# Patient Record
Sex: MALE | Race: WHITE | NOT HISPANIC OR LATINO | Employment: OTHER | URBAN - METROPOLITAN AREA
[De-identification: names, ages, dates, MRNs, and addresses within clinical notes are randomized per-mention and may not be internally consistent; named-entity substitution may affect disease eponyms.]

---

## 2017-03-30 RX ORDER — TRAMADOL HYDROCHLORIDE 50 MG/1
50 TABLET ORAL
Status: ON HOLD | COMMUNITY
End: 2017-04-05

## 2017-03-30 RX ORDER — LOSARTAN POTASSIUM 50 MG/1
50 TABLET ORAL EVERY MORNING
Status: ON HOLD | COMMUNITY
End: 2019-02-22 | Stop reason: CLARIF

## 2017-03-30 RX ORDER — ACARBOSE 25 MG/1
25 TABLET ORAL
Status: ON HOLD | COMMUNITY
End: 2019-02-22 | Stop reason: CLARIF

## 2017-03-30 RX ORDER — MELOXICAM 7.5 MG/1
7.5 TABLET ORAL 2 TIMES DAILY
Status: ON HOLD | COMMUNITY
End: 2017-04-05

## 2017-03-30 RX ORDER — ACETAMINOPHEN 325 MG/1
325-650 TABLET ORAL PRN
Status: ON HOLD | COMMUNITY
End: 2019-02-22 | Stop reason: CLARIF

## 2017-04-04 ENCOUNTER — APPOINTMENT (OUTPATIENT)
Dept: RADIOLOGY | Facility: MEDICAL CENTER | Age: 62
DRG: 497 | End: 2017-04-04
Attending: NEUROLOGICAL SURGERY
Payer: COMMERCIAL

## 2017-04-04 ENCOUNTER — HOSPITAL ENCOUNTER (INPATIENT)
Facility: MEDICAL CENTER | Age: 62
LOS: 1 days | DRG: 497 | End: 2017-04-05
Attending: NEUROLOGICAL SURGERY | Admitting: NEUROLOGICAL SURGERY
Payer: COMMERCIAL

## 2017-04-04 PROBLEM — M48.061 SPINAL STENOSIS, LUMBAR REGION, WITHOUT NEUROGENIC CLAUDICATION: Status: ACTIVE | Noted: 2017-04-04

## 2017-04-04 LAB — GLUCOSE BLD-MCNC: 107 MG/DL (ref 65–99)

## 2017-04-04 PROCEDURE — 160036 HCHG PACU - EA ADDL 30 MINS PHASE I: Performed by: NEUROLOGICAL SURGERY

## 2017-04-04 PROCEDURE — 160002 HCHG RECOVERY MINUTES (STAT): Performed by: NEUROLOGICAL SURGERY

## 2017-04-04 PROCEDURE — 770001 HCHG ROOM/CARE - MED/SURG/GYN PRIV*

## 2017-04-04 PROCEDURE — 160048 HCHG OR STATISTICAL LEVEL 1-5: Performed by: NEUROLOGICAL SURGERY

## 2017-04-04 PROCEDURE — 500122 HCHG BOVIE, BLADE: Performed by: NEUROLOGICAL SURGERY

## 2017-04-04 PROCEDURE — 72020 X-RAY EXAM OF SPINE 1 VIEW: CPT

## 2017-04-04 PROCEDURE — 01NB0ZZ RELEASE LUMBAR NERVE, OPEN APPROACH: ICD-10-PCS | Performed by: NEUROLOGICAL SURGERY

## 2017-04-04 PROCEDURE — 160009 HCHG ANES TIME/MIN: Performed by: NEUROLOGICAL SURGERY

## 2017-04-04 PROCEDURE — A9270 NON-COVERED ITEM OR SERVICE: HCPCS

## 2017-04-04 PROCEDURE — 700102 HCHG RX REV CODE 250 W/ 637 OVERRIDE(OP)

## 2017-04-04 PROCEDURE — 160031 HCHG SURGERY MINUTES - 1ST 30 MINS LEVEL 5: Performed by: NEUROLOGICAL SURGERY

## 2017-04-04 PROCEDURE — 500367 HCHG DRAIN KIT, HEMOVAC: Performed by: NEUROLOGICAL SURGERY

## 2017-04-04 PROCEDURE — 501423 HCHG SPONGE, SURGIFOAM 12X7: Performed by: NEUROLOGICAL SURGERY

## 2017-04-04 PROCEDURE — A4314 CATH W/DRAINAGE 2-WAY LATEX: HCPCS | Performed by: NEUROLOGICAL SURGERY

## 2017-04-04 PROCEDURE — 700111 HCHG RX REV CODE 636 W/ 250 OVERRIDE (IP)

## 2017-04-04 PROCEDURE — 502626 HCHG SURGIFLO HEMOSTATIC MATRIX 6ML: Performed by: NEUROLOGICAL SURGERY

## 2017-04-04 PROCEDURE — 502240 HCHG MISC OR SUPPLY RC 0272: Performed by: NEUROLOGICAL SURGERY

## 2017-04-04 PROCEDURE — A9270 NON-COVERED ITEM OR SERVICE: HCPCS | Performed by: NURSE PRACTITIONER

## 2017-04-04 PROCEDURE — 500881 HCHG PACK, EXTREMITY: Performed by: NEUROLOGICAL SURGERY

## 2017-04-04 PROCEDURE — A6404 STERILE GAUZE > 48 SQ IN: HCPCS | Performed by: NEUROLOGICAL SURGERY

## 2017-04-04 PROCEDURE — 500444 HCHG HEMOSTAT, SURGICEL 2X3: Performed by: NEUROLOGICAL SURGERY

## 2017-04-04 PROCEDURE — 160035 HCHG PACU - 1ST 60 MINS PHASE I: Performed by: NEUROLOGICAL SURGERY

## 2017-04-04 PROCEDURE — 700105 HCHG RX REV CODE 258: Performed by: NURSE PRACTITIONER

## 2017-04-04 PROCEDURE — 500331 HCHG COTTONOID, SURG PATTIE: Performed by: NEUROLOGICAL SURGERY

## 2017-04-04 PROCEDURE — 0QP004Z REMOVAL OF INTERNAL FIXATION DEVICE FROM LUMBAR VERTEBRA, OPEN APPROACH: ICD-10-PCS | Performed by: NEUROLOGICAL SURGERY

## 2017-04-04 PROCEDURE — A6222 GAUZE <=16 IN NO W/SAL W/O B: HCPCS | Performed by: NEUROLOGICAL SURGERY

## 2017-04-04 PROCEDURE — 500105 HCHG BONE MILL BM200: Performed by: NEUROLOGICAL SURGERY

## 2017-04-04 PROCEDURE — 700101 HCHG RX REV CODE 250

## 2017-04-04 PROCEDURE — 110371 HCHG SHELL REV 272: Performed by: NEUROLOGICAL SURGERY

## 2017-04-04 PROCEDURE — 501899 HCHG DURASEAL SEALANT SYSTEM 5ML: Performed by: NEUROLOGICAL SURGERY

## 2017-04-04 PROCEDURE — 700112 HCHG RX REV CODE 229: Performed by: NURSE PRACTITIONER

## 2017-04-04 PROCEDURE — A4606 OXYGEN PROBE USED W OXIMETER: HCPCS | Performed by: NEUROLOGICAL SURGERY

## 2017-04-04 PROCEDURE — 160042 HCHG SURGERY MINUTES - EA ADDL 1 MIN LEVEL 5: Performed by: NEUROLOGICAL SURGERY

## 2017-04-04 PROCEDURE — 500512 HCHG ENDO PEANUT: Performed by: NEUROLOGICAL SURGERY

## 2017-04-04 PROCEDURE — 500885 HCHG PACK, JACKSON TABLE: Performed by: NEUROLOGICAL SURGERY

## 2017-04-04 PROCEDURE — 82962 GLUCOSE BLOOD TEST: CPT

## 2017-04-04 PROCEDURE — 110382 HCHG SHELL REV 271: Performed by: NEUROLOGICAL SURGERY

## 2017-04-04 PROCEDURE — 503195 HCHG SEALER, BIPOLAR AQUAMANTYS: Performed by: NEUROLOGICAL SURGERY

## 2017-04-04 PROCEDURE — 501838 HCHG SUTURE GENERAL: Performed by: NEUROLOGICAL SURGERY

## 2017-04-04 PROCEDURE — 700102 HCHG RX REV CODE 250 W/ 637 OVERRIDE(OP): Performed by: NURSE PRACTITIONER

## 2017-04-04 DEVICE — DURASEAL SEALANT SYSTEM 5ML - (5/BX): Type: IMPLANTABLE DEVICE | Status: FUNCTIONAL

## 2017-04-04 RX ORDER — BACITRACIN ZINC 500 [USP'U]/G
OINTMENT TOPICAL
Status: DISCONTINUED | OUTPATIENT
Start: 2017-04-04 | End: 2017-04-04 | Stop reason: HOSPADM

## 2017-04-04 RX ORDER — POLYETHYLENE GLYCOL 3350 17 G/17G
1 POWDER, FOR SOLUTION ORAL 2 TIMES DAILY PRN
Status: DISCONTINUED | OUTPATIENT
Start: 2017-04-04 | End: 2017-04-05 | Stop reason: HOSPADM

## 2017-04-04 RX ORDER — ACARBOSE 50 MG/1
25 TABLET ORAL
Status: DISCONTINUED | OUTPATIENT
Start: 2017-04-05 | End: 2017-04-05 | Stop reason: HOSPADM

## 2017-04-04 RX ORDER — OXYMETAZOLINE HYDROCHLORIDE 0.05 G/100ML
2 SPRAY NASAL 2 TIMES DAILY PRN
Status: DISCONTINUED | OUTPATIENT
Start: 2017-04-04 | End: 2017-04-05 | Stop reason: HOSPADM

## 2017-04-04 RX ORDER — OXYCODONE HYDROCHLORIDE 5 MG/1
10-20 TABLET ORAL EVERY 4 HOURS PRN
Status: DISCONTINUED | OUTPATIENT
Start: 2017-04-04 | End: 2017-04-04

## 2017-04-04 RX ORDER — LIDOCAINE HYDROCHLORIDE 10 MG/ML
INJECTION, SOLUTION INFILTRATION; PERINEURAL
Status: COMPLETED
Start: 2017-04-04 | End: 2017-04-04

## 2017-04-04 RX ORDER — OXYCODONE HYDROCHLORIDE 10 MG/1
10 TABLET ORAL EVERY 4 HOURS PRN
Status: DISCONTINUED | OUTPATIENT
Start: 2017-04-04 | End: 2017-04-05 | Stop reason: HOSPADM

## 2017-04-04 RX ORDER — SODIUM CHLORIDE, SODIUM LACTATE, POTASSIUM CHLORIDE, CALCIUM CHLORIDE 600; 310; 30; 20 MG/100ML; MG/100ML; MG/100ML; MG/100ML
1000 INJECTION, SOLUTION INTRAVENOUS
Status: COMPLETED | OUTPATIENT
Start: 2017-04-04 | End: 2017-04-04

## 2017-04-04 RX ORDER — AMOXICILLIN 250 MG
1 CAPSULE ORAL
Status: DISCONTINUED | OUTPATIENT
Start: 2017-04-04 | End: 2017-04-05 | Stop reason: HOSPADM

## 2017-04-04 RX ORDER — METHOCARBAMOL 750 MG/1
750 TABLET, FILM COATED ORAL EVERY 8 HOURS PRN
Status: DISCONTINUED | OUTPATIENT
Start: 2017-04-04 | End: 2017-04-05 | Stop reason: HOSPADM

## 2017-04-04 RX ORDER — DOCUSATE SODIUM 100 MG/1
100 CAPSULE, LIQUID FILLED ORAL 2 TIMES DAILY
Status: DISCONTINUED | OUTPATIENT
Start: 2017-04-04 | End: 2017-04-05 | Stop reason: HOSPADM

## 2017-04-04 RX ORDER — DIPHENHYDRAMINE HYDROCHLORIDE 50 MG/ML
25 INJECTION INTRAMUSCULAR; INTRAVENOUS EVERY 6 HOURS PRN
Status: DISCONTINUED | OUTPATIENT
Start: 2017-04-04 | End: 2017-04-05 | Stop reason: HOSPADM

## 2017-04-04 RX ORDER — SODIUM CHLORIDE 9 MG/ML
INJECTION, SOLUTION INTRAVENOUS CONTINUOUS
Status: DISCONTINUED | OUTPATIENT
Start: 2017-04-04 | End: 2017-04-05 | Stop reason: HOSPADM

## 2017-04-04 RX ORDER — AMOXICILLIN 250 MG
1 CAPSULE ORAL NIGHTLY
Status: DISCONTINUED | OUTPATIENT
Start: 2017-04-04 | End: 2017-04-05 | Stop reason: HOSPADM

## 2017-04-04 RX ORDER — ACETAMINOPHEN 500 MG
1000 TABLET ORAL EVERY 6 HOURS
Status: DISCONTINUED | OUTPATIENT
Start: 2017-04-04 | End: 2017-04-05 | Stop reason: HOSPADM

## 2017-04-04 RX ORDER — CALCIUM CARBONATE 500 MG/1
500 TABLET, CHEWABLE ORAL 2 TIMES DAILY
Status: DISCONTINUED | OUTPATIENT
Start: 2017-04-04 | End: 2017-04-05 | Stop reason: HOSPADM

## 2017-04-04 RX ORDER — BACITRACIN 50000 [IU]/1
INJECTION, POWDER, FOR SOLUTION INTRAMUSCULAR
Status: DISCONTINUED | OUTPATIENT
Start: 2017-04-04 | End: 2017-04-04 | Stop reason: HOSPADM

## 2017-04-04 RX ORDER — ENEMA 19; 7 G/133ML; G/133ML
1 ENEMA RECTAL
Status: DISCONTINUED | OUTPATIENT
Start: 2017-04-04 | End: 2017-04-05 | Stop reason: HOSPADM

## 2017-04-04 RX ORDER — BUPIVACAINE HYDROCHLORIDE AND EPINEPHRINE 5; 5 MG/ML; UG/ML
INJECTION, SOLUTION EPIDURAL; INTRACAUDAL; PERINEURAL
Status: DISCONTINUED | OUTPATIENT
Start: 2017-04-04 | End: 2017-04-04 | Stop reason: HOSPADM

## 2017-04-04 RX ORDER — DEXTROSE MONOHYDRATE 25 G/50ML
25 INJECTION, SOLUTION INTRAVENOUS
Status: DISCONTINUED | OUTPATIENT
Start: 2017-04-04 | End: 2017-04-05 | Stop reason: HOSPADM

## 2017-04-04 RX ORDER — BISACODYL 10 MG
10 SUPPOSITORY, RECTAL RECTAL
Status: DISCONTINUED | OUTPATIENT
Start: 2017-04-04 | End: 2017-04-05 | Stop reason: HOSPADM

## 2017-04-04 RX ORDER — ONDANSETRON 4 MG/1
4 TABLET, ORALLY DISINTEGRATING ORAL EVERY 4 HOURS PRN
Status: DISCONTINUED | OUTPATIENT
Start: 2017-04-04 | End: 2017-04-05 | Stop reason: HOSPADM

## 2017-04-04 RX ORDER — ZOLPIDEM TARTRATE 5 MG/1
5 TABLET ORAL NIGHTLY PRN
Status: DISCONTINUED | OUTPATIENT
Start: 2017-04-04 | End: 2017-04-05 | Stop reason: HOSPADM

## 2017-04-04 RX ORDER — PROMETHAZINE HYDROCHLORIDE 25 MG/1
12.5-25 TABLET ORAL EVERY 8 HOURS PRN
Status: DISCONTINUED | OUTPATIENT
Start: 2017-04-04 | End: 2017-04-05 | Stop reason: HOSPADM

## 2017-04-04 RX ORDER — OXYCODONE HYDROCHLORIDE 10 MG/1
20 TABLET ORAL EVERY 4 HOURS PRN
Status: DISCONTINUED | OUTPATIENT
Start: 2017-04-04 | End: 2017-04-05 | Stop reason: HOSPADM

## 2017-04-04 RX ORDER — LOSARTAN POTASSIUM 50 MG/1
50 TABLET ORAL EVERY MORNING
Status: DISCONTINUED | OUTPATIENT
Start: 2017-04-06 | End: 2017-04-05 | Stop reason: HOSPADM

## 2017-04-04 RX ORDER — DIPHENHYDRAMINE HCL 25 MG
25 TABLET ORAL EVERY 6 HOURS PRN
Status: DISCONTINUED | OUTPATIENT
Start: 2017-04-04 | End: 2017-04-05 | Stop reason: HOSPADM

## 2017-04-04 RX ORDER — BUPIVACAINE HYDROCHLORIDE AND EPINEPHRINE 2.5; 5 MG/ML; UG/ML
INJECTION, SOLUTION INFILTRATION; PERINEURAL
Status: DISCONTINUED | OUTPATIENT
Start: 2017-04-04 | End: 2017-04-04 | Stop reason: HOSPADM

## 2017-04-04 RX ORDER — ONDANSETRON 2 MG/ML
4 INJECTION INTRAMUSCULAR; INTRAVENOUS EVERY 4 HOURS PRN
Status: DISCONTINUED | OUTPATIENT
Start: 2017-04-04 | End: 2017-04-05 | Stop reason: HOSPADM

## 2017-04-04 RX ORDER — OXYCODONE HCL 5 MG/5 ML
SOLUTION, ORAL ORAL
Status: COMPLETED
Start: 2017-04-04 | End: 2017-04-04

## 2017-04-04 RX ORDER — CEFAZOLIN SODIUM 2 G/100ML
2 INJECTION, SOLUTION INTRAVENOUS EVERY 8 HOURS
Status: COMPLETED | OUTPATIENT
Start: 2017-04-05 | End: 2017-04-05

## 2017-04-04 RX ADMIN — SODIUM CHLORIDE, SODIUM LACTATE, POTASSIUM CHLORIDE, CALCIUM CHLORIDE 1000 ML: 600; 310; 30; 20 INJECTION, SOLUTION INTRAVENOUS at 15:05

## 2017-04-04 RX ADMIN — OXYCODONE HYDROCHLORIDE 5 MG: 5 SOLUTION ORAL at 19:50

## 2017-04-04 RX ADMIN — LIDOCAINE HYDROCHLORIDE: 10 INJECTION, SOLUTION INFILTRATION; PERINEURAL at 15:05

## 2017-04-04 RX ADMIN — SODIUM CHLORIDE: 9 INJECTION, SOLUTION INTRAVENOUS at 22:02

## 2017-04-04 RX ADMIN — METHOCARBAMOL 750 MG: 750 TABLET ORAL at 23:11

## 2017-04-04 RX ADMIN — VITAMIN D, TAB 1000IU (100/BT) 5000 UNITS: 25 TAB at 22:02

## 2017-04-04 RX ADMIN — ANTACID TABLETS 500 MG: 500 TABLET, CHEWABLE ORAL at 22:03

## 2017-04-04 RX ADMIN — ACETAMINOPHEN 1000 MG: 500 TABLET, FILM COATED ORAL at 22:03

## 2017-04-04 RX ADMIN — DOCUSATE SODIUM 100 MG: 100 CAPSULE ORAL at 22:03

## 2017-04-04 RX ADMIN — STANDARDIZED SENNA CONCENTRATE AND DOCUSATE SODIUM 1 TABLET: 8.6; 5 TABLET, FILM COATED ORAL at 22:03

## 2017-04-04 ASSESSMENT — PAIN SCALES - GENERAL
PAINLEVEL_OUTOF10: 0
PAINLEVEL_OUTOF10: 1
PAINLEVEL_OUTOF10: 0
PAINLEVEL_OUTOF10: 3
PAINLEVEL_OUTOF10: 0
PAINLEVEL_OUTOF10: 4
PAINLEVEL_OUTOF10: 0

## 2017-04-04 ASSESSMENT — LIFESTYLE VARIABLES
HAVE PEOPLE ANNOYED YOU BY CRITICIZING YOUR DRINKING: NO
EVER HAD A DRINK FIRST THING IN THE MORNING TO STEADY YOUR NERVES TO GET RID OF A HANGOVER: NO
TOTAL SCORE: 0
HAVE YOU EVER FELT YOU SHOULD CUT DOWN ON YOUR DRINKING: NO
EVER_SMOKED: NEVER
EVER FELT BAD OR GUILTY ABOUT YOUR DRINKING: NO
TOTAL SCORE: 0
AVERAGE NUMBER OF DAYS PER WEEK YOU HAVE A DRINK CONTAINING ALCOHOL: 1
TOTAL SCORE: 0
CONSUMPTION TOTAL: NEGATIVE
ON A TYPICAL DAY WHEN YOU DRINK ALCOHOL HOW MANY DRINKS DO YOU HAVE: 1
ALCOHOL_USE: YES
HOW MANY TIMES IN THE PAST YEAR HAVE YOU HAD 5 OR MORE DRINKS IN A DAY: 0

## 2017-04-04 NOTE — IP AVS SNAPSHOT
" <p align=\"LEFT\"><IMG SRC=\"//[email or web address]$/[email or web address]\" alt=\"Image\" WIDTH=\"50%\" HEIGHT=\"200\" BORDER=\"\"></p>                   Name:Lucas Rojas  Medical Record Number:[id number]  CSN: [id number]    YOB: 1955   Age: 62 y.o.  Sex: male  HT:1.778 m (5' 10\") WT: 100.4 kg (221 lb 5.5 oz)          Admit Date: 4/4/2017     Discharge Date:   Today's Date: 4/5/2017  Attending Doctor:  Osmani Engel M.D.                  Allergies:  Review of patient's allergies indicates no known allergies.          Follow-up Information     1. Follow up with Osmani Engel M.D.. Schedule an appointment as soon as possible for a visit in 2 weeks.    Specialty:  Neurosurgery    Contact information    [address] [id number] [phone]           Medication List      Take these Medications        Instructions    acarbose 25 MG tablet   Commonly known as:  PRECOSE    Take 25 mg by mouth 3 times a day, with meals. Indications: Type 2 Diabetes   Dose:  25 mg       acetaminophen 325 MG Tabs   Commonly known as:  TYLENOL    Take 325-650 mg by mouth as needed. Indications: Pain   Dose:  325-650 mg       chlorpheniramine 4 MG Tabs   Commonly known as:  CHLOR-TRIMETRON    Take 4 mg by mouth at bedtime as needed for Allergies.   Dose:  4 mg       hydrocodone-acetaminophen 5-325 MG Tabs per tablet   Commonly known as:  NORCO    Take 1-2 Tabs by mouth every 6 hours as needed.   Dose:  1-2 Tab       losartan 50 MG Tabs   Commonly known as:  COZAAR    Take 50 mg by mouth every morning. Indications: High Blood Pressure   Dose:  50 mg       methocarbamol 750 MG Tabs   Commonly known as:  ROBAXIN    Take 1 Tab by mouth every 8 hours as needed.   Dose:  750 mg       oxymetazoline 0.05 % Soln   Commonly known as:  AFRIN    Spray 2 Sprays in nose 2 times a day as needed for Congestion.   Dose:  2 Spray       Testosterone 4 MG/24HR Pt24    Apply 1 Patch to skin as directed every bedtime.   Dose:  1 Patch      " tramadol 50 MG Tabs   Commonly known as:  ULTRAM    Take 1 Tab by mouth 1 time daily as needed for Severe Pain.   Dose:  50 mg       vitamin D3 5000 UNITS Caps    Take 1 Cap by mouth every day.   Dose:  1 Cap

## 2017-04-04 NOTE — IP AVS SNAPSHOT
[employer] Access Code: Activation code not generated  Current [employer] Status: Active    [employer]  A secure, online tool to manage your health information     [employer]’s [employer]® is a secure, online tool that connects you to your personalized health information from the privacy of your home -- day or night - making it very easy for you to manage your healthcare. Once the activation process is completed, you can even access your medical information using the [employer] naga, which is available for free in the Apple Naga store or Google Play store.     [employer] provides the following levels of access (as shown below):   My Chart Features   [facility] Primary Care Doctor [facility]  Specialists [facility]  Urgent  Care Non-[facility]  Primary Care  Doctor   Email your healthcare team securely and privately 24/7 X X X X   Manage appointments: schedule your next appointment; view details of past/upcoming appointments X      Request prescription refills. X      View recent personal medical records, including lab and immunizations X X X X   View health record, including health history, allergies, medications X X X X   Read reports about your outpatient visits, procedures, consult and ER notes X X X X   See your discharge summary, which is a recap of your hospital and/or ER visit that includes your diagnosis, lab results, and care plan. X X       How to register for [employer]:  1. Go to  [email or web address].  2. Click on the Sign Up Now box, which takes you to the New Member Sign Up page. You will need to provide the following information:  a. Enter your [employer] Access Code exactly as it appears at the top of this page. (You will not need to use this code after you’ve completed the sign-up process. If you do not sign up before the expiration date, you must request a new code.)   b. Enter your date of birth.   c. Enter your home email address.   d. Click Submit, and follow the next screen’s instructions.  3. Create a [employer] ID. This will  be your [employer] login ID and cannot be changed, so think of one that is secure and easy to remember.  4. Create a [employer] password. You can change your password at any time.  5. Enter your Password Reset Question and Answer. This can be used at a later time if you forget your password.   6. Enter your e-mail address. This allows you to receive e-mail notifications when new information is available in [employer].  7. Click Sign Up. You can now view your health information.    For assistance activating your [employer] account, call [phone]

## 2017-04-04 NOTE — IP AVS SNAPSHOT
" Home Care Instructions                                                                                                                  Name:Lucas Rojas  Medical Record Number:[id number]  CSN: [id number]    YOB: 1955   Age: 62 y.o.  Sex: male  HT:1.778 m (5' 10\") WT: 100.4 kg (221 lb 5.5 oz)          Admit Date: 4/4/2017     Discharge Date:   Today's Date: 4/5/2017  Attending Doctor:  Osmani Engel M.D.                  Allergies:  Review of patient's allergies indicates no known allergies.            Discharge Instructions       Discharge Instructions    Discharged to home by car with relative. Discharged via wheelchair, hospital escort: Yes.  Special equipment needed: Not Applicable    Be sure to schedule a follow-up appointment with your primary care doctor or any specialists as instructed.     Discharge Plan:   Diet Plan: Discussed  Activity Level: Discussed  Confirmed Follow up Appointment: Patient to Call and Schedule Appointment  Confirmed Symptoms Management: Discussed  Medication Reconciliation Updated: Yes  Influenza Vaccine Indication: Not indicated: Previously immunized this influenza season and > 8 years of age    I understand that a diet low in cholesterol, fat, and sodium is recommended for good health. Unless I have been given specific instructions below for another diet, I accept this instruction as my diet prescription.   Other diet: Diabetic    Special Instructions:   Continue to wear brace as directed, may remove for showering.   No bending, lifting or twisting.   Daily OTC laxative while on narcotics.   No aspirin, NSAID or blood thinners x 2 weeks.   Walk often to prevent DVT   Continue incentive spirometer hourly   F/u with [facility] in 2 weeks    · Is patient discharged on Warfarin / Coumadin?   No     · Is patient Post Blood Transfusion?  No    Depression / Suicide Risk    As you are discharged from this [facility] facility, it is important to learn how to " keep safe from harming yourself.    Recognize the warning signs:  · Abrupt changes in personality, positive or negative- including increase in energy   · Giving away possessions  · Change in eating patterns- significant weight changes-  positive or negative  · Change in sleeping patterns- unable to sleep or sleeping all the time   · Unwillingness or inability to communicate  · Depression  · Unusual sadness, discouragement and loneliness  · Talk of wanting to die  · Neglect of personal appearance   · Rebelliousness- reckless behavior  · Withdrawal from people/activities they love  · Confusion- inability to concentrate     If you or a loved one observes any of these behaviors or has concerns about self-harm, here's what you can do:  · Talk about it- your feelings and reasons for harming yourself  · Remove any means that you might use to hurt yourself (examples: pills, rope, extension cords, firearm)  · Get professional help from the community (Mental Health, Substance Abuse, psychological counseling)  · Do not be alone:Call your Safe Contact- someone whom you trust who will be there for you.  · Call your local CRISIS HOTLINE 447-5644 or 699-558-3322  · Call your local Children's Mobile Crisis Response Team [place] (942) 665-7153 or www.LawyerPaid  · Call the toll free National Suicide Prevention Hotlines   · National Suicide Prevention Lifeline 841-187-LIJK (9505)  · National Hope Line Network 800-SUICIDE (395-4805)        Follow-up Information     1. Follow up with Osmani Engel M.D.. Schedule an appointment as soon as possible for a visit in 2 weeks.    Specialty:  Neurosurgery    Contact information    [address] [address] [phone]           Discharge Medication Instructions:    Below are the medications your physician expects you to take upon discharge:    Review all your home medications and newly ordered medications with your doctor and/or pharmacist. Follow  medication instructions as directed by your doctor and/or pharmacist.    Please keep your medication list with you and share with your physician.               Medication List      START taking these medications        Instructions    hydrocodone-acetaminophen 5-325 MG Tabs per tablet   Commonly known as:  NORCO    Take 1-2 Tabs by mouth every 6 hours as needed.   Dose:  1-2 Tab       methocarbamol 750 MG Tabs   Last time this was given:  750 mg on 4/5/2017  7:36 AM   Commonly known as:  ROBAXIN    Take 1 Tab by mouth every 8 hours as needed.   Dose:  750 mg         CONTINUE taking these medications        Instructions    acarbose 25 MG tablet   Last time this was given:  25 mg on 4/5/2017  7:36 AM   Commonly known as:  PRECOSE    Take 25 mg by mouth 3 times a day, with meals. Indications: Type 2 Diabetes   Dose:  25 mg       acetaminophen 325 MG Tabs   Last time this was given:  1,000 mg on 4/5/2017  5:52 AM   Commonly known as:  TYLENOL    Take 325-650 mg by mouth as needed. Indications: Pain   Dose:  325-650 mg       chlorpheniramine 4 MG Tabs   Commonly known as:  CHLOR-TRIMETRON    Take 4 mg by mouth at bedtime as needed for Allergies.   Dose:  4 mg       losartan 50 MG Tabs   Commonly known as:  COZAAR    Take 50 mg by mouth every morning. Indications: High Blood Pressure   Dose:  50 mg       oxymetazoline 0.05 % Soln   Commonly known as:  AFRIN    Spray 2 Sprays in nose 2 times a day as needed for Congestion.   Dose:  2 Spray       Testosterone 4 MG/24HR Pt24    Apply 1 Patch to skin as directed every bedtime.   Dose:  1 Patch       tramadol 50 MG Tabs   Commonly known as:  ULTRAM    Take 1 Tab by mouth 1 time daily as needed for Severe Pain.   Dose:  50 mg       vitamin D3 5000 UNITS Caps    Take 1 Cap by mouth every day.   Dose:  1 Cap         STOP taking these medications     aspirin 81 MG tablet       meloxicam 7.5 MG Tabs   Commonly known as:  MOBIC               Instructions           Diet /  Nutrition:    Follow any diet instructions given to you by your doctor or the dietician, including how much salt (sodium) you are allowed each day.    If you are overweight, talk to your doctor about a weight reduction plan.    Activity:    Remain physically active following your doctor's instructions about exercise and activity.    Rest often.     Any time you become even a little tired or short of breath, SIT DOWN and rest.    Worsening Symptoms:    Report any of the following signs and symptoms to the doctor's office immediately:    *Pain of jaw, arm, or neck  *Chest pain not relieved by medication                               *Dizziness or loss of consciousness  *Difficulty breathing even when at rest   *More tired than usual                                       *Bleeding drainage or swelling of surgical site  *Swelling of feet, ankles, legs or stomach                 *Fever (>100ºF)  *Pink or blood tinged sputum  *Weight gain (3lbs/day or 5lbs /week)           *Shock from internal defibrillator (if applicable)  *Palpitations or irregular heartbeats                *Cool and/or numb extremities    Stroke Awareness    Common Risk Factors for Stroke include:    Age  Atrial Fibrillation  Carotid Artery Stenosis  Diabetes Mellitus  Excessive alcohol consumption  High blood pressure  Overweight   Physical inactivity  Smoking    Warning signs and symptoms of a stroke include:    *Sudden numbness or weakness of the face, arm or leg (especially on one side of the body).  *Sudden confusion, trouble speaking or understanding.  *Sudden trouble seeing in one or both eyes.  *Sudden trouble walking, dizziness, loss of balance or coordination.Sudden severe headache with no known cause.    It is very important to get treatment quickly when a stroke occurs. If you experience any of the above warning signs, call 911 immediately.                   Disclaimer         Quit Smoking / Tobacco Use:    I understand the use of any  tobacco products increases my chance of suffering from future heart disease or stroke and could cause other illnesses which may shorten my life. Quitting the use of tobacco products is the single most important thing I can do to improve my health. For further information on smoking / tobacco cessation call a Toll Free Quit Line at 1-409.466.6294 (*National Cancer [place]) or 1-242.826.7753 (American Lung Association) or you can access the web based program at www.lungusa.org.    [place] Tobacco Users Help Line:  (578) 825-4032       Toll Free: 1-517.394.3306  Quit Tobacco Program [facility] (268)[phone]    Crisis Hotline:    [place] Crisis Hotline:  5-346-MCRGHBT or 1-294.599.1524    [place] Crisis Hotline:    1-172.507.2977 or 151-030-8850    Discharge Survey:   Thank you for choosing [facility]. We hope we did everything we could to make your hospital stay a pleasant one. You may be receiving a phone survey and we would appreciate your time and participation in answering the questions. Your input is very valuable to us in our efforts to improve our service to our patients and their families.        My signature on this form indicates that:    1. I have reviewed and understand the above information.  2. My questions regarding this information have been answered to my satisfaction.  3. I have formulated a plan with my discharge nurse to obtain my prescribed medications for home.                  Disclaimer         __________________________________                     __________       ________                       Patient Signature                                                 Date                    Time

## 2017-04-05 VITALS
SYSTOLIC BLOOD PRESSURE: 147 MMHG | OXYGEN SATURATION: 97 % | RESPIRATION RATE: 16 BRPM | HEART RATE: 68 BPM | BODY MASS INDEX: 31.69 KG/M2 | DIASTOLIC BLOOD PRESSURE: 76 MMHG | WEIGHT: 221.34 LBS | TEMPERATURE: 97.2 F | HEIGHT: 70 IN

## 2017-04-05 LAB
ANION GAP SERPL CALC-SCNC: 8 MMOL/L (ref 0–11.9)
BUN SERPL-MCNC: 27 MG/DL (ref 8–22)
CALCIUM SERPL-MCNC: 9.1 MG/DL (ref 8.5–10.5)
CHLORIDE SERPL-SCNC: 107 MMOL/L (ref 96–112)
CO2 SERPL-SCNC: 22 MMOL/L (ref 20–33)
CREAT SERPL-MCNC: 1.14 MG/DL (ref 0.5–1.4)
ERYTHROCYTE [DISTWIDTH] IN BLOOD BY AUTOMATED COUNT: 41.8 FL (ref 35.9–50)
GFR SERPL CREATININE-BSD FRML MDRD: >60 ML/MIN/1.73 M 2
GLUCOSE BLD-MCNC: 104 MG/DL (ref 65–99)
GLUCOSE BLD-MCNC: 137 MG/DL (ref 65–99)
GLUCOSE BLD-MCNC: 193 MG/DL (ref 65–99)
GLUCOSE SERPL-MCNC: 163 MG/DL (ref 65–99)
HCT VFR BLD AUTO: 44.1 % (ref 42–52)
HGB BLD-MCNC: 14.8 G/DL (ref 14–18)
MCH RBC QN AUTO: 30.3 PG (ref 27–33)
MCHC RBC AUTO-ENTMCNC: 33.6 G/DL (ref 33.7–35.3)
MCV RBC AUTO: 90.4 FL (ref 81.4–97.8)
PLATELET # BLD AUTO: 200 K/UL (ref 164–446)
PMV BLD AUTO: 11.2 FL (ref 9–12.9)
POTASSIUM SERPL-SCNC: 4.6 MMOL/L (ref 3.6–5.5)
RBC # BLD AUTO: 4.88 M/UL (ref 4.7–6.1)
SODIUM SERPL-SCNC: 137 MMOL/L (ref 135–145)
WBC # BLD AUTO: 10.6 K/UL (ref 4.8–10.8)

## 2017-04-05 PROCEDURE — 36415 COLL VENOUS BLD VENIPUNCTURE: CPT

## 2017-04-05 PROCEDURE — G8979 MOBILITY GOAL STATUS: HCPCS | Mod: CI

## 2017-04-05 PROCEDURE — 97161 PT EVAL LOW COMPLEX 20 MIN: CPT

## 2017-04-05 PROCEDURE — A9270 NON-COVERED ITEM OR SERVICE: HCPCS | Performed by: NURSE PRACTITIONER

## 2017-04-05 PROCEDURE — G8987 SELF CARE CURRENT STATUS: HCPCS | Mod: CI

## 2017-04-05 PROCEDURE — G8978 MOBILITY CURRENT STATUS: HCPCS | Mod: CI

## 2017-04-05 PROCEDURE — 700102 HCHG RX REV CODE 250 W/ 637 OVERRIDE(OP): Performed by: NURSE PRACTITIONER

## 2017-04-05 PROCEDURE — G8989 SELF CARE D/C STATUS: HCPCS | Mod: CI

## 2017-04-05 PROCEDURE — G8988 SELF CARE GOAL STATUS: HCPCS | Mod: CI

## 2017-04-05 PROCEDURE — 700111 HCHG RX REV CODE 636 W/ 250 OVERRIDE (IP): Performed by: NURSE PRACTITIONER

## 2017-04-05 PROCEDURE — 700102 HCHG RX REV CODE 250 W/ 637 OVERRIDE(OP): Performed by: PHYSICIAN ASSISTANT

## 2017-04-05 PROCEDURE — G8980 MOBILITY D/C STATUS: HCPCS | Mod: CI

## 2017-04-05 PROCEDURE — 82962 GLUCOSE BLOOD TEST: CPT

## 2017-04-05 PROCEDURE — 80048 BASIC METABOLIC PNL TOTAL CA: CPT

## 2017-04-05 PROCEDURE — A9270 NON-COVERED ITEM OR SERVICE: HCPCS | Performed by: PHYSICIAN ASSISTANT

## 2017-04-05 PROCEDURE — 700112 HCHG RX REV CODE 229: Performed by: NURSE PRACTITIONER

## 2017-04-05 PROCEDURE — 85027 COMPLETE CBC AUTOMATED: CPT

## 2017-04-05 PROCEDURE — 97165 OT EVAL LOW COMPLEX 30 MIN: CPT

## 2017-04-05 RX ORDER — METHOCARBAMOL 750 MG/1
750 TABLET, FILM COATED ORAL EVERY 8 HOURS PRN
Qty: 90 TAB | Refills: 0 | Status: ON HOLD | OUTPATIENT
Start: 2017-04-05 | End: 2019-02-22 | Stop reason: CLARIF

## 2017-04-05 RX ORDER — TRAMADOL HYDROCHLORIDE 50 MG/1
50 TABLET ORAL
Qty: 90 TAB | Refills: 0 | Status: ON HOLD | OUTPATIENT
Start: 2017-04-05 | End: 2019-02-22 | Stop reason: CLARIF

## 2017-04-05 RX ORDER — HYDROCODONE BITARTRATE AND ACETAMINOPHEN 5; 325 MG/1; MG/1
1-2 TABLET ORAL EVERY 6 HOURS PRN
Status: DISCONTINUED | OUTPATIENT
Start: 2017-04-05 | End: 2017-04-05 | Stop reason: HOSPADM

## 2017-04-05 RX ORDER — HYDROCODONE BITARTRATE AND ACETAMINOPHEN 5; 325 MG/1; MG/1
1-2 TABLET ORAL EVERY 6 HOURS PRN
Qty: 90 TAB | Refills: 0 | Status: ON HOLD | OUTPATIENT
Start: 2017-04-05 | End: 2019-02-22 | Stop reason: CLARIF

## 2017-04-05 RX ADMIN — ACARBOSE 25 MG: 50 TABLET ORAL at 07:36

## 2017-04-05 RX ADMIN — HYDROCODONE BITARTRATE AND ACETAMINOPHEN 2 TABLET: 5; 325 TABLET ORAL at 11:30

## 2017-04-05 RX ADMIN — ANTACID TABLETS 500 MG: 500 TABLET, CHEWABLE ORAL at 07:36

## 2017-04-05 RX ADMIN — DOCUSATE SODIUM 100 MG: 100 CAPSULE ORAL at 07:36

## 2017-04-05 RX ADMIN — METHOCARBAMOL 750 MG: 750 TABLET ORAL at 07:36

## 2017-04-05 RX ADMIN — ACETAMINOPHEN 1000 MG: 500 TABLET, FILM COATED ORAL at 05:52

## 2017-04-05 RX ADMIN — CEFAZOLIN SODIUM 2 G: 2 INJECTION, SOLUTION INTRAVENOUS at 00:15

## 2017-04-05 RX ADMIN — INSULIN LISPRO 2 UNITS: 100 INJECTION, SOLUTION INTRAVENOUS; SUBCUTANEOUS at 00:16

## 2017-04-05 RX ADMIN — CEFAZOLIN SODIUM 2 G: 2 INJECTION, SOLUTION INTRAVENOUS at 07:35

## 2017-04-05 RX ADMIN — ACARBOSE 25 MG: 50 TABLET ORAL at 11:30

## 2017-04-05 RX ADMIN — VITAMIN D, TAB 1000IU (100/BT) 5000 UNITS: 25 TAB at 07:36

## 2017-04-05 ASSESSMENT — GAIT ASSESSMENTS
GAIT LEVEL OF ASSIST: SUPERVISED
DISTANCE (FEET): 200

## 2017-04-05 ASSESSMENT — ACTIVITIES OF DAILY LIVING (ADL): TOILETING: INDEPENDENT

## 2017-04-05 ASSESSMENT — PAIN SCALES - GENERAL
PAINLEVEL_OUTOF10: 3
PAINLEVEL_OUTOF10: 2
PAINLEVEL_OUTOF10: 3
PAINLEVEL_OUTOF10: 3

## 2017-04-05 NOTE — THERAPY
"Occupational Therapy Evaluation completed.   Functional Status:  Mod I w/bed mobility, CGA w/LB dressing spouse assisted, walking in room w/no AD mod I w/toilet txf and standing at sink for grooming aware of spinal prec able to apply   Plan of Care: Patient with no further skilled OT needs in the acute care setting at this time  Discharge Recommendations:  Equipment: No Equipment Needed. Post-acute therapy Currently anticipate no further skilled therapy needs once patient is discharged from the inpatient setting.    See \"Rehab Therapy-Acute\" Patient Summary Report for complete documentation.    "

## 2017-04-05 NOTE — PROGRESS NOTES
Pt refused bed alarm. Pt educated about safety and importance of bed alarm to prevent falls. Pt still refused bed alarm. Call light within reach. Pt educated to call before getting up. Hourly rounding in place.

## 2017-04-05 NOTE — PROGRESS NOTES
Late entry: Wife Lashae to PACU for short visit and drop off more patient belongings. Wife states to just leave her a message with update and assigned room number since she will be driving home long distance.   Current: Updated wife via her cell phone with room assignment, etc.

## 2017-04-05 NOTE — CARE PLAN
Problem: Safety  Goal: Will remain free from falls  Intervention: Implement fall precautions  Instructed pt to call for assistance as needed. Pt verbalized understanding. Call light w/in reach.      Problem: Pain Management  Goal: Pain level will decrease to patient’s comfort goal  Intervention: Follow pain managment plan developed in collaboration with patient and Interdisciplinary Team  Tylenol and robaxin given with +results.

## 2017-04-05 NOTE — PROGRESS NOTES
Back dressing changed. Norco given for pain. PIV d/c'd. Went over discharge instructions w/ pt, when to call the doctor, f/u appointments, medications, spinal precautions. Wife went to fill prescriptions at [facility]. Copy of discharge paperwork given to pt. Pt had no further questions, pt going to eat lunch and wait for prescriptions to be filled prior to d/c home.

## 2017-04-05 NOTE — PROGRESS NOTES
Pt seen and evaluated    Plan discussed with pt and nursing at bedside    Patient agrees with treatment plan.   OK to d/c home after cleared by PT    Full note to follow

## 2017-04-05 NOTE — PROGRESS NOTES
Pt refused bed alarm despite education. Instructed pt to call for assistance as needed, pt up self with steady gait. Pt verbalized understanding. Call light w/in reach.

## 2017-04-05 NOTE — CARE PLAN
Problem: Communication  Goal: The ability to communicate needs accurately and effectively will improve  Intervention: Landing patient and significant other/support system to call light to alert staff of needs  Pt. Educated to all of the following.

## 2017-04-05 NOTE — PROGRESS NOTES
Pt aaox4. RSIVASTAVA 5/5. Denies N/T. Up self with steady gait. Pt c/o minimal pain, tylenol and robaxin given with +results. +BS. Voiding w/o difficulty. Dressing CDI. Reviewed poc with pt-verbalized understanding. Call light in reach. Pt to d/c home today.

## 2017-04-05 NOTE — PROGRESS NOTES
Pt. Is AAOx4  Pt. Moves all extremeties,  Denies numbness and tingling  Complains of pain at lower back 1/10  Tylenol and robaxin given for pain  Pt. Up with standby assistance, pt. Ambulated approximately 200 ft. Tolerated well  18 ga in LFA Patent, site CDI  SCD's on  Treaded socks in place  Call light within reach    Pt. Dressing replaced due to the previous dressing being saturated. No evidence of a hematoma present.

## 2017-04-05 NOTE — PROGRESS NOTES
Pt. Declined for RN to order prn lumbar corset, pt. Given incentive spirometer, education provided on how to use it. Pt. Agreed to use 10 times per hour while awake.

## 2017-04-05 NOTE — DISCHARGE INSTRUCTIONS
Discharge Instructions    Discharged to home by car with relative. Discharged via wheelchair, hospital escort: Yes.  Special equipment needed: Not Applicable    Be sure to schedule a follow-up appointment with your primary care doctor or any specialists as instructed.     Discharge Plan:   Diet Plan: Discussed  Activity Level: Discussed  Confirmed Follow up Appointment: Patient to Call and Schedule Appointment  Confirmed Symptoms Management: Discussed  Medication Reconciliation Updated: Yes  Influenza Vaccine Indication: Not indicated: Previously immunized this influenza season and > 8 years of age    I understand that a diet low in cholesterol, fat, and sodium is recommended for good health. Unless I have been given specific instructions below for another diet, I accept this instruction as my diet prescription.   Other diet: Diabetic    Special Instructions:   Continue to wear brace as directed, may remove for showering.   No bending, lifting or twisting.   Daily OTC laxative while on narcotics.   No aspirin, NSAID or blood thinners x 2 weeks.   Walk often to prevent DVT   Continue incentive spirometer hourly   F/u with [facility] in 2 weeks    · Is patient discharged on Warfarin / Coumadin?   No     · Is patient Post Blood Transfusion?  No    Depression / Suicide Risk    As you are discharged from this [facility] facility, it is important to learn how to keep safe from harming yourself.    Recognize the warning signs:  · Abrupt changes in personality, positive or negative- including increase in energy   · Giving away possessions  · Change in eating patterns- significant weight changes-  positive or negative  · Change in sleeping patterns- unable to sleep or sleeping all the time   · Unwillingness or inability to communicate  · Depression  · Unusual sadness, discouragement and loneliness  · Talk of wanting to die  · Neglect of personal appearance   · Rebelliousness- reckless behavior  · Withdrawal  from people/activities they love  · Confusion- inability to concentrate     If you or a loved one observes any of these behaviors or has concerns about self-harm, here's what you can do:  · Talk about it- your feelings and reasons for harming yourself  · Remove any means that you might use to hurt yourself (examples: pills, rope, extension cords, firearm)  · Get professional help from the community (Mental Health, Substance Abuse, psychological counseling)  · Do not be alone:Call your Safe Contact- someone whom you trust who will be there for you.  · Call your local CRISIS HOTLINE 720-3684 or 135-715-8810  · Call your local Children's Mobile Crisis Response Team [place] (997) 012-2391 or www.Power Assure  · Call the toll free National Suicide Prevention Hotlines   · National Suicide Prevention Lifeline 187-395-PGSH (9133)  · National Hope Line Network 800-SUICIDE (504-7499)

## 2017-04-05 NOTE — THERAPY
"Physical Therapy Evaluation completed.   Bed Mobility:  Supine to Sit:  (up in chair)  Transfers: Sit to Stand: Supervised  Gait: Level Of Assist: Supervised with No Equipment Needed       Plan of Care: Patient with no further skilled PT needs in the acute care setting at this time  Discharge Recommendations: Equipment: No Equipment Needed. Post-acute therapy Discharge to home with outpatient or home health for additional skilled therapy services.    See \"Rehab Therapy-Acute\" Patient Summary Report for complete documentation.     "

## 2017-04-07 NOTE — OR SURGEON
Immediate Post-Operative Note      PreOp Diagnosis: Prior L4,5 TLIF, Right L3-S1 stenosis, right sided lumbar radiculopathy    PostOp Diagnosis: same    Procedure(s):  LUMBAR FUSION POSTERIOR - L4-5 FUSION/EXPLORATION  - Wound Class: Clean with Drain  LUMBAR LAMINECTOMY DISKECTOMY L3-S1 AND MEDIAL FACETECTOMY - Wound Class: Clean with Drain  FORAMINOTOMY - Wound Class: Clean with Drain  HARDWARE REMOVAL ORTHO - L4-5 - Wound Class: Clean with Drain    Surgeon(s):  Osmani Engel M.D.    Anesthesiologist/Type of Anesthesia:  Anesthesiologist: Jacobo Vela M.D./General    Surgical Staff:  Circulator: Elvia Elizabeth R.N.  Relief Scrub: Bob Palacios R.N.  Scrub Person: Billy Ventura [place]: Nikki Javed R.N.; Hazel Rosenberg  Radiology Technologist: Kaylyn Kelly    Specimen: none    Estimated Blood Loss: minimal    Findings: severe stenosis relieved    Complications: none        4/7/2017 7:33 AM Osmani Engel

## 2017-04-07 NOTE — OP REPORT
DATE OF SERVICE:  04/04/2017    PREOPERATIVE DIAGNOSES:  L4-L5 prior transforaminal lumbar interbody fusion   with placement of left-sided L4-L5 NuVasive pedicle screws and a right L4-L5   facet screw with significant right L4, L5 and S1 radiculopathies and L3-S1   stenosis.    POSTOPERATIVE DIAGNOSES:  L4-L5 prior transforaminal lumbar interbody fusion   with placement of left-sided L4-L5 NuVasive pedicle screws and a right L4-L5   facet screw with significant right L4, L5 and S1 radiculopathies and L3-S1   stenosis.    PRINCIPAL PROCEDURE PERFORMED:  L4-L5 posterior hardware removal, L4-L5   exploration of fusion performed as a distinct procedural service, and   unilateral L3, L4, L5 and S1 decompressive laminectomies, medial facetectomies   and foraminotomies with microdissection.    SURGEON:  Osmani Engel MD    ASSISTANT:  Denton Bird DNP    ANESTHESIA:  The procedure was performed under general anesthesia.    ANESTHESIOLOGIST:  Jacobo Vela MD    COMPLICATIONS:  There were no complications.    FINDINGS:  Include evidence of significant spinal canal compromise and no   evidence of pseudoarthrosis or screw hardware and evidence of a solid   arthrodesis at L4-L5.    For IV fluids, urine output, estimated blood loss, please see the anesthesia   record.  Please note, there were no changes to SSEPs and EMGs throughout the   case with remote monitoring was performed by neuro monitoring associates.    DISPOSITION:  Patient was extubated and moving all 4 extremities in the   recovery room.    CLINICAL HISTORY:  Patient presents with right-sided radiculopathy.  He   underwent a prior L4-L5 TLIF.  The patient exhausted conservative treatment, I   recommended return to surgery.  Risks, benefits, and options were discussed.    The patient understood.  He signed a written informed consent.  He desired to   proceed with surgery.    DESCRIPTION OF PROCEDURE:  He was brought to the operating room and placed   under  general anesthesia.  A Morgan catheter was placed.  He was turned prone   atop a radiolucent OSI table.  Perioperative IV antibiotics were administered.    The area was prepped and draped in the usual sterile fashion.  A time-out   was performed.  The old incision was opened.  The thoracolumbar fascia was   incised.  A sharp subperiosteal dissection was performed.  The laminectomy   defect was visualized.  The left L4 and L5 pedicle screws were removed.  There   was no evidence of loosening and no evidence of screw fracture.  The right   L4-L5 facet screw was removed.  There was no evidence of loosening or screw   fracture.  Next, using intraoperative live fluoroscopy, I used a 2 Kochers to   manipulate the inferior portion of the L4 spinous process and the S1 spinous   process.  I tried to show obvious motion at the L4-L5 interspace.  An   exploration of fusion was thus performed, there was no evidence of   pseudoarthrosis.  Furthermore, I was able to visualize the medial transverse   processes and there was clear evidence of bone growing between the L4-L5   transverse processes bilaterally.  Thus, effusion exploration was thus   performed.  Next, microscope was brought into view and I used an AMA drill bit   to perform a unilateral laminectomy on the right at L3, L4, L5 and S1.  There   was evidence of a disk recurrence at L4-L5 and significant amount of scar   tissue.  There were severe stenosis and a lateral recess at L3-L4, L4-L5 and   L5-S1.  After this area was decompressed, perfect hemostasis was achieved, the   wound was closed in anatomic layers and a sterile dressing was applied.    Prior to closure, instruments, sponge and needle counts were correct.  The   patient was extubated and found to be moving all 4 extremities in the recovery   room.       ____________________________________     MD LAWANDA NEELY / FRANSISCA    DD:  04/07/2017 07:33:20  DT:  04/07/2017 07:49:45    D#:  [id number]  Job#:  [id number]

## 2019-02-21 ENCOUNTER — HOSPITAL ENCOUNTER (OUTPATIENT)
Facility: MEDICAL CENTER | Age: 64
End: 2019-02-21
Payer: COMMERCIAL

## 2019-02-22 ENCOUNTER — HOSPITAL ENCOUNTER (INPATIENT)
Facility: MEDICAL CENTER | Age: 64
LOS: 2 days | DRG: 247 | End: 2019-02-24
Attending: EMERGENCY MEDICINE | Admitting: HOSPITALIST
Payer: MEDICARE

## 2019-02-22 ENCOUNTER — APPOINTMENT (OUTPATIENT)
Dept: RADIOLOGY | Facility: MEDICAL CENTER | Age: 64
DRG: 247 | End: 2019-02-22
Payer: MEDICARE

## 2019-02-22 DIAGNOSIS — I21.19 ACUTE MYOCARDIAL INFARCTION OF INFERIOR WALL (HCC): ICD-10-CM

## 2019-02-22 PROBLEM — M1A.9XX1 TOPHACEOUS GOUT: Status: ACTIVE | Noted: 2019-02-22

## 2019-02-22 PROBLEM — E66.09 CLASS 1 OBESITY DUE TO EXCESS CALORIES WITHOUT SERIOUS COMORBIDITY WITH BODY MASS INDEX (BMI) OF 31.0 TO 31.9 IN ADULT: Status: ACTIVE | Noted: 2019-02-22

## 2019-02-22 PROBLEM — I10 ESSENTIAL HYPERTENSION: Status: ACTIVE | Noted: 2019-02-22

## 2019-02-22 PROBLEM — I21.3 STEMI (ST ELEVATION MYOCARDIAL INFARCTION) (HCC): Status: ACTIVE | Noted: 2019-02-22

## 2019-02-22 PROBLEM — E78.5 DYSLIPIDEMIA: Status: ACTIVE | Noted: 2019-02-22

## 2019-02-22 PROBLEM — E66.811 CLASS 1 OBESITY DUE TO EXCESS CALORIES WITHOUT SERIOUS COMORBIDITY WITH BODY MASS INDEX (BMI) OF 31.0 TO 31.9 IN ADULT: Status: ACTIVE | Noted: 2019-02-22

## 2019-02-22 LAB
ALBUMIN SERPL BCP-MCNC: 4.6 G/DL (ref 3.2–4.9)
ALBUMIN/GLOB SERPL: 2 G/DL
ALP SERPL-CCNC: 131 U/L (ref 30–99)
ALT SERPL-CCNC: 39 U/L (ref 2–50)
ANION GAP SERPL CALC-SCNC: 7 MMOL/L (ref 0–11.9)
APTT PPP: 24.6 SEC (ref 24.7–36)
AST SERPL-CCNC: 94 U/L (ref 12–45)
BASOPHILS # BLD AUTO: 0.2 % (ref 0–1.8)
BASOPHILS # BLD: 0.03 K/UL (ref 0–0.12)
BILIRUB SERPL-MCNC: 0.3 MG/DL (ref 0.1–1.5)
BNP SERPL-MCNC: 35 PG/ML (ref 0–100)
BUN SERPL-MCNC: 26 MG/DL (ref 8–22)
CALCIUM SERPL-MCNC: 9.1 MG/DL (ref 8.5–10.5)
CHLORIDE SERPL-SCNC: 104 MMOL/L (ref 96–112)
CO2 SERPL-SCNC: 24 MMOL/L (ref 20–33)
CREAT SERPL-MCNC: 1.21 MG/DL (ref 0.5–1.4)
EKG IMPRESSION: NORMAL
EOSINOPHIL # BLD AUTO: 0.01 K/UL (ref 0–0.51)
EOSINOPHIL NFR BLD: 0.1 % (ref 0–6.9)
ERYTHROCYTE [DISTWIDTH] IN BLOOD BY AUTOMATED COUNT: 42.8 FL (ref 35.9–50)
GLOBULIN SER CALC-MCNC: 2.3 G/DL (ref 1.9–3.5)
GLUCOSE SERPL-MCNC: 147 MG/DL (ref 65–99)
HCT VFR BLD AUTO: 46.9 % (ref 42–52)
HGB BLD-MCNC: 14.6 G/DL (ref 14–18)
IMM GRANULOCYTES # BLD AUTO: 0.07 K/UL (ref 0–0.11)
IMM GRANULOCYTES NFR BLD AUTO: 0.5 % (ref 0–0.9)
INR PPP: 1.02 (ref 0.87–1.13)
LIPASE SERPL-CCNC: 29 U/L (ref 11–82)
LYMPHOCYTES # BLD AUTO: 0.69 K/UL (ref 1–4.8)
LYMPHOCYTES NFR BLD: 5.3 % (ref 22–41)
MAGNESIUM SERPL-MCNC: 2 MG/DL (ref 1.5–2.5)
MCH RBC QN AUTO: 28.2 PG (ref 27–33)
MCHC RBC AUTO-ENTMCNC: 31.1 G/DL (ref 33.7–35.3)
MCV RBC AUTO: 90.7 FL (ref 81.4–97.8)
MONOCYTES # BLD AUTO: 0.51 K/UL (ref 0–0.85)
MONOCYTES NFR BLD AUTO: 3.9 % (ref 0–13.4)
NEUTROPHILS # BLD AUTO: 11.75 K/UL (ref 1.82–7.42)
NEUTROPHILS NFR BLD: 90 % (ref 44–72)
NRBC # BLD AUTO: 0 K/UL
NRBC BLD-RTO: 0 /100 WBC
PLATELET # BLD AUTO: 251 K/UL (ref 164–446)
PMV BLD AUTO: 11.1 FL (ref 9–12.9)
POTASSIUM SERPL-SCNC: 5.1 MMOL/L (ref 3.6–5.5)
PROT SERPL-MCNC: 6.9 G/DL (ref 6–8.2)
PROTHROMBIN TIME: 13.5 SEC (ref 12–14.6)
RBC # BLD AUTO: 5.17 M/UL (ref 4.7–6.1)
SODIUM SERPL-SCNC: 135 MMOL/L (ref 135–145)
TROPONIN I SERPL-MCNC: 9.72 NG/ML (ref 0–0.04)
WBC # BLD AUTO: 13.1 K/UL (ref 4.8–10.8)

## 2019-02-22 PROCEDURE — 83880 ASSAY OF NATRIURETIC PEPTIDE: CPT

## 2019-02-22 PROCEDURE — 304952 HCHG R 2 PADS

## 2019-02-22 PROCEDURE — 99291 CRITICAL CARE FIRST HOUR: CPT | Performed by: INTERNAL MEDICINE

## 2019-02-22 PROCEDURE — 700102 HCHG RX REV CODE 250 W/ 637 OVERRIDE(OP): Performed by: INTERNAL MEDICINE

## 2019-02-22 PROCEDURE — 700105 HCHG RX REV CODE 258: Performed by: INTERNAL MEDICINE

## 2019-02-22 PROCEDURE — 307093 HCHG TR BAND RADIAL

## 2019-02-22 PROCEDURE — A9270 NON-COVERED ITEM OR SERVICE: HCPCS | Performed by: INTERNAL MEDICINE

## 2019-02-22 PROCEDURE — 02C03ZZ EXTIRPATION OF MATTER FROM CORONARY ARTERY, ONE ARTERY, PERCUTANEOUS APPROACH: ICD-10-PCS | Performed by: INTERNAL MEDICINE

## 2019-02-22 PROCEDURE — C1769 GUIDE WIRE: HCPCS

## 2019-02-22 PROCEDURE — 700102 HCHG RX REV CODE 250 W/ 637 OVERRIDE(OP): Performed by: HOSPITALIST

## 2019-02-22 PROCEDURE — C1894 INTRO/SHEATH, NON-LASER: HCPCS

## 2019-02-22 PROCEDURE — 700111 HCHG RX REV CODE 636 W/ 250 OVERRIDE (IP): Performed by: HOSPITALIST

## 2019-02-22 PROCEDURE — C1887 CATHETER, GUIDING: HCPCS

## 2019-02-22 PROCEDURE — 85025 COMPLETE CBC W/AUTO DIFF WBC: CPT

## 2019-02-22 PROCEDURE — 99223 1ST HOSP IP/OBS HIGH 75: CPT | Mod: 25 | Performed by: INTERNAL MEDICINE

## 2019-02-22 PROCEDURE — 99285 EMERGENCY DEPT VISIT HI MDM: CPT

## 2019-02-22 PROCEDURE — 700111 HCHG RX REV CODE 636 W/ 250 OVERRIDE (IP)

## 2019-02-22 PROCEDURE — 85610 PROTHROMBIN TIME: CPT

## 2019-02-22 PROCEDURE — C1874 STENT, COATED/COV W/DEL SYS: HCPCS

## 2019-02-22 PROCEDURE — 700111 HCHG RX REV CODE 636 W/ 250 OVERRIDE (IP): Performed by: INTERNAL MEDICINE

## 2019-02-22 PROCEDURE — 93010 ELECTROCARDIOGRAM REPORT: CPT | Performed by: INTERNAL MEDICINE

## 2019-02-22 PROCEDURE — 84484 ASSAY OF TROPONIN QUANT: CPT

## 2019-02-22 PROCEDURE — 700101 HCHG RX REV CODE 250

## 2019-02-22 PROCEDURE — 99223 1ST HOSP IP/OBS HIGH 75: CPT | Mod: AI | Performed by: HOSPITALIST

## 2019-02-22 PROCEDURE — 93005 ELECTROCARDIOGRAM TRACING: CPT | Performed by: EMERGENCY MEDICINE

## 2019-02-22 PROCEDURE — 93458 L HRT ARTERY/VENTRICLE ANGIO: CPT

## 2019-02-22 PROCEDURE — 85730 THROMBOPLASTIN TIME PARTIAL: CPT

## 2019-02-22 PROCEDURE — 770022 HCHG ROOM/CARE - ICU (200)

## 2019-02-22 PROCEDURE — 83690 ASSAY OF LIPASE: CPT

## 2019-02-22 PROCEDURE — 93010 ELECTROCARDIOGRAM REPORT: CPT | Mod: 76 | Performed by: INTERNAL MEDICINE

## 2019-02-22 PROCEDURE — B2111ZZ FLUOROSCOPY OF MULTIPLE CORONARY ARTERIES USING LOW OSMOLAR CONTRAST: ICD-10-PCS | Performed by: INTERNAL MEDICINE

## 2019-02-22 PROCEDURE — 99152 MOD SED SAME PHYS/QHP 5/>YRS: CPT

## 2019-02-22 PROCEDURE — 360979 HCHG DIAGNOSTIC CATH

## 2019-02-22 PROCEDURE — 92941 PRQ TRLML REVSC TOT OCCL AMI: CPT | Mod: RC | Performed by: INTERNAL MEDICINE

## 2019-02-22 PROCEDURE — A9270 NON-COVERED ITEM OR SERVICE: HCPCS | Performed by: HOSPITALIST

## 2019-02-22 PROCEDURE — 700111 HCHG RX REV CODE 636 W/ 250 OVERRIDE (IP): Performed by: EMERGENCY MEDICINE

## 2019-02-22 PROCEDURE — 99153 MOD SED SAME PHYS/QHP EA: CPT

## 2019-02-22 PROCEDURE — 99233 SBSQ HOSP IP/OBS HIGH 50: CPT | Performed by: INTERNAL MEDICINE

## 2019-02-22 PROCEDURE — 93005 ELECTROCARDIOGRAM TRACING: CPT | Performed by: HOSPITALIST

## 2019-02-22 PROCEDURE — A9270 NON-COVERED ITEM OR SERVICE: HCPCS

## 2019-02-22 PROCEDURE — C1757 CATH, THROMBECTOMY/EMBOLECT: HCPCS

## 2019-02-22 PROCEDURE — 93005 ELECTROCARDIOGRAM TRACING: CPT | Performed by: INTERNAL MEDICINE

## 2019-02-22 PROCEDURE — 4A023N7 MEASUREMENT OF CARDIAC SAMPLING AND PRESSURE, LEFT HEART, PERCUTANEOUS APPROACH: ICD-10-PCS | Performed by: INTERNAL MEDICINE

## 2019-02-22 PROCEDURE — 83735 ASSAY OF MAGNESIUM: CPT

## 2019-02-22 PROCEDURE — C9606 PERC D-E COR REVASC W AMI S: HCPCS | Mod: RC

## 2019-02-22 PROCEDURE — 700117 HCHG RX CONTRAST REV CODE 255: Performed by: INTERNAL MEDICINE

## 2019-02-22 PROCEDURE — 80053 COMPREHEN METABOLIC PANEL: CPT

## 2019-02-22 PROCEDURE — 027034Z DILATION OF CORONARY ARTERY, ONE ARTERY WITH DRUG-ELUTING INTRALUMINAL DEVICE, PERCUTANEOUS APPROACH: ICD-10-PCS | Performed by: INTERNAL MEDICINE

## 2019-02-22 PROCEDURE — 93458 L HRT ARTERY/VENTRICLE ANGIO: CPT | Mod: 26,59 | Performed by: INTERNAL MEDICINE

## 2019-02-22 PROCEDURE — 700102 HCHG RX REV CODE 250 W/ 637 OVERRIDE(OP)

## 2019-02-22 PROCEDURE — C1725 CATH, TRANSLUMIN NON-LASER: HCPCS

## 2019-02-22 PROCEDURE — 99152 MOD SED SAME PHYS/QHP 5/>YRS: CPT | Performed by: INTERNAL MEDICINE

## 2019-02-22 RX ORDER — POLYETHYLENE GLYCOL 3350 17 G/17G
1 POWDER, FOR SOLUTION ORAL
Status: DISCONTINUED | OUTPATIENT
Start: 2019-02-22 | End: 2019-02-24 | Stop reason: HOSPADM

## 2019-02-22 RX ORDER — MORPHINE SULFATE 4 MG/ML
2 INJECTION, SOLUTION INTRAMUSCULAR; INTRAVENOUS
Status: DISCONTINUED | OUTPATIENT
Start: 2019-02-22 | End: 2019-02-24 | Stop reason: HOSPADM

## 2019-02-22 RX ORDER — OXYCODONE HYDROCHLORIDE 5 MG/1
5 TABLET ORAL
Status: DISCONTINUED | OUTPATIENT
Start: 2019-02-22 | End: 2019-02-24 | Stop reason: HOSPADM

## 2019-02-22 RX ORDER — PRASUGREL 10 MG/1
TABLET, FILM COATED ORAL
Status: COMPLETED
Start: 2019-02-22 | End: 2019-02-22

## 2019-02-22 RX ORDER — LOSARTAN POTASSIUM 25 MG/1
50 TABLET ORAL ONCE
Status: COMPLETED | OUTPATIENT
Start: 2019-02-22 | End: 2019-02-22

## 2019-02-22 RX ORDER — ONDANSETRON 2 MG/ML
4 INJECTION INTRAMUSCULAR; INTRAVENOUS EVERY 4 HOURS PRN
Status: DISCONTINUED | OUTPATIENT
Start: 2019-02-22 | End: 2019-02-24 | Stop reason: HOSPADM

## 2019-02-22 RX ORDER — LOSARTAN POTASSIUM 25 MG/1
50 TABLET ORAL
Status: DISCONTINUED | OUTPATIENT
Start: 2019-02-22 | End: 2019-02-22

## 2019-02-22 RX ORDER — PRASUGREL 10 MG/1
60 TABLET, FILM COATED ORAL ONCE
Status: DISCONTINUED | OUTPATIENT
Start: 2019-02-22 | End: 2019-02-22

## 2019-02-22 RX ORDER — SODIUM CHLORIDE 9 MG/ML
INJECTION, SOLUTION INTRAVENOUS CONTINUOUS
Status: DISCONTINUED | OUTPATIENT
Start: 2019-02-22 | End: 2019-02-22

## 2019-02-22 RX ORDER — MIDAZOLAM HYDROCHLORIDE 1 MG/ML
INJECTION INTRAMUSCULAR; INTRAVENOUS
Status: COMPLETED
Start: 2019-02-22 | End: 2019-02-22

## 2019-02-22 RX ORDER — ONDANSETRON 4 MG/1
4 TABLET, ORALLY DISINTEGRATING ORAL EVERY 4 HOURS PRN
Status: DISCONTINUED | OUTPATIENT
Start: 2019-02-22 | End: 2019-02-24 | Stop reason: HOSPADM

## 2019-02-22 RX ORDER — RANITIDINE 150 MG/1
150 TABLET ORAL DAILY
COMMUNITY
End: 2023-09-12

## 2019-02-22 RX ORDER — BIVALIRUDIN 250 MG/5ML
INJECTION, POWDER, LYOPHILIZED, FOR SOLUTION INTRAVENOUS
Status: COMPLETED
Start: 2019-02-22 | End: 2019-02-22

## 2019-02-22 RX ORDER — LOSARTAN POTASSIUM 25 MG/1
50 TABLET ORAL 2 TIMES DAILY
Status: DISCONTINUED | OUTPATIENT
Start: 2019-02-22 | End: 2019-02-22

## 2019-02-22 RX ORDER — SODIUM CHLORIDE 9 MG/ML
INJECTION, SOLUTION INTRAVENOUS CONTINUOUS
Status: DISPENSED | OUTPATIENT
Start: 2019-02-22 | End: 2019-02-22

## 2019-02-22 RX ORDER — AMOXICILLIN 250 MG
2 CAPSULE ORAL 2 TIMES DAILY
Status: DISCONTINUED | OUTPATIENT
Start: 2019-02-22 | End: 2019-02-24 | Stop reason: HOSPADM

## 2019-02-22 RX ORDER — LIDOCAINE HYDROCHLORIDE 20 MG/ML
INJECTION, SOLUTION INFILTRATION; PERINEURAL
Status: COMPLETED
Start: 2019-02-22 | End: 2019-02-22

## 2019-02-22 RX ORDER — ALLOPURINOL 300 MG/1
300 TABLET ORAL DAILY
Status: DISCONTINUED | OUTPATIENT
Start: 2019-02-22 | End: 2019-02-24 | Stop reason: HOSPADM

## 2019-02-22 RX ORDER — BISACODYL 10 MG
10 SUPPOSITORY, RECTAL RECTAL
Status: DISCONTINUED | OUTPATIENT
Start: 2019-02-22 | End: 2019-02-24 | Stop reason: HOSPADM

## 2019-02-22 RX ORDER — HYDRALAZINE HYDROCHLORIDE 20 MG/ML
INJECTION INTRAMUSCULAR; INTRAVENOUS
Status: COMPLETED
Start: 2019-02-22 | End: 2019-02-22

## 2019-02-22 RX ORDER — ROSUVASTATIN CALCIUM 10 MG/1
10 TABLET, COATED ORAL DAILY
Status: ON HOLD | COMMUNITY
End: 2019-02-24

## 2019-02-22 RX ORDER — OXYCODONE HYDROCHLORIDE 5 MG/1
2.5 TABLET ORAL
Status: DISCONTINUED | OUTPATIENT
Start: 2019-02-22 | End: 2019-02-24 | Stop reason: HOSPADM

## 2019-02-22 RX ORDER — NITROGLYCERIN 20 MG/100ML
0-200 INJECTION INTRAVENOUS CONTINUOUS
Status: DISCONTINUED | OUTPATIENT
Start: 2019-02-22 | End: 2019-02-22

## 2019-02-22 RX ORDER — VERAPAMIL HYDROCHLORIDE 2.5 MG/ML
INJECTION, SOLUTION INTRAVENOUS
Status: COMPLETED
Start: 2019-02-22 | End: 2019-02-22

## 2019-02-22 RX ORDER — ALLOPURINOL 100 MG/1
100 TABLET ORAL DAILY
COMMUNITY

## 2019-02-22 RX ORDER — ACETAMINOPHEN 325 MG/1
650 TABLET ORAL EVERY 6 HOURS PRN
Status: DISCONTINUED | OUTPATIENT
Start: 2019-02-22 | End: 2019-02-24 | Stop reason: HOSPADM

## 2019-02-22 RX ORDER — ENALAPRILAT 1.25 MG/ML
1.25 INJECTION INTRAVENOUS EVERY 6 HOURS PRN
Status: DISCONTINUED | OUTPATIENT
Start: 2019-02-22 | End: 2019-02-24 | Stop reason: HOSPADM

## 2019-02-22 RX ORDER — ATORVASTATIN CALCIUM 80 MG/1
80 TABLET, FILM COATED ORAL
Status: DISCONTINUED | OUTPATIENT
Start: 2019-02-22 | End: 2019-02-24 | Stop reason: HOSPADM

## 2019-02-22 RX ORDER — ACETAMINOPHEN 325 MG/1
650 TABLET ORAL EVERY 6 HOURS PRN
Status: DISCONTINUED | OUTPATIENT
Start: 2019-02-22 | End: 2019-02-22

## 2019-02-22 RX ORDER — HEPARIN SODIUM,PORCINE 1000/ML
VIAL (ML) INJECTION
Status: COMPLETED
Start: 2019-02-22 | End: 2019-02-22

## 2019-02-22 RX ORDER — METOPROLOL TARTRATE 50 MG/1
50 TABLET, FILM COATED ORAL EVERY 8 HOURS
Status: DISCONTINUED | OUTPATIENT
Start: 2019-02-22 | End: 2019-02-24 | Stop reason: HOSPADM

## 2019-02-22 RX ORDER — PRASUGREL 10 MG/1
10 TABLET, FILM COATED ORAL DAILY
Status: DISCONTINUED | OUTPATIENT
Start: 2019-02-23 | End: 2019-02-24 | Stop reason: HOSPADM

## 2019-02-22 RX ADMIN — HYDRALAZINE HYDROCHLORIDE 10 MG: 20 INJECTION INTRAMUSCULAR; INTRAVENOUS at 02:14

## 2019-02-22 RX ADMIN — IOHEXOL 127 ML: 350 INJECTION, SOLUTION INTRAVENOUS at 02:14

## 2019-02-22 RX ADMIN — HEPARIN SODIUM: 1000 INJECTION, SOLUTION INTRAVENOUS; SUBCUTANEOUS at 01:34

## 2019-02-22 RX ADMIN — NITROGLYCERIN 30 MCG/MIN: 20 INJECTION INTRAVENOUS at 01:06

## 2019-02-22 RX ADMIN — HEPARIN SODIUM 2000 UNITS: 1000 INJECTION, SOLUTION INTRAVENOUS; SUBCUTANEOUS at 01:45

## 2019-02-22 RX ADMIN — MIDAZOLAM HYDROCHLORIDE 2 MG: 1 INJECTION, SOLUTION INTRAMUSCULAR; INTRAVENOUS at 01:52

## 2019-02-22 RX ADMIN — BIVALIRUDIN 250 MG: 250 INJECTION, POWDER, LYOPHILIZED, FOR SOLUTION INTRAVENOUS at 02:13

## 2019-02-22 RX ADMIN — METOPROLOL TARTRATE 25 MG: 25 TABLET, FILM COATED ORAL at 13:57

## 2019-02-22 RX ADMIN — LIDOCAINE HYDROCHLORIDE: 20 INJECTION, SOLUTION INFILTRATION; PERINEURAL at 01:34

## 2019-02-22 RX ADMIN — MIDAZOLAM 2 MG: 1 INJECTION INTRAMUSCULAR; INTRAVENOUS at 01:30

## 2019-02-22 RX ADMIN — LOSARTAN POTASSIUM 50 MG: 25 TABLET ORAL at 09:47

## 2019-02-22 RX ADMIN — FENTANYL CITRATE 25 MCG: 50 INJECTION, SOLUTION INTRAMUSCULAR; INTRAVENOUS at 02:13

## 2019-02-22 RX ADMIN — NITROGLYCERIN 1 INCH: 20 OINTMENT TOPICAL at 08:15

## 2019-02-22 RX ADMIN — LOSARTAN POTASSIUM 50 MG: 25 TABLET ORAL at 05:53

## 2019-02-22 RX ADMIN — NITROGLYCERIN 10 ML: 20 INJECTION INTRAVENOUS at 01:34

## 2019-02-22 RX ADMIN — SODIUM CHLORIDE: 9 INJECTION, SOLUTION INTRAVENOUS at 03:15

## 2019-02-22 RX ADMIN — PRASUGREL 60 MG: 10 TABLET, FILM COATED ORAL at 02:14

## 2019-02-22 RX ADMIN — METOPROLOL TARTRATE 50 MG: 50 TABLET ORAL at 15:45

## 2019-02-22 RX ADMIN — ENALAPRILAT 1.25 MG: 2.5 INJECTION INTRAVENOUS at 05:40

## 2019-02-22 RX ADMIN — METOPROLOL TARTRATE 25 MG: 25 TABLET, FILM COATED ORAL at 03:14

## 2019-02-22 RX ADMIN — ALLOPURINOL 300 MG: 300 TABLET ORAL at 05:43

## 2019-02-22 RX ADMIN — ATORVASTATIN CALCIUM 80 MG: 80 TABLET, FILM COATED ORAL at 03:14

## 2019-02-22 RX ADMIN — VERAPAMIL HYDROCHLORIDE 2.5 MG: 2.5 INJECTION, SOLUTION INTRAVENOUS at 01:35

## 2019-02-22 RX ADMIN — MORPHINE SULFATE 2 MG: 4 INJECTION INTRAVENOUS at 08:14

## 2019-02-22 RX ADMIN — ASPIRIN 81 MG: 81 TABLET, COATED ORAL at 05:43

## 2019-02-22 ASSESSMENT — LIFESTYLE VARIABLES
TOTAL SCORE: 0
CONSUMPTION TOTAL: NEGATIVE
EVER_SMOKED: NEVER
HOW MANY TIMES IN THE PAST YEAR HAVE YOU HAD 5 OR MORE DRINKS IN A DAY: 0
EVER HAD A DRINK FIRST THING IN THE MORNING TO STEADY YOUR NERVES TO GET RID OF A HANGOVER: NO
HAVE PEOPLE ANNOYED YOU BY CRITICIZING YOUR DRINKING: NO
HAVE YOU EVER FELT YOU SHOULD CUT DOWN ON YOUR DRINKING: NO
ON A TYPICAL DAY WHEN YOU DRINK ALCOHOL HOW MANY DRINKS DO YOU HAVE: 1
AVERAGE NUMBER OF DAYS PER WEEK YOU HAVE A DRINK CONTAINING ALCOHOL: .5
ALCOHOL_USE: YES
EVER FELT BAD OR GUILTY ABOUT YOUR DRINKING: NO
TOTAL SCORE: 0
TOTAL SCORE: 0

## 2019-02-22 ASSESSMENT — ENCOUNTER SYMPTOMS
WHEEZING: 0
MUSCULOSKELETAL NEGATIVE: 1
HEMOPTYSIS: 0
NAUSEA: 1
POLYDIPSIA: 0
VOMITING: 0
CHILLS: 0
HEADACHES: 0
PHOTOPHOBIA: 0
PALPITATIONS: 0
WEIGHT LOSS: 0
BRUISES/BLEEDS EASILY: 0
BLURRED VISION: 0
HEARTBURN: 0
DEPRESSION: 0
DOUBLE VISION: 0
TINGLING: 0
NEUROLOGICAL NEGATIVE: 1
COUGH: 0
SINUS PAIN: 0
EYES NEGATIVE: 1
ABDOMINAL PAIN: 0
MYALGIAS: 0
FOCAL WEAKNESS: 0
NAUSEA: 0
RESPIRATORY NEGATIVE: 1
DIZZINESS: 0
SPEECH CHANGE: 0
BLOOD IN STOOL: 0
NERVOUS/ANXIOUS: 0
DIAPHORESIS: 0
SENSORY CHANGE: 0
NERVOUS/ANXIOUS: 1
BACK PAIN: 0
NECK PAIN: 0
FEVER: 0
SHORTNESS OF BREATH: 0
SPUTUM PRODUCTION: 0
STRIDOR: 0

## 2019-02-22 ASSESSMENT — COGNITIVE AND FUNCTIONAL STATUS - GENERAL
DAILY ACTIVITIY SCORE: 24
SUGGESTED CMS G CODE MODIFIER MOBILITY: CH
SUGGESTED CMS G CODE MODIFIER DAILY ACTIVITY: CH
MOBILITY SCORE: 24

## 2019-02-22 ASSESSMENT — COPD QUESTIONNAIRES
COPD SCREENING SCORE: 2
IN THE PAST 12 MONTHS DO YOU DO LESS THAN YOU USED TO BECAUSE OF YOUR BREATHING PROBLEMS: DISAGREE/UNSURE
DURING THE PAST 4 WEEKS HOW MUCH DID YOU FEEL SHORT OF BREATH: NONE/LITTLE OF THE TIME
DO YOU EVER COUGH UP ANY MUCUS OR PHLEGM?: NO/ONLY WITH OCCASIONAL COLDS OR INFECTIONS
HAVE YOU SMOKED AT LEAST 100 CIGARETTES IN YOUR ENTIRE LIFE: NO/DON'T KNOW

## 2019-02-22 ASSESSMENT — PATIENT HEALTH QUESTIONNAIRE - PHQ9
1. LITTLE INTEREST OR PLEASURE IN DOING THINGS: NOT AT ALL
SUM OF ALL RESPONSES TO PHQ9 QUESTIONS 1 AND 2: 0
2. FEELING DOWN, DEPRESSED, IRRITABLE, OR HOPELESS: NOT AT ALL

## 2019-02-22 NOTE — ASSESSMENT & PLAN NOTE
Status post PCI RCA with total occlusion,   TPA administration at outside hospital.    Status post RCA stenting  Now on medical regimen as per Cardiology   The additional abnormal findings on angiography will be followed as an outpatient

## 2019-02-22 NOTE — CONSULTS
Cardiology Consultation Note      Date of service: 2/22/2019    Requesting Physician: Dr. Rudolph Heaton    Reason for consultation: Acute inferolateral ST elevation myocardial infarction    History of present illness    Lucas Rojas is a 63 y.o. male with history of hypertension who presented to [facility] with acute chest pain that started about 5 hours ago. It occured at rest, localized on the left side of his chest radiated down his left arm associated with diaphoresis and shortness of breath. The pain was particularly severe but he was found to have an ST segment elevation in the inferolateral leads with reciprocal ST depression in anterior leads.  He was given TNK a few hours ago and was transferred here for higher level of care.  The patient did receive aspirin as well but did not receive IV heparin.  At this time the patient states he is pain-free.   His electrocardiogram on arrival here by my review however continue to show up to 5 mm ST elevation in the inferior lead and several millimeter ST elevation in the head with reciprocal ST depression in V1 to V2 and lead I and aVL.    No Known Allergies     Home medication include;  Losartan 50 mg daily, tramadol 50 mg daily, Norco 5-3 25 1-2 as needed every 6, Robaxin 750 every 8-hour as needed, testosterone 4 mg patch at bedtime,   vitamin D, CPM 4 mg as needed and acetaminophen as needed      Current Facility-Administered Medications:   •  VERAPAMIL HCL 2.5 MG/ML IV SOLN, , , ,   •  nitroglycerin 50 mg in D5W 250 ml infusion, 0-200 mcg/min, Intravenous, Continuous, Rudolph Heaton M.D.  •  HEPARIN 1000 UNITS/ML OR USE ONLY, , , ,   •  LIDOCAINE HCL 2 % INJ SOLN, , , ,   •  HEPARIN (PORCINE) IN NACL 2-0.9 UNIT/ML-% INJ SOLN, , , ,   •  NITROGLYCERIN 2 MG IV SOLN, , , ,     Current Outpatient Prescriptions:   •  tramadol (ULTRAM) 50 MG Tab, Take 1 Tab by mouth 1 time daily as needed for Severe Pain., Disp: 90 Tab, Rfl: 0  •   hydrocodone-acetaminophen (NORCO) 5-325 MG Tab per tablet, Take 1-2 Tabs by mouth every 6 hours as needed., Disp: 90 Tab, Rfl: 0  •  methocarbamol (ROBAXIN) 750 MG Tab, Take 1 Tab by mouth every 8 hours as needed., Disp: 90 Tab, Rfl: 0  •  losartan (COZAAR) 50 MG Tab, Take 50 mg by mouth every morning. Indications: High Blood Pressure, Disp: , Rfl:   •  Cholecalciferol (VITAMIN D3) 5000 UNITS Cap, Take 1 Cap by mouth every day., Disp: , Rfl:   •  acarbose (PRECOSE) 25 MG tablet, Take 25 mg by mouth 3 times a day, with meals. Indications: Type 2 Diabetes, Disp: , Rfl:   •  acetaminophen (TYLENOL) 325 MG Tab, Take 325-650 mg by mouth as needed. Indications: Pain, Disp: , Rfl:   •  chlorpheniramine (CHLOR-TRIMETRON) 4 MG Tab, Take 4 mg by mouth at bedtime as needed for Allergies., Disp: , Rfl:   •  oxymetazoline (AFRIN) 0.05 % Solution, Spray 2 Sprays in nose 2 times a day as needed for Congestion., Disp: , Rfl:   •  Testosterone 4 MG/24HR PATCH 24 HR, Apply 1 Patch to skin as directed every bedtime., Disp: , Rfl:     Past Medical History:   Diagnosis Date   • Anesthesia     post op  nausea, last neurosurgery no problems   • Arthritis     back/right ankle and right wrist   • Diabetes (CMS-HCC)     oral meds   • Emphysema of lung (CMS-HCC)     per xray result. pt states no active disease   • High cholesterol    • Hypertension    • Pain 03-30-17     back, 7-8/10   • Snoring     sometimes, very mild, no sleep study       Past Surgical History:   Procedure Laterality Date   • LUMBAR FUSION POSTERIOR  4/4/2017    Procedure: LUMBAR FUSION POSTERIOR - L4-5 FUSION/EXPLORATION ;  Surgeon: Osmani Engel M.D.;  Location: SURGERY [facility];  Service:    • LUMBAR LAMINECTOMY DISKECTOMY Right 4/4/2017    Procedure: LUMBAR LAMINECTOMY DISKECTOMY L3-S1 AND MEDIAL FACETECTOMY;  Surgeon: Osmani Engel M.D.;  Location: SURGERY [facility];  Service:    • FORAMINOTOMY  4/4/2017    Procedure: FORAMINOTOMY;  Surgeon: Osmani WEBER  "LICHA Engel;  Location: SURGERY [facility];  Service:    • HARDWARE REMOVAL ORTHO  4/4/2017    Procedure: HARDWARE REMOVAL ORTHO - L4-5;  Surgeon: Osmani Engel M.D.;  Location: SURGERY [facility];  Service:    • LUMBAR FUSION POSTERIOR N/A 12/15/2015    Procedure: LUMBAR FUSION POSTERIOR INSTRUMENTED TLIF L4-5;  Surgeon: Osmani Engel M.D.;  Location: SURGERY [facility];  Service:    • LUMBAR LAMINECTOMY DISKECTOMY Left 12/15/2015    Procedure: LUMBAR LAMINECTOMY DISKECTOMY L1-2;  Surgeon: Osmani Engel M.D.;  Location: SURGERY [facility];  Service:    • SHOULDER SURGERY  2013    arthroscopic    • SHOULDER SURGERY  2012    right shoulder   • APPENDECTOMY  1976       Family history: Brother with MI in his 30s  Father with HTN.    Social History     Social History   • Marital status:      Spouse name: N/A   • Number of children: N/A   • Years of education: N/A     Occupational History   • Not on file.     Social History Main Topics   • Smoking status: Passive Smoke Exposure - Never Smoker     Packs/day: 2.00     Years: 18.00   • Smokeless tobacco: Never Used   • Alcohol use 0.0 oz/week      Comment: \"maybe 1 per week\"       ,    parents smoked growing up   • Drug use: No   • Sexual activity: Not on file     Other Topics Concern   • Not on file     Social History Narrative   • No narrative on file         Review of systems;    General: No fever, chills, no recent weight change, no weakness for her fatigue  HENT: No recent head trauma, no earache or discharge, no hearing loss or ringing in the ears, no toothache or sore throat, no neck pain  Eyes: No redness, no blurred vision or double vision  Heart: + chest pain and left arm pain No palpitation, no PND or orthopnea, no claudication, no leg swelling  Lung: No productive cough, no hemoptysis, + SOB  Abdomen: No abdominal pain, no nausea vomiting diarrhea or constipation, no blood in stool  : No dysuria, no frequency or hesitancy, no " "hematuria  Musculoskeletal: No myalgia, no back pain, some joint pain  Hematology: No easy bruising  Skin: No rash or itching  Neurological: No headache, no new focal weakness or numbness, no new memory loss  Psychological: Denies depression, anxiety or insomnia  All other review of systems are negative    Vitals: /108 mmHg HR 80    02/22/19 0000   Weight: 98.4 kg (217 lb)   Height: 1.778 m (5' 10\")     GENERAL not in acute distress, not dyspnic at rest, obese  Head atraumatic, normocephalic  Eyes EOMI  ENT neck supple, no JVD, no carotid bruits or thyromegaly  Lung good expansion, distant sound, no rales or wheezing  Heart RRR, normal rate, no murmur,gallop or rub  Abd soft, no tenderness, mass or bruits  Ext no edema  Skin no ecchymosis or petechiae  Musculoskeletal no deformity  Neuro grossly intact  Psych normal mood, normal affect    EKG as mentioned above,   CBC hemoglobin 14.6 platelet 251      Assessment and plans    1.  Acute inferolateral ST elevation myocardial infarction with failed thrombolytic therapy based on EKG  I advised him to undergo emergent cardiac catheterization and rescue percutaneous coronary intervention.  Risks and benefits of the procedure were discussed with him at length.  He understood except and was then wished to proceed.  He will be admitted to cardiac intensive care unit following procedure.  He will be placed on metoprolol, statin and antiplatelet therapy.  We will check his lipid and echocardiography.  Certainly, further recommendations will be based upon the finding of his catheterization.    2.  Hypertension, probably not well controlled  His blood pressure is quite elevated.  This could be in part from chest pain.  We will continue his losartan and also add metoprolol.  We will closely monitor his blood pressure and adjust medication accordingly.    Will follow the patient along with you.  Thank you consultation.  "

## 2019-02-22 NOTE — PROGRESS NOTES
12 hour chart check   SR-ST: 's with occasional PVC's and rare PAC's  PVC run   0.16 / 0.08 / 0.36

## 2019-02-22 NOTE — DISCHARGE PLANNING
Medical Social Work    Referral: STEMI    Intervention: LSW responded to code STEMI. Pt was BIB [facility] as a transfer from [facility]. Pt's name is Lucas Rojas ( [birth year]). Pt stated that he has already spoken to his wife, Lashae Rojas ([phone]), and Lashae will be driving to [employer] in the morning pending road conditions. SW offered to call pt's wife with update and pt declined stating he already spoke w/ spouse.     Plan: LSW will remain available as needed.

## 2019-02-22 NOTE — H&P
Hospital Medicine History & Physical Note    Date of Service  2/22/2019    Primary Care Physician  No primary care provider on file.    Consultants  Interventional Cardiology [facility]    Code Status  Full code     Chief Complaint  Chest pain    History of Presenting Illness  63 y.o. 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  He reports that approximately 8 PM, he began to feel unwell although nonspecifically so.  Within 15 minutes, he began to have severe chest pressure on the left side of his chest with radiated to his left arm.  This was constant in nature, and he immediately notified his wife taken to the emergency department for fear of a heart attack.  He reports associated anxiety, diaphoresis, nausea, with no other symptoms.  He denies any fever or chills, no shortness of breath, no headache, no abdominal pain, nausea vomiting, diarrhea or constipation.  He was brought to an emergency department at outside hospital, where he was found to have an ST elevated myocardial infarction, and he was started on tissue plasminogen activator as transferred to this facility was in process.  He was subsequently taken to the angiography suite and underwent percutaneous coronary intervention of the right coronary artery.  He currently feels much better.  He has no chest pain, he does have a slight headache, with no shortness of breath, no other complaints.    Review of Systems  Review of Systems   Constitutional: Positive for malaise/fatigue.   HENT: Negative.    Eyes: Negative.    Respiratory: Negative.    Cardiovascular: Positive for chest pain.   Gastrointestinal: Positive for nausea. Negative for abdominal pain and vomiting.   Genitourinary: Negative.    Musculoskeletal: Negative.    Skin: Negative.    Neurological: Negative.     Endo/Heme/Allergies: Negative.    Psychiatric/Behavioral: The patient is nervous/anxious.        Past Medical History   has a past medical history of Anesthesia; Arthritis; Diabetes (HCC); Emphysema of lung (HCC); High cholesterol; Hypertension; Pain (03-30-17); and Snoring.    Surgical History   has a past surgical history that includes shoulder surgery (2012); shoulder surgery (2013); appendectomy (1976); lumbar fusion posterior (N/A, 12/15/2015); lumbar laminectomy diskectomy (Left, 12/15/2015); lumbar fusion posterior (4/4/2017); lumbar laminectomy diskectomy (Right, 4/4/2017); foraminotomy (4/4/2017); and hardware removal ortho (4/4/2017).     Family History  family history includes Heart Attack in his brother; Hypertension in his father.     Social History   reports that he is a non-smoker but has been exposed to tobacco smoke. He has been exposed to 2.00 packs per day for the past 18.00 years. He has never used smokeless tobacco. He reports that he drinks alcohol. He reports that he does not use drugs.    Allergies  No Known Allergies    Medications  Prior to Admission Medications   Prescriptions Last Dose Informant Patient Reported? Taking?   Cholecalciferol (VITAMIN D3) 5000 UNITS Cap  Patient Yes No   Sig: Take 1 Cap by mouth every day.   Testosterone 4 MG/24HR PATCH 24 HR  Patient Yes No   Sig: Apply 1 Patch to skin as directed every bedtime.   acarbose (PRECOSE) 25 MG tablet  Patient Yes No   Sig: Take 25 mg by mouth 3 times a day, with meals. Indications: Type 2 Diabetes   acetaminophen (TYLENOL) 325 MG Tab  Patient Yes No   Sig: Take 325-650 mg by mouth as needed. Indications: Pain   chlorpheniramine (CHLOR-TRIMETRON) 4 MG Tab  Patient Yes No   Sig: Take 4 mg by mouth at bedtime as needed for Allergies.   hydrocodone-acetaminophen (NORCO) 5-325 MG Tab per tablet   No No   Sig: Take 1-2 Tabs by mouth every 6 hours as needed.   losartan (COZAAR) 50 MG Tab  Patient Yes No   Sig: Take 50 mg by mouth  every morning. Indications: High Blood Pressure   methocarbamol (ROBAXIN) 750 MG Tab   No No   Sig: Take 1 Tab by mouth every 8 hours as needed.   oxymetazoline (AFRIN) 0.05 % Solution  Patient Yes No   Sig: Spray 2 Sprays in nose 2 times a day as needed for Congestion.   tramadol (ULTRAM) 50 MG Tab   No No   Sig: Take 1 Tab by mouth 1 time daily as needed for Severe Pain.      Facility-Administered Medications: None       Physical Exam  Temp:  [36.8 °C (98.2 °F)-37.1 °C (98.7 °F)] 37.1 °C (98.7 °F)  Pulse:  [] 91  Resp:  [13-23] 14  BP: (147-157)/(88-91) 147/88  SpO2:  [94 %-97 %] 96 %    Physical Exam   Constitutional: He is oriented to person, place, and time. He appears well-developed and well-nourished. No distress.   HENT:   Head: Normocephalic and atraumatic.   Eyes: Pupils are equal, round, and reactive to light. Conjunctivae are normal.   Neck: Normal range of motion. Neck supple. No tracheal deviation present. No thyromegaly present.   Cardiovascular: Normal rate, regular rhythm and normal heart sounds.  Exam reveals no gallop and no friction rub.    No murmur heard.  Pulmonary/Chest: Effort normal and breath sounds normal. No respiratory distress. He has no wheezes.   Abdominal: Soft. Bowel sounds are normal. He exhibits no distension and no mass. There is no tenderness. There is no rebound and no guarding.   Musculoskeletal: Normal range of motion. He exhibits no edema.   Lymphadenopathy:     He has no cervical adenopathy.   Neurological: He is alert and oriented to person, place, and time. No cranial nerve deficit.   Skin: Skin is warm and dry. He is not diaphoretic.   Psychiatric: He has a normal mood and affect.   Nursing note and vitals reviewed.      Laboratory:  Recent Labs      02/22/19 0054   WBC  13.1*   RBC  5.17   HEMOGLOBIN  14.6   HEMATOCRIT  46.9   MCV  90.7   MCH  28.2   MCHC  31.1*   RDW  42.8   PLATELETCT  251   MPV  11.1     Recent Labs      02/22/19 0054   SODIUM  135    POTASSIUM  5.1   CHLORIDE  104   CO2  24   GLUCOSE  147*   BUN  26*   CREATININE  1.21   CALCIUM  9.1     Recent Labs      02/22/19 0054   ALTSGPT  39   ASTSGOT  94*   ALKPHOSPHAT  131*   TBILIRUBIN  0.3   LIPASE  29   GLUCOSE  147*     Recent Labs      02/22/19 0054   APTT  24.6*   INR  1.02     Recent Labs      02/22/19 0054   BNPBTYPENAT  35         Recent Labs      02/22/19 0054   TROPONINI  9.72*       Urinalysis:    No results found     Imaging:  EC-ECHOCARDIOGRAM COMPLETE W/O CONT    (Results Pending)   NO new imaging for review      Assessment/Plan:  I anticipate this patient will require at least two midnights for appropriate medical management, necessitating inpatient admission.    * STEMI (ST elevation myocardial infarction) (HCC)   Assessment & Plan    Status post PCI RCA with total occlusion, TPA administration at outside hospital.  Now on medical regimen as per Cardiology Dr. Lanier.  Will need further intervention of LAD in future (80-90% occlusion noted)     Class 1 obesity due to excess calories without serious comorbidity with body mass index (BMI) of 31.0 to 31.9 in adult   Assessment & Plan    Body mass index is 31.98 kg/m².       Essential hypertension   Assessment & Plan    Variable control with current medication regimen.  Will need follow-up given newly started BB therapy and ARB therapy.  Monitor.  PRN Medication for SBP >180mmhg     Dyslipidemia   Assessment & Plan    ON statin therapy, controlled.  Monitor.      Tophaceous gout   Assessment & Plan    Stable on allopurinol which will be continued.  Has had multiple removal surgeries for the same.          VTE prophylaxis: SCD

## 2019-02-22 NOTE — PROGRESS NOTES
Med rec complete per pt at bedside with list  Ok per Pt to discuss medications with visitor/s present  Allergies have been verified   No oral ABX within the last 30 days  Pt Home Pharmacy:[facility]

## 2019-02-22 NOTE — PROGRESS NOTES
0545 asked patient if he was experiencing any pain. Patient did admit he had been feeling some mild 2+ sharp pain over L side of chest. Declined mentioning it because he thought it didn't feel like anything when he came in. STAT EKG received 0549 shows new slight elevation in V5 and V6 with continued elevation in inferior leads.   Patient told to notify of any worsening/ changes in symptoms or decrease in pain.   Cardiology paged [phone]. Waiting for call back to give updates

## 2019-02-22 NOTE — CONSULTS
Critical Care Consultation    Date of consult: 2/22/2019    Referring Physician  No att. providers found    Reason for Consultation  Consulted for STEMI s/p cath    History of Presenting Illness  63 y.o. Male presented with chest pain.  He says it started at home, and he told his wife the need to go to the hospital.  He had diaphoresis, anxiety, nausea and chest discomfort.  He felt that he had heavy chest discomfort.  He was in his usual state of health the prior day.  He has a past medical history of hypertension, dyslipidemia, gout.  His pain did radiate to his left arm.  In the emergency room he was found to have an ST elevation MI, he was started on TPA, and transferred to this facility.  He was sent to cardiac catheterization lab and had a stent placed.  Currently he has some chest discomfort but says different than MI.  Given morphine and nitro paste and improved.    Interval updates on rounds this am:  On bivalirudin, stopping this am  S/p stent  No hx mi  Some chest pain  Nitro paste  Morphine prn  2 L nc  sbp adequate, keep < 160  Give vasotec  Received losartan and metoprolol  Hr adequate  Appropriate dual antiplatelets  Repeat mg  k adequate    Code Status  Full Code    Review of Systems  Review of Systems   Constitutional: Positive for malaise/fatigue. Negative for chills, diaphoresis, fever and weight loss.   HENT: Negative for congestion and sinus pain.    Eyes: Negative for blurred vision, double vision and photophobia.   Respiratory: Negative for cough, hemoptysis, sputum production, shortness of breath, wheezing and stridor.    Cardiovascular: Positive for chest pain. Negative for palpitations and leg swelling.   Gastrointestinal: Negative for blood in stool, heartburn, melena and vomiting.   Genitourinary: Negative for dysuria and urgency.   Musculoskeletal: Negative for back pain, myalgias and neck pain.   Skin: Negative for itching and rash.   Neurological: Negative for dizziness, tingling,  sensory change, speech change, focal weakness and headaches.   Endo/Heme/Allergies: Negative for polydipsia. Does not bruise/bleed easily.   Psychiatric/Behavioral: Negative for depression. The patient is not nervous/anxious.        Past Medical History   has a past medical history of Anesthesia; Arthritis; Diabetes (HCC); Emphysema of lung (HCC); High cholesterol; Hypertension; Pain (03-30-17); and Snoring.    Surgical History   has a past surgical history that includes shoulder surgery (2012); shoulder surgery (2013); appendectomy (1976); lumbar fusion posterior (N/A, 12/15/2015); lumbar laminectomy diskectomy (Left, 12/15/2015); lumbar fusion posterior (4/4/2017); lumbar laminectomy diskectomy (Right, 4/4/2017); foraminotomy (4/4/2017); and hardware removal ortho (4/4/2017).    Family History  family history includes Heart Attack in his brother; Hypertension in his father.    Social History   reports that he is a non-smoker but has been exposed to tobacco smoke. He has been exposed to 2.00 packs per day for the past 18.00 years. He has never used smokeless tobacco. He reports that he drinks alcohol. He reports that he does not use drugs.    Medications  Home Medications    **Home medications have not yet been reviewed for this encounter**       Current Facility-Administered Medications   Medication Dose Route Frequency Provider Last Rate Last Dose   • atorvastatin (LIPITOR) tablet 80 mg  80 mg Oral QHS Homero Olvera M.D.   80 mg at 02/22/19 0314   • aspirin EC (ECOTRIN) tablet 81 mg  81 mg Oral DAILY Homero Olvera M.D.   81 mg at 02/22/19 0543   • [START ON 2/23/2019] prasugrel (EFFIENT) tablet 10 mg  10 mg Oral DAILY Homero Olvera M.D.       • NS infusion   Intravenous Continuous Homero Olvera M.D. 100 mL/hr at 02/22/19 0315     • metoprolol (LOPRESSOR) tablet 25 mg  25 mg Oral Q8HRS Homero Olvera M.D.   25 mg at 02/22/19 0314   • enalaprilat (VASOTEC) injection 1.25 mg   1.25 mg Intravenous Q6HRS PRN Homero Olvera M.D.   1.25 mg at 02/22/19 0540   • losartan (COZAAR) tablet 50 mg  50 mg Oral Q DAY Homero Olvera M.D.   50 mg at 02/22/19 0553   • bivalirudin in saline infusion  0.2 mg/kg/hr Intravenous Continuous Homero Olvera M.D. 3.9 mL/hr at 02/22/19 0315 0.2 mg/kg/hr at 02/22/19 0315   • allopurinol (ZYLOPRIM) tablet 300 mg  300 mg Oral DAILY Armin Escalona M.D.   300 mg at 02/22/19 0543   • NS infusion   Intravenous Continuous Armin Escalona M.D.       • ondansetron (ZOFRAN) syringe/vial injection 4 mg  4 mg Intravenous Q4HRS PRN Armin Escalona M.D.       • ondansetron (ZOFRAN ODT) dispertab 4 mg  4 mg Oral Q4HRS PRN Armin Escalona M.D.       • senna-docusate (PERICOLACE or SENOKOT S) 8.6-50 MG per tablet 2 Tab  2 Tab Oral BID Armin Escalona M.D.        And   • polyethylene glycol/lytes (MIRALAX) PACKET 1 Packet  1 Packet Oral QDAY PRN Armin Escalona M.D.        And   • magnesium hydroxide (MILK OF MAGNESIA) suspension 30 mL  30 mL Oral QDAY PRN Armin Escalona M.D.        And   • bisacodyl (DULCOLAX) suppository 10 mg  10 mg Rectal QDAY PRN Armin Escalona M.D.       • acetaminophen (TYLENOL) tablet 650 mg  650 mg Oral Q6HRS PRN Armin Escalona M.D.       • Pharmacy Consult Request ...Pain Management Review 1 Each  1 Each Other PHARMACY TO DOSE Armin Escalona M.D.        And   • oxyCODONE immediate-release (ROXICODONE) tablet 2.5 mg  2.5 mg Oral Q3HRS PRN Armin Escalona M.D.        And   • oxyCODONE immediate-release (ROXICODONE) tablet 5 mg  5 mg Oral Q3HRS PRN Armin Escalona M.D.        And   • morphine (pf) 4 mg/ml injection 2 mg  2 mg Intravenous Q3HRS PRN Armin Escalona M.D.           Allergies  No Known Allergies    Vital Signs last 24 hours  Temp:  [36.8 °C (98.2 °F)-37.1 °C (98.7 °F)] 37.1 °C (98.7 °F)  Pulse:  [] 86  Resp:  [13-23] 16  BP: (147-157)/(88-91) 147/88  SpO2:  [94 %-97 %] 95 %    Physical Exam  Physical Exam    Constitutional: He is oriented to person, place, and time. He appears well-developed and well-nourished. He appears distressed.   Chest discomfort   HENT:   Head: Normocephalic and atraumatic.   Right Ear: External ear normal.   Left Ear: External ear normal.   Mouth/Throat: No oropharyngeal exudate.   Eyes: Pupils are equal, round, and reactive to light. Conjunctivae and EOM are normal. Right eye exhibits no discharge. Left eye exhibits no discharge.   Neck: No JVD present. No tracheal deviation present.   Cardiovascular: Normal rate, regular rhythm and normal heart sounds.    No murmur heard.  Pulmonary/Chest: Effort normal and breath sounds normal. No stridor. No respiratory distress. He has no wheezes. He has no rales. He exhibits no tenderness.   Abdominal: He exhibits no mass. There is no tenderness. There is no rebound and no guarding.   Musculoskeletal: He exhibits no edema, tenderness or deformity.   Neurological: He is alert and oriented to person, place, and time. He displays normal reflexes. No cranial nerve deficit. Coordination normal.   Skin: Skin is warm and dry. No rash noted. He is not diaphoretic. No erythema.   Psychiatric: He has a normal mood and affect. His behavior is normal.   Nursing note and vitals reviewed.      Fluids    Intake/Output Summary (Last 24 hours) at 19 0705  Last data filed at 19 0600   Gross per 24 hour   Intake            897.1 ml   Output             1300 ml   Net           -402.9 ml       Laboratory  Recent Results (from the past 48 hour(s))   EKG    Collection Time: 19 12:51 AM   Result Value Ref Range    Report       [facility]    Test Date:  2019  Pt Name:    JANEEN PRO                 Department: ER  MRN:        [id number]                      Room:       TRAUMA - EXAM 1  Gender:     Male                         Technician: [id number]  :        1955                   Requested By:ER TRIAGE  PROTOCOL  Order #:    [id number]                    Reading MD: RONNIE SAINI MD    Measurements  Intervals                                Axis  Rate:       76                           P:          40  AL:         188                          QRS:        56  QRSD:       90                           T:          105  QT:         336  QTc:        378    Interpretive Statements  Normal sinus rhythm with a ventricular rate of 76, normal QRS, normal  intervals,  ST segment elevation inferiorly and laterally as well as ST segment  depression  anteriorly consistent with an acute myocardial infarction    Electronically Signed On 2- 1:02:17 PST by RONNIE SAINI MD     TROPONIN    Collection Time: 02/22/19 12:54 AM   Result Value Ref Range    Troponin I 9.72 (H) 0.00 - 0.04 ng/mL   BTYPE NATRIURETIC PEPTIDE    Collection Time: 02/22/19 12:54 AM   Result Value Ref Range    B Natriuretic Peptide 35 0 - 100 pg/mL   CBC WITH DIFFERENTIAL    Collection Time: 02/22/19 12:54 AM   Result Value Ref Range    WBC 13.1 (H) 4.8 - 10.8 K/uL    RBC 5.17 4.70 - 6.10 M/uL    Hemoglobin 14.6 14.0 - 18.0 g/dL    Hematocrit 46.9 42.0 - 52.0 %    MCV 90.7 81.4 - 97.8 fL    MCH 28.2 27.0 - 33.0 pg    MCHC 31.1 (L) 33.7 - 35.3 g/dL    RDW 42.8 35.9 - 50.0 fL    Platelet Count 251 164 - 446 K/uL    MPV 11.1 9.0 - 12.9 fL    Neutrophils-Polys 90.00 (H) 44.00 - 72.00 %    Lymphocytes 5.30 (L) 22.00 - 41.00 %    Monocytes 3.90 0.00 - 13.40 %    Eosinophils 0.10 0.00 - 6.90 %    Basophils 0.20 0.00 - 1.80 %    Immature Granulocytes 0.50 0.00 - 0.90 %    Nucleated RBC 0.00 /100 WBC    Neutrophils (Absolute) 11.75 (H) 1.82 - 7.42 K/uL    Lymphs (Absolute) 0.69 (L) 1.00 - 4.80 K/uL    Monos (Absolute) 0.51 0.00 - 0.85 K/uL    Eos (Absolute) 0.01 0.00 - 0.51 K/uL    Baso (Absolute) 0.03 0.00 - 0.12 K/uL    Immature Granulocytes (abs) 0.07 0.00 - 0.11 K/uL    NRBC (Absolute) 0.00 K/uL   COMP METABOLIC PANEL    Collection Time: 02/22/19 12:54  AM   Result Value Ref Range    Sodium 135 135 - 145 mmol/L    Potassium 5.1 3.6 - 5.5 mmol/L    Chloride 104 96 - 112 mmol/L    Co2 24 20 - 33 mmol/L    Anion Gap 7.0 0.0 - 11.9    Glucose 147 (H) 65 - 99 mg/dL    Bun 26 (H) 8 - 22 mg/dL    Creatinine 1.21 0.50 - 1.40 mg/dL    Calcium 9.1 8.5 - 10.5 mg/dL    AST(SGOT) 94 (H) 12 - 45 U/L    ALT(SGPT) 39 2 - 50 U/L    Alkaline Phosphatase 131 (H) 30 - 99 U/L    Total Bilirubin 0.3 0.1 - 1.5 mg/dL    Albumin 4.6 3.2 - 4.9 g/dL    Total Protein 6.9 6.0 - 8.2 g/dL    Globulin 2.3 1.9 - 3.5 g/dL    A-G Ratio 2.0 g/dL   LIPASE    Collection Time: 19 12:54 AM   Result Value Ref Range    Lipase 29 11 - 82 U/L   PROTHROMBIN TIME    Collection Time: 19 12:54 AM   Result Value Ref Range    PT 13.5 12.0 - 14.6 sec    INR 1.02 0.87 - 1.13   APTT    Collection Time: 19 12:54 AM   Result Value Ref Range    APTT 24.6 (L) 24.7 - 36.0 sec   ESTIMATED GFR    Collection Time: 19 12:54 AM   Result Value Ref Range    GFR If African American >60 >60 mL/min/1.73 m 2    GFR If Non African American >60 >60 mL/min/1.73 m 2   EKG STAT    Collection Time: 19  3:45 AM   Result Value Ref Range    Report       [facility]    Test Date:  2019  Pt Name:    JANEEN PRO                 Department: 161  MRN:        [id number]                      Room:       T605  Gender:     Male                         Technician: ABEL  :        1955                   Requested By:YASMINE WHITTAKER  Order #:    [id number]                    Reading MD:    Measurements  Intervals                                Axis  Rate:       85                           P:          57  NH:         166                          QRS:        8  QRSD:       96                           T:          -35  QT:         353  QTc:        420    Interpretive Statements  SINUS RHYTHM  INFERIOR INFARCT, RECENT  Compared to ECG 2019 00:51:50  ST (T wave) deviation no longer  present  Myocardial infarct finding still present     EKG    Collection Time: 19  5:49 AM   Result Value Ref Range    Report       [facility]    Test Date:  2019  Pt Name:    JANEEN PRO                 Department: 161  MRN:        [id number]                      Room:       T605  Gender:     Male                         Technician: ABEL  :        1955                   Requested By:MELI SILVEIRA  Order #:    [id number]                    Reading MD:    Measurements  Intervals                                Axis  Rate:       90                           P:          43  HI:         165                          QRS:        -11  QRSD:       98                           T:          -34  QT:         363  QTc:        444    Interpretive Statements  SINUS RHYTHM  INFERIOR INFARCT, RECENT  LATERAL LEADS ARE ALSO INVOLVED  Compared to ECG 2019 03:45:49  No significant changes         Imaging  EC-ECHOCARDIOGRAM COMPLETE W/O CONT    (Results Pending)   Chest xray reviewed, bilateral edema, no consolidations    Assessment/Plan  * STEMI (ST elevation myocardial infarction) (HCC)   Assessment & Plan    S/p stent, presented with stemi  Chest discomfort  Weaning bilivirudin  Statin  Metoprolol  Nitro prn  Morphine prn  vasotec for sbp > 160  Inferior mi  Dual anti platelet therapy     Class 1 obesity due to excess calories without serious comorbidity with body mass index (BMI) of 31.0 to 31.9 in adult   Assessment & Plan    Contributory to heart disease   on diet prior to discharge     Essential hypertension   Assessment & Plan    Keep sbp < 160  vasotec prn  On metoprolol  On verapamil outpt     Dyslipidemia   Assessment & Plan    Continue statin     Tophaceous gout   Assessment & Plan    On allopurinol outpt  No acute episode         Discussed patient condition and risk of morbidity and/or mortality with Hospitalist, RN, RT, Therapies, Pharmacy, , Charge nurse / hot rounds,  Patient and cardiology.      The patient remains critically ill.  He is on bilavirudin drip, weaning off.  Post STEMI with stent placed.  Ongoing chest discomfort, he is at high risk for recurrently coronary occlusion and resulting arrythmia and/or cardiopulmonary arrest, including death, without active critical care monitoring and management.  Critical care time = 32 minutes in directly providing and coordinating critical care and extensive data review.  No time overlap and excludes procedures.

## 2019-02-22 NOTE — PROGRESS NOTES
Cardiology Follow Up Progress Note    Date of Service  2/22/2019    Attending Physician  CATHERINE Agarwal.*    Chief Complaint   Inferior ST elevation MI    HPI  Lucas Rojas is a 63 y.o. male admitted 2/22/2019 with with chest pain, found to have inferior ST elevation MI, taken to the Cath Lab emergently.  He had a stent placed to the culprit lesion which was the RCA.  He has residual 80-90% stenosis in the mid and 40-50% in the circumflex.    Strong family history of premature coronary artery disease in his brother.    Has hypertension and hyperlipidemia.  Has gout.    Interim Events  Telemetry-sinus, no sustained VT, some PVCs, frequent PACs  Had chest pain this morning, now resolved.  Blood pressure a little elevated  Echo pending  Has Nitropaste  Placed on Effient  Platelets 251, hemoglobin 14.6, creatinine 1.21, potassium 5.1  Troponin 9.72, BNP 35    Review of Systems  Review of Systems   Constitutional: Negative for chills and fever.   Gastrointestinal: Negative for nausea and vomiting.       Vital signs in last 24 hours  Temp:  [36.8 °C (98.2 °F)-37.1 °C (98.7 °F)] 37.1 °C (98.7 °F)  Pulse:  [] 86  Resp:  [13-23] 16  BP: (147-157)/(88-91) 147/88  SpO2:  [94 %-97 %] 95 %    Physical Exam  Physical Exam   General: No acute distress. Well nourished.  HEENT: EOM grossly intact, no scleral icterus, no pharyngeal erythema.   Neck:  No JVD, no bruits, trachea midline  CVS: RRR. Normal S1, S2. No M/R/G. No LE edema.  2+ radial pulses, 2+ PT pulses  Resp: CTAB. No wheezing or crackles/rhonchi. Normal respiratory effort.  Abdomen: Soft, NT, no kortney hepatomegaly, obese.  MSK/Ext: No clubbing or cyanosis.  Skin: Warm and dry, no rashes.  Neurological: CN III-XII grossly intact. No focal deficits.   Psych: A&O x 3, appropriate affect, good judgement      Lab Review  Lab Results   Component Value Date/Time    WBC 13.1 (H) 02/22/2019 12:54 AM    RBC 5.17 02/22/2019 12:54 AM    HEMOGLOBIN 14.6  02/22/2019 12:54 AM    HEMATOCRIT 46.9 02/22/2019 12:54 AM    MCV 90.7 02/22/2019 12:54 AM    MCH 28.2 02/22/2019 12:54 AM    MCHC 31.1 (L) 02/22/2019 12:54 AM    MPV 11.1 02/22/2019 12:54 AM      Lab Results   Component Value Date/Time    SODIUM 135 02/22/2019 12:54 AM    POTASSIUM 5.1 02/22/2019 12:54 AM    CHLORIDE 104 02/22/2019 12:54 AM    CO2 24 02/22/2019 12:54 AM    GLUCOSE 147 (H) 02/22/2019 12:54 AM    BUN 26 (H) 02/22/2019 12:54 AM    CREATININE 1.21 02/22/2019 12:54 AM      Lab Results   Component Value Date/Time    ASTSGOT 94 (H) 02/22/2019 12:54 AM    ALTSGPT 39 02/22/2019 12:54 AM     Lab Results   Component Value Date/Time    TROPONINI 9.72 (H) 02/22/2019 12:54 AM       Recent Labs      02/22/19   0054   BNPBTYPENAT  35       Cardiac Imaging and Procedures Review  EKG:  My personal interpretation of the EKG dated,  2/22/2019 is sinus, rate 76, lateral ST elevation with reciprocal changes    EKG 2/22/2019  Sinus, rate 90, inferior Q waves with persistent inferior and lateral ST elevation though improved    Echocardiogram: Pending    Cardiac Catheterization: 12/21/2019  1.  Acute inferolateral ST elevation myocardial infarction with failed thrombolytic therapy secondary to total occlusion of distal right coronary artery  2.  Status post stenting of the distal right coronary artery with 4x 20 mm Synergy drug-eluting stent with no significant residual stenosis DAPHNE-3 flow  3.  80-90% stenosis in the mid left anterior descending artery and 40-50% proximal left circumflex artery stenosis      Assessment/Plan  -Inferior ST elevation MI, status post emergent angioplasty  -Ongoing chest pain  -Hypertension  -Hyperlipidemia  -Gout  -Obesity  -Hx epistaxis  -DM, diet controlled  -Hyperkalemia due to diclofenac and losartan/ARB    Plan:  -Dual antiplatelet therapy for 1 year  -May need intervention on LAD  -Continue statin  -Continue metoprolol and losartan  -Blood pressure control  -Echocardiogram  -Continue  nitroglycerin  -Meds to beds  -Follow-up lipid panel  -Chronic NSAID use, he reports needed for severe gout  -Stop losartan, add to allergy list  -Home in 2 days, ICU tonight, floor tomorrow  -remove nitro patch later today to see if he remains CP free    Discussed with Dr. Alcantara    Thank you for allowing me to participate in the care of this patient.  I will continue to follow this patient    Please contact me with any questions.    Diana Heaton M.D.   Cardiologist, [facility]  [phone]

## 2019-02-22 NOTE — ED PROVIDER NOTES
ED Provider Note    CHIEF COMPLAINT  No chief complaint on file.  Chest pain    HPI  Lucas Rojas is a 63 y.o. male who presents with chest pain.  The patient states the pain started approximately 8 PM.  Patient states it started while at rest.  He describes the pain as pressure with radiation down the left arm.  He did have some associated dyspnea as well as nausea and also had profound diaphoresis.  The patient presented to [facility] where he was found to have an ST segment elevation myocardial infarction and transferred here for higher level of care.  The patient did receive aspirin as well as thrombolytics.  At this time the patient states he is pain-free.  He does have significant cardiac risk factors including hypertension, dyslipidemia, and diabetes mellitus.  He does not currently smoke.  At this time as mentioned above he is pain-free.  He has not any pain or swelling to his lower extremities nor does he have any risk factors from a DVT standpoint.  When the patient was having the discomfort he was unaware of any exacerbating or relieving factors.    REVIEW OF SYSTEMS  See HPI for further details. All other systems are negative.     PAST MEDICAL HISTORY  Past Medical History:   Diagnosis Date   • Pain 03-30-17     back, 7-8/10   • Anesthesia     post op  nausea, last neurosurgery no problems   • Arthritis     back/right ankle and right wrist   • Diabetes (CMS-[facility])     oral meds   • Emphysema of lung (CMS-[facility])     per xray result. pt states no active disease   • High cholesterol    • Hypertension    • Snoring     sometimes, very mild, no sleep study       FAMILY HISTORY  [unfilled]    SOCIAL HISTORY  Social History     Social History   • Marital status:      Spouse name: N/A   • Number of children: N/A   • Years of education: N/A     Social History Main Topics   • Smoking status: Passive Smoke Exposure - Never Smoker     Packs/day: 2.00     Years: 18.00   • Smokeless tobacco: Never  "Used   • Alcohol use 0.0 oz/week      Comment: \"maybe 1 per week\"       ,    parents smoked growing up   • Drug use: No   • Sexual activity: Not on file     Other Topics Concern   • Not on file     Social History Narrative   • No narrative on file       SURGICAL HISTORY  Past Surgical History:   Procedure Laterality Date   • LUMBAR FUSION POSTERIOR  4/4/2017    Procedure: LUMBAR FUSION POSTERIOR - L4-5 FUSION/EXPLORATION ;  Surgeon: Osmani Engel M.D.;  Location: SURGERY [facility];  Service:    • LUMBAR LAMINECTOMY DISKECTOMY Right 4/4/2017    Procedure: LUMBAR LAMINECTOMY DISKECTOMY L3-S1 AND MEDIAL FACETECTOMY;  Surgeon: Osmani Engel M.D.;  Location: SURGERY [facility];  Service:    • FORAMINOTOMY  4/4/2017    Procedure: FORAMINOTOMY;  Surgeon: Osmani Engel M.D.;  Location: SURGERY [facility];  Service:    • HARDWARE REMOVAL ORTHO  4/4/2017    Procedure: HARDWARE REMOVAL ORTHO - L4-5;  Surgeon: Osmani Engel M.D.;  Location: SURGERY [facility];  Service:    • LUMBAR FUSION POSTERIOR N/A 12/15/2015    Procedure: LUMBAR FUSION POSTERIOR INSTRUMENTED TLIF L4-5;  Surgeon: Osmani Engel M.D.;  Location: SURGERY [facility];  Service:    • LUMBAR LAMINECTOMY DISKECTOMY Left 12/15/2015    Procedure: LUMBAR LAMINECTOMY DISKECTOMY L1-2;  Surgeon: Osmani Engel M.D.;  Location: SURGERY [facility];  Service:    • SHOULDER SURGERY  2013    arthroscopic    • SHOULDER SURGERY  2012    right shoulder   • APPENDECTOMY  1976       CURRENT MEDICATIONS  Home Medications    **Home medications have not yet been reviewed for this encounter**         ALLERGIES  No Known Allergies    PHYSICAL EXAM  VITAL SIGNS: Ht 1.778 m (5' 10\")   Wt 98.4 kg (217 lb)   BMI 31.14 kg/m²       Constitutional: Well developed, Well nourished  HENT: Normocephalic, Atraumatic, Bilateral external ears normal, Oropharynx moist, No oral exudates, Nose normal.   Eyes: PERRLA, EOMI, Conjunctiva normal, No discharge. "   Neck: Normal range of motion, No tenderness, Supple, No stridor.   Lymphatic: No lymphadenopathy noted.   Cardiovascular: Normal heart rate, Normal rhythm, No murmurs, No rubs, No gallops.   Thorax & Lungs: Normal breath sounds, No respiratory distress, No wheezing, No chest tenderness.   Abdomen: Bowel sounds normal, Soft, No tenderness, No masses, No pulsatile masses.   Skin: Warm, Dry, No erythema, No rash.   Back: No tenderness, No CVA tenderness.   Extremities: Intact distal pulses, No edema, No tenderness, No cyanosis, No clubbing.   Musculoskeletal: Good range of motion in all major joints. No tenderness to palpation or major deformities noted.   Neurologic: Alert & oriented x 3, Normal motor function, Normal sensory function, No focal deficits noted.   Psychiatric: Affect normal, Judgment normal, Mood normal.     EKG  Twelve-lead EKG interpreted by myself shows a normal sinus rhythm with ventricular to 76, normal QRS, normal intervals, ST segment elevation inferiorly and laterally with depression anteriorly consistent with an acute myocardial infarction      COURSE & MEDICAL DECISION MAKING  Pertinent Labs & Imaging studies reviewed. (See chart for details)  This a 63-year-old gentleman who presents with signs and symptoms consistent with an acute myocardial infarction.  He received aspirin as well as thrombolytics prior to transfer.  On arrival the patient is pain-free however his EKG continues to show significant ST segment elevation and therefore cardiology was notified and the patient will be taken to the Cath Lab for intervention.  The patient will continue to receive a nitro drip for blood pressure management.  I did review his workup prior to transfer that showed a normal chest x-ray.  The patient does not have any evidence of widened mediastinum and has a normal aortic notch therefore dissection be unlikely.  The differential would include pulmonary embolus, chest wall pain, or referred pain from  intra-abdominal standpoint.  However based on the EKG as well as his benign abdominal examination I suspect the patient is undergoing acute myocardial infarction and therefore he was taken emergently to the Cath Lab for further treatment.  The hospitalist was notified for admission and cardiology was present for the resuscitation.    FINAL IMPRESSION  1.  Acute myocardial infarction  2.  Critical care time 30 minutes      Disposition  The patient will be admitted in critical condition         Electronically signed by: Rudolph Heaton, 2/22/2019 12:59 AM

## 2019-02-22 NOTE — OP REPORT
Cardiac catheterization report    Procedure date: 2/22/2019      Pre-operative Diagnosis:  Acute inferolateral ST elevation myocardial infarction with failed thrombolytic therapy    Post-operative Diagnosis:   1.  Acute inferolateral ST elevation myocardial infarction with failed thrombolytic therapy secondary to total occlusion of distal right coronary artery  2.  Status post stenting of the distal right coronary artery with 4x 20 mm Synergy drug-eluting stent with no significant residual stenosis DAPHNE-3 flow  3.  80-90% stenosis in the mid left anterior descending artery and 40-50% proximal left circumflex artery stenosis    Procedure:  1.  Left heart catheterization   2.  Selective coronary angiography   3.  Aspiration thrombectomy   4.  Balloon angioplasty and stenting of the distal right coronary artery  5.  Supervision of moderate conscious sedation    Complications: None    Description of Procedure:  After informed consent was obtained, the patient was brought to cardiac catheterization laboratory emergently due to persistent ST elevation after thrombolytic therapy.   Juan Francisco test was carried out on the right hand and was found to be negative.  Right wrist and right groin were then prepped and drapped in sterile fashion.  Versed and fentanyl were used for conscious sedation.  Lidocaine 2% was used to anesthetize the area.  A 6 Macanese Terumo sheath was then placed in the right radial artery using Seldinger technique.  A 2.5 mg of verapamil, 100 microgram of nitroglycerine and 3000 units of heparin were administered into the radial sheath.  A 5 Macanese TIG catheter was then advanced into the left ventricle.    Left ventricular pressure was then recorded.    Left ventriculography was not performed.  Left heart pullback was then performed.  Selective angiography of the right coronary artery was performed in 2 orthogonal views catheter. The TIG catheter did not engage the left main well.  This was then exchanged to a  4 Icelandic JL 3.5 catheter. Selective coronary angiography of the left coronary artery was then performed in multiple views.     After reviewing of the diagnostic angiography, it was felt that intervention of the right coronary artery was indicated.  Bivalrudin was then started for anticoagulation.  A 6 Icelandic JR4 guide catheter was used.  A 0.014 BMW wire was then advanced pass the occlusion.  Significant amount of thrombus was suggested.  Aspiration thrombectomy was performed using priority 1 catheter.  Relatively large amount of thrombus was retrieved.  Some embolization into the distal portion of the posterolateral branch was noted.  The lesion in the distal RCA was dilated with a 3 x 15 mm balloon.  A 4 x 20 mm Synergy drug eluting stent was deployed and post dilated with 4 mm non-compliance balloon upto 12 ATMs.  Subsequent angiography showed no significant residual stenosis with DAPHNE III flow except the very distal portion of the posterolateral branch which remained occluded.   The guide wire was subsequently removed and final angiography was performed.  It confirmed good result. The guide catheter was then removed.   The radial sheath was subsequently removed.  Hemostasis was obtained using a Terumo TR wrist band.  The patient was then given a loading dose of Effient.  Bivalirudin was subsequently reduced to low dose infusion.  The patient tolerated procedure well and left cardiac catheterization laboratory in stable condition.    I supervised monitoring the patient under moderate conscious sedation beginning at 1:18 AM until the end of the case at 2:15 AM.    Findings:  There is no gradient across aortic valve.  Left ventricular end-diastolic pressure was elevated at 28 mmHg.    Two-vessel coronary artery disease    This is right dominant system.    Left main is large and without flow limiting disease.  It bifurcated into left anterior descending and left circumflex artery.     Left anterior descending artery  is large caliber vessel and extends to the apex.  It gives rises to several small to medium size diagonal branches.  There is 80-90% stenosis in the mid portion of the left anterior descending artery (LAD) at the takeoff of the relatively large third diagonal branch.  The antegrade flow is normal.    Left circumflex artery is medium to large in caliber.   It gives rise to large first and medium second obtuse marginal branches.  There was 40-50% eccentric stenosis in the proximal left circumflex artery (LCX).  The antegrade flow is normal.    Right coronary artery (RCA) is large caliber. It was occluded in the distal segment after giving rise to a couple small acute marginal branches at the beginning of the case.  Midportion of right coronary has a couple area of 20-30% stenosis as well.    After intervention, there was 0% residual stenosis in the stented segment in the distal right coronary artery with DAPHNE III flow into large posterior descending and posterolateral branch.      Plan:   Dual antiplatelet therapy for one year.   Continue bivalirudin for about 4 hours.    Monitor closely in the ICU.  Blood pressure control  Risk factor modification.  Limit right wrist movement for 24 hours.  Consider intervention the left anterior descending artery in the near future.          Homero Olvera M.D.

## 2019-02-23 ENCOUNTER — APPOINTMENT (OUTPATIENT)
Dept: CARDIOLOGY | Facility: MEDICAL CENTER | Age: 64
DRG: 247 | End: 2019-02-23
Attending: INTERNAL MEDICINE
Payer: MEDICARE

## 2019-02-23 PROBLEM — R09.02 HYPOXIA: Status: ACTIVE | Noted: 2019-02-23

## 2019-02-23 LAB
ANION GAP SERPL CALC-SCNC: 6 MMOL/L (ref 0–11.9)
BUN SERPL-MCNC: 18 MG/DL (ref 8–22)
CALCIUM SERPL-MCNC: 8.3 MG/DL (ref 8.5–10.5)
CHLORIDE SERPL-SCNC: 108 MMOL/L (ref 96–112)
CHOLEST SERPL-MCNC: 72 MG/DL (ref 100–199)
CO2 SERPL-SCNC: 21 MMOL/L (ref 20–33)
CREAT SERPL-MCNC: 1.05 MG/DL (ref 0.5–1.4)
ERYTHROCYTE [DISTWIDTH] IN BLOOD BY AUTOMATED COUNT: 43.4 FL (ref 35.9–50)
GLUCOSE SERPL-MCNC: 104 MG/DL (ref 65–99)
HCT VFR BLD AUTO: 43.3 % (ref 42–52)
HDLC SERPL-MCNC: 31 MG/DL
HGB BLD-MCNC: 13.6 G/DL (ref 14–18)
LDLC SERPL CALC-MCNC: 28 MG/DL
LV EJECT FRACT  99904: 55
MCH RBC QN AUTO: 28.5 PG (ref 27–33)
MCHC RBC AUTO-ENTMCNC: 31.4 G/DL (ref 33.7–35.3)
MCV RBC AUTO: 90.8 FL (ref 81.4–97.8)
PLATELET # BLD AUTO: 247 K/UL (ref 164–446)
PMV BLD AUTO: 10.7 FL (ref 9–12.9)
POTASSIUM SERPL-SCNC: 4.4 MMOL/L (ref 3.6–5.5)
RBC # BLD AUTO: 4.77 M/UL (ref 4.7–6.1)
SODIUM SERPL-SCNC: 135 MMOL/L (ref 135–145)
TRIGL SERPL-MCNC: 66 MG/DL (ref 0–149)
WBC # BLD AUTO: 13.4 K/UL (ref 4.8–10.8)

## 2019-02-23 PROCEDURE — 80048 BASIC METABOLIC PNL TOTAL CA: CPT

## 2019-02-23 PROCEDURE — A9270 NON-COVERED ITEM OR SERVICE: HCPCS | Performed by: INTERNAL MEDICINE

## 2019-02-23 PROCEDURE — 99233 SBSQ HOSP IP/OBS HIGH 50: CPT | Performed by: INTERNAL MEDICINE

## 2019-02-23 PROCEDURE — 700102 HCHG RX REV CODE 250 W/ 637 OVERRIDE(OP): Performed by: INTERNAL MEDICINE

## 2019-02-23 PROCEDURE — 99232 SBSQ HOSP IP/OBS MODERATE 35: CPT | Performed by: INTERNAL MEDICINE

## 2019-02-23 PROCEDURE — 93306 TTE W/DOPPLER COMPLETE: CPT

## 2019-02-23 PROCEDURE — 80061 LIPID PANEL: CPT

## 2019-02-23 PROCEDURE — A9270 NON-COVERED ITEM OR SERVICE: HCPCS | Performed by: HOSPITALIST

## 2019-02-23 PROCEDURE — 85027 COMPLETE CBC AUTOMATED: CPT

## 2019-02-23 PROCEDURE — 700102 HCHG RX REV CODE 250 W/ 637 OVERRIDE(OP): Performed by: HOSPITALIST

## 2019-02-23 PROCEDURE — 700117 HCHG RX CONTRAST REV CODE 255: Performed by: INTERNAL MEDICINE

## 2019-02-23 PROCEDURE — 99233 SBSQ HOSP IP/OBS HIGH 50: CPT | Performed by: HOSPITALIST

## 2019-02-23 PROCEDURE — 93306 TTE W/DOPPLER COMPLETE: CPT | Mod: 26 | Performed by: INTERNAL MEDICINE

## 2019-02-23 PROCEDURE — 770020 HCHG ROOM/CARE - TELE (206)

## 2019-02-23 RX ORDER — PRASUGREL 10 MG/1
10 TABLET, FILM COATED ORAL DAILY
Qty: 30 TAB | Refills: 11 | Status: SHIPPED | OUTPATIENT
Start: 2019-02-24 | End: 2019-02-24

## 2019-02-23 RX ADMIN — METOPROLOL TARTRATE 50 MG: 50 TABLET ORAL at 00:19

## 2019-02-23 RX ADMIN — ATORVASTATIN CALCIUM 80 MG: 80 TABLET, FILM COATED ORAL at 00:18

## 2019-02-23 RX ADMIN — ALLOPURINOL 300 MG: 300 TABLET ORAL at 05:41

## 2019-02-23 RX ADMIN — METOPROLOL TARTRATE 50 MG: 50 TABLET ORAL at 21:21

## 2019-02-23 RX ADMIN — HUMAN ALBUMIN MICROSPHERES AND PERFLUTREN 3 ML: 10; .22 INJECTION, SOLUTION INTRAVENOUS at 10:36

## 2019-02-23 RX ADMIN — PRASUGREL 10 MG: 10 TABLET, FILM COATED ORAL at 05:42

## 2019-02-23 RX ADMIN — ATORVASTATIN CALCIUM 80 MG: 80 TABLET, FILM COATED ORAL at 21:21

## 2019-02-23 RX ADMIN — METOPROLOL TARTRATE 50 MG: 50 TABLET ORAL at 05:42

## 2019-02-23 RX ADMIN — ASPIRIN 81 MG: 81 TABLET, COATED ORAL at 05:42

## 2019-02-23 RX ADMIN — METOPROLOL TARTRATE 50 MG: 50 TABLET ORAL at 15:24

## 2019-02-23 ASSESSMENT — ENCOUNTER SYMPTOMS
BRUISES/BLEEDS EASILY: 0
MUSCULOSKELETAL NEGATIVE: 1
SENSORY CHANGE: 0
SPUTUM PRODUCTION: 0
CONSTITUTIONAL NEGATIVE: 1
CARDIOVASCULAR NEGATIVE: 1
HEADACHES: 0
GASTROINTESTINAL NEGATIVE: 1
POLYDIPSIA: 0
BLOOD IN STOOL: 0
BLURRED VISION: 0
STRIDOR: 0
HEMOPTYSIS: 0
WHEEZING: 0
SHORTNESS OF BREATH: 0
DOUBLE VISION: 0
WEIGHT LOSS: 0
PALPITATIONS: 0
PHOTOPHOBIA: 0
DIAPHORESIS: 0
RESPIRATORY NEGATIVE: 1
DIZZINESS: 0
NEUROLOGICAL NEGATIVE: 1
FOCAL WEAKNESS: 0
SINUS PAIN: 0
COUGH: 0
PSYCHIATRIC NEGATIVE: 1
NAUSEA: 0
DEPRESSION: 0
MYALGIAS: 0
BACK PAIN: 0
FEVER: 0
TINGLING: 0
NECK PAIN: 0
SPEECH CHANGE: 0
VOMITING: 0
EYES NEGATIVE: 1
NERVOUS/ANXIOUS: 0
HEARTBURN: 0
CHILLS: 0

## 2019-02-23 NOTE — PROGRESS NOTES
Cardiology Follow Up Progress Note    Date of Service  2/23/2019    Attending Physician  Milad Recio M.D.    Reason for consultation     ST elevation inferior MI    History of     Hypertension, obesity, dyslipidemia, gout    Admitted for     Transferred from [facility] with acute chest pain, EKG revealed ST segment elevation in the inferolateral leads with reciprocal ST depression in anterior leads, was given TNK, transferred to [facility] for further care, EKG in ER at [facility] showed persistent ST elevation in the inferior leads, underwent emergent cath    Interim Events    2/24/19, no overnight issues, sinus rhythm, no arrhythmia, denies angina, ambulated this morning, stable for discharge    Review of Systems  Review of Systems   Respiratory: Negative for apnea, cough, choking, chest tightness, shortness of breath, wheezing and stridor.    Cardiovascular: Negative for chest pain and leg swelling.       Vital signs in last 24 hours  Temp:  [36.7 °C (98.1 °F)-37.7 °C (99.9 °F)] 37.3 °C (99.2 °F)  Pulse:  [66-96] 90  Resp:  [16-24] 16  BP: (126-147)/(84-95) 126/84  SpO2:  [89 %-97 %] 95 %    Physical Exam  Physical Exam   Constitutional: He is oriented to person, place, and time. He appears well-developed.   HENT:   Head: Normocephalic.   Eyes: Conjunctivae are normal.   Neck: No JVD present. No thyromegaly present.   Cardiovascular: Normal rate and regular rhythm.    Pulses:       Carotid pulses are 2+ on the right side, and 2+ on the left side.       Radial pulses are 2+ on the right side, and 2+ on the left side.        Posterior tibial pulses are 2+ on the right side, and 2+ on the left side.   Pulmonary/Chest: He has no wheezes.   Abdominal: Soft.   Musculoskeletal: He exhibits no edema.   Neurological: He is alert and oriented to person, place, and time.   Skin: Skin is warm and dry.   Right radial cath site without evidence of hematoma       Lab Review  Lab Results   Component Value Date/Time    WBC  13.4 (H) 02/23/2019 05:45 AM    RBC 4.77 02/23/2019 05:45 AM    HEMOGLOBIN 13.6 (L) 02/23/2019 05:45 AM    HEMATOCRIT 43.3 02/23/2019 05:45 AM    MCV 90.8 02/23/2019 05:45 AM    MCH 28.5 02/23/2019 05:45 AM    MCHC 31.4 (L) 02/23/2019 05:45 AM    MPV 10.7 02/23/2019 05:45 AM      Lab Results   Component Value Date/Time    SODIUM 135 02/23/2019 05:45 AM    POTASSIUM 4.4 02/23/2019 05:45 AM    CHLORIDE 108 02/23/2019 05:45 AM    CO2 21 02/23/2019 05:45 AM    GLUCOSE 104 (H) 02/23/2019 05:45 AM    BUN 18 02/23/2019 05:45 AM    CREATININE 1.05 02/23/2019 05:45 AM      Lab Results   Component Value Date/Time    ASTSGOT 94 (H) 02/22/2019 12:54 AM    ALTSGPT 39 02/22/2019 12:54 AM     Lab Results   Component Value Date/Time    CHOLSTRLTOT 72 (L) 02/23/2019 05:45 AM    LDL 28 02/23/2019 05:45 AM    HDL 31 (A) 02/23/2019 05:45 AM    TRIGLYCERIDE 66 02/23/2019 05:45 AM    TROPONINI 9.72 (H) 02/22/2019 12:54 AM       Recent Labs      02/22/19   0054   BNPBTYPENAT  35         Assessment/Plan    ST elevation inferior MI  -s/p JEANNETTE/PTCA/aspiration thrombectomy of dRCA with Synergy JEANNETTE with no significant residual stenosis, 80-90% stenosis in mLAD (close follow-up with cardiology office, may need intervention on LAD), 40-50% pleft circumflex stenosis, 2/22/19    continue with DAPT ( ASA 81 Effient 10 mg ), along with Lipitor 80 mg , Metoprolol 50 mg BID      -LVEF 55% by echo on 2/23/19, RVSP 37 mmHg, no significant valvular abnormality      Hypertension  -Well-controlled  -105.69 this am      Hyperlipidemia  continue with Lipitor  -Lipid panel in good order 2/23/19, LDL 28    Obesity , class 1    -We discussed therapeutic lifestyle changes and close follow-up with primary care physician      We will schedule follow-up appointment with our cardiology office in 2 weeks.    Stable for DC

## 2019-02-23 NOTE — CARE PLAN
Problem: Infection  Goal: Will remain free from infection  Outcome: PROGRESSING AS EXPECTED  Monitoring temp, VS for s/s of infection. Hand hygiene performed in and out of room.     Problem: Knowledge Deficit  Goal: Knowledge of the prescribed therapeutic regimen will improve  Outcome: PROGRESSING AS EXPECTED  Pt verbalized understanding of cath puncture site care education.

## 2019-02-23 NOTE — PROGRESS NOTES
Critical Care Progress Note    Date of admission  2/22/2019    Chief Complaint  63 y.o. Male presented with chest pain.  He says it started at home, and he told his wife the need to go to the hospital.  He had diaphoresis, anxiety, nausea and chest discomfort.  He felt that he had heavy chest discomfort.  He was in his usual state of health the prior day.  He has a past medical history of hypertension, dyslipidemia, gout.  His pain did radiate to his left arm.  In the emergency room he was found to have an ST elevation MI, he was started on TPA, and transferred to this facility.  He was sent to cardiac catheterization lab and had a stent placed.  Currently he has some chest discomfort but says different than MI.  Given morphine and nitro paste and improved.     Hospital Course          Interval Problem Update  Reviewed last 24 hour events:  Interval updates on rounds this am:  sbp 115 to 130  Transfer to tele  Home tomorrow per cards  No xray  Dual antiplt therapy  Resting comfortably in chair  Ok for telemetry    Review of Systems  Review of Systems   Constitutional: Positive for malaise/fatigue. Negative for chills, diaphoresis, fever and weight loss.   HENT: Negative for congestion and sinus pain.    Eyes: Negative for blurred vision, double vision and photophobia.   Respiratory: Negative for cough, hemoptysis, sputum production, shortness of breath, wheezing and stridor.    Cardiovascular: Negative for chest pain, palpitations and leg swelling.   Gastrointestinal: Negative for blood in stool, heartburn, melena and vomiting.   Genitourinary: Negative for dysuria and urgency.   Musculoskeletal: Negative for back pain, myalgias and neck pain.   Skin: Negative for itching and rash.   Neurological: Negative for dizziness, tingling, sensory change, speech change, focal weakness and headaches.   Endo/Heme/Allergies: Negative for polydipsia. Does not bruise/bleed easily.   Psychiatric/Behavioral: Negative for  depression. The patient is not nervous/anxious.         Vital Signs for last 24 hours   Temp:  [36.7 °C (98.1 °F)-37.7 °C (99.9 °F)] 37 °C (98.6 °F)  Pulse:  [] 96  Resp:  [11-24] 24  BP: (147)/(95) 147/95  SpO2:  [88 %-97 %] 93 %    Hemodynamic parameters for last 24 hours       Respiratory Information for the last 24 hours       Physical Exam   Physical Exam   Constitutional: He is oriented to person, place, and time. He appears well-developed and well-nourished. No distress.   Fatigued but improved   HENT:   Head: Normocephalic and atraumatic.   Right Ear: External ear normal.   Left Ear: External ear normal.   Mouth/Throat: No oropharyngeal exudate.   Eyes: Pupils are equal, round, and reactive to light. Conjunctivae and EOM are normal. Right eye exhibits no discharge. Left eye exhibits no discharge.   Neck: No JVD present. No tracheal deviation present.   Cardiovascular: Normal rate, regular rhythm and normal heart sounds.    No murmur heard.  Pulmonary/Chest: Effort normal and breath sounds normal. No stridor. No respiratory distress. He has no wheezes. He has no rales. He exhibits no tenderness.   Moderate air movement   Abdominal: He exhibits no mass. There is no tenderness. There is no rebound and no guarding.   Musculoskeletal: He exhibits no edema, tenderness or deformity.   Neurological: He is alert and oriented to person, place, and time. He displays normal reflexes. No cranial nerve deficit. Coordination normal.   Skin: Skin is warm and dry. No rash noted. He is not diaphoretic. No erythema.   Psychiatric: He has a normal mood and affect. His behavior is normal.   Nursing note and vitals reviewed.      Medications  Current Facility-Administered Medications   Medication Dose Route Frequency Provider Last Rate Last Dose   • atorvastatin (LIPITOR) tablet 80 mg  80 mg Oral QHS Homero Olvera M.D.   80 mg at 02/23/19 0018   • aspirin EC (ECOTRIN) tablet 81 mg  81 mg Oral DAILY Homero  LICHA Olvera   81 mg at 02/23/19 0542   • prasugrel (EFFIENT) tablet 10 mg  10 mg Oral DAILY Homero Olvera M.D.   10 mg at 02/23/19 0542   • enalaprilat (VASOTEC) injection 1.25 mg  1.25 mg Intravenous Q6HRS PRN Homero Olvera M.D.   1.25 mg at 02/22/19 0540   • allopurinol (ZYLOPRIM) tablet 300 mg  300 mg Oral DAILY Armin Escalona M.D.   300 mg at 02/23/19 0541   • ondansetron (ZOFRAN) syringe/vial injection 4 mg  4 mg Intravenous Q4HRS PRN Armin Escalona M.D.       • ondansetron (ZOFRAN ODT) dispertab 4 mg  4 mg Oral Q4HRS PRN Armin Escalona M.D.       • senna-docusate (PERICOLACE or SENOKOT S) 8.6-50 MG per tablet 2 Tab  2 Tab Oral BID Armin Escalona M.D.        And   • polyethylene glycol/lytes (MIRALAX) PACKET 1 Packet  1 Packet Oral QDAY PRN Armin Escalona M.D.        And   • magnesium hydroxide (MILK OF MAGNESIA) suspension 30 mL  30 mL Oral QDAY PRN Armin Escalona M.D.        And   • bisacodyl (DULCOLAX) suppository 10 mg  10 mg Rectal QDAY PRN Armin Escalona M.D.       • acetaminophen (TYLENOL) tablet 650 mg  650 mg Oral Q6HRS PRN Armin Escalona M.D.       • Pharmacy Consult Request ...Pain Management Review 1 Each  1 Each Other PHARMACY TO DOSE Armin Escalona M.D.        And   • oxyCODONE immediate-release (ROXICODONE) tablet 2.5 mg  2.5 mg Oral Q3HRS PRN Armin Escalona M.D.        And   • oxyCODONE immediate-release (ROXICODONE) tablet 5 mg  5 mg Oral Q3HRS PRN Armin Escalona M.D.        And   • morphine (pf) 4 mg/ml injection 2 mg  2 mg Intravenous Q3HRS PRN Armin Escalona M.D.   2 mg at 02/22/19 0814   • metoprolol (LOPRESSOR) tablet 50 mg  50 mg Oral Q8HRS Diana Heaton M.D.   50 mg at 02/23/19 0542       Fluids    Intake/Output Summary (Last 24 hours) at 02/23/19 0712  Last data filed at 02/23/19 0400   Gross per 24 hour   Intake          1076.67 ml   Output             2030 ml   Net          -953.33 ml       Laboratory      Recent Labs      02/22/19   2794    TROPONINI  9.72*   BNPBTYPENAT  35     Recent Labs      02/22/19 0054 02/22/19   0425  02/23/19   0545   SODIUM  135   --   135   POTASSIUM  5.1   --   4.4   CHLORIDE  104   --   108   CO2  24   --   21   BUN  26*   --   18   CREATININE  1.21   --   1.05   MAGNESIUM   --   2.0   --    CALCIUM  9.1   --   8.3*     Recent Labs      02/22/19 0054 02/23/19   0545   ALTSGPT  39   --    ASTSGOT  94*   --    ALKPHOSPHAT  131*   --    TBILIRUBIN  0.3   --    LIPASE  29   --    GLUCOSE  147*  104*     Recent Labs      02/22/19 0054 02/23/19   0545   WBC  13.1*  13.4*   NEUTSPOLYS  90.00*   --    LYMPHOCYTES  5.30*   --    MONOCYTES  3.90   --    EOSINOPHILS  0.10   --    BASOPHILS  0.20   --    ASTSGOT  94*   --    ALTSGPT  39   --    ALKPHOSPHAT  131*   --    TBILIRUBIN  0.3   --      Recent Labs      02/22/19 0054 02/23/19   0545   RBC  5.17  4.77   HEMOGLOBIN  14.6  13.6*   HEMATOCRIT  46.9  43.3   PLATELETCT  251  247   PROTHROMBTM  13.5   --    APTT  24.6*   --    INR  1.02   --        Imaging  X-Ray:  I have personally reviewed the images and compared with prior images. and No film today  EKG:  I have personally reviewed the images and compared with prior images. and No film today  Echo:   Reviewed    Assessment/Plan  * STEMI (ST elevation myocardial infarction) (HCC)   Assessment & Plan    S/p stent, presented with stemi  Chest discomfort  Weaning bilivirudin  Statin  Metoprolol  Nitro prn  Morphine prn  vasotec for sbp > 160  Inferior mi  Dual anti platelet therapy  Patient likely to go home tomorrow  Ok for telemetry  Hypoxia study overnight for continued o2 needs     Hypoxia   Assessment & Plan    Hypoxia overnight  Denies snoring or sleep apnea  Should have sleep study outpt  May contribute to heart failure     Class 1 obesity due to excess calories without serious comorbidity with body mass index (BMI) of 31.0 to 31.9 in adult   Assessment & Plan    Contributory to heart disease   on diet  prior to discharge     Essential hypertension   Assessment & Plan    Keep sbp < 160  vasotec prn  On metoprolol  On verapamil outpt, currently held     Dyslipidemia   Assessment & Plan    Continue statin  Contributory to heart disease     Tophaceous gout   Assessment & Plan    On allopurinol outpt  No acute episode  No current pain          VTE:  Not Indicated  Ulcer: Not Indicated  Lines: None    I have performed a physical exam and reviewed and updated ROS and Plan today (2/23/2019). In review of yesterday's note (2/22/2019), there are no changes except as documented above.     Discussed patient condition and risk of morbidity and/or mortality with Hospitalist, Family, RN, RT, Therapies, Pharmacy, Dietary, Charge nurse / hot rounds, Patient and cardiology

## 2019-02-23 NOTE — PROGRESS NOTES
Hospital Medicine Daily Progress Note    Date of Service  2/23/2019    Chief Complaint  63 y.o. male admitted 2/22/2019 with history of diabetes type 2, dyslipidemia, hypertension, dietary obesity, presents to the hospital with chest pain at an outlying facility with the patient is started on TPA, transferred to [facility] for definitive care.  The patient was taken today angiography suite and ended up with a RCA stent.    Hospital Course    Admission for inferior ST elevation MI      Interval Problem Update  Patient seen and examined today. ICU Care  Care and plan discussed in IDT/Hot rounds.  Lines and assistive devices reviewed.    Patient tolerating treatment and therapies.  All Data, Medication data reviewed.  Case discussed with nursing as available.  Plan of Care reviewed with patient and notified of changes.  2/23 patient improved, telemetry with sinus rhythm, no ventricular tachycardia, some PVCs, no further chest pain, feels back to his previous status, blood pressure is officially controlled, echocardiogram is being done today, on DA PT with Effient.  Discussed with cardiology and the additional LAD lesion is felt to be a bridge phenomenon.  Patient stable for telemetry transfer today  Consultants/Specialty  Cardiology  Intensivist    Code Status  Full code    Disposition  ICU to telemetry    Review of Systems  Review of Systems   Constitutional: Negative.    HENT: Negative.    Eyes: Negative.    Respiratory: Negative.    Cardiovascular: Negative.    Gastrointestinal: Negative.    Genitourinary: Negative.    Musculoskeletal: Negative.    Skin: Negative.    Neurological: Negative.    Endo/Heme/Allergies: Negative.    Psychiatric/Behavioral: Negative.    All other systems reviewed and are negative.       Physical Exam  Temp:  [36.7 °C (98.1 °F)-37.7 °C (99.9 °F)] 37 °C (98.6 °F)  Pulse:  [] 96  Resp:  [11-24] 24  BP: (147)/(95) 147/95  SpO2:  [88 %-97 %] 93 %    Physical Exam    Fluids    Intake/Output  Summary (Last 24 hours) at 02/23/19 0726  Last data filed at 02/23/19 0400   Gross per 24 hour   Intake          1076.67 ml   Output             2030 ml   Net          -953.33 ml       Laboratory  Recent Labs      02/22/19 0054 02/23/19   0545   WBC  13.1*  13.4*   RBC  5.17  4.77   HEMOGLOBIN  14.6  13.6*   HEMATOCRIT  46.9  43.3   MCV  90.7  90.8   MCH  28.2  28.5   MCHC  31.1*  31.4*   RDW  42.8  43.4   PLATELETCT  251  247   MPV  11.1  10.7     Recent Labs      02/22/19 0054 02/23/19   0545   SODIUM  135  135   POTASSIUM  5.1  4.4   CHLORIDE  104  108   CO2  24  21   GLUCOSE  147*  104*   BUN  26*  18   CREATININE  1.21  1.05   CALCIUM  9.1  8.3*     Recent Labs      02/22/19 0054   APTT  24.6*   INR  1.02     Recent Labs      02/22/19 0054   BNPBTYPENAT  35     Recent Labs      02/23/19 0545   TRIGLYCERIDE  66   HDL  31*   LDL  28       Imaging  EC-ECHOCARDIOGRAM COMPLETE W/ CONT              Assessment/Plan  * STEMI (ST elevation myocardial infarction) (HCC)   Assessment & Plan    Status post PCI RCA with total occlusion,   TPA administration at outside hospital.    Status post RCA stenting  Now on medical regimen as per Cardiology   The additional abnormal findings on angiography will be followed as an outpatient     Class 1 obesity due to excess calories without serious comorbidity with body mass index (BMI) of 31.0 to 31.9 in adult   Assessment & Plan    Body mass index is 31.98 kg/m².       Essential hypertension   Assessment & Plan    Beta-blocker  Ace inhibition  As tolerated     Dyslipidemia   Assessment & Plan    ON statin therapy, controlled.  Monitor.      Tophaceous gout   Assessment & Plan    Stable on allopurinol which will be continued.  Has had multiple removal surgeries for the same.      Plan  Continue with current medical therapy and DA PT  Monitor hemodynamics and telemetry  Likely discharge on 2/24  Transfer to telemetry  Discussed with cardiology    VTE prophylaxis: SCD,  Lovenox    I have performed a physical exam and reviewed and updated ROS and Plan today . In review of yesterday's note , there are no changes except as documented above.

## 2019-02-23 NOTE — PROGRESS NOTES
Cardiology Follow Up Progress Note    Date of Service  2/23/2019    Attending Physician  CATHERINE Agarwal.*    Chief Complaint   Inferior ST elevation MI    HPI  Lucas Rojas is a 63 y.o. male admitted 2/22/2019 with with chest pain, found to have inferior ST elevation MI, taken to the Cath Lab emergently.  He had a stent placed to the culprit lesion which was the RCA.  He has residual 80-90% stenosis in the mid and 40-50% in the circumflex.    Strong family history of premature coronary artery disease in his brother.    Has hypertension and hyperlipidemia.  Has gout.    Interim Events  Telemetry-sinus, no VT, some PVCs, frequent PACs, brief atrial runs  No more CP, sitting up in chair, nitro paste off yesterda  Blood pressure controlled  Echo pending today  DAPT with Effient  Platelets 247, hemoglobin 13.6, creatinine 1.05, potassium 4.4  Troponin 9.72, BNP 35  LDL 28, HDL 31    Review of Systems  Review of Systems   Constitutional: Negative for chills and fever.   Gastrointestinal: Negative for nausea and vomiting.       Vital signs in last 24 hours  Temp:  [36.7 °C (98.1 °F)-37.7 °C (99.9 °F)] 37 °C (98.6 °F)  Pulse:  [] 96  Resp:  [11-24] 24  BP: (147)/(95) 147/95  SpO2:  [88 %-97 %] 93 %    Physical Exam  Physical Exam    General: No acute distress. Well nourished.  HEENT: EOM grossly intact, no scleral icterus, no pharyngeal erythema.   Neck:  No JVD, no bruits, trachea midline  CVS: RRR. Normal S1, S2. No M/R/G. No LE edema.  2+ radial pulses, 2+ PT pulses  Resp: CTAB. No wheezing or crackles/rhonchi. Normal respiratory effort.  Abdomen: Soft, NT, no kortney hepatomegaly.  MSK/Ext: No clubbing or cyanosis.  Skin: Warm and dry, no rashes.  Neurological: CN III-XII grossly intact. No focal deficits.   Psych: A&O x 3, appropriate affect, good judgement      Lab Review  Lab Results   Component Value Date/Time    WBC 13.4 (H) 02/23/2019 05:45 AM    RBC 4.77 02/23/2019 05:45 AM    HEMOGLOBIN 13.6  (L) 02/23/2019 05:45 AM    HEMATOCRIT 43.3 02/23/2019 05:45 AM    MCV 90.8 02/23/2019 05:45 AM    MCH 28.5 02/23/2019 05:45 AM    MCHC 31.4 (L) 02/23/2019 05:45 AM    MPV 10.7 02/23/2019 05:45 AM      Lab Results   Component Value Date/Time    SODIUM 135 02/23/2019 05:45 AM    POTASSIUM 4.4 02/23/2019 05:45 AM    CHLORIDE 108 02/23/2019 05:45 AM    CO2 21 02/23/2019 05:45 AM    GLUCOSE 104 (H) 02/23/2019 05:45 AM    BUN 18 02/23/2019 05:45 AM    CREATININE 1.05 02/23/2019 05:45 AM      Lab Results   Component Value Date/Time    ASTSGOT 94 (H) 02/22/2019 12:54 AM    ALTSGPT 39 02/22/2019 12:54 AM     Lab Results   Component Value Date/Time    CHOLSTRLTOT 72 (L) 02/23/2019 05:45 AM    LDL 28 02/23/2019 05:45 AM    HDL 31 (A) 02/23/2019 05:45 AM    TRIGLYCERIDE 66 02/23/2019 05:45 AM    TROPONINI 9.72 (H) 02/22/2019 12:54 AM       Recent Labs      02/22/19   0054   BNPBTYPENAT  35       Cardiac Imaging and Procedures Review  EKG:  My personal interpretation of the EKG dated,  2/22/2019 is sinus, rate 76, lateral ST elevation with reciprocal changes    EKG 2/22/2019  Sinus, rate 90, inferior Q waves with persistent inferior and lateral ST elevation though improved    Echocardiogram: Pending    Cardiac Catheterization: 12/21/2019  1.  Acute inferolateral ST elevation myocardial infarction with failed thrombolytic therapy secondary to total occlusion of distal right coronary artery  2.  Status post stenting of the distal right coronary artery with 4x 20 mm Synergy drug-eluting stent with no significant residual stenosis DAPHNE-3 flow  3.  80-90% stenosis in the mid left anterior descending artery and 40-50% proximal left circumflex artery stenosis    Assessment/Plan  -Inferior ST elevation MI, status post emergent angioplasty  -residual LAD disease  -Hypertension  -Hyperlipidemia  -Gout  -Obesity  -Hx epistaxis  -DM, diet controlled  -Hyperkalemia due to diclofenac and losartan/ARB    Plan:  -Monitor for sign afib bursts,  burden low at this point  -Dual antiplatelet therapy for 1 year  -May need intervention on LAD, monitor as outpt  -Continue statin, LDL at goal  -Continue metoprolol and losartan  -Blood pressure control  -Echocardiogram today  -Stop nitroglycerin, monitor for recurrent BP  -Meds to beds  -Chronic NSAID use, he reports needed for severe gout  -Stop losartan, add to allergy list  -To floor today, home tomorrow  -Lives too far for cardiac rehab    Discussed with Dr. Recio    Thank you for allowing me to participate in the care of this patient.  I will continue to follow this patient    Please contact me with any questions.    Diana Heaton M.D.   Cardiologist, [facility]  [phone]

## 2019-02-23 NOTE — PROGRESS NOTES
Paged Dr. Otoole to update her of pt's frequent PAC's and runs of afib RVR. Also updated on pt's bloody noses. Bleeding has stopped and they occur frequently for the pt

## 2019-02-23 NOTE — CARE PLAN
Problem: Knowledge Deficit  Goal: Knowledge of disease process/condition, treatment plan, diagnostic tests, and medications will improve  Outcome: PROGRESSING AS EXPECTED  Educate on current treatment regimen    Problem: Fluid Volume:  Goal: Will maintain balanced intake and output  Outcome: PROGRESSING AS EXPECTED  Strict I&O

## 2019-02-23 NOTE — PROGRESS NOTES
Monitor Summary:    -/.08/-    SR with frequent trigeminal PAC's  70-80's    2 runs of rapid afib.

## 2019-02-23 NOTE — CARE PLAN
Problem: Pain Management  Goal: Pain level will decrease to patient's comfort goal    Intervention: Follow pain managment plan developed in collaboration with patient and Interdisciplinary Team  Spoke with Dr. Langston about pt chest pain this am. Now chest pain free. See MAR  Intervention: Educate and implement non-pharmacologic comfort measures. Examples: relaxation, distration, play therapy, activity therapy, massage, etc.  Pt up moving to help with joint pain      Problem: Fluid Volume:  Goal: Will maintain balanced intake and output    Intervention: Monitor, educate, and encourage compliance with therapeutic intake of liquids  Pt drinking adequate fluids and having great output

## 2019-02-23 NOTE — ASSESSMENT & PLAN NOTE
S/p stent, presented with stemi  Chest discomfort  Weaning bilivirudin  Statin  Metoprolol  Nitro prn  Morphine prn  vasotec for sbp > 160  Inferior mi  Dual anti platelet therapy  Patient likely to go home tomorrow  Ok for telemetry  Hypoxia study overnight for continued o2 needs

## 2019-02-24 ENCOUNTER — PATIENT OUTREACH (OUTPATIENT)
Dept: HEALTH INFORMATION MANAGEMENT | Facility: OTHER | Age: 64
End: 2019-02-24

## 2019-02-24 VITALS
OXYGEN SATURATION: 97 % | TEMPERATURE: 97.4 F | BODY MASS INDEX: 31.78 KG/M2 | DIASTOLIC BLOOD PRESSURE: 88 MMHG | SYSTOLIC BLOOD PRESSURE: 133 MMHG | WEIGHT: 222 LBS | HEIGHT: 70 IN | HEART RATE: 110 BPM | RESPIRATION RATE: 17 BRPM

## 2019-02-24 LAB
ANION GAP SERPL CALC-SCNC: 9 MMOL/L (ref 0–11.9)
BNP SERPL-MCNC: 137 PG/ML (ref 0–100)
BUN SERPL-MCNC: 23 MG/DL (ref 8–22)
CALCIUM SERPL-MCNC: 8.3 MG/DL (ref 8.5–10.5)
CHLORIDE SERPL-SCNC: 107 MMOL/L (ref 96–112)
CO2 SERPL-SCNC: 22 MMOL/L (ref 20–33)
CREAT SERPL-MCNC: 1.15 MG/DL (ref 0.5–1.4)
ERYTHROCYTE [DISTWIDTH] IN BLOOD BY AUTOMATED COUNT: 43.7 FL (ref 35.9–50)
GLUCOSE SERPL-MCNC: 117 MG/DL (ref 65–99)
HCT VFR BLD AUTO: 41.1 % (ref 42–52)
HGB BLD-MCNC: 13.2 G/DL (ref 14–18)
MAGNESIUM SERPL-MCNC: 1.8 MG/DL (ref 1.5–2.5)
MCH RBC QN AUTO: 28.8 PG (ref 27–33)
MCHC RBC AUTO-ENTMCNC: 32.1 G/DL (ref 33.7–35.3)
MCV RBC AUTO: 89.7 FL (ref 81.4–97.8)
PHOSPHATE SERPL-MCNC: 3.6 MG/DL (ref 2.5–4.5)
PLATELET # BLD AUTO: 235 K/UL (ref 164–446)
PMV BLD AUTO: 10.9 FL (ref 9–12.9)
POTASSIUM SERPL-SCNC: 4.4 MMOL/L (ref 3.6–5.5)
RBC # BLD AUTO: 4.58 M/UL (ref 4.7–6.1)
SODIUM SERPL-SCNC: 138 MMOL/L (ref 135–145)
WBC # BLD AUTO: 13.2 K/UL (ref 4.8–10.8)

## 2019-02-24 PROCEDURE — A9270 NON-COVERED ITEM OR SERVICE: HCPCS | Performed by: INTERNAL MEDICINE

## 2019-02-24 PROCEDURE — 700102 HCHG RX REV CODE 250 W/ 637 OVERRIDE(OP): Performed by: HOSPITALIST

## 2019-02-24 PROCEDURE — 85027 COMPLETE CBC AUTOMATED: CPT

## 2019-02-24 PROCEDURE — 83735 ASSAY OF MAGNESIUM: CPT

## 2019-02-24 PROCEDURE — 83880 ASSAY OF NATRIURETIC PEPTIDE: CPT

## 2019-02-24 PROCEDURE — 80048 BASIC METABOLIC PNL TOTAL CA: CPT

## 2019-02-24 PROCEDURE — 97161 PT EVAL LOW COMPLEX 20 MIN: CPT

## 2019-02-24 PROCEDURE — 700102 HCHG RX REV CODE 250 W/ 637 OVERRIDE(OP): Performed by: INTERNAL MEDICINE

## 2019-02-24 PROCEDURE — 99239 HOSP IP/OBS DSCHRG MGMT >30: CPT | Performed by: INTERNAL MEDICINE

## 2019-02-24 PROCEDURE — 36415 COLL VENOUS BLD VENIPUNCTURE: CPT

## 2019-02-24 PROCEDURE — 84100 ASSAY OF PHOSPHORUS: CPT

## 2019-02-24 PROCEDURE — A9270 NON-COVERED ITEM OR SERVICE: HCPCS | Performed by: HOSPITALIST

## 2019-02-24 RX ORDER — ATORVASTATIN CALCIUM 80 MG/1
80 TABLET, FILM COATED ORAL
Qty: 30 TAB | Refills: 0 | Status: SHIPPED | OUTPATIENT
Start: 2019-02-24 | End: 2019-03-06 | Stop reason: SDUPTHER

## 2019-02-24 RX ORDER — METOPROLOL TARTRATE 50 MG/1
50 TABLET, FILM COATED ORAL 2 TIMES DAILY
Qty: 60 TAB | Refills: 0 | Status: SHIPPED | OUTPATIENT
Start: 2019-02-24 | End: 2019-03-06 | Stop reason: SDUPTHER

## 2019-02-24 RX ORDER — PRASUGREL 10 MG/1
10 TABLET, FILM COATED ORAL DAILY
Qty: 30 TAB | Refills: 11 | Status: SHIPPED | OUTPATIENT
Start: 2019-02-24 | End: 2019-03-06 | Stop reason: SDUPTHER

## 2019-02-24 RX ORDER — METOPROLOL TARTRATE 50 MG/1
25 TABLET, FILM COATED ORAL 2 TIMES DAILY
Qty: 60 TAB | Refills: 0 | Status: SHIPPED | OUTPATIENT
Start: 2019-02-24 | End: 2019-02-24

## 2019-02-24 RX ADMIN — ALLOPURINOL 300 MG: 300 TABLET ORAL at 05:55

## 2019-02-24 RX ADMIN — METOPROLOL TARTRATE 50 MG: 50 TABLET ORAL at 05:56

## 2019-02-24 RX ADMIN — ASPIRIN 81 MG: 81 TABLET, COATED ORAL at 05:59

## 2019-02-24 RX ADMIN — PRASUGREL 10 MG: 10 TABLET, FILM COATED ORAL at 05:59

## 2019-02-24 ASSESSMENT — COGNITIVE AND FUNCTIONAL STATUS - GENERAL
MOBILITY SCORE: 24
SUGGESTED CMS G CODE MODIFIER MOBILITY: CH

## 2019-02-24 ASSESSMENT — ENCOUNTER SYMPTOMS
CONSTITUTIONAL NEGATIVE: 1
WHEEZING: 0
GASTROINTESTINAL NEGATIVE: 1
EYES NEGATIVE: 1
NEUROLOGICAL NEGATIVE: 1
CHOKING: 0
CARDIOVASCULAR NEGATIVE: 1
MUSCULOSKELETAL NEGATIVE: 1
RESPIRATORY NEGATIVE: 1
COUGH: 0
PSYCHIATRIC NEGATIVE: 1
STRIDOR: 0
CHEST TIGHTNESS: 0
SHORTNESS OF BREATH: 0
APNEA: 0

## 2019-02-24 ASSESSMENT — GAIT ASSESSMENTS
GAIT LEVEL OF ASSIST: SUPERVISED
DISTANCE (FEET): 250

## 2019-02-24 ASSESSMENT — PATIENT HEALTH QUESTIONNAIRE - PHQ9
2. FEELING DOWN, DEPRESSED, IRRITABLE, OR HOPELESS: NOT AT ALL
SUM OF ALL RESPONSES TO PHQ9 QUESTIONS 1 AND 2: 0
1. LITTLE INTEREST OR PLEASURE IN DOING THINGS: NOT AT ALL

## 2019-02-24 NOTE — THERAPY
"Physical Therapy Cardiac Rehab Consult completed.   Bed Mobility:  Supine to Sit: (NT, sitting on couch upon entry, appears capable)  Transfers: Sit to Stand: Supervised  Gait: Level Of Assist: Supervised with No Equipment Needed       Plan of Care: Patient with no further skilled PT needs in the acute care setting at this time  Discharge Recommendations: Equipment: No Equipment Needed. Post-acute therapy Currently anticipate no further skilled therapy needs once patient is discharged from the inpatient setting.    See \"Rehab Therapy-Acute\" Patient Summary Report for complete documentation.    Patient is a 62 YO male that presented with complaints of chest pain and is s/p STEMI with RCA stent. EF per cath 55%. Patient with PMHx significant for HTN, HLD, sedentary lifestyle, obesity, DM. PT evaluation performed following 5 doses of metoprolol. Patient able to tolerate 250ft of ambulation with appropriate hemodynamic response, HR maintained 78-79bpm. Patient demonstrated elevated HR to 110s and increased WOB with exertion on stairs. Patient demonstrated knowledge of talk test/RPE scale, home walking program, and appropriate return to activity. Patient with no further acute PT needs.  "

## 2019-02-24 NOTE — PROGRESS NOTES
Bedside report received from Henry GUILLEN, assumed care of pt. Pt sitting on couch in room with no signs of distress, pt denies pain, SOB, N/V, dizziness. Right radial cath site assessed, clean/dry/soft with Band-Aid in place. Tele box on, call light and belongings in reach. Pt verbalized no additional needs at this time.

## 2019-02-24 NOTE — ASSESSMENT & PLAN NOTE
Hypoxia overnight  Denies snoring or sleep apnea  Should have sleep study outpt  May contribute to heart failure

## 2019-02-24 NOTE — DISCHARGE INSTRUCTIONS
Discharge Instructions    Discharged to home by car with relative. Discharged via wheelchair, hospital escort: Yes.  Special equipment needed: Not Applicable    Be sure to schedule a follow-up appointment with your primary care doctor or any specialists as instructed.     Discharge Plan:   Influenza Vaccine Indication: Not indicated: Previously immunized this influenza season and > 8 years of age    I understand that a diet low in cholesterol, fat, and sodium is recommended for good health. Unless I have been given specific instructions below for another diet, I accept this instruction as my diet prescription.   Other diet:     Special Instructions: Diagnosis:  Acute Coronary Syndrome (ACS) is a diagnosis that encompasses cardiac-related chest pain and heart attack. ACS occurs when the blood flow to the heart muscle is severely reduced or cut off completely due to a slow process called atherosclerosis.  Atherosclerosis is a disease in which the coronary arteries become narrow from a buildup of fat, cholesterol, and other substances that combine to form plaque. If the plaque breaks, a blood clot will form and block the blood flow to the heart muscle. This lack of blood flow can cause damage or death to the heart muscle which is called a heart attack or Myocardial Infarction (MI). There are two different types of MIs:  ST Elevation Myocardial Infarction or STEMI (the most severe type of heart attack) and Non-ST Elevation Myocardial Infarction or NSTEMI.    Treatment Plan:  · Cardiac Diet  - Low fat, low salt, low cholesterol   · Cardiac Rehab  - Your doctor has ordered you a referral to [facility].  Call [phone] to schedule an appointment.  · Attend my follow-up appointment with my Cardiologist.  · Take my medications as prescribed by my doctor  · Exercise daily  · Lower my bad cholesterol and raise my good cholesterol    Medications:  Certain medications are used to treat ACS.  Remember to always take medications  as prescribed and never stop talking medications unless told by your doctor.    You have been prescribed the following medicatons:    Aspirin - Aspirin is used as a blood thinning medication and you will require this medication indefinitely.    Lipitor    Lopressor    Effient (blood thinner)    · Is patient discharged on Warfarin / Coumadin?   No     Depression / Suicide Risk    As you are discharged from this [facility] facility, it is important to learn how to keep safe from harming yourself.    Recognize the warning signs:  · Abrupt changes in personality, positive or negative- including increase in energy   · Giving away possessions  · Change in eating patterns- significant weight changes-  positive or negative  · Change in sleeping patterns- unable to sleep or sleeping all the time   · Unwillingness or inability to communicate  · Depression  · Unusual sadness, discouragement and loneliness  · Talk of wanting to die  · Neglect of personal appearance   · Rebelliousness- reckless behavior  · Withdrawal from people/activities they love  · Confusion- inability to concentrate     If you or a loved one observes any of these behaviors or has concerns about self-harm, here's what you can do:  · Talk about it- your feelings and reasons for harming yourself  · Remove any means that you might use to hurt yourself (examples: pills, rope, extension cords, firearm)  · Get professional help from the community (Mental Health, Substance Abuse, psychological counseling)  · Do not be alone:Call your Safe Contact- someone whom you trust who will be there for you.  · Call your local CRISIS HOTLINE 552-1687 or 707-875-1339  · Call your local Children's Mobile Crisis Response Team [place] (233) 790-5610 or www.[employer]  · Call the toll free National Suicide Prevention Hotlines   · National Suicide Prevention Lifeline 312-091-FEDA (3501)  · National Hope Line Network 800-SUICIDE (854-6169)

## 2019-02-24 NOTE — DISCHARGE SUMMARY
Discharge Summary    CHIEF COMPLAINT ON ADMISSION  STEMI    Reason for Admission  Stemi     Admission Date  2/22/2019    CODE STATUS  Full Code  Echo  Normal left ventricular systolic function. Left ventricular ejection   fraction is visually estimated to be 55%.   Indeterminate diastolic function.  Normal inferior vena cava size and inspiratory collapse.  Right ventricular systolic pressure is estimated to be 37 mmHg.  HPI & HOSPITAL COURSE  This is a 63 y.o. male here with STEMI.  Please refer to H&P for detail.  admitted 2/22/2019 with history of diabetes type 2, dyslipidemia, hypertension, dietary obesity, presents to the hospital with chest pain at an outlying facility with the patient is started on TPA, transferred to [facility] for definitive care.  The patient was taken to angiography suite emergently and ended up with a RCA stent.  The patient was then admitted to cardiac intensive care unit.  Cardiology has been consulted and started on dual antiplatelet with aspirin and effient.  He was then transferred to telemetry floor overnight on 2/23.  He has been chest pain-free since the procedure.  Echocardiogram was done with no acute finding.  Per cardiology he can be discharged home and follow-up with them as an outpatient.  I saw and examined the patient today.  He was instructed to come back to ER if his symptoms get worse.       Therefore, he is discharged in fair and stable condition to home with close outpatient follow-up.    The patient met 2-midnight criteria for an inpatient stay at the time of discharge.    Discharge Date  2/24/19    FOLLOW UP ITEMS POST DISCHARGE      DISCHARGE DIAGNOSES  Principal Problem:    STEMI (ST elevation myocardial infarction) (HCC) POA: Unknown  Active Problems:    Tophaceous gout POA: Unknown    Dyslipidemia POA: Unknown    Essential hypertension POA: Unknown    Class 1 obesity due to excess calories without serious comorbidity with body mass index (BMI) of 31.0 to 31.9 in  adult POA: Unknown    Hypoxia POA: Unknown  Resolved Problems:    * No resolved hospital problems. *      FOLLOW UP  Future Appointments  Date Time Provider Department Center   3/6/2019 10:20 AM MANUEL Patrick [facility] None   3/28/2019 3:20 PM Diana Heaton M.D. [facility] None     Primary Care Physican      Please call to schedule your appointment with your Primary Care Phsyician. Thank you     Diana Heaton M.D.  [address] [facility] [address]  [facility] [address]  [phone]    In 2 weeks        MEDICATIONS ON DISCHARGE     Medication List      START taking these medications      Instructions   atorvastatin 80 MG tablet  Commonly known as:  LIPITOR   Take 1 Tab by mouth every bedtime.  Dose:  80 mg     metoprolol 50 MG Tabs  Commonly known as:  LOPRESSOR   Take 1 Tab by mouth 2 times a day.  Dose:  50 mg     prasugrel 10 MG Tabs  Commonly known as:  EFFIENT   Take 1 Tab by mouth every day.  Dose:  10 mg        CONTINUE taking these medications      Instructions   allopurinol 100 MG Tabs  Commonly known as:  ZYLOPRIM   Take 100 mg by mouth every day.  Dose:  100 mg     aspirin EC 81 MG Tbec  Commonly known as:  ECOTRIN   Take 81 mg by mouth every bedtime.  Dose:  81 mg     DICLOFENAC PO   Take 1 Tab by mouth 2 Times a Day.  Dose:  1 Tab     oxymetazoline 0.05 % Soln  Commonly known as:  AFRIN   Spray 2 Sprays in nose 2 times a day as needed for Congestion.  Dose:  2 Spray     raNITidine 150 MG Tabs  Commonly known as:  ZANTAC   Take 150 mg by mouth every day.  Dose:  150 mg     Testosterone 4 MG/24HR Pt24   Apply 1 Patch to skin as directed every bedtime.  Dose:  1 Patch     VITAMIN B-12 PO   Take 1 Tab by mouth every day.  Dose:  1 Tab        STOP taking these medications    rosuvastatin 10 MG Tabs  Commonly known as:  CRESTOR     verapamil  MG Tbcr  Commonly known as:  CALAN-SR            Allergies  Allergies   Allergen Reactions   • Losartan      Hyperkalemia       DIET  Orders Placed This Encounter    Procedures   • Diet Order Cardiac     Standing Status:   Standing     Number of Occurrences:   1     Order Specific Question:   Diet:     Answer:   Cardiac [6]       ACTIVITY  As tolerated.  Weight bearing as tolerated    CONSULTATIONS  Cardiology      PROCEDURES  Cath:  1.  Acute inferolateral ST elevation myocardial infarction with failed thrombolytic therapy secondary to total occlusion of distal right coronary artery  2.  Status post stenting of the distal right coronary artery with 4x 20 mm Synergy drug-eluting stent with no significant residual stenosis DAPHNE-3 flow  3.  80-90% stenosis in the mid left anterior descending artery and 40-50% proximal left circumflex artery stenosis  LABORATORY  Lab Results   Component Value Date    SODIUM 138 02/24/2019    POTASSIUM 4.4 02/24/2019    CHLORIDE 107 02/24/2019    CO2 22 02/24/2019    GLUCOSE 117 (H) 02/24/2019    BUN 23 (H) 02/24/2019    CREATININE 1.15 02/24/2019        Lab Results   Component Value Date    WBC 13.2 (H) 02/24/2019    HEMOGLOBIN 13.2 (L) 02/24/2019    HEMATOCRIT 41.1 (L) 02/24/2019    PLATELETCT 235 02/24/2019        Total time of the discharge process exceeds 40 minutes.

## 2019-02-24 NOTE — PROGRESS NOTES
Hospital Medicine Daily Progress Note    Date of Service  2/24/2019    Chief Complaint  63 y.o. male admitted 2/22/2019 with history of diabetes type 2, dyslipidemia, hypertension, dietary obesity, presents to the hospital with chest pain at an outlying facility with the patient is started on TPA, transferred to [facility] for definitive care.  The patient was taken today angiography suite and ended up with a RCA stent.    Hospital Course    Admission for inferior ST elevation MI      Interval Problem Update  Patient seen and examined today. ICU Care  Care and plan discussed in IDT/Hot rounds.  Lines and assistive devices reviewed.    Patient tolerating treatment and therapies.  All Data, Medication data reviewed.  Case discussed with nursing as available.  Plan of Care reviewed with patient and notified of changes.  2/23 patient improved, telemetry with sinus rhythm, no ventricular tachycardia, some PVCs, no further chest pain, feels back to his previous status, blood pressure is officially controlled, echocardiogram is being done today, on DA PT with Effient.  Discussed with cardiology and the additional LAD lesion is felt to be a bridge phenomenon.  Patient stable for telemetry transfer today  Consultants/Specialty  Cardiology  Intensivist    Code Status  Full code    Disposition  ICU to telemetry    Review of Systems  Review of Systems   Constitutional: Negative.    HENT: Negative.    Eyes: Negative.    Respiratory: Negative.    Cardiovascular: Negative.    Gastrointestinal: Negative.    Genitourinary: Negative.    Musculoskeletal: Negative.    Skin: Negative.    Neurological: Negative.    Endo/Heme/Allergies: Negative.    Psychiatric/Behavioral: Negative.    All other systems reviewed and are negative.       Physical Exam  Temp:  [36.8 °C (98.2 °F)-37.5 °C (99.5 °F)] 36.8 °C (98.2 °F)  Pulse:  [82-90] 82  Resp:  [16-18] 18  BP: (104-126)/(76-84) 104/76  SpO2:  [92 %-95 %] 92 %    Physical  Exam    Fluids    Intake/Output Summary (Last 24 hours) at 02/24/19 0807  Last data filed at 02/24/19 0600   Gross per 24 hour   Intake              440 ml   Output                0 ml   Net              440 ml       Laboratory  Recent Labs      02/22/19 0054 02/23/19 0545  02/24/19 0224   WBC  13.1*  13.4*  13.2*   RBC  5.17  4.77  4.58*   HEMOGLOBIN  14.6  13.6*  13.2*   HEMATOCRIT  46.9  43.3  41.1*   MCV  90.7  90.8  89.7   MCH  28.2  28.5  28.8   MCHC  31.1*  31.4*  32.1*   RDW  42.8  43.4  43.7   PLATELETCT  251  247  235   MPV  11.1  10.7  10.9     Recent Labs      02/22/19 0054 02/23/19 0545  02/24/19 0224   SODIUM  135  135  138   POTASSIUM  5.1  4.4  4.4   CHLORIDE  104  108  107   CO2  24  21  22   GLUCOSE  147*  104*  117*   BUN  26*  18  23*   CREATININE  1.21  1.05  1.15   CALCIUM  9.1  8.3*  8.3*     Recent Labs      02/22/19 0054   APTT  24.6*   INR  1.02     Recent Labs      02/22/19 0054 02/24/19 0224   BNPBTYPENAT  35  137*     Recent Labs      02/23/19   0545   TRIGLYCERIDE  66   HDL  31*   LDL  28       Imaging  EC-ECHOCARDIOGRAM COMPLETE W/ CONT   Final Result           Assessment/Plan  * STEMI (ST elevation myocardial infarction) (HCC)   Assessment & Plan    Status post PCI RCA with total occlusion,   TPA administration at outside hospital.    Status post RCA stenting  Now on medical regimen as per Cardiology   The additional abnormal findings on angiography will be followed as an outpatient     Class 1 obesity due to excess calories without serious comorbidity with body mass index (BMI) of 31.0 to 31.9 in adult   Assessment & Plan    Body mass index is 31.98 kg/m².       Essential hypertension   Assessment & Plan    Beta-blocker  Ace inhibition  As tolerated     Dyslipidemia   Assessment & Plan    ON statin therapy, controlled.  Monitor.      Tophaceous gout   Assessment & Plan    Stable on allopurinol which will be continued.  Has had multiple removal surgeries for the  same.      Plan  Continue with current medical therapy and DA PT  Monitor hemodynamics and telemetry  Likely discharge on 2/24  Transfer to telemetry  Discussed with cardiology    VTE prophylaxis: SCD, Lovenox    I have performed a physical exam and reviewed and updated ROS and Plan today . In review of yesterday's note , there are no changes except as documented above.

## 2019-02-24 NOTE — CARE PLAN
Problem: Communication  Goal: The ability to communicate needs accurately and effectively will improve  Outcome: PROGRESSING AS EXPECTED    Intervention: [place] patient and significant other/support system to call light to alert staff of needs   02/23/19 0415   OTHER   Oriented to: All of the Following : Location of Bathroom, Visiting Policy, Unit Routine, Call Light and Bedside Controls, Bedside Rail Policy, Smoking Policy, Rights and Responsibilities, Bedside Report, and Patient Education Notebook     Intervention: Reorient patient to environment as needed   02/23/19 5852   OTHER   Oriented to: All of the Following : Location of Bathroom, Visiting Policy, Unit Routine, Call Light and Bedside Controls, Bedside Rail Policy, Smoking Policy, Rights and Responsibilities, Bedside Report, and Patient Education Notebook         Problem: Safety  Goal: Will remain free from falls  Outcome: PROGRESSING AS EXPECTED  Pt fall risk assessed and appropriate interventions in place. Pt ambulates with steady gait, with no assistive devices. Bed in low/locked position, treaded slippers on, call light and belongings in reach. Pt educated to call for assistance.

## 2019-02-24 NOTE — PROGRESS NOTES
Pt monitored on pulse ox for nocturnal oximetry study on room air. Pt would occasionally drop to 87-88% while sleeping, not  sustained for greater than 30 seconds at a time, when O2 levels would return to 92% or above.

## 2019-02-25 ENCOUNTER — TELEPHONE (OUTPATIENT)
Dept: CARDIOLOGY | Facility: MEDICAL CENTER | Age: 64
End: 2019-02-25

## 2019-02-25 NOTE — TELEPHONE ENCOUNTER
Spoke to pt to advise of echo results and reinforce DAPT usage. Answered questions and encouraged pt to call with further questions or concerns. Pt appreciative of call and verbalized understanding.

## 2019-02-25 NOTE — TELEPHONE ENCOUNTER
Diana Heaton M.D.  Thuy Guzman, R.N.             This pt was d/c'd on Sunday by a new RN who didn't know to wait until I saw him, I missed him by 30 min.   Please give him a quick call on my behalf, let him know EF was normal on echo which is a miracle for the type of STEMI he had and reinforce DAPT and calling the office with questions. Not urgent.  He lives in [place].   I really appreciate it!      Called pt to inform. No answer; left vm to call back.

## 2019-03-06 ENCOUNTER — OFFICE VISIT (OUTPATIENT)
Dept: CARDIOLOGY | Facility: MEDICAL CENTER | Age: 64
End: 2019-03-06
Payer: MEDICARE

## 2019-03-06 ENCOUNTER — TELEPHONE (OUTPATIENT)
Dept: CARDIOLOGY | Facility: MEDICAL CENTER | Age: 64
End: 2019-03-06

## 2019-03-06 VITALS
OXYGEN SATURATION: 96 % | WEIGHT: 220.35 LBS | DIASTOLIC BLOOD PRESSURE: 82 MMHG | SYSTOLIC BLOOD PRESSURE: 120 MMHG | HEART RATE: 65 BPM | HEIGHT: 70 IN | BODY MASS INDEX: 31.55 KG/M2

## 2019-03-06 DIAGNOSIS — I25.10 CORONARY ARTERY DISEASE INVOLVING NATIVE CORONARY ARTERY OF NATIVE HEART WITHOUT ANGINA PECTORIS: ICD-10-CM

## 2019-03-06 DIAGNOSIS — I10 ESSENTIAL HYPERTENSION: ICD-10-CM

## 2019-03-06 PROBLEM — I21.3 STEMI (ST ELEVATION MYOCARDIAL INFARCTION) (HCC): Status: RESOLVED | Noted: 2019-02-22 | Resolved: 2019-03-06

## 2019-03-06 PROCEDURE — 99214 OFFICE O/P EST MOD 30 MIN: CPT | Performed by: NURSE PRACTITIONER

## 2019-03-06 RX ORDER — SYRINGE WITH NEEDLE, 1 ML 25GX5/8"
SYRINGE, EMPTY DISPOSABLE MISCELLANEOUS
Refills: 1 | COMMUNITY
Start: 2019-01-10 | End: 2019-03-28

## 2019-03-06 RX ORDER — ATORVASTATIN CALCIUM 80 MG/1
80 TABLET, FILM COATED ORAL
Qty: 90 TAB | Refills: 3 | Status: SHIPPED | OUTPATIENT
Start: 2019-03-06 | End: 2020-02-19

## 2019-03-06 RX ORDER — METOPROLOL TARTRATE 50 MG/1
50 TABLET, FILM COATED ORAL 2 TIMES DAILY
Qty: 180 TAB | Refills: 3 | Status: SHIPPED | OUTPATIENT
Start: 2019-03-06 | End: 2019-03-28 | Stop reason: SDUPTHER

## 2019-03-06 RX ORDER — TESTOSTERONE CYPIONATE 200 MG/ML
INJECTION, SOLUTION INTRAMUSCULAR
Refills: 4 | COMMUNITY
Start: 2019-02-01 | End: 2019-03-06

## 2019-03-06 RX ORDER — PRASUGREL 10 MG/1
10 TABLET, FILM COATED ORAL DAILY
Qty: 90 TAB | Refills: 3 | Status: SHIPPED | OUTPATIENT
Start: 2019-03-06 | End: 2023-09-12

## 2019-03-06 ASSESSMENT — ENCOUNTER SYMPTOMS
PND: 0
DIZZINESS: 0
SPUTUM PRODUCTION: 0
ORTHOPNEA: 0
CHILLS: 0
SHORTNESS OF BREATH: 0
CLAUDICATION: 0
PALPITATIONS: 0
VOMITING: 0
HEADACHES: 0
FEVER: 0
COUGH: 0
NAUSEA: 0
HEMOPTYSIS: 0
WHEEZING: 0

## 2019-03-06 NOTE — PROGRESS NOTES
"Chief Complaint   Patient presents with   • Hypertension     Hospital Follow Up       Subjective:   Lucas Rojas is a 63 y.o. male who presents today after being hospitalized February 22-24, 2019 for inferior ST elevated myocardial infarction.  The patient was given TNK while in [place] and was eventually transferred to [facility].  He was taken emergently to the Cath Lab in which a 4 x 20 mm Synergy drug-eluting stent was placed to the culprit RCA.  The patient had residual 80-90% stenosis in the LAD and 40-50% in the left circumflex.    He is not having chest pain currently but feels as though he is in \"slow motion.\"  He is very concerned about the 80-90% blockage and would like to have it fixed as soon as possible.  He has been relatively healthy his whole entire life and thus his recent hospitalization was quite concerning to him.    Past Medical History:   Diagnosis Date   • Anesthesia     post op  nausea, last neurosurgery no problems   • Arthritis     back/right ankle and right wrist   • Coronary artery disease involving native coronary artery without angina pectoris 3/6/2019   • Diabetes (HCC)     oral meds   • Emphysema of lung (HCC)     per xray result. pt states no active disease   • High cholesterol    • Hypertension    • Pain 03-30-17     back, 7-8/10   • Snoring     sometimes, very mild, no sleep study     Past Surgical History:   Procedure Laterality Date   • LUMBAR FUSION POSTERIOR  4/4/2017    Procedure: LUMBAR FUSION POSTERIOR - L4-5 FUSION/EXPLORATION ;  Surgeon: Osmani Engel M.D.;  Location: [facility];  Service:    • LUMBAR LAMINECTOMY DISKECTOMY Right 4/4/2017    Procedure: LUMBAR LAMINECTOMY DISKECTOMY L3-S1 AND MEDIAL FACETECTOMY;  Surgeon: Osmani Engel M.D.;  Location: [facility];  Service:    • FORAMINOTOMY  4/4/2017    Procedure: FORAMINOTOMY;  Surgeon: Osmani Engel M.D.;  Location: SUR[facility];  Service:    • HARDWARE REMOVAL ORTHO  " "4/4/2017    Procedure: HARDWARE REMOVAL ORTHO - L4-5;  Surgeon: Osmani Engel M.D.;  Location: SUR[facility]ERY [facility];  Service:    • LUMBAR FUSION POSTERIOR N/A 12/15/2015    Procedure: LUMBAR FUSION POSTERIOR INSTRUMENTED TLIF L4-5;  Surgeon: Osmani Engel M.D.;  Location: SUR[facility]ERY [facility];  Service:    • LUMBAR LAMINECTOMY DISKECTOMY Left 12/15/2015    Procedure: LUMBAR LAMINECTOMY DISKECTOMY L1-2;  Surgeon: Osmani Engel M.D.;  Location: SUR[facility]ERY [facility];  Service:    • SHOULDER SURGERY  2013    arthroscopic    • SHOULDER SURGERY  2012    right shoulder   • APPENDECTOMY  1976     Family History   Problem Relation Age of Onset   • Hypertension Father    • Heart Attack Brother      Social History     Social History   • Marital status:      Spouse name: N/A   • Number of children: N/A   • Years of education: N/A     Occupational History   • Not on file.     Social History Main Topics   • Smoking status: Passive Smoke Exposure - Never Smoker     Packs/day: 2.00     Years: 18.00   • Smokeless tobacco: Never Used   • Alcohol use 0.0 oz/week      Comment: \"maybe 1 per week\"       ,    parents smoked growing up   • Drug use: No   • Sexual activity: Not on file     Other Topics Concern   • Not on file     Social History Narrative   • No narrative on file     Allergies   Allergen Reactions   • Losartan      Hyperkalemia     Outpatient Encounter Prescriptions as of 3/6/2019   Medication Sig Dispense Refill   • atorvastatin (LIPITOR) 80 MG tablet Take 1 Tab by mouth every bedtime. 90 Tab 3   • metoprolol (LOPRESSOR) 50 MG Tab Take 1 Tab by mouth 2 times a day. 180 Tab 3   • prasugrel (EFFIENT) 10 MG Tab Take 1 Tab by mouth every day. 90 Tab 3   • aspirin EC (ECOTRIN) 81 MG Tablet Delayed Response Take 81 mg by mouth every bedtime.     • allopurinol (ZYLOPRIM) 100 MG Tab Take 100 mg by mouth every day.     • DICLOFENAC PO Take 1 Tab by mouth 2 Times a Day.     • raNITidine (ZANTAC) 150 MG Tab Take " "150 mg by mouth every day.     • Cyanocobalamin (VITAMIN B-12 PO) Take 1 Tab by mouth every day.     • oxymetazoline (AFRIN) 0.05 % Solution Spray 2 Sprays in nose 2 times a day as needed for Congestion.     • Testosterone 4 MG/24HR PATCH 24 HR Apply 1 Patch to skin as directed every bedtime.     • B-D 3CC LUER-YOLIS SYR 52QC6-8/2 21G X 1-1/2\" 3 ML Misc USE 1 Q 2 WEEKS  1   • [DISCONTINUED] ROSUVASTATIN CALCIUM PO TK 1 T PO QHS  3   • [DISCONTINUED] testosterone cypionate (DEPO-TESTOSTERONE) 200 MG/ML Solution injection INJECT 1 ML IM Q 2 WEEKS  4   • [DISCONTINUED] atorvastatin (LIPITOR) 80 MG tablet Take 1 Tab by mouth every bedtime. 30 Tab 0   • [DISCONTINUED] metoprolol (LOPRESSOR) 50 MG Tab Take 1 Tab by mouth 2 times a day. 60 Tab 0   • [DISCONTINUED] prasugrel (EFFIENT) 10 MG Tab Take 1 Tab by mouth every day. 30 Tab 11     No facility-administered encounter medications on file as of 3/6/2019.      Review of Systems   Constitutional: Negative for chills and fever.   Respiratory: Negative for cough, hemoptysis, sputum production, shortness of breath and wheezing.    Cardiovascular: Negative for chest pain, palpitations, orthopnea, claudication, leg swelling and PND.   Gastrointestinal: Negative for nausea and vomiting.   Neurological: Negative for dizziness and headaches.   All other systems reviewed and are negative.       Objective:   /82 (BP Location: Left arm, Patient Position: Sitting, BP Cuff Size: Adult)   Pulse 65   Ht 1.778 m (5' 10\")   Wt 100 kg (220 lb 5.6 oz)   SpO2 96%   BMI 31.62 kg/m²     Physical Exam   Constitutional: He is oriented to person, place, and time. He appears well-developed and well-nourished.   HENT:   Head: Normocephalic and atraumatic.   Neck: Neck supple.   Cardiovascular: Normal rate, regular rhythm and normal heart sounds.    No murmur heard.  Pulmonary/Chest: Effort normal and breath sounds normal.   Abdominal: Soft.   Musculoskeletal: He exhibits no edema. "   Neurological: He is alert and oriented to person, place, and time.   Skin: Skin is warm and dry.   Psychiatric: He has a normal mood and affect. His behavior is normal. Judgment and thought content normal.   Nursing note and vitals reviewed.    CARDIAC CATHETERIZATION 2/22/19  Post-operative Diagnosis:   1.  Acute inferolateral ST elevation myocardial infarction with failed thrombolytic therapy secondary to total occlusion of distal right coronary artery  2.  Status post stenting of the distal right coronary artery with 4x 20 mm Synergy drug-eluting stent with no significant residual stenosis DAPHNE-3 flow  3.  80-90% stenosis in the mid left anterior descending artery and 40-50% proximal left circumflex artery stenosis    ECHOCARDIOGRAM 2/22/19  CONCLUSIONS  Normal left ventricular systolic function. Left ventricular ejection   fraction is visually estimated to be 55%.   Indeterminate diastolic function.  Normal inferior vena cava size and inspiratory collapse.  Right ventricular systolic pressure is estimated to be 37 mmHg.    Assessment:     1. Coronary artery disease involving native coronary artery of native heart without angina pectoris     2. Essential hypertension         Medical Decision Making:  Today's Assessment / Status / Plan:   1.  CAD  -A 4x 20 mm Synergy drug-eluting stent placed to distal RCA  -80-90% stenosis of pLAD  -Scheduled for cardiac catheterization  -Continue ASA 81 mg daily, atorvastatin 80 mg every evening, metoprolol 50 mg twice daily, and prasugrel 10 mg daily  -Patient knows should he start having chest pain he needs to call 911 and seek medical attention immediately    2.  HTN  -Monitor blood pressure and heart rate  -Continue metoprolol 50 mg twice daily    Collaborating MD is Dr. Rosales.  Follow-up visit already scheduled with .    Please note that this dictation was created using voice recognition software.  I have made every reasonable attempt to correct obvious errors, but it  is possible there are errors of grammar or possibly content that I did not discover before finalizing the note.

## 2019-03-06 NOTE — TELEPHONE ENCOUNTER
Patient is scheduled on 4-4-19 for a C w/poss with . No meds to stop and patient to check in at 7:00 for a 9:00 procedure. H&P was done on 3-6-19 by Lexie Velazquez. Pre admit to call patient due to him living out of area.

## 2019-03-07 ENCOUNTER — HOSPITAL ENCOUNTER (OUTPATIENT)
Facility: MEDICAL CENTER | Age: 64
End: 2019-03-07
Attending: INTERNAL MEDICINE | Admitting: INTERNAL MEDICINE
Payer: MEDICARE

## 2019-03-28 ENCOUNTER — OFFICE VISIT (OUTPATIENT)
Dept: CARDIOLOGY | Facility: MEDICAL CENTER | Age: 64
End: 2019-03-28
Payer: MEDICARE

## 2019-03-28 VITALS
DIASTOLIC BLOOD PRESSURE: 82 MMHG | BODY MASS INDEX: 31.56 KG/M2 | OXYGEN SATURATION: 95 % | SYSTOLIC BLOOD PRESSURE: 128 MMHG | WEIGHT: 220.46 LBS | HEART RATE: 62 BPM | HEIGHT: 70 IN

## 2019-03-28 DIAGNOSIS — I21.11 ST ELEVATION MYOCARDIAL INFARCTION INVOLVING RIGHT CORONARY ARTERY (HCC): ICD-10-CM

## 2019-03-28 DIAGNOSIS — R53.83 OTHER FATIGUE: ICD-10-CM

## 2019-03-28 DIAGNOSIS — Z79.1 NSAID LONG-TERM USE: ICD-10-CM

## 2019-03-28 DIAGNOSIS — I10 ESSENTIAL HYPERTENSION: ICD-10-CM

## 2019-03-28 DIAGNOSIS — E78.5 DYSLIPIDEMIA: ICD-10-CM

## 2019-03-28 DIAGNOSIS — E66.09 CLASS 1 OBESITY DUE TO EXCESS CALORIES WITHOUT SERIOUS COMORBIDITY WITH BODY MASS INDEX (BMI) OF 31.0 TO 31.9 IN ADULT: ICD-10-CM

## 2019-03-28 DIAGNOSIS — R06.09 DOE (DYSPNEA ON EXERTION): ICD-10-CM

## 2019-03-28 DIAGNOSIS — I25.10 CORONARY ARTERY DISEASE INVOLVING NATIVE CORONARY ARTERY OF NATIVE HEART WITHOUT ANGINA PECTORIS: ICD-10-CM

## 2019-03-28 PROCEDURE — 99214 OFFICE O/P EST MOD 30 MIN: CPT | Performed by: INTERNAL MEDICINE

## 2019-03-28 RX ORDER — METOPROLOL TARTRATE 50 MG/1
25 TABLET, FILM COATED ORAL 2 TIMES DAILY
Qty: 180 TAB | Refills: 3 | COMMUNITY
Start: 2019-03-28 | End: 2019-03-29

## 2019-03-28 NOTE — PROGRESS NOTES
Subjective:   Chief Complaint:   Chief Complaint   Patient presents with   • Coronary Artery Disease       Lucas Rojas is a 63 y.o. male who returns for coronary artery disease.  In February 22, 2019 he was having significant chest pain was found to have an inferior ST elevation MI.  He was in [place] and had to wait for ambulance support, was given thrombolytics.  He was taken emergently for heart catheterization and had PCI to the RCA which was a culprit lesion.  He had residual 80-90% stenosis in the mid LAD and 40-50% in the circumflex.    Has been maintained on Effient and aspirin in addition to his beta-blocker and statin.  He is limiting by ADAM, NHYC 3, also SOB laying down.  No orthopnea, no LE edema.  EF was normal.  HR is slow, BP controlled, might be BB.    Strong family history of coronary artery disease including his brother.  Has hypertension, blood pressure controlled.  Has dyslipidemia with low HDL, last LDL 28.  He is on Lipitor 80 mg.  Has gout.      DATA REVIEWED by me:  ECG 2/22/2019  Sinus, rate 90, inferior Q waves and residual inferior lateral ST elevation    Echo 2-23-19  EF 55%, mild LHV, RVSP 37 mmHg    Left heart catheterization 2/22/2019  1.  Acute inferolateral ST elevation myocardial infarction with failed thrombolytic therapy secondary to total occlusion of distal right coronary artery  2.  Status post stenting of the distal right coronary artery with 4x 20 mm Synergy drug-eluting stent with no significant residual stenosis DAPHNE-3 flow  3.  80-90% stenosis in the mid left anterior descending artery and 40-50% proximal left circumflex artery stenosis      Most recent labs:     2/24/2019 hemoglobin 13.2, pulse 235, sodium 138, potassium 4.4, creatinine 1.15    2/23/2019 LDL cholesterol 28, HDL 31, to glycerides 66, total cholesterol 72    Past Medical History:   Diagnosis Date   • Anesthesia     post op  nausea, last neurosurgery no problems   • Arthritis     back/right ankle  and right wrist   • Coronary artery disease involving native coronary artery without angina pectoris 3/6/2019   • Diabetes (HCC)     oral meds   • Emphysema of lung (HCC)     per xray result. pt states no active disease   • High cholesterol    • Hypertension    • Pain 03-30-17     back, 7-8/10   • Snoring     sometimes, very mild, no sleep study     Past Surgical History:   Procedure Laterality Date   • LUMBAR FUSION POSTERIOR  4/4/2017    Procedure: LUMBAR FUSION POSTERIOR - L4-5 FUSION/EXPLORATION ;  Surgeon: Osmani Engel M.D.;  Location: SURGERY [facility];  Service:    • LUMBAR LAMINECTOMY DISKECTOMY Right 4/4/2017    Procedure: LUMBAR LAMINECTOMY DISKECTOMY L3-S1 AND MEDIAL FACETECTOMY;  Surgeon: Osmani Engel M.D.;  Location: SURGERY [facility];  Service:    • FORAMINOTOMY  4/4/2017    Procedure: FORAMINOTOMY;  Surgeon: Osmani Engel M.D.;  Location: SURGERY [facility];  Service:    • HARDWARE REMOVAL ORTHO  4/4/2017    Procedure: HARDWARE REMOVAL ORTHO - L4-5;  Surgeon: Osmani Engel M.D.;  Location: SURGERY [facility];  Service:    • LUMBAR FUSION POSTERIOR N/A 12/15/2015    Procedure: LUMBAR FUSION POSTERIOR INSTRUMENTED TLIF L4-5;  Surgeon: Osmani Engel M.D.;  Location: SURGERY [facility];  Service:    • LUMBAR LAMINECTOMY DISKECTOMY Left 12/15/2015    Procedure: LUMBAR LAMINECTOMY DISKECTOMY L1-2;  Surgeon: Osmani Engel M.D.;  Location: SURGERY [facility];  Service:    • SHOULDER SURGERY  2013    arthroscopic    • SHOULDER SURGERY  2012    right shoulder   • APPENDECTOMY  1976     Family History   Problem Relation Age of Onset   • Hypertension Father    • Heart Attack Brother      Social History     Social History   • Marital status:      Spouse name: N/A   • Number of children: N/A   • Years of education: N/A     Occupational History   • Not on file.     Social History Main Topics   • Smoking status: Passive Smoke Exposure - Never Smoker     Packs/day:  "2.00     Years: 18.00   • Smokeless tobacco: Never Used   • Alcohol use 0.0 oz/week      Comment: \"maybe 1 per week\"       ,    parents smoked growing up   • Drug use: No   • Sexual activity: Not on file     Other Topics Concern   • Not on file     Social History Narrative   • No narrative on file     Allergies   Allergen Reactions   • Losartan      Hyperkalemia       Current Outpatient Prescriptions   Medication Sig Dispense Refill   • metoprolol (LOPRESSOR) 50 MG Tab Take 0.5 Tabs by mouth 2 times a day. 180 Tab 3   • atorvastatin (LIPITOR) 80 MG tablet Take 1 Tab by mouth every bedtime. 90 Tab 3   • prasugrel (EFFIENT) 10 MG Tab Take 1 Tab by mouth every day. 90 Tab 3   • aspirin EC (ECOTRIN) 81 MG Tablet Delayed Response Take 81 mg by mouth every bedtime.     • allopurinol (ZYLOPRIM) 100 MG Tab Take 100 mg by mouth every day.     • DICLOFENAC PO Take 1 Tab by mouth 2 Times a Day.     • raNITidine (ZANTAC) 150 MG Tab Take 150 mg by mouth every day.     • Cyanocobalamin (VITAMIN B-12 PO) Take 1 Tab by mouth every day.     • oxymetazoline (AFRIN) 0.05 % Solution Spray 2 Sprays in nose 2 times a day as needed for Congestion.     • Testosterone 4 MG/24HR PATCH 24 HR Apply 1 Patch to skin as directed every bedtime.       No current facility-administered medications for this visit.        ROS  All others systems reviewed and negative.     Objective:     Blood pressure 128/82, pulse 62, height 1.778 m (5' 10\"), weight 100 kg (220 lb 7.4 oz), SpO2 95 %. Body mass index is 31.63 kg/m².    Physical Exam   General: No acute distress. Well nourished.  HEENT: EOM grossly intact, no scleral icterus, no pharyngeal erythema.   Neck:  No JVD, no bruits, trachea midline  CVS: RRR. Normal S1, S2. No M/R/G. No LE edema.  2+ radial pulses, 2+ DP pulses  Resp: CTAB. No wheezing or crackles/rhonchi. Normal respiratory effort.  Abdomen: Soft, NT, no kortney hepatomegaly.  MSK/Ext: No clubbing or cyanosis.  Skin: Warm and dry, no " ankur.  Neurological: CN III-XII grossly intact. No focal deficits.   Psych: A&O x 3, appropriate affect, good judgement      Assessment:     1. Coronary artery disease involving native coronary artery of native heart without angina pectoris  EKG    NM-CARDIAC STRESS TEST   2. ADAM (dyspnea on exertion)  NM-CARDIAC STRESS TEST   3. Essential hypertension     4. Other fatigue     5. NSAID long-term use     6. Class 1 obesity due to excess calories without serious comorbidity with body mass index (BMI) of 31.0 to 31.9 in adult     7. ST elevation myocardial infarction involving right coronary artery (HCC)     8. Dyslipidemia         Medical Decision Making:  Today's Assessment / Status / Plan:     -DAPT for 1 year  -Nuc to see if LAD lesion is relevant, I reviewed films personally with pt  -Reduce BB to see if he feels better  -He is on diclofenac and needs to stay on this per pt, he is aware it is an issue with CAD and accepts the risks.  -postpone cath for now    Written instructions given today:    -Metoprolol tartrate to 25 mg AM and PM, cut your 50 mg in half  -Chemical nuclear stress test to see if the blockage is physiologically relevant, it is a bifurcation lesion, more difficult to fix  -You should always hear results of testing within 5 days with my interpretation, if you do not, send a [employer] message or call the office: [phone].    Return in about 6 months (around 9/28/2019), or Lexie Velazquez 2 months, MD 6 months.    It is my pleasure to participate in the care of Mr. Rojas.  Please do not hesitate to contact me with questions or concerns.    Diana Heaton MD, [facility]  Cardiologist [facility]    Please note that this dictation was created using voice recognition software. I have made every reasonable attempt to correct obvious errors, but it is possible there are errors of grammar and possibly content that I did not discover before finalizing the note.

## 2019-03-28 NOTE — PATIENT INSTRUCTIONS
-Metoprolol tartrate to 25 mg AM and PM, cut your 50 mg in half  -Chemical nuclear stress test to see if the blockage is physiologically relevant, it is a bifurcation lesion, more difficult to fix  -You should always hear results of testing within 5 days with my interpretation, if you do not, send a [employer] message or call the office: [phone].

## 2019-03-28 NOTE — LETTER
[facility]   [address], [address]  [place] [address]  Phone: [phone]  Fax: [phone]              Lucas Rojas  1955    Encounter Date: 3/28/2019    Diana Heaton          PROGRESS NOTE:  Subjective:   Chief Complaint:   Chief Complaint   Patient presents with   • Coronary Artery Disease       Lucas Rojas is a 63 y.o. male who returns for coronary artery disease.  In February 22, 2019 he was having significant chest pain was found to have an inferior ST elevation MI.  He was in [place] and had to wait for ambulance support, was given thrombolytics.  He was taken emergently for heart catheterization and had PCI to the RCA which was a culprit lesion.  He had residual 80-90% stenosis in the mid LAD and 40-50% in the circumflex.    Has been maintained on Effient and aspirin in addition to his beta-blocker and statin.  He is limiting by ADAM, NHYC 3, also SOB laying down.  No orthopnea, no LE edema.  EF was normal.  HR is slow, BP controlled, might be BB.    Strong family history of coronary artery disease including his brother.  Has hypertension, blood pressure controlled.  Has dyslipidemia with low HDL, last LDL 28.  He is on Lipitor 80 mg.  Has gout.      DATA REVIEWED by me:  ECG 2/22/2019  Sinus, rate 90, inferior Q waves and residual inferior lateral ST elevation    Echo 2-23-19  EF 55%, mild LHV, RVSP 37 mmHg    Left heart catheterization 2/22/2019  1.  Acute inferolateral ST elevation myocardial infarction with failed thrombolytic therapy secondary to total occlusion of distal right coronary artery  2.  Status post stenting of the distal right coronary artery with 4x 20 mm Synergy drug-eluting stent with no significant residual stenosis DAPHNE-3 flow  3.  80-90% stenosis in the mid left anterior descending artery and 40-50% proximal left circumflex artery stenosis      Most recent labs:     2/24/2019 hemoglobin 13.2, pulse 235,  sodium 138, potassium 4.4, creatinine 1.15    2/23/2019 LDL cholesterol 28, HDL 31, to glycerides 66, total cholesterol 72    Past Medical History:   Diagnosis Date   • Anesthesia     post op  nausea, last neurosurgery no problems   • Arthritis     back/right ankle and right wrist   • Coronary artery disease involving native coronary artery without angina pectoris 3/6/2019   • Diabetes (HCC)     oral meds   • Emphysema of lung (HCC)     per xray result. pt states no active disease   • High cholesterol    • Hypertension    • Pain 03-30-17     back, 7-8/10   • Snoring     sometimes, very mild, no sleep study     Past Surgical History:   Procedure Laterality Date   • LUMBAR FUSION POSTERIOR  4/4/2017    Procedure: LUMBAR FUSION POSTERIOR - L4-5 FUSION/EXPLORATION ;  Surgeon: Osmani Engel M.D.;  Location: [facility];  Service:    • LUMBAR LAMINECTOMY DISKECTOMY Right 4/4/2017    Procedure: LUMBAR LAMINECTOMY DISKECTOMY L3-S1 AND MEDIAL FACETECTOMY;  Surgeon: Osmani Engel M.D.;  Location: [facility];  Service:    • FORAMINOTOMY  4/4/2017    Procedure: FORAMINOTOMY;  Surgeon: Osmani Engel M.D.;  Location: SURGERY [facility];  Service:    • HARDWARE REMOVAL ORTHO  4/4/2017    Procedure: HARDWARE REMOVAL ORTHO - L4-5;  Surgeon: Osmani Engel M.D.;  Location: SURGERY [facility];  Service:    • LUMBAR FUSION POSTERIOR N/A 12/15/2015    Procedure: LUMBAR FUSION POSTERIOR INSTRUMENTED TLIF L4-5;  Surgeon: Osmani Engel M.D.;  Location: [facility];  Service:    • LUMBAR LAMINECTOMY DISKECTOMY Left 12/15/2015    Procedure: LUMBAR LAMINECTOMY DISKECTOMY L1-2;  Surgeon: Osmani Engel M.D.;  Location: SURGERY [facility];  Service:    • SHOULDER SURGERY  2013    arthroscopic    • SHOULDER SURGERY  2012    right shoulder   • APPENDECTOMY  1976     Family History   Problem Relation Age of Onset   • Hypertension Father    • Heart Attack Brother      Social History      "    Social History   • Marital status:      Spouse name: N/A   • Number of children: N/A   • Years of education: N/A     Occupational History   • Not on file.     Social History Main Topics   • Smoking status: Passive Smoke Exposure - Never Smoker     Packs/day: 2.00     Years: 18.00   • Smokeless tobacco: Never Used   • Alcohol use 0.0 oz/week      Comment: \"maybe 1 per week\"       ,    parents smoked growing up   • Drug use: No   • Sexual activity: Not on file     Other Topics Concern   • Not on file     Social History Narrative   • No narrative on file     Allergies   Allergen Reactions   • Losartan      Hyperkalemia       Current Outpatient Prescriptions   Medication Sig Dispense Refill   • metoprolol (LOPRESSOR) 50 MG Tab Take 0.5 Tabs by mouth 2 times a day. 180 Tab 3   • atorvastatin (LIPITOR) 80 MG tablet Take 1 Tab by mouth every bedtime. 90 Tab 3   • prasugrel (EFFIENT) 10 MG Tab Take 1 Tab by mouth every day. 90 Tab 3   • aspirin EC (ECOTRIN) 81 MG Tablet Delayed Response Take 81 mg by mouth every bedtime.     • allopurinol (ZYLOPRIM) 100 MG Tab Take 100 mg by mouth every day.     • DICLOFENAC PO Take 1 Tab by mouth 2 Times a Day.     • raNITidine (ZANTAC) 150 MG Tab Take 150 mg by mouth every day.     • Cyanocobalamin (VITAMIN B-12 PO) Take 1 Tab by mouth every day.     • oxymetazoline (AFRIN) 0.05 % Solution Spray 2 Sprays in nose 2 times a day as needed for Congestion.     • Testosterone 4 MG/24HR PATCH 24 HR Apply 1 Patch to skin as directed every bedtime.       No current facility-administered medications for this visit.        ROS  All others systems reviewed and negative.     Objective:     Blood pressure 128/82, pulse 62, height 1.778 m (5' 10\"), weight 100 kg (220 lb 7.4 oz), SpO2 95 %. Body mass index is 31.63 kg/m².    Physical Exam   General: No acute distress. Well nourished.  HEENT: EOM grossly intact, no scleral icterus, no pharyngeal erythema.   Neck:  No JVD, no bruits, trachea " midline  CVS: RRR. Normal S1, S2. No M/R/G. No LE edema.  2+ radial pulses, 2+ DP pulses  Resp: CTAB. No wheezing or crackles/rhonchi. Normal respiratory effort.  Abdomen: Soft, NT, no kortney hepatomegaly.  MSK/Ext: No clubbing or cyanosis.  Skin: Warm and dry, no rashes.  Neurological: CN III-XII grossly intact. No focal deficits.   Psych: A&O x 3, appropriate affect, good judgement      Assessment:     1. Coronary artery disease involving native coronary artery of native heart without angina pectoris  EKG    NM-CARDIAC STRESS TEST   2. ADAM (dyspnea on exertion)  NM-CARDIAC STRESS TEST   3. Essential hypertension     4. Other fatigue     5. NSAID long-term use     6. Class 1 obesity due to excess calories without serious comorbidity with body mass index (BMI) of 31.0 to 31.9 in adult     7. ST elevation myocardial infarction involving right coronary artery (HCC)     8. Dyslipidemia         Medical Decision Making:  Today's Assessment / Status / Plan:     -DAPT for 1 year  -Nuc to see if LAD lesion is relevant, I reviewed films personally with pt  -Reduce BB to see if he feels better  -He is on diclofenac and needs to stay on this per pt, he is aware it is an issue with CAD and accepts the risks.  -postpone cath for now    Written instructions given today:    -Metoprolol tartrate to 25 mg AM and PM, cut your 50 mg in half  -Chemical nuclear stress test to see if the blockage is physiologically relevant, it is a bifurcation lesion, more difficult to fix  -You should always hear results of testing within 5 days with my interpretation, if you do not, send a "[employer] message or call the office: [phone].    Return in about 6 months (around 9/28/2019), or Lexie Velazquez 2 months, MD 6 months.    It is my pleasure to participate in the care of Mr. Rojas.  Please do not hesitate to contact me with questions or concerns.    Diana Heaton MD, [facility]  Cardiologist [facility]    Please note  that this dictation was created using voice recognition software. I have made every reasonable attempt to correct obvious errors, but it is possible there are errors of grammar and possibly content that I did not discover before finalizing the note.      Luis Fernando Culp M.D.  [address] [address]  VIA Facsimile: [phone]

## 2019-03-29 ENCOUNTER — TELEPHONE (OUTPATIENT)
Dept: CARDIOLOGY | Facility: MEDICAL CENTER | Age: 64
End: 2019-03-29

## 2019-03-29 ENCOUNTER — HOSPITAL ENCOUNTER (OUTPATIENT)
Dept: RADIOLOGY | Facility: MEDICAL CENTER | Age: 64
End: 2019-03-29
Attending: INTERNAL MEDICINE
Payer: MEDICARE

## 2019-03-29 DIAGNOSIS — I10 ESSENTIAL HYPERTENSION: ICD-10-CM

## 2019-03-29 DIAGNOSIS — R06.09 DOE (DYSPNEA ON EXERTION): ICD-10-CM

## 2019-03-29 DIAGNOSIS — I25.10 CORONARY ARTERY DISEASE INVOLVING NATIVE CORONARY ARTERY OF NATIVE HEART WITHOUT ANGINA PECTORIS: ICD-10-CM

## 2019-03-29 PROCEDURE — 700111 HCHG RX REV CODE 636 W/ 250 OVERRIDE (IP)

## 2019-03-29 PROCEDURE — A9502 TC99M TETROFOSMIN: HCPCS

## 2019-03-29 PROCEDURE — 78452 HT MUSCLE IMAGE SPECT MULT: CPT | Mod: 26 | Performed by: INTERNAL MEDICINE

## 2019-03-29 PROCEDURE — 93018 CV STRESS TEST I&R ONLY: CPT | Performed by: INTERNAL MEDICINE

## 2019-03-29 RX ORDER — REGADENOSON 0.08 MG/ML
INJECTION, SOLUTION INTRAVENOUS
Status: COMPLETED
Start: 2019-03-29 | End: 2019-03-29

## 2019-03-29 RX ORDER — METOPROLOL SUCCINATE 25 MG/1
12.5 TABLET, EXTENDED RELEASE ORAL DAILY
Qty: 90 TAB | Refills: 1 | Status: SHIPPED | OUTPATIENT
Start: 2019-03-29 | End: 2019-05-22 | Stop reason: SDUPTHER

## 2019-03-29 RX ADMIN — REGADENOSON 0.4 MG: 0.08 INJECTION, SOLUTION INTRAVENOUS at 10:46

## 2019-03-29 NOTE — TELEPHONE ENCOUNTER
"Dr. Heaton (ADR):  \"Nuclear stress test showed LAD was perfusing adequately so I do not think he needs a heart cath. It did show the infarct on the bottom part of the heart, but overall the heart pump function is still good.\"    Called pt to inform. Pt not avail. Left message to call back.  "

## 2019-03-29 NOTE — TELEPHONE ENCOUNTER
Spoke to pt to advise re: stress test results and can cancel cath. Per Tiger Text from Dr. Heaton, pt to be on metoprolol succ 12.5 mg daily. New rx sent to [facility]. Pt agrees and verbalized understanding.

## 2019-05-10 NOTE — IP AVS SNAPSHOT
4/5/2017          Lucas Rojas  [phone] [place] [address]    Dear Lucas:    [facility] wants to ensure your discharge home is safe and you or your loved ones have had all your questions answered regarding your care after you leave the hospital.    You may receive a telephone call within two days of your discharge.  This call is to make certain you understand your discharge instructions as well as ensure we provided you with the best care possible during your stay with us.     The call will only last approximately 3-5 minutes and will be done by a nurse.    Once again, we want to ensure your discharge home is safe and that you have a clear understanding of any next steps in your care.  If you have any questions or concerns, please do not hesitate to contact us, we are here for you.  Thank you for choosing [facility] for your healthcare needs.    Sincerely,    Duglas Garcia    [facility]         [place]

## 2019-05-22 ENCOUNTER — OFFICE VISIT (OUTPATIENT)
Dept: CARDIOLOGY | Facility: MEDICAL CENTER | Age: 64
End: 2019-05-22
Payer: MEDICARE

## 2019-05-22 VITALS
HEIGHT: 70 IN | BODY MASS INDEX: 31.14 KG/M2 | DIASTOLIC BLOOD PRESSURE: 72 MMHG | SYSTOLIC BLOOD PRESSURE: 128 MMHG | WEIGHT: 217.48 LBS | HEART RATE: 76 BPM | OXYGEN SATURATION: 98 %

## 2019-05-22 DIAGNOSIS — I10 ESSENTIAL HYPERTENSION: ICD-10-CM

## 2019-05-22 DIAGNOSIS — I25.10 CORONARY ARTERY DISEASE INVOLVING NATIVE CORONARY ARTERY OF NATIVE HEART WITHOUT ANGINA PECTORIS: ICD-10-CM

## 2019-05-22 DIAGNOSIS — E78.5 DYSLIPIDEMIA: ICD-10-CM

## 2019-05-22 PROCEDURE — 99214 OFFICE O/P EST MOD 30 MIN: CPT | Performed by: NURSE PRACTITIONER

## 2019-05-22 RX ORDER — METOPROLOL SUCCINATE 25 MG/1
12.5 TABLET, EXTENDED RELEASE ORAL DAILY
Qty: 90 TAB | Refills: 3 | Status: ON HOLD | OUTPATIENT
Start: 2019-05-22 | End: 2023-09-22

## 2019-05-22 RX ORDER — TESTOSTERONE CYPIONATE 200 MG/ML
INJECTION, SOLUTION INTRAMUSCULAR
Refills: 4 | COMMUNITY
Start: 2019-05-03 | End: 2023-09-12

## 2019-05-22 ASSESSMENT — ENCOUNTER SYMPTOMS
CHILLS: 0
FEVER: 0
ORTHOPNEA: 0
PALPITATIONS: 0

## 2019-05-22 NOTE — PROGRESS NOTES
Chief Complaint   Patient presents with   • Coronary Artery Disease       Subjective:   Lucas Rojas is a 64 y.o. male who presents today with CAD.  February 22, 2019 patient was having significant chest pain and found to have inferior STEMI.  He was in [place], [place] and had to wait for ambulance and thus was given thrombolytics.  He was taken emergently to for cardiac catheterization and had a 4 x 20 mm Synergy JEANNETTE to the dRCA.  There was also 80-90% stenosis of the mLAD and 40-50% to pCx.    Due to ongoing dyspnea on exertion, NYHA C class III and shortness of breath while laying down, MPI was ordered indicating the LAD was perfusing adequately, showed the infarct of the bottom of the heart, however overall heart function was good.  Due to these results cardiac catheterization was canceled.    The patient has been compliant with prasugrel 10 mg daily, metoprolol SR 12.5 mg every evening, atorvastatin 80 mg every evening, ASA 81 mg daily.  He has no problems tolerating these medications.  Metoprolol was recently changed from tartrate 50 mg twice daily to SR 25 mg daily due to ongoing fatigue.  His fatigue improved greatly changing from tartrate to succinate, I will now have him take it in the evening and hopefully this will be beneficial as well.    Blood pressures 1/1/2010-120/70-80.  We have discussed the importance of daily exercise and monitoring blood pressure and heart rates.  He is currently walking 1 mile per day which takes approximately 30 minutes.  I have asked him to increase his intensity.    Past Medical History:   Diagnosis Date   • Anesthesia     post op  nausea, last neurosurgery no problems   • Arthritis     back/right ankle and right wrist   • Coronary artery disease involving native coronary artery without angina pectoris 3/6/2019   • Diabetes (HCC)     oral meds   • Emphysema of lung (HCC)     per xray result. pt states no active disease   • High cholesterol    • Hypertension   "  • Pain 03-30-17     back, 7-8/10   • Snoring     sometimes, very mild, no sleep study     Past Surgical History:   Procedure Laterality Date   • LUMBAR FUSION POSTERIOR  4/4/2017    Procedure: LUMBAR FUSION POSTERIOR - L4-5 FUSION/EXPLORATION ;  Surgeon: Osmani Engel M.D.;  Location: SURGERY [facility];  Service:    • LUMBAR LAMINECTOMY DISKECTOMY Right 4/4/2017    Procedure: LUMBAR LAMINECTOMY DISKECTOMY L3-S1 AND MEDIAL FACETECTOMY;  Surgeon: Osmani Engel M.D.;  Location: SURGERY [facility];  Service:    • FORAMINOTOMY  4/4/2017    Procedure: FORAMINOTOMY;  Surgeon: Osmani Engel M.D.;  Location: SURGERY [facility];  Service:    • HARDWARE REMOVAL ORTHO  4/4/2017    Procedure: HARDWARE REMOVAL ORTHO - L4-5;  Surgeon: Osmani Engel M.D.;  Location: SURGERY [facility];  Service:    • LUMBAR FUSION POSTERIOR N/A 12/15/2015    Procedure: LUMBAR FUSION POSTERIOR INSTRUMENTED TLIF L4-5;  Surgeon: Osmani Engel M.D.;  Location: SURGERY [facility];  Service:    • LUMBAR LAMINECTOMY DISKECTOMY Left 12/15/2015    Procedure: LUMBAR LAMINECTOMY DISKECTOMY L1-2;  Surgeon: Osmani Engel M.D.;  Location: SURGERY [facility];  Service:    • SHOULDER SURGERY  2013    arthroscopic    • SHOULDER SURGERY  2012    right shoulder   • APPENDECTOMY  1976     Family History   Problem Relation Age of Onset   • Hypertension Father    • Heart Attack Brother      Social History     Social History   • Marital status:      Spouse name: N/A   • Number of children: N/A   • Years of education: N/A     Occupational History   • Not on file.     Social History Main Topics   • Smoking status: Passive Smoke Exposure - Never Smoker     Packs/day: 2.00     Years: 18.00   • Smokeless tobacco: Never Used   • Alcohol use 0.0 oz/week      Comment: \"maybe 1 per week\"       ,    parents smoked growing up   • Drug use: No   • Sexual activity: Not on file     Other Topics Concern   • Not on file     Social " "History Narrative   • No narrative on file     Allergies   Allergen Reactions   • Losartan      Hyperkalemia     Outpatient Encounter Prescriptions as of 5/22/2019   Medication Sig Dispense Refill   • testosterone cypionate (DEPO-TESTOSTERONE) 200 MG/ML Solution injection INJECT 1 ML IM Q 2 WEEKS  4   • metoprolol SR (TOPROL XL) 25 MG TABLET SR 24 HR Take 0.5 Tabs by mouth every day. 90 Tab 3   • atorvastatin (LIPITOR) 80 MG tablet Take 1 Tab by mouth every bedtime. 90 Tab 3   • prasugrel (EFFIENT) 10 MG Tab Take 1 Tab by mouth every day. 90 Tab 3   • aspirin EC (ECOTRIN) 81 MG Tablet Delayed Response Take 81 mg by mouth every bedtime.     • allopurinol (ZYLOPRIM) 100 MG Tab Take 100 mg by mouth every day.     • DICLOFENAC PO Take 1 Tab by mouth 2 Times a Day.     • raNITidine (ZANTAC) 150 MG Tab Take 150 mg by mouth every day.     • Cyanocobalamin (VITAMIN B-12 PO) Take 1 Tab by mouth every day.     • oxymetazoline (AFRIN) 0.05 % Solution Spray 2 Sprays in nose 2 times a day as needed for Congestion.     • [DISCONTINUED] D5W 5 % SOLN 50 mL with amphotericin B 50 MG SOLR 1 mg 1 mg by Intravenous route Once.     • [DISCONTINUED] metoprolol SR (TOPROL XL) 25 MG TABLET SR 24 HR Take 0.5 Tabs by mouth every day. 90 Tab 1   • [DISCONTINUED] Testosterone 4 MG/24HR PATCH 24 HR Apply 1 Patch to skin as directed every bedtime.       No facility-administered encounter medications on file as of 5/22/2019.      Review of Systems   Constitutional: Negative for chills and fever.   Cardiovascular: Negative for chest pain, palpitations and orthopnea.   All other systems reviewed and are negative.       Objective:   /72 (BP Location: Right arm, Patient Position: Sitting, BP Cuff Size: Adult)   Pulse 76   Ht 1.778 m (5' 10\")   Wt 98.6 kg (217 lb 7.7 oz)   SpO2 98%   BMI 31.21 kg/m²     Physical Exam   Constitutional: He is oriented to person, place, and time. He appears well-developed and well-nourished.   HENT:   Head: " Normocephalic and atraumatic.   Eyes: EOM are normal.   Neck: Neck supple.   Pulmonary/Chest: Effort normal.   Abdominal: Soft.   Neurological: He is alert and oriented to person, place, and time.   Skin: Skin is warm and dry.   Nursing note and vitals reviewed.       Myocardial Perfusion Report 3/29/2019  NUCLEAR IMAGING INTERPRETATION  Large territory, moderate to severe intensity inferior and inferolateral   infarct.  No ischemia.  Mild inferior wall hypokinesis. LV ejection fraction = 59%.  TID absent 0.91.  Sinus rhythm.  Old inferior MI on ecg.  Nondiagnostic ECG portion of a regadenoson nuclear stress test.  ECG INTERPRETATION  Nondiagnostic ECG portion of a regadenoson nuclear stress test.    CARDIAC CATHETERIZATION 2/22/19  Post-operative Diagnosis:   1.  Acute inferolateral ST elevation myocardial infarction with failed thrombolytic therapy secondary to total occlusion of distal right coronary artery  2.  Status post stenting of the distal right coronary artery with 4x 20 mm Synergy drug-eluting stent with no significant residual stenosis DAPHNE-3 flow  3.  80-90% stenosis in the mid left anterior descending artery and 40-50% proximal left circumflex artery stenosis     ECHOCARDIOGRAM 2/22/19  CONCLUSIONS  Normal left ventricular systolic function. Left ventricular ejection   fraction is visually estimated to be 55%.   Indeterminate diastolic function.  Normal inferior vena cava size and inspiratory collapse.  Right ventricular systolic pressure is estimated to be 37 mmHg.    Assessment:     1. Coronary artery disease involving native coronary artery of native heart without angina pectoris  metoprolol SR (TOPROL XL) 25 MG TABLET SR 24 HR   2. Dyslipidemia     3. Essential hypertension  metoprolol SR (TOPROL XL) 25 MG TABLET SR 24 HR       Medical Decision Making:  Today's Assessment / Status / Plan:   1.  CAD  -S/P JEANNETTE dRCA  -Continue ASA 81 mg, atorvastatin 80 mg every evening, metoprolol SR 25 mg (half  tab) every evening, Prasugrel 10 mg daily  -Chest pain-free    2.  Dyslipidemia  - Continue atorvastatin    3.  HTN  - Continue metoprolol SR    Collaborating MD is Dr. Taylor.  Follow-up visit in 4 months with LOS, already scheduled.    Please note that this dictation was created using voice recognition software.  I have made every reasonable attempt to correct obvious errors, but it is possible there are errors of grammar or possibly content that I did not discover before finalizing the note.

## 2019-08-30 ENCOUNTER — HOSPITAL ENCOUNTER (INPATIENT)
Dept: HOSPITAL 8 - CACL | Age: 64
LOS: 12 days | Discharge: HOME | DRG: 233 | End: 2019-09-11
Attending: INTERNAL MEDICINE | Admitting: INTERNAL MEDICINE
Payer: MEDICARE

## 2019-08-30 VITALS — SYSTOLIC BLOOD PRESSURE: 143 MMHG | DIASTOLIC BLOOD PRESSURE: 84 MMHG

## 2019-08-30 VITALS — HEIGHT: 70 IN | BODY MASS INDEX: 30.11 KG/M2 | WEIGHT: 210.32 LBS

## 2019-08-30 VITALS — DIASTOLIC BLOOD PRESSURE: 93 MMHG | SYSTOLIC BLOOD PRESSURE: 141 MMHG

## 2019-08-30 DIAGNOSIS — Z99.11: ICD-10-CM

## 2019-08-30 DIAGNOSIS — Z80.1: ICD-10-CM

## 2019-08-30 DIAGNOSIS — Z79.02: ICD-10-CM

## 2019-08-30 DIAGNOSIS — E78.5: ICD-10-CM

## 2019-08-30 DIAGNOSIS — G89.18: ICD-10-CM

## 2019-08-30 DIAGNOSIS — J43.9: ICD-10-CM

## 2019-08-30 DIAGNOSIS — E66.9: ICD-10-CM

## 2019-08-30 DIAGNOSIS — K21.9: ICD-10-CM

## 2019-08-30 DIAGNOSIS — D50.9: ICD-10-CM

## 2019-08-30 DIAGNOSIS — M10.9: ICD-10-CM

## 2019-08-30 DIAGNOSIS — I25.110: Primary | ICD-10-CM

## 2019-08-30 DIAGNOSIS — J96.01: ICD-10-CM

## 2019-08-30 DIAGNOSIS — D72.829: ICD-10-CM

## 2019-08-30 DIAGNOSIS — Z95.5: ICD-10-CM

## 2019-08-30 DIAGNOSIS — I25.5: ICD-10-CM

## 2019-08-30 DIAGNOSIS — I50.42: ICD-10-CM

## 2019-08-30 DIAGNOSIS — I25.2: ICD-10-CM

## 2019-08-30 DIAGNOSIS — I08.1: ICD-10-CM

## 2019-08-30 DIAGNOSIS — Z87.891: ICD-10-CM

## 2019-08-30 DIAGNOSIS — I11.0: ICD-10-CM

## 2019-08-30 DIAGNOSIS — Z82.49: ICD-10-CM

## 2019-08-30 DIAGNOSIS — N17.9: ICD-10-CM

## 2019-08-30 LAB
ALBUMIN SERPL-MCNC: 3.3 G/DL (ref 3.4–5)
ALP SERPL-CCNC: 118 U/L (ref 45–117)
ALT SERPL-CCNC: 57 U/L (ref 12–78)
ANION GAP SERPL CALC-SCNC: 4 MMOL/L (ref 5–15)
ANION GAP SERPL CALC-SCNC: 6 MMOL/L (ref 5–15)
APTT BLD: 26 SECONDS (ref 25–31)
BASOPHILS # BLD AUTO: 0.03 X10^3/UL (ref 0–0.1)
BASOPHILS # BLD AUTO: 0.03 X10^3/UL (ref 0–0.1)
BASOPHILS NFR BLD AUTO: 0 % (ref 0–1)
BASOPHILS NFR BLD AUTO: 0 % (ref 0–1)
BILIRUB SERPL-MCNC: 0.5 MG/DL (ref 0.2–1)
CALCIUM SERPL-MCNC: 8 MG/DL (ref 8.5–10.1)
CALCIUM SERPL-MCNC: 8.5 MG/DL (ref 8.5–10.1)
CHLORIDE SERPL-SCNC: 112 MMOL/L (ref 98–107)
CHLORIDE SERPL-SCNC: 113 MMOL/L (ref 98–107)
CREAT SERPL-MCNC: 1.19 MG/DL (ref 0.7–1.3)
CREAT SERPL-MCNC: 1.28 MG/DL (ref 0.7–1.3)
EOSINOPHIL # BLD AUTO: 0.21 X10^3/UL (ref 0–0.4)
EOSINOPHIL # BLD AUTO: 0.29 X10^3/UL (ref 0–0.4)
EOSINOPHIL NFR BLD AUTO: 3 % (ref 1–7)
EOSINOPHIL NFR BLD AUTO: 4 % (ref 1–7)
ERYTHROCYTE [DISTWIDTH] IN BLOOD BY AUTOMATED COUNT: 19 % (ref 9.4–14.8)
ERYTHROCYTE [DISTWIDTH] IN BLOOD BY AUTOMATED COUNT: 19.1 % (ref 9.4–14.8)
EST. AVERAGE GLUCOSE BLD GHB EST-MCNC: 131 MG/DL (ref 0–126)
HBA1C MFR BLD: 6.2 % (ref 4.2–6.3)
INR PPP: 1.06 (ref 0.93–1.1)
LYMPHOCYTES # BLD AUTO: 1.41 X10^3/UL (ref 1–3.4)
LYMPHOCYTES # BLD AUTO: 1.77 X10^3/UL (ref 1–3.4)
LYMPHOCYTES NFR BLD AUTO: 17 % (ref 22–44)
LYMPHOCYTES NFR BLD AUTO: 23 % (ref 22–44)
MCH RBC QN AUTO: 23.6 PG (ref 27.5–34.5)
MCH RBC QN AUTO: 24 PG (ref 27.5–34.5)
MCHC RBC AUTO-ENTMCNC: 31.2 G/DL (ref 33.2–36.2)
MCHC RBC AUTO-ENTMCNC: 31.3 G/DL (ref 33.2–36.2)
MCV RBC AUTO: 75.6 FL (ref 81–97)
MCV RBC AUTO: 76.8 FL (ref 81–97)
MD: NO
MD: NO
MONOCYTES # BLD AUTO: 0.65 X10^3/UL (ref 0.2–0.8)
MONOCYTES # BLD AUTO: 0.75 X10^3/UL (ref 0.2–0.8)
MONOCYTES NFR BLD AUTO: 9 % (ref 2–9)
MONOCYTES NFR BLD AUTO: 9 % (ref 2–9)
NEUTROPHILS # BLD AUTO: 4.83 X10^3/UL (ref 1.8–6.8)
NEUTROPHILS # BLD AUTO: 5.86 X10^3/UL (ref 1.8–6.8)
NEUTROPHILS NFR BLD AUTO: 64 % (ref 42–75)
NEUTROPHILS NFR BLD AUTO: 71 % (ref 42–75)
PLATELET # BLD AUTO: 257 X10^3/UL (ref 130–400)
PLATELET # BLD AUTO: 275 X10^3/UL (ref 130–400)
PMV BLD AUTO: 9.1 FL (ref 7.4–10.4)
PMV BLD AUTO: 9.5 FL (ref 7.4–10.4)
PROT SERPL-MCNC: 6 G/DL (ref 6.4–8.2)
PROTHROMBIN TIME: 11.1 SECONDS (ref 9.6–11.5)
RBC # BLD AUTO: 5.19 X10^6/UL (ref 4.38–5.82)
RBC # BLD AUTO: 5.45 X10^6/UL (ref 4.38–5.82)

## 2019-08-30 PROCEDURE — 0399T: CPT

## 2019-08-30 PROCEDURE — 99156 MOD SED OTH PHYS/QHP 5/>YRS: CPT

## 2019-08-30 PROCEDURE — 83036 HEMOGLOBIN GLYCOSYLATED A1C: CPT

## 2019-08-30 PROCEDURE — 82947 ASSAY GLUCOSE BLOOD QUANT: CPT

## 2019-08-30 PROCEDURE — 94002 VENT MGMT INPAT INIT DAY: CPT

## 2019-08-30 PROCEDURE — 82330 ASSAY OF CALCIUM: CPT

## 2019-08-30 PROCEDURE — 85520 HEPARIN ASSAY: CPT

## 2019-08-30 PROCEDURE — 36415 COLL VENOUS BLD VENIPUNCTURE: CPT

## 2019-08-30 PROCEDURE — 80048 BASIC METABOLIC PNL TOTAL CA: CPT

## 2019-08-30 PROCEDURE — 82728 ASSAY OF FERRITIN: CPT

## 2019-08-30 PROCEDURE — P9047 ALBUMIN (HUMAN), 25%, 50ML: HCPCS

## 2019-08-30 PROCEDURE — 85025 COMPLETE CBC W/AUTO DIFF WBC: CPT

## 2019-08-30 PROCEDURE — 80053 COMPREHEN METABOLIC PANEL: CPT

## 2019-08-30 PROCEDURE — B2111ZZ FLUOROSCOPY OF MULTIPLE CORONARY ARTERIES USING LOW OSMOLAR CONTRAST: ICD-10-PCS | Performed by: INTERNAL MEDICINE

## 2019-08-30 PROCEDURE — B2131ZZ FLUOROSCOPY OF MULTIPLE CORONARY ARTERY BYPASS GRAFTS USING LOW OSMOLAR CONTRAST: ICD-10-PCS | Performed by: INTERNAL MEDICINE

## 2019-08-30 PROCEDURE — 84132 ASSAY OF SERUM POTASSIUM: CPT

## 2019-08-30 PROCEDURE — 83735 ASSAY OF MAGNESIUM: CPT

## 2019-08-30 PROCEDURE — 85730 THROMBOPLASTIN TIME PARTIAL: CPT

## 2019-08-30 PROCEDURE — C1769 GUIDE WIRE: HCPCS

## 2019-08-30 PROCEDURE — 71046 X-RAY EXAM CHEST 2 VIEWS: CPT

## 2019-08-30 PROCEDURE — 82272 OCCULT BLD FECES 1-3 TESTS: CPT

## 2019-08-30 PROCEDURE — 93005 ELECTROCARDIOGRAM TRACING: CPT

## 2019-08-30 PROCEDURE — C1894 INTRO/SHEATH, NON-LASER: HCPCS

## 2019-08-30 PROCEDURE — 93458 L HRT ARTERY/VENTRICLE ANGIO: CPT

## 2019-08-30 PROCEDURE — C1760 CLOSURE DEV, VASC: HCPCS

## 2019-08-30 PROCEDURE — 93306 TTE W/DOPPLER COMPLETE: CPT

## 2019-08-30 PROCEDURE — 82962 GLUCOSE BLOOD TEST: CPT

## 2019-08-30 PROCEDURE — 85610 PROTHROMBIN TIME: CPT

## 2019-08-30 PROCEDURE — 4A023N7 MEASUREMENT OF CARDIAC SAMPLING AND PRESSURE, LEFT HEART, PERCUTANEOUS APPROACH: ICD-10-PCS | Performed by: INTERNAL MEDICINE

## 2019-08-30 PROCEDURE — 83550 IRON BINDING TEST: CPT

## 2019-08-30 PROCEDURE — 86900 BLOOD TYPING SEROLOGIC ABO: CPT

## 2019-08-30 PROCEDURE — 85014 HEMATOCRIT: CPT

## 2019-08-30 PROCEDURE — 82803 BLOOD GASES ANY COMBINATION: CPT

## 2019-08-30 PROCEDURE — C1751 CATH, INF, PER/CENT/MIDLINE: HCPCS

## 2019-08-30 PROCEDURE — P9045 ALBUMIN (HUMAN), 5%, 250 ML: HCPCS

## 2019-08-30 PROCEDURE — 99157 MOD SED OTHER PHYS/QHP EA: CPT

## 2019-08-30 PROCEDURE — 94150 VITAL CAPACITY TEST: CPT

## 2019-08-30 PROCEDURE — B2151ZZ FLUOROSCOPY OF LEFT HEART USING LOW OSMOLAR CONTRAST: ICD-10-PCS | Performed by: INTERNAL MEDICINE

## 2019-08-30 PROCEDURE — 84295 ASSAY OF SERUM SODIUM: CPT

## 2019-08-30 PROCEDURE — 87081 CULTURE SCREEN ONLY: CPT

## 2019-08-30 PROCEDURE — 86923 COMPATIBILITY TEST ELECTRIC: CPT

## 2019-08-30 PROCEDURE — 36600 WITHDRAWAL OF ARTERIAL BLOOD: CPT

## 2019-08-30 PROCEDURE — 83540 ASSAY OF IRON: CPT

## 2019-08-30 PROCEDURE — 81003 URINALYSIS AUTO W/O SCOPE: CPT

## 2019-08-30 PROCEDURE — 93970 EXTREMITY STUDY: CPT

## 2019-08-30 PROCEDURE — 71045 X-RAY EXAM CHEST 1 VIEW: CPT

## 2019-08-30 PROCEDURE — 93880 EXTRACRANIAL BILAT STUDY: CPT

## 2019-08-30 PROCEDURE — 85347 COAGULATION TIME ACTIVATED: CPT

## 2019-08-30 PROCEDURE — 82040 ASSAY OF SERUM ALBUMIN: CPT

## 2019-08-30 PROCEDURE — 85049 AUTOMATED PLATELET COUNT: CPT

## 2019-08-30 PROCEDURE — 82810 BLOOD GASES O2 SAT ONLY: CPT

## 2019-08-30 PROCEDURE — 86850 RBC ANTIBODY SCREEN: CPT

## 2019-08-30 PROCEDURE — 93325 DOPPLER ECHO COLOR FLOW MAPG: CPT

## 2019-08-30 PROCEDURE — 82800 BLOOD PH: CPT

## 2019-08-30 PROCEDURE — 93312 ECHO TRANSESOPHAGEAL: CPT

## 2019-08-30 PROCEDURE — 93321 DOPPLER ECHO F-UP/LMTD STD: CPT

## 2019-08-30 PROCEDURE — 85018 HEMOGLOBIN: CPT

## 2019-08-30 RX ADMIN — ATORVASTATIN CALCIUM SCH MG: 80 TABLET, FILM COATED ORAL at 21:15

## 2019-08-30 RX ADMIN — METOPROLOL TARTRATE SCH MG: 25 TABLET, FILM COATED ORAL at 18:24

## 2019-08-30 RX ADMIN — DIPHENHYDRAMINE HYDROCHLORIDE PRN MG: 25 CAPSULE ORAL at 22:04

## 2019-08-30 RX ADMIN — SODIUM CHLORIDE, PRESERVATIVE FREE SCH ML: 5 INJECTION INTRAVENOUS at 21:17

## 2019-08-31 VITALS — DIASTOLIC BLOOD PRESSURE: 86 MMHG | SYSTOLIC BLOOD PRESSURE: 128 MMHG

## 2019-08-31 VITALS — SYSTOLIC BLOOD PRESSURE: 113 MMHG | DIASTOLIC BLOOD PRESSURE: 71 MMHG

## 2019-08-31 VITALS — DIASTOLIC BLOOD PRESSURE: 85 MMHG | SYSTOLIC BLOOD PRESSURE: 138 MMHG

## 2019-08-31 VITALS — SYSTOLIC BLOOD PRESSURE: 150 MMHG | DIASTOLIC BLOOD PRESSURE: 89 MMHG

## 2019-08-31 RX ADMIN — SODIUM CHLORIDE, PRESERVATIVE FREE SCH ML: 5 INJECTION INTRAVENOUS at 09:47

## 2019-08-31 RX ADMIN — FAMOTIDINE SCH MG: 20 TABLET, FILM COATED ORAL at 20:47

## 2019-08-31 RX ADMIN — ATORVASTATIN CALCIUM SCH MG: 80 TABLET, FILM COATED ORAL at 20:47

## 2019-08-31 RX ADMIN — DIPHENHYDRAMINE HYDROCHLORIDE PRN MG: 25 CAPSULE ORAL at 20:47

## 2019-08-31 RX ADMIN — METOPROLOL TARTRATE SCH MG: 25 TABLET, FILM COATED ORAL at 09:52

## 2019-08-31 RX ADMIN — HEPARIN SODIUM PRN UNITS: 5000 INJECTION, SOLUTION INTRAVENOUS; SUBCUTANEOUS at 16:48

## 2019-08-31 RX ADMIN — METOPROLOL TARTRATE SCH MG: 25 TABLET, FILM COATED ORAL at 16:49

## 2019-08-31 RX ADMIN — SODIUM CHLORIDE, PRESERVATIVE FREE SCH ML: 5 INJECTION INTRAVENOUS at 23:17

## 2019-08-31 RX ADMIN — ASPIRIN 81 MG SCH MG: 81 TABLET ORAL at 06:26

## 2019-08-31 RX ADMIN — HEPARIN SODIUM PRN UNITS: 5000 INJECTION, SOLUTION INTRAVENOUS; SUBCUTANEOUS at 22:55

## 2019-09-01 VITALS — SYSTOLIC BLOOD PRESSURE: 123 MMHG | DIASTOLIC BLOOD PRESSURE: 79 MMHG

## 2019-09-01 VITALS — DIASTOLIC BLOOD PRESSURE: 78 MMHG | SYSTOLIC BLOOD PRESSURE: 129 MMHG

## 2019-09-01 VITALS — SYSTOLIC BLOOD PRESSURE: 114 MMHG | DIASTOLIC BLOOD PRESSURE: 72 MMHG

## 2019-09-01 VITALS — SYSTOLIC BLOOD PRESSURE: 143 MMHG | DIASTOLIC BLOOD PRESSURE: 76 MMHG

## 2019-09-01 RX ADMIN — HEPARIN SODIUM PRN UNITS: 5000 INJECTION, SOLUTION INTRAVENOUS; SUBCUTANEOUS at 12:39

## 2019-09-01 RX ADMIN — DIPHENHYDRAMINE HYDROCHLORIDE PRN MG: 25 CAPSULE ORAL at 21:21

## 2019-09-01 RX ADMIN — ASPIRIN 81 MG SCH MG: 81 TABLET ORAL at 06:24

## 2019-09-01 RX ADMIN — ATORVASTATIN CALCIUM SCH MG: 80 TABLET, FILM COATED ORAL at 21:16

## 2019-09-01 RX ADMIN — METOPROLOL TARTRATE SCH MG: 25 TABLET, FILM COATED ORAL at 06:33

## 2019-09-01 RX ADMIN — FAMOTIDINE SCH MG: 20 TABLET, FILM COATED ORAL at 21:16

## 2019-09-01 RX ADMIN — SODIUM CHLORIDE, PRESERVATIVE FREE SCH ML: 5 INJECTION INTRAVENOUS at 21:17

## 2019-09-01 RX ADMIN — FAMOTIDINE SCH MG: 20 TABLET, FILM COATED ORAL at 08:26

## 2019-09-01 RX ADMIN — METOPROLOL TARTRATE SCH MG: 25 TABLET, FILM COATED ORAL at 17:49

## 2019-09-01 RX ADMIN — SODIUM CHLORIDE, PRESERVATIVE FREE SCH ML: 5 INJECTION INTRAVENOUS at 08:25

## 2019-09-01 RX ADMIN — ALLOPURINOL SCH MG: 100 TABLET ORAL at 08:27

## 2019-09-02 VITALS — DIASTOLIC BLOOD PRESSURE: 79 MMHG | SYSTOLIC BLOOD PRESSURE: 124 MMHG

## 2019-09-02 VITALS — SYSTOLIC BLOOD PRESSURE: 125 MMHG | DIASTOLIC BLOOD PRESSURE: 78 MMHG

## 2019-09-02 VITALS — DIASTOLIC BLOOD PRESSURE: 76 MMHG | SYSTOLIC BLOOD PRESSURE: 143 MMHG

## 2019-09-02 VITALS — SYSTOLIC BLOOD PRESSURE: 121 MMHG | DIASTOLIC BLOOD PRESSURE: 80 MMHG

## 2019-09-02 RX ADMIN — DIPHENHYDRAMINE HYDROCHLORIDE PRN MG: 25 CAPSULE ORAL at 21:06

## 2019-09-02 RX ADMIN — HEPARIN SODIUM PRN UNITS: 5000 INJECTION, SOLUTION INTRAVENOUS; SUBCUTANEOUS at 01:41

## 2019-09-02 RX ADMIN — METOPROLOL TARTRATE SCH MG: 25 TABLET, FILM COATED ORAL at 16:57

## 2019-09-02 RX ADMIN — METOPROLOL TARTRATE SCH MG: 25 TABLET, FILM COATED ORAL at 05:55

## 2019-09-02 RX ADMIN — SODIUM CHLORIDE, PRESERVATIVE FREE SCH ML: 5 INJECTION INTRAVENOUS at 19:52

## 2019-09-02 RX ADMIN — ALLOPURINOL SCH MG: 100 TABLET ORAL at 07:22

## 2019-09-02 RX ADMIN — ATORVASTATIN CALCIUM SCH MG: 80 TABLET, FILM COATED ORAL at 19:51

## 2019-09-02 RX ADMIN — FAMOTIDINE SCH MG: 20 TABLET, FILM COATED ORAL at 07:22

## 2019-09-02 RX ADMIN — FAMOTIDINE SCH MG: 20 TABLET, FILM COATED ORAL at 19:51

## 2019-09-02 RX ADMIN — ASPIRIN 81 MG SCH MG: 81 TABLET ORAL at 05:55

## 2019-09-02 RX ADMIN — SODIUM CHLORIDE, PRESERVATIVE FREE SCH ML: 5 INJECTION INTRAVENOUS at 07:22

## 2019-09-03 VITALS — SYSTOLIC BLOOD PRESSURE: 135 MMHG | DIASTOLIC BLOOD PRESSURE: 85 MMHG

## 2019-09-03 VITALS — SYSTOLIC BLOOD PRESSURE: 116 MMHG | DIASTOLIC BLOOD PRESSURE: 75 MMHG

## 2019-09-03 VITALS — SYSTOLIC BLOOD PRESSURE: 126 MMHG | DIASTOLIC BLOOD PRESSURE: 80 MMHG

## 2019-09-03 VITALS — DIASTOLIC BLOOD PRESSURE: 85 MMHG | SYSTOLIC BLOOD PRESSURE: 127 MMHG

## 2019-09-03 RX ADMIN — DIPHENHYDRAMINE HYDROCHLORIDE PRN MG: 25 CAPSULE ORAL at 21:09

## 2019-09-03 RX ADMIN — ATORVASTATIN CALCIUM SCH MG: 80 TABLET, FILM COATED ORAL at 20:04

## 2019-09-03 RX ADMIN — SODIUM CHLORIDE, PRESERVATIVE FREE SCH ML: 5 INJECTION INTRAVENOUS at 09:13

## 2019-09-03 RX ADMIN — METOPROLOL TARTRATE SCH MG: 25 TABLET, FILM COATED ORAL at 05:06

## 2019-09-03 RX ADMIN — ASPIRIN 81 MG SCH MG: 81 TABLET ORAL at 05:07

## 2019-09-03 RX ADMIN — ALLOPURINOL SCH MG: 100 TABLET ORAL at 09:13

## 2019-09-03 RX ADMIN — SODIUM CHLORIDE, PRESERVATIVE FREE SCH ML: 5 INJECTION INTRAVENOUS at 20:05

## 2019-09-03 RX ADMIN — FAMOTIDINE SCH MG: 20 TABLET, FILM COATED ORAL at 09:12

## 2019-09-03 RX ADMIN — FAMOTIDINE SCH MG: 20 TABLET, FILM COATED ORAL at 20:04

## 2019-09-03 RX ADMIN — METOPROLOL TARTRATE SCH MG: 25 TABLET, FILM COATED ORAL at 17:12

## 2019-09-04 VITALS — SYSTOLIC BLOOD PRESSURE: 121 MMHG | DIASTOLIC BLOOD PRESSURE: 78 MMHG

## 2019-09-04 VITALS — DIASTOLIC BLOOD PRESSURE: 90 MMHG | SYSTOLIC BLOOD PRESSURE: 125 MMHG

## 2019-09-04 VITALS — DIASTOLIC BLOOD PRESSURE: 74 MMHG | SYSTOLIC BLOOD PRESSURE: 119 MMHG

## 2019-09-04 VITALS — DIASTOLIC BLOOD PRESSURE: 82 MMHG | SYSTOLIC BLOOD PRESSURE: 123 MMHG

## 2019-09-04 LAB
% IRON SATURATION: 7 % (ref 20–55)
IRON LEVEL: 27 MCG/DL (ref 65–175)
TIBC SERPL-MCNC: 395 MCG/DL (ref 250–450)

## 2019-09-04 RX ADMIN — SODIUM CHLORIDE, PRESERVATIVE FREE SCH ML: 5 INJECTION INTRAVENOUS at 20:31

## 2019-09-04 RX ADMIN — METOPROLOL TARTRATE SCH MG: 25 TABLET, FILM COATED ORAL at 05:36

## 2019-09-04 RX ADMIN — DIPHENHYDRAMINE HYDROCHLORIDE PRN MG: 25 CAPSULE ORAL at 20:30

## 2019-09-04 RX ADMIN — ATORVASTATIN CALCIUM SCH MG: 80 TABLET, FILM COATED ORAL at 20:30

## 2019-09-04 RX ADMIN — METOPROLOL TARTRATE SCH MG: 25 TABLET, FILM COATED ORAL at 17:26

## 2019-09-04 RX ADMIN — ALLOPURINOL SCH MG: 100 TABLET ORAL at 08:00

## 2019-09-04 RX ADMIN — FAMOTIDINE SCH MG: 20 TABLET, FILM COATED ORAL at 08:00

## 2019-09-04 RX ADMIN — ASPIRIN 81 MG SCH MG: 81 TABLET ORAL at 05:36

## 2019-09-04 RX ADMIN — FAMOTIDINE SCH MG: 20 TABLET, FILM COATED ORAL at 20:31

## 2019-09-04 RX ADMIN — SODIUM CHLORIDE, PRESERVATIVE FREE SCH ML: 5 INJECTION INTRAVENOUS at 08:01

## 2019-09-05 VITALS — SYSTOLIC BLOOD PRESSURE: 146 MMHG | DIASTOLIC BLOOD PRESSURE: 87 MMHG

## 2019-09-05 VITALS — SYSTOLIC BLOOD PRESSURE: 124 MMHG | DIASTOLIC BLOOD PRESSURE: 87 MMHG

## 2019-09-05 VITALS — SYSTOLIC BLOOD PRESSURE: 124 MMHG | DIASTOLIC BLOOD PRESSURE: 80 MMHG

## 2019-09-05 VITALS — DIASTOLIC BLOOD PRESSURE: 91 MMHG | SYSTOLIC BLOOD PRESSURE: 152 MMHG

## 2019-09-05 LAB
ALBUMIN SERPL-MCNC: 3.6 G/DL (ref 3.4–5)
ALP SERPL-CCNC: 122 U/L (ref 45–117)
ALT SERPL-CCNC: 127 U/L (ref 12–78)
ANION GAP SERPL CALC-SCNC: 7 MMOL/L (ref 5–15)
APTT BLD: 50 SECONDS (ref 25–31)
BASOPHILS # BLD AUTO: 0.03 X10^3/UL (ref 0–0.1)
BASOPHILS NFR BLD AUTO: 0 % (ref 0–1)
BILIRUB SERPL-MCNC: 0.4 MG/DL (ref 0.2–1)
CALCIUM SERPL-MCNC: 8.2 MG/DL (ref 8.5–10.1)
CHLORIDE SERPL-SCNC: 110 MMOL/L (ref 98–107)
CREAT SERPL-MCNC: 1.22 MG/DL (ref 0.7–1.3)
EOSINOPHIL # BLD AUTO: 0.47 X10^3/UL (ref 0–0.4)
EOSINOPHIL NFR BLD AUTO: 5 % (ref 1–7)
ERYTHROCYTE [DISTWIDTH] IN BLOOD BY AUTOMATED COUNT: 19.2 % (ref 9.4–14.8)
EST. AVERAGE GLUCOSE BLD GHB EST-MCNC: 131 MG/DL (ref 0–126)
HBA1C MFR BLD: 6.2 % (ref 4.2–6.3)
INR PPP: 1.02 (ref 0.93–1.1)
LYMPHOCYTES # BLD AUTO: 1.65 X10^3/UL (ref 1–3.4)
LYMPHOCYTES NFR BLD AUTO: 18 % (ref 22–44)
MCH RBC QN AUTO: 24 PG (ref 27.5–34.5)
MCHC RBC AUTO-ENTMCNC: 31.1 G/DL (ref 33.2–36.2)
MCV RBC AUTO: 77 FL (ref 81–97)
MD: NO
MICROSCOPIC: (no result)
MONOCYTES # BLD AUTO: 0.88 X10^3/UL (ref 0.2–0.8)
MONOCYTES NFR BLD AUTO: 10 % (ref 2–9)
NEUTROPHILS # BLD AUTO: 6.12 X10^3/UL (ref 1.8–6.8)
NEUTROPHILS NFR BLD AUTO: 67 % (ref 42–75)
PLATELET # BLD AUTO: 261 X10^3/UL (ref 130–400)
PMV BLD AUTO: 9.6 FL (ref 7.4–10.4)
PROT SERPL-MCNC: 6.5 G/DL (ref 6.4–8.2)
PROTHROMBIN TIME: 10.7 SECONDS (ref 9.6–11.5)
RBC # BLD AUTO: 5.31 X10^6/UL (ref 4.38–5.82)

## 2019-09-05 RX ADMIN — METOPROLOL TARTRATE SCH MG: 25 TABLET, FILM COATED ORAL at 17:19

## 2019-09-05 RX ADMIN — FAMOTIDINE SCH MG: 20 TABLET, FILM COATED ORAL at 21:14

## 2019-09-05 RX ADMIN — ASPIRIN 81 MG SCH MG: 81 TABLET ORAL at 05:40

## 2019-09-05 RX ADMIN — SODIUM CHLORIDE, PRESERVATIVE FREE SCH ML: 5 INJECTION INTRAVENOUS at 07:57

## 2019-09-05 RX ADMIN — ATORVASTATIN CALCIUM SCH MG: 80 TABLET, FILM COATED ORAL at 21:14

## 2019-09-05 RX ADMIN — METOPROLOL TARTRATE SCH MG: 25 TABLET, FILM COATED ORAL at 05:40

## 2019-09-05 RX ADMIN — CHLORHEXIDINE GLUCONATE 0.12% ORAL RINSE PRN ML: 1.2 LIQUID ORAL at 21:46

## 2019-09-05 RX ADMIN — MUPIROCIN SCH APPLIC: 20 OINTMENT TOPICAL at 21:46

## 2019-09-05 RX ADMIN — SODIUM CHLORIDE, PRESERVATIVE FREE SCH ML: 5 INJECTION INTRAVENOUS at 21:00

## 2019-09-05 RX ADMIN — FAMOTIDINE SCH MG: 20 TABLET, FILM COATED ORAL at 07:56

## 2019-09-05 RX ADMIN — ALLOPURINOL SCH MG: 100 TABLET ORAL at 07:56

## 2019-09-05 RX ADMIN — SODIUM CHLORIDE, PRESERVATIVE FREE SCH ML: 5 INJECTION INTRAVENOUS at 21:14

## 2019-09-06 VITALS — SYSTOLIC BLOOD PRESSURE: 138 MMHG | DIASTOLIC BLOOD PRESSURE: 81 MMHG

## 2019-09-06 VITALS — SYSTOLIC BLOOD PRESSURE: 128 MMHG | DIASTOLIC BLOOD PRESSURE: 81 MMHG

## 2019-09-06 VITALS — SYSTOLIC BLOOD PRESSURE: 131 MMHG | DIASTOLIC BLOOD PRESSURE: 78 MMHG

## 2019-09-06 LAB
GLUCOSE BY BLOOD GAS ANALYZER: 160 MG/DL (ref 70–110)
HBV CORE AB SER QL: 4.6 MMOL/L (ref 3.6–5.5)
HEMATOCRIT CALCULATED: 33 % (ref 42–52)
HEMOGLOBIN BY BLOOD GAS ANALYZ: 11.2 G/DL (ref 14–18)
O2/TOTAL GAS SETTING VFR VENT: 60 %

## 2019-09-06 PROCEDURE — 5A1221Z PERFORMANCE OF CARDIAC OUTPUT, CONTINUOUS: ICD-10-PCS | Performed by: THORACIC SURGERY (CARDIOTHORACIC VASCULAR SURGERY)

## 2019-09-06 PROCEDURE — 021109W BYPASS CORONARY ARTERY, TWO ARTERIES FROM AORTA WITH AUTOLOGOUS VENOUS TISSUE, OPEN APPROACH: ICD-10-PCS | Performed by: THORACIC SURGERY (CARDIOTHORACIC VASCULAR SURGERY)

## 2019-09-06 PROCEDURE — 06BP4ZZ EXCISION OF RIGHT SAPHENOUS VEIN, PERCUTANEOUS ENDOSCOPIC APPROACH: ICD-10-PCS | Performed by: THORACIC SURGERY (CARDIOTHORACIC VASCULAR SURGERY)

## 2019-09-06 PROCEDURE — 04HY32Z INSERTION OF MONITORING DEVICE INTO LOWER ARTERY, PERCUTANEOUS APPROACH: ICD-10-PCS

## 2019-09-06 PROCEDURE — 02100Z9 BYPASS CORONARY ARTERY, ONE ARTERY FROM LEFT INTERNAL MAMMARY, OPEN APPROACH: ICD-10-PCS | Performed by: THORACIC SURGERY (CARDIOTHORACIC VASCULAR SURGERY)

## 2019-09-06 RX ADMIN — FAMOTIDINE SCH MG: 20 TABLET, FILM COATED ORAL at 20:24

## 2019-09-06 RX ADMIN — INSULIN LISPRO SCH UNITS: 100 INJECTION, SOLUTION INTRAVENOUS; SUBCUTANEOUS at 20:25

## 2019-09-06 RX ADMIN — SODIUM CHLORIDE, PRESERVATIVE FREE SCH ML: 5 INJECTION INTRAVENOUS at 09:00

## 2019-09-06 RX ADMIN — MORPHINE SULFATE PRN MG: 10 INJECTION INTRAVENOUS at 20:14

## 2019-09-06 RX ADMIN — OXYCODONE HYDROCHLORIDE PRN MG: 5 TABLET ORAL at 23:40

## 2019-09-06 RX ADMIN — ASPIRIN 81 MG SCH MG: 81 TABLET ORAL at 09:00

## 2019-09-06 RX ADMIN — GABAPENTIN SCH MG: 300 CAPSULE ORAL at 21:30

## 2019-09-06 RX ADMIN — ATORVASTATIN CALCIUM SCH MG: 80 TABLET, FILM COATED ORAL at 20:25

## 2019-09-06 RX ADMIN — OXYCODONE HYDROCHLORIDE PRN MG: 5 TABLET ORAL at 19:15

## 2019-09-06 RX ADMIN — Medication SCH NOTE: at 12:00

## 2019-09-06 RX ADMIN — ONDANSETRON PRN MG: 2 INJECTION, SOLUTION INTRAMUSCULAR; INTRAVENOUS at 21:45

## 2019-09-06 RX ADMIN — OXYCODONE HYDROCHLORIDE PRN MG: 5 TABLET ORAL at 15:57

## 2019-09-06 RX ADMIN — CHLORHEXIDINE GLUCONATE 0.12% ORAL RINSE PRN ML: 1.2 LIQUID ORAL at 05:20

## 2019-09-06 RX ADMIN — SODIUM CHLORIDE, PRESERVATIVE FREE SCH ML: 5 INJECTION INTRAVENOUS at 20:25

## 2019-09-06 RX ADMIN — METOPROLOL TARTRATE SCH MG: 25 TABLET, FILM COATED ORAL at 05:54

## 2019-09-06 RX ADMIN — INSULIN LISPRO SCH UNITS: 100 INJECTION, SOLUTION INTRAVENOUS; SUBCUTANEOUS at 16:00

## 2019-09-06 RX ADMIN — MAGNESIUM SULFATE HEPTAHYDRATE SCH MLS/HR: 500 INJECTION, SOLUTION INTRAMUSCULAR; INTRAVENOUS at 12:54

## 2019-09-06 RX ADMIN — CEFUROXIME SCH MLS/HR: 1.5 INJECTION, POWDER, FOR SOLUTION INTRAVENOUS at 19:30

## 2019-09-06 RX ADMIN — MUPIROCIN SCH APPLIC: 20 OINTMENT TOPICAL at 09:00

## 2019-09-06 RX ADMIN — MORPHINE SULFATE PRN MG: 10 INJECTION INTRAVENOUS at 14:59

## 2019-09-06 RX ADMIN — Medication SCH NOTE: at 18:00

## 2019-09-06 RX ADMIN — FAMOTIDINE SCH MG: 20 TABLET, FILM COATED ORAL at 09:00

## 2019-09-06 RX ADMIN — ALLOPURINOL SCH MG: 100 TABLET ORAL at 09:00

## 2019-09-06 RX ADMIN — MORPHINE SULFATE PRN MG: 10 INJECTION INTRAVENOUS at 17:46

## 2019-09-06 RX ADMIN — VANCOMYCIN HYDROCHLORIDE SCH MLS/HR: 1 INJECTION, POWDER, LYOPHILIZED, FOR SOLUTION INTRAVENOUS at 20:25

## 2019-09-06 RX ADMIN — MUPIROCIN SCH APPLIC: 20 OINTMENT TOPICAL at 21:30

## 2019-09-06 RX ADMIN — MORPHINE SULFATE PRN MG: 10 INJECTION INTRAVENOUS at 16:33

## 2019-09-07 VITALS — DIASTOLIC BLOOD PRESSURE: 65 MMHG | SYSTOLIC BLOOD PRESSURE: 101 MMHG

## 2019-09-07 LAB
% IRON SATURATION: 3 % (ref 20–55)
<PLATELET ESTIMATE>: ADEQUATE
<PLT MORPHOLOGY>: (no result)
ALBUMIN SERPL-MCNC: 3.4 G/DL (ref 3.4–5)
ANION GAP SERPL CALC-SCNC: 5 MMOL/L (ref 5–15)
ANISOCYTOSIS BLD QL SMEAR: (no result)
APTT BLD: 24 SECONDS (ref 25–31)
BAND#(MANUAL): 1.53 X10^3/UL
CALCIUM SERPL-MCNC: 8.1 MG/DL (ref 8.5–10.1)
CHLORIDE SERPL-SCNC: 115 MMOL/L (ref 98–107)
CREAT SERPL-MCNC: 1.16 MG/DL (ref 0.7–1.3)
ERYTHROCYTE [DISTWIDTH] IN BLOOD BY AUTOMATED COUNT: 19.4 % (ref 9.4–14.8)
HYPOCHROMIA BLD QL SMEAR: (no result)
INR PPP: 1.04 (ref 0.93–1.1)
IRON LEVEL: 8 MCG/DL (ref 65–175)
MCH RBC QN AUTO: 24.5 PG (ref 27.5–34.5)
MCHC RBC AUTO-ENTMCNC: 32 G/DL (ref 33.2–36.2)
MCV RBC AUTO: 76.8 FL (ref 81–97)
MD: YES
MICROCYTES BLD QL SMEAR: (no result)
MONOS#(MANUAL): 2.85 X10^3/UL (ref 0.3–2.7)
MONOS% (MANUAL): 13 % (ref 2–9)
NEUTS BAND NFR BLD: 7 % (ref 0–7)
O2 FLOW: 3 L/MIN
OVALOCYTES BLD QL SMEAR: (no result)
PLATELET # BLD AUTO: 186 X10^3/UL (ref 130–400)
PMV BLD AUTO: 9.8 FL (ref 7.4–10.4)
PROTHROMBIN TIME: 10.9 SECONDS (ref 9.6–11.5)
RBC # BLD AUTO: 4.14 X10^6/UL (ref 4.38–5.82)
SEG#(MANUAL): 17.52 X10^3/UL (ref 1.8–6.8)
SEGS% (MANUAL): 80 % (ref 42–75)
TIBC SERPL-MCNC: 282 MCG/DL (ref 250–450)

## 2019-09-07 RX ADMIN — Medication SCH NOTE: at 06:00

## 2019-09-07 RX ADMIN — SODIUM CHLORIDE, PRESERVATIVE FREE SCH ML: 5 INJECTION INTRAVENOUS at 20:31

## 2019-09-07 RX ADMIN — ACETAMINOPHEN PRN MG: 325 TABLET, FILM COATED ORAL at 09:25

## 2019-09-07 RX ADMIN — VANCOMYCIN HYDROCHLORIDE SCH MLS/HR: 1 INJECTION, POWDER, LYOPHILIZED, FOR SOLUTION INTRAVENOUS at 08:18

## 2019-09-07 RX ADMIN — SODIUM CHLORIDE, PRESERVATIVE FREE SCH ML: 5 INJECTION INTRAVENOUS at 08:19

## 2019-09-07 RX ADMIN — ONDANSETRON PRN MG: 2 INJECTION, SOLUTION INTRAMUSCULAR; INTRAVENOUS at 16:22

## 2019-09-07 RX ADMIN — HYDROCODONE BITARTRATE AND ACETAMINOPHEN PRN TAB: 5; 325 TABLET ORAL at 16:22

## 2019-09-07 RX ADMIN — MUPIROCIN SCH APPLIC: 20 OINTMENT TOPICAL at 08:19

## 2019-09-07 RX ADMIN — GABAPENTIN SCH MG: 300 CAPSULE ORAL at 20:33

## 2019-09-07 RX ADMIN — MUPIROCIN SCH APPLIC: 20 OINTMENT TOPICAL at 09:00

## 2019-09-07 RX ADMIN — MUPIROCIN SCH APPLIC: 20 OINTMENT TOPICAL at 20:36

## 2019-09-07 RX ADMIN — FERROUS SULFATE TAB 325 MG (65 MG ELEMENTAL FE) SCH MG: 325 (65 FE) TAB at 17:27

## 2019-09-07 RX ADMIN — INSULIN LISPRO SCH UNITS: 100 INJECTION, SOLUTION INTRAVENOUS; SUBCUTANEOUS at 07:00

## 2019-09-07 RX ADMIN — ATORVASTATIN CALCIUM SCH MG: 80 TABLET, FILM COATED ORAL at 20:33

## 2019-09-07 RX ADMIN — OXYCODONE HYDROCHLORIDE PRN MG: 5 TABLET ORAL at 04:16

## 2019-09-07 RX ADMIN — MAGNESIUM SULFATE HEPTAHYDRATE SCH MLS/HR: 500 INJECTION, SOLUTION INTRAMUSCULAR; INTRAVENOUS at 12:27

## 2019-09-07 RX ADMIN — Medication SCH NOTE: at 00:00

## 2019-09-07 RX ADMIN — INSULIN LISPRO SCH UNITS: 100 INJECTION, SOLUTION INTRAVENOUS; SUBCUTANEOUS at 20:42

## 2019-09-07 RX ADMIN — INSULIN LISPRO SCH UNITS: 100 INJECTION, SOLUTION INTRAVENOUS; SUBCUTANEOUS at 16:00

## 2019-09-07 RX ADMIN — HYDROCODONE BITARTRATE AND ACETAMINOPHEN PRN TAB: 5; 325 TABLET ORAL at 20:34

## 2019-09-07 RX ADMIN — MUPIROCIN SCH APPLIC: 20 OINTMENT TOPICAL at 20:33

## 2019-09-07 RX ADMIN — FUROSEMIDE SCH MG: 10 INJECTION, SOLUTION INTRAVENOUS at 17:27

## 2019-09-07 RX ADMIN — ALLOPURINOL SCH MG: 100 TABLET ORAL at 08:19

## 2019-09-07 RX ADMIN — METOPROLOL TARTRATE SCH MG: 25 TABLET, FILM COATED ORAL at 09:30

## 2019-09-07 RX ADMIN — FAMOTIDINE SCH MG: 20 TABLET, FILM COATED ORAL at 08:19

## 2019-09-07 RX ADMIN — ASPIRIN 81 MG SCH MG: 81 TABLET ORAL at 08:20

## 2019-09-07 RX ADMIN — INSULIN LISPRO SCH UNITS: 100 INJECTION, SOLUTION INTRAVENOUS; SUBCUTANEOUS at 12:27

## 2019-09-07 RX ADMIN — CEFUROXIME SCH MLS/HR: 1.5 INJECTION, POWDER, FOR SOLUTION INTRAVENOUS at 08:18

## 2019-09-07 RX ADMIN — FUROSEMIDE SCH MG: 10 INJECTION, SOLUTION INTRAVENOUS at 12:27

## 2019-09-07 RX ADMIN — FAMOTIDINE SCH MG: 20 TABLET, FILM COATED ORAL at 20:33

## 2019-09-07 RX ADMIN — GABAPENTIN SCH MG: 300 CAPSULE ORAL at 08:19

## 2019-09-07 RX ADMIN — ACETAMINOPHEN PRN MG: 325 TABLET, FILM COATED ORAL at 15:17

## 2019-09-07 RX ADMIN — METOPROLOL TARTRATE SCH MG: 25 TABLET, FILM COATED ORAL at 20:36

## 2019-09-07 RX ADMIN — CHLORHEXIDINE GLUCONATE 0.12% ORAL RINSE SCH ML: 1.2 LIQUID ORAL at 20:32

## 2019-09-07 RX ADMIN — ONDANSETRON PRN MG: 2 INJECTION, SOLUTION INTRAMUSCULAR; INTRAVENOUS at 06:31

## 2019-09-08 VITALS — DIASTOLIC BLOOD PRESSURE: 76 MMHG | SYSTOLIC BLOOD PRESSURE: 115 MMHG

## 2019-09-08 VITALS — SYSTOLIC BLOOD PRESSURE: 119 MMHG | DIASTOLIC BLOOD PRESSURE: 75 MMHG

## 2019-09-08 VITALS — SYSTOLIC BLOOD PRESSURE: 148 MMHG | DIASTOLIC BLOOD PRESSURE: 82 MMHG

## 2019-09-08 VITALS — DIASTOLIC BLOOD PRESSURE: 79 MMHG | SYSTOLIC BLOOD PRESSURE: 127 MMHG

## 2019-09-08 VITALS — DIASTOLIC BLOOD PRESSURE: 79 MMHG | SYSTOLIC BLOOD PRESSURE: 129 MMHG

## 2019-09-08 VITALS — DIASTOLIC BLOOD PRESSURE: 69 MMHG | SYSTOLIC BLOOD PRESSURE: 109 MMHG

## 2019-09-08 VITALS — DIASTOLIC BLOOD PRESSURE: 72 MMHG | SYSTOLIC BLOOD PRESSURE: 114 MMHG

## 2019-09-08 LAB
ANION GAP SERPL CALC-SCNC: 6 MMOL/L (ref 5–15)
APTT BLD: 28 SECONDS (ref 25–31)
BASOPHILS # BLD AUTO: 0 X10^3/UL (ref 0–0.1)
BASOPHILS NFR BLD AUTO: 0 % (ref 0–1)
CALCIUM SERPL-MCNC: 8.2 MG/DL (ref 8.5–10.1)
CHLORIDE SERPL-SCNC: 109 MMOL/L (ref 98–107)
CREAT SERPL-MCNC: 1.46 MG/DL (ref 0.7–1.3)
EOSINOPHIL # BLD AUTO: 0 X10^3/UL (ref 0–0.4)
EOSINOPHIL NFR BLD AUTO: 0 % (ref 1–7)
ERYTHROCYTE [DISTWIDTH] IN BLOOD BY AUTOMATED COUNT: 19.5 % (ref 9.4–14.8)
INR PPP: 1.02 (ref 0.93–1.1)
LYMPHOCYTES # BLD AUTO: 0.86 X10^3/UL (ref 1–3.4)
LYMPHOCYTES NFR BLD AUTO: 4 % (ref 22–44)
MCH RBC QN AUTO: 24.4 PG (ref 27.5–34.5)
MCHC RBC AUTO-ENTMCNC: 31.2 G/DL (ref 33.2–36.2)
MCV RBC AUTO: 78 FL (ref 81–97)
MD: (no result)
MONOCYTES # BLD AUTO: 1.47 X10^3/UL (ref 0.2–0.8)
MONOCYTES NFR BLD AUTO: 7 % (ref 2–9)
NEUTROPHILS # BLD AUTO: 17.62 X10^3/UL (ref 1.8–6.8)
NEUTROPHILS NFR BLD AUTO: 88 % (ref 42–75)
PLATELET # BLD AUTO: 166 X10^3/UL (ref 130–400)
PMV BLD AUTO: 10.3 FL (ref 7.4–10.4)
PROTHROMBIN TIME: 10.7 SECONDS (ref 9.6–11.5)
RBC # BLD AUTO: 3.97 X10^6/UL (ref 4.38–5.82)

## 2019-09-08 RX ADMIN — MUPIROCIN SCH APPLIC: 20 OINTMENT TOPICAL at 09:55

## 2019-09-08 RX ADMIN — INSULIN LISPRO SCH UNITS: 100 INJECTION, SOLUTION INTRAVENOUS; SUBCUTANEOUS at 16:36

## 2019-09-08 RX ADMIN — FERROUS SULFATE TAB 325 MG (65 MG ELEMENTAL FE) SCH MG: 325 (65 FE) TAB at 17:28

## 2019-09-08 RX ADMIN — AMIODARONE HYDROCHLORIDE SCH MG: 200 TABLET ORAL at 20:45

## 2019-09-08 RX ADMIN — SODIUM CHLORIDE, PRESERVATIVE FREE SCH ML: 5 INJECTION INTRAVENOUS at 20:46

## 2019-09-08 RX ADMIN — OXYCODONE HYDROCHLORIDE PRN MG: 5 TABLET ORAL at 10:40

## 2019-09-08 RX ADMIN — FERROUS SULFATE TAB 325 MG (65 MG ELEMENTAL FE) SCH MG: 325 (65 FE) TAB at 09:36

## 2019-09-08 RX ADMIN — ATORVASTATIN CALCIUM SCH MG: 80 TABLET, FILM COATED ORAL at 20:44

## 2019-09-08 RX ADMIN — OXYCODONE HYDROCHLORIDE PRN MG: 5 TABLET ORAL at 17:19

## 2019-09-08 RX ADMIN — CARVEDILOL SCH MG: 6.25 TABLET, FILM COATED ORAL at 17:28

## 2019-09-08 RX ADMIN — OXYCODONE HYDROCHLORIDE PRN MG: 5 TABLET ORAL at 01:46

## 2019-09-08 RX ADMIN — ENOXAPARIN SODIUM SCH MG: 40 INJECTION SUBCUTANEOUS at 12:54

## 2019-09-08 RX ADMIN — DOCUSATE SODIUM SCH MG: 100 CAPSULE, LIQUID FILLED ORAL at 20:46

## 2019-09-08 RX ADMIN — FUROSEMIDE SCH MG: 10 INJECTION, SOLUTION INTRAVENOUS at 06:26

## 2019-09-08 RX ADMIN — DOCUSATE SODIUM SCH MG: 100 CAPSULE, LIQUID FILLED ORAL at 12:54

## 2019-09-08 RX ADMIN — MUPIROCIN SCH APPLIC: 20 OINTMENT TOPICAL at 20:46

## 2019-09-08 RX ADMIN — MUPIROCIN SCH APPLIC: 20 OINTMENT TOPICAL at 20:45

## 2019-09-08 RX ADMIN — OXYCODONE HYDROCHLORIDE PRN MG: 5 TABLET ORAL at 00:14

## 2019-09-08 RX ADMIN — GABAPENTIN SCH MG: 300 CAPSULE ORAL at 09:35

## 2019-09-08 RX ADMIN — MUPIROCIN SCH APPLIC: 20 OINTMENT TOPICAL at 09:40

## 2019-09-08 RX ADMIN — INSULIN LISPRO SCH UNITS: 100 INJECTION, SOLUTION INTRAVENOUS; SUBCUTANEOUS at 20:46

## 2019-09-08 RX ADMIN — OXYCODONE HYDROCHLORIDE PRN MG: 5 TABLET ORAL at 12:53

## 2019-09-08 RX ADMIN — AMIODARONE HYDROCHLORIDE SCH MG: 200 TABLET ORAL at 09:52

## 2019-09-08 RX ADMIN — ALLOPURINOL SCH MG: 100 TABLET ORAL at 09:37

## 2019-09-08 RX ADMIN — FAMOTIDINE SCH MG: 20 TABLET, FILM COATED ORAL at 20:44

## 2019-09-08 RX ADMIN — GABAPENTIN SCH MG: 300 CAPSULE ORAL at 20:45

## 2019-09-08 RX ADMIN — OXYCODONE HYDROCHLORIDE PRN MG: 5 TABLET ORAL at 20:46

## 2019-09-08 RX ADMIN — ASPIRIN 81 MG SCH MG: 81 TABLET ORAL at 05:35

## 2019-09-08 RX ADMIN — CARVEDILOL SCH MG: 6.25 TABLET, FILM COATED ORAL at 09:51

## 2019-09-08 RX ADMIN — OXYCODONE HYDROCHLORIDE PRN MG: 5 TABLET ORAL at 06:21

## 2019-09-08 RX ADMIN — INSULIN LISPRO SCH UNITS: 100 INJECTION, SOLUTION INTRAVENOUS; SUBCUTANEOUS at 11:31

## 2019-09-08 RX ADMIN — CHLORHEXIDINE GLUCONATE 0.12% ORAL RINSE SCH ML: 1.2 LIQUID ORAL at 09:40

## 2019-09-08 RX ADMIN — SODIUM CHLORIDE, PRESERVATIVE FREE SCH ML: 5 INJECTION INTRAVENOUS at 09:47

## 2019-09-08 RX ADMIN — FAMOTIDINE SCH MG: 20 TABLET, FILM COATED ORAL at 09:35

## 2019-09-08 RX ADMIN — MAGNESIUM SULFATE HEPTAHYDRATE SCH MLS/HR: 500 INJECTION, SOLUTION INTRAMUSCULAR; INTRAVENOUS at 12:51

## 2019-09-08 RX ADMIN — CHLORHEXIDINE GLUCONATE 0.12% ORAL RINSE SCH ML: 1.2 LIQUID ORAL at 20:44

## 2019-09-08 RX ADMIN — FUROSEMIDE SCH MG: 10 INJECTION, SOLUTION INTRAVENOUS at 17:28

## 2019-09-08 RX ADMIN — INSULIN LISPRO SCH UNITS: 100 INJECTION, SOLUTION INTRAVENOUS; SUBCUTANEOUS at 09:49

## 2019-09-08 RX ADMIN — OXYCODONE HYDROCHLORIDE PRN MG: 5 TABLET ORAL at 09:40

## 2019-09-09 VITALS — DIASTOLIC BLOOD PRESSURE: 74 MMHG | SYSTOLIC BLOOD PRESSURE: 114 MMHG

## 2019-09-09 VITALS — DIASTOLIC BLOOD PRESSURE: 78 MMHG | SYSTOLIC BLOOD PRESSURE: 118 MMHG

## 2019-09-09 VITALS — DIASTOLIC BLOOD PRESSURE: 77 MMHG | SYSTOLIC BLOOD PRESSURE: 119 MMHG

## 2019-09-09 VITALS — DIASTOLIC BLOOD PRESSURE: 73 MMHG | SYSTOLIC BLOOD PRESSURE: 106 MMHG

## 2019-09-09 VITALS — SYSTOLIC BLOOD PRESSURE: 120 MMHG | DIASTOLIC BLOOD PRESSURE: 72 MMHG

## 2019-09-09 LAB
ANION GAP SERPL CALC-SCNC: 6 MMOL/L (ref 5–15)
BASOPHILS # BLD AUTO: 0.01 X10^3/UL (ref 0–0.1)
BASOPHILS NFR BLD AUTO: 0 % (ref 0–1)
CALCIUM SERPL-MCNC: 8.3 MG/DL (ref 8.5–10.1)
CHLORIDE SERPL-SCNC: 103 MMOL/L (ref 98–107)
CREAT SERPL-MCNC: 1.16 MG/DL (ref 0.7–1.3)
EOSINOPHIL # BLD AUTO: 0.02 X10^3/UL (ref 0–0.4)
EOSINOPHIL NFR BLD AUTO: 0 % (ref 1–7)
ERYTHROCYTE [DISTWIDTH] IN BLOOD BY AUTOMATED COUNT: 18.9 % (ref 9.4–14.8)
LYMPHOCYTES # BLD AUTO: 0.98 X10^3/UL (ref 1–3.4)
LYMPHOCYTES NFR BLD AUTO: 6 % (ref 22–44)
MCH RBC QN AUTO: 24.5 PG (ref 27.5–34.5)
MCHC RBC AUTO-ENTMCNC: 31.5 G/DL (ref 33.2–36.2)
MCV RBC AUTO: 78 FL (ref 81–97)
MD: NO
MONOCYTES # BLD AUTO: 1.38 X10^3/UL (ref 0.2–0.8)
MONOCYTES NFR BLD AUTO: 9 % (ref 2–9)
NEUTROPHILS # BLD AUTO: 13.01 X10^3/UL (ref 1.8–6.8)
NEUTROPHILS NFR BLD AUTO: 85 % (ref 42–75)
PLATELET # BLD AUTO: 162 X10^3/UL (ref 130–400)
PMV BLD AUTO: 9.7 FL (ref 7.4–10.4)
RBC # BLD AUTO: 4.05 X10^6/UL (ref 4.38–5.82)

## 2019-09-09 RX ADMIN — FAMOTIDINE SCH MG: 20 TABLET, FILM COATED ORAL at 21:27

## 2019-09-09 RX ADMIN — ONDANSETRON PRN MG: 2 INJECTION, SOLUTION INTRAMUSCULAR; INTRAVENOUS at 13:16

## 2019-09-09 RX ADMIN — INSULIN LISPRO SCH UNITS: 100 INJECTION, SOLUTION INTRAVENOUS; SUBCUTANEOUS at 12:01

## 2019-09-09 RX ADMIN — ASPIRIN 81 MG SCH MG: 81 TABLET ORAL at 05:44

## 2019-09-09 RX ADMIN — FUROSEMIDE SCH MG: 10 INJECTION, SOLUTION INTRAVENOUS at 17:22

## 2019-09-09 RX ADMIN — DOCUSATE SODIUM SCH MG: 100 CAPSULE, LIQUID FILLED ORAL at 21:26

## 2019-09-09 RX ADMIN — MUPIROCIN SCH APPLIC: 20 OINTMENT TOPICAL at 21:00

## 2019-09-09 RX ADMIN — FUROSEMIDE SCH MG: 10 INJECTION, SOLUTION INTRAVENOUS at 08:19

## 2019-09-09 RX ADMIN — SODIUM CHLORIDE, PRESERVATIVE FREE SCH ML: 5 INJECTION INTRAVENOUS at 08:18

## 2019-09-09 RX ADMIN — ALLOPURINOL SCH MG: 100 TABLET ORAL at 08:17

## 2019-09-09 RX ADMIN — OXYCODONE HYDROCHLORIDE PRN MG: 5 TABLET ORAL at 03:28

## 2019-09-09 RX ADMIN — SODIUM CHLORIDE, PRESERVATIVE FREE SCH ML: 5 INJECTION INTRAVENOUS at 21:27

## 2019-09-09 RX ADMIN — CLOPIDOGREL SCH MG: 75 TABLET, FILM COATED ORAL at 08:17

## 2019-09-09 RX ADMIN — AMIODARONE HYDROCHLORIDE SCH MG: 200 TABLET ORAL at 21:27

## 2019-09-09 RX ADMIN — CARVEDILOL SCH MG: 6.25 TABLET, FILM COATED ORAL at 17:22

## 2019-09-09 RX ADMIN — CARVEDILOL SCH MG: 6.25 TABLET, FILM COATED ORAL at 05:44

## 2019-09-09 RX ADMIN — FERROUS SULFATE TAB 325 MG (65 MG ELEMENTAL FE) SCH MG: 325 (65 FE) TAB at 17:22

## 2019-09-09 RX ADMIN — DOCUSATE SODIUM SCH MG: 100 CAPSULE, LIQUID FILLED ORAL at 08:17

## 2019-09-09 RX ADMIN — OXYCODONE HYDROCHLORIDE PRN MG: 5 TABLET ORAL at 00:07

## 2019-09-09 RX ADMIN — GABAPENTIN SCH MG: 300 CAPSULE ORAL at 08:17

## 2019-09-09 RX ADMIN — ATORVASTATIN CALCIUM SCH MG: 80 TABLET, FILM COATED ORAL at 21:27

## 2019-09-09 RX ADMIN — ENOXAPARIN SODIUM SCH MG: 40 INJECTION SUBCUTANEOUS at 15:23

## 2019-09-09 RX ADMIN — AMIODARONE HYDROCHLORIDE SCH MG: 200 TABLET ORAL at 08:18

## 2019-09-09 RX ADMIN — FERROUS SULFATE TAB 325 MG (65 MG ELEMENTAL FE) SCH MG: 325 (65 FE) TAB at 08:17

## 2019-09-09 RX ADMIN — GABAPENTIN SCH MG: 300 CAPSULE ORAL at 21:27

## 2019-09-09 RX ADMIN — MUPIROCIN SCH APPLIC: 20 OINTMENT TOPICAL at 08:19

## 2019-09-09 RX ADMIN — FAMOTIDINE SCH MG: 20 TABLET, FILM COATED ORAL at 08:17

## 2019-09-09 RX ADMIN — CHLORHEXIDINE GLUCONATE 0.12% ORAL RINSE SCH ML: 1.2 LIQUID ORAL at 08:18

## 2019-09-09 RX ADMIN — MUPIROCIN SCH APPLIC: 20 OINTMENT TOPICAL at 21:27

## 2019-09-09 RX ADMIN — INSULIN LISPRO SCH UNITS: 100 INJECTION, SOLUTION INTRAVENOUS; SUBCUTANEOUS at 07:08

## 2019-09-10 VITALS — SYSTOLIC BLOOD PRESSURE: 110 MMHG | DIASTOLIC BLOOD PRESSURE: 68 MMHG

## 2019-09-10 VITALS — SYSTOLIC BLOOD PRESSURE: 99 MMHG | DIASTOLIC BLOOD PRESSURE: 59 MMHG

## 2019-09-10 VITALS — DIASTOLIC BLOOD PRESSURE: 75 MMHG | SYSTOLIC BLOOD PRESSURE: 118 MMHG

## 2019-09-10 VITALS — SYSTOLIC BLOOD PRESSURE: 118 MMHG | DIASTOLIC BLOOD PRESSURE: 72 MMHG

## 2019-09-10 VITALS — SYSTOLIC BLOOD PRESSURE: 125 MMHG | DIASTOLIC BLOOD PRESSURE: 70 MMHG

## 2019-09-10 VITALS — SYSTOLIC BLOOD PRESSURE: 107 MMHG | DIASTOLIC BLOOD PRESSURE: 67 MMHG

## 2019-09-10 LAB
ANION GAP SERPL CALC-SCNC: 6 MMOL/L (ref 5–15)
BASOPHILS # BLD AUTO: 0.01 X10^3/UL (ref 0–0.1)
BASOPHILS NFR BLD AUTO: 0 % (ref 0–1)
CALCIUM SERPL-MCNC: 7.9 MG/DL (ref 8.5–10.1)
CHLORIDE SERPL-SCNC: 103 MMOL/L (ref 98–107)
CREAT SERPL-MCNC: 1.2 MG/DL (ref 0.7–1.3)
EOSINOPHIL # BLD AUTO: 0.12 X10^3/UL (ref 0–0.4)
EOSINOPHIL NFR BLD AUTO: 1 % (ref 1–7)
ERYTHROCYTE [DISTWIDTH] IN BLOOD BY AUTOMATED COUNT: 19 % (ref 9.4–14.8)
LYMPHOCYTES # BLD AUTO: 1.12 X10^3/UL (ref 1–3.4)
LYMPHOCYTES NFR BLD AUTO: 9 % (ref 22–44)
MCH RBC QN AUTO: 24.7 PG (ref 27.5–34.5)
MCHC RBC AUTO-ENTMCNC: 31.8 G/DL (ref 33.2–36.2)
MCV RBC AUTO: 77.9 FL (ref 81–97)
MD: (no result)
MONOCYTES # BLD AUTO: 1.49 X10^3/UL (ref 0.2–0.8)
MONOCYTES NFR BLD AUTO: 12 % (ref 2–9)
NEUTROPHILS # BLD AUTO: 9.56 X10^3/UL (ref 1.8–6.8)
NEUTROPHILS NFR BLD AUTO: 78 % (ref 42–75)
PLATELET # BLD AUTO: 174 X10^3/UL (ref 130–400)
PMV BLD AUTO: 10.2 FL (ref 7.4–10.4)
RBC # BLD AUTO: 3.72 X10^6/UL (ref 4.38–5.82)

## 2019-09-10 RX ADMIN — MUPIROCIN SCH APPLIC: 20 OINTMENT TOPICAL at 21:26

## 2019-09-10 RX ADMIN — CARVEDILOL SCH MG: 6.25 TABLET, FILM COATED ORAL at 17:48

## 2019-09-10 RX ADMIN — CLOPIDOGREL SCH MG: 75 TABLET, FILM COATED ORAL at 09:02

## 2019-09-10 RX ADMIN — FERROUS SULFATE TAB 325 MG (65 MG ELEMENTAL FE) SCH MG: 325 (65 FE) TAB at 17:48

## 2019-09-10 RX ADMIN — DOCUSATE SODIUM SCH MG: 100 CAPSULE, LIQUID FILLED ORAL at 09:02

## 2019-09-10 RX ADMIN — DOCUSATE SODIUM SCH MG: 100 CAPSULE, LIQUID FILLED ORAL at 21:25

## 2019-09-10 RX ADMIN — MUPIROCIN SCH APPLIC: 20 OINTMENT TOPICAL at 09:05

## 2019-09-10 RX ADMIN — MUPIROCIN SCH APPLIC: 20 OINTMENT TOPICAL at 09:00

## 2019-09-10 RX ADMIN — AMIODARONE HYDROCHLORIDE SCH MG: 200 TABLET ORAL at 21:26

## 2019-09-10 RX ADMIN — FAMOTIDINE SCH MG: 20 TABLET, FILM COATED ORAL at 21:25

## 2019-09-10 RX ADMIN — GABAPENTIN SCH MG: 300 CAPSULE ORAL at 21:26

## 2019-09-10 RX ADMIN — LISINOPRIL SCH MG: 5 TABLET ORAL at 09:03

## 2019-09-10 RX ADMIN — FERROUS SULFATE TAB 325 MG (65 MG ELEMENTAL FE) SCH MG: 325 (65 FE) TAB at 09:02

## 2019-09-10 RX ADMIN — FAMOTIDINE SCH MG: 20 TABLET, FILM COATED ORAL at 09:03

## 2019-09-10 RX ADMIN — ATORVASTATIN CALCIUM SCH MG: 80 TABLET, FILM COATED ORAL at 21:25

## 2019-09-10 RX ADMIN — MUPIROCIN SCH APPLIC: 20 OINTMENT TOPICAL at 21:27

## 2019-09-10 RX ADMIN — AMIODARONE HYDROCHLORIDE SCH MG: 200 TABLET ORAL at 09:04

## 2019-09-10 RX ADMIN — ENOXAPARIN SODIUM SCH MG: 40 INJECTION SUBCUTANEOUS at 17:48

## 2019-09-10 RX ADMIN — GABAPENTIN SCH MG: 300 CAPSULE ORAL at 09:02

## 2019-09-10 RX ADMIN — FUROSEMIDE SCH MG: 10 INJECTION, SOLUTION INTRAVENOUS at 17:48

## 2019-09-10 RX ADMIN — FUROSEMIDE SCH MG: 10 INJECTION, SOLUTION INTRAVENOUS at 09:03

## 2019-09-10 RX ADMIN — ALLOPURINOL SCH MG: 100 TABLET ORAL at 09:03

## 2019-09-10 RX ADMIN — ASPIRIN 81 MG SCH MG: 81 TABLET ORAL at 06:11

## 2019-09-10 RX ADMIN — SODIUM CHLORIDE, PRESERVATIVE FREE SCH ML: 5 INJECTION INTRAVENOUS at 09:05

## 2019-09-10 RX ADMIN — SODIUM CHLORIDE, PRESERVATIVE FREE SCH ML: 5 INJECTION INTRAVENOUS at 21:25

## 2019-09-10 RX ADMIN — CARVEDILOL SCH MG: 6.25 TABLET, FILM COATED ORAL at 06:11

## 2019-09-11 VITALS — SYSTOLIC BLOOD PRESSURE: 113 MMHG | DIASTOLIC BLOOD PRESSURE: 72 MMHG

## 2019-09-11 VITALS — SYSTOLIC BLOOD PRESSURE: 110 MMHG | DIASTOLIC BLOOD PRESSURE: 69 MMHG

## 2019-09-11 VITALS — SYSTOLIC BLOOD PRESSURE: 98 MMHG | DIASTOLIC BLOOD PRESSURE: 67 MMHG

## 2019-09-11 LAB
ANION GAP SERPL CALC-SCNC: 5 MMOL/L (ref 5–15)
BASOPHILS # BLD AUTO: 0.05 X10^3/UL (ref 0–0.1)
BASOPHILS NFR BLD AUTO: 1 % (ref 0–1)
CALCIUM SERPL-MCNC: 7.8 MG/DL (ref 8.5–10.1)
CHLORIDE SERPL-SCNC: 104 MMOL/L (ref 98–107)
CREAT SERPL-MCNC: 1.32 MG/DL (ref 0.7–1.3)
EOSINOPHIL # BLD AUTO: 0.3 X10^3/UL (ref 0–0.4)
EOSINOPHIL NFR BLD AUTO: 3 % (ref 1–7)
ERYTHROCYTE [DISTWIDTH] IN BLOOD BY AUTOMATED COUNT: 18.8 % (ref 9.4–14.8)
GFR SERPL CREATININE-BSD FRML MDRD: NEGATIVE ML/MIN/{1.73_M2}
LYMPHOCYTES # BLD AUTO: 1.48 X10^3/UL (ref 1–3.4)
LYMPHOCYTES NFR BLD AUTO: 13 % (ref 22–44)
MCH RBC QN AUTO: 24.7 PG (ref 27.5–34.5)
MCHC RBC AUTO-ENTMCNC: 32 G/DL (ref 33.2–36.2)
MCV RBC AUTO: 76.9 FL (ref 81–97)
MD: NO
MONOCYTES # BLD AUTO: 1.42 X10^3/UL (ref 0.2–0.8)
MONOCYTES NFR BLD AUTO: 13 % (ref 2–9)
NEUTROPHILS # BLD AUTO: 7.94 X10^3/UL (ref 1.8–6.8)
NEUTROPHILS NFR BLD AUTO: 71 % (ref 42–75)
PLATELET # BLD AUTO: 219 X10^3/UL (ref 130–400)
PMV BLD AUTO: 9.7 FL (ref 7.4–10.4)
RBC # BLD AUTO: 3.78 X10^6/UL (ref 4.38–5.82)

## 2019-09-11 RX ADMIN — DOCUSATE SODIUM SCH MG: 100 CAPSULE, LIQUID FILLED ORAL at 08:15

## 2019-09-11 RX ADMIN — FAMOTIDINE SCH MG: 20 TABLET, FILM COATED ORAL at 08:11

## 2019-09-11 RX ADMIN — FERROUS SULFATE TAB 325 MG (65 MG ELEMENTAL FE) SCH MG: 325 (65 FE) TAB at 08:12

## 2019-09-11 RX ADMIN — CARVEDILOL SCH MG: 6.25 TABLET, FILM COATED ORAL at 05:53

## 2019-09-11 RX ADMIN — ASPIRIN 81 MG SCH MG: 81 TABLET ORAL at 05:53

## 2019-09-11 RX ADMIN — CLOPIDOGREL SCH MG: 75 TABLET, FILM COATED ORAL at 08:12

## 2019-09-11 RX ADMIN — AMIODARONE HYDROCHLORIDE SCH MG: 200 TABLET ORAL at 08:12

## 2019-09-11 RX ADMIN — LISINOPRIL SCH MG: 5 TABLET ORAL at 08:13

## 2019-09-11 RX ADMIN — MUPIROCIN SCH APPLIC: 20 OINTMENT TOPICAL at 08:13

## 2019-09-11 RX ADMIN — ALLOPURINOL SCH MG: 100 TABLET ORAL at 08:13

## 2019-09-11 RX ADMIN — MUPIROCIN SCH APPLIC: 20 OINTMENT TOPICAL at 08:16

## 2019-09-11 RX ADMIN — GABAPENTIN SCH MG: 300 CAPSULE ORAL at 08:12

## 2019-09-11 RX ADMIN — SODIUM CHLORIDE, PRESERVATIVE FREE SCH ML: 5 INJECTION INTRAVENOUS at 08:13

## 2020-01-10 ENCOUNTER — HOSPITAL ENCOUNTER (OUTPATIENT)
Dept: HOSPITAL 8 - CFH | Age: 65
Discharge: HOME | End: 2020-01-10
Attending: PHYSICIAN ASSISTANT
Payer: MEDICARE

## 2020-01-10 DIAGNOSIS — E11.9: ICD-10-CM

## 2020-01-10 DIAGNOSIS — E78.5: ICD-10-CM

## 2020-01-10 DIAGNOSIS — I51.7: Primary | ICD-10-CM

## 2020-01-10 DIAGNOSIS — Z98.61: ICD-10-CM

## 2020-01-10 DIAGNOSIS — Z95.1: ICD-10-CM

## 2020-01-10 DIAGNOSIS — M10.9: ICD-10-CM

## 2020-01-10 PROCEDURE — 71046 X-RAY EXAM CHEST 2 VIEWS: CPT

## 2020-01-15 ENCOUNTER — HOSPITAL ENCOUNTER (OUTPATIENT)
Dept: HOSPITAL 8 - CVU | Age: 65
Discharge: HOME | End: 2020-01-15
Attending: PHYSICIAN ASSISTANT
Payer: MEDICARE

## 2020-01-15 ENCOUNTER — HOSPITAL ENCOUNTER (OUTPATIENT)
Dept: HOSPITAL 8 - CFH | Age: 65
Discharge: HOME | End: 2020-01-15
Attending: PHYSICIAN ASSISTANT
Payer: MEDICARE

## 2020-01-15 DIAGNOSIS — I42.9: ICD-10-CM

## 2020-01-15 DIAGNOSIS — E78.5: ICD-10-CM

## 2020-01-15 DIAGNOSIS — M10.9: ICD-10-CM

## 2020-01-15 DIAGNOSIS — J96.00: ICD-10-CM

## 2020-01-15 DIAGNOSIS — I10: ICD-10-CM

## 2020-01-15 DIAGNOSIS — I21.19: ICD-10-CM

## 2020-01-15 DIAGNOSIS — I08.8: Primary | ICD-10-CM

## 2020-01-15 DIAGNOSIS — I25.2: ICD-10-CM

## 2020-01-15 DIAGNOSIS — Z95.5: ICD-10-CM

## 2020-01-15 DIAGNOSIS — E11.9: Primary | ICD-10-CM

## 2020-01-15 DIAGNOSIS — Z95.1: ICD-10-CM

## 2020-01-15 DIAGNOSIS — R07.9: ICD-10-CM

## 2020-01-15 LAB
ANION GAP SERPL CALC-SCNC: 6 MMOL/L (ref 5–15)
CALCIUM SERPL-MCNC: 7.9 MG/DL (ref 8.5–10.1)
CHLORIDE SERPL-SCNC: 113 MMOL/L (ref 98–107)
CREAT SERPL-MCNC: 1.37 MG/DL (ref 0.7–1.3)

## 2020-01-15 PROCEDURE — 80048 BASIC METABOLIC PNL TOTAL CA: CPT

## 2020-01-15 PROCEDURE — 36415 COLL VENOUS BLD VENIPUNCTURE: CPT

## 2020-01-15 PROCEDURE — 93306 TTE W/DOPPLER COMPLETE: CPT

## 2020-01-29 ENCOUNTER — HOSPITAL ENCOUNTER (OUTPATIENT)
Dept: HOSPITAL 8 - CFH | Age: 65
Discharge: HOME | End: 2020-01-29
Attending: INTERNAL MEDICINE
Payer: MEDICARE

## 2020-01-29 DIAGNOSIS — R07.89: ICD-10-CM

## 2020-01-29 DIAGNOSIS — M62.08: Primary | ICD-10-CM

## 2020-01-29 PROCEDURE — 71250 CT THORAX DX C-: CPT

## 2020-02-19 DIAGNOSIS — E78.5 DYSLIPIDEMIA: Primary | ICD-10-CM

## 2020-02-19 RX ORDER — ATORVASTATIN CALCIUM 80 MG/1
TABLET, FILM COATED ORAL
Qty: 90 TAB | Refills: 1 | Status: SHIPPED | OUTPATIENT
Start: 2020-02-19 | End: 2023-09-12

## 2020-03-04 ENCOUNTER — HOSPITAL ENCOUNTER (OUTPATIENT)
Dept: HOSPITAL 8 - CFH | Age: 65
Discharge: HOME | End: 2020-03-04
Attending: NURSE PRACTITIONER
Payer: MEDICARE

## 2020-03-04 DIAGNOSIS — R07.9: Primary | ICD-10-CM

## 2020-03-04 PROCEDURE — 71046 X-RAY EXAM CHEST 2 VIEWS: CPT

## 2020-06-15 ENCOUNTER — HOSPITAL ENCOUNTER (INPATIENT)
Dept: HOSPITAL 8 - OUT | Age: 65
LOS: 1 days | Discharge: HOME | DRG: 909 | End: 2020-06-16
Attending: THORACIC SURGERY (CARDIOTHORACIC VASCULAR SURGERY) | Admitting: THORACIC SURGERY (CARDIOTHORACIC VASCULAR SURGERY)
Payer: MEDICARE

## 2020-06-15 VITALS — SYSTOLIC BLOOD PRESSURE: 149 MMHG | DIASTOLIC BLOOD PRESSURE: 81 MMHG

## 2020-06-15 VITALS — BODY MASS INDEX: 31.03 KG/M2 | HEIGHT: 70 IN | WEIGHT: 216.71 LBS

## 2020-06-15 VITALS — DIASTOLIC BLOOD PRESSURE: 91 MMHG | SYSTOLIC BLOOD PRESSURE: 141 MMHG

## 2020-06-15 VITALS — DIASTOLIC BLOOD PRESSURE: 97 MMHG | SYSTOLIC BLOOD PRESSURE: 155 MMHG

## 2020-06-15 DIAGNOSIS — T81.89XA: Primary | ICD-10-CM

## 2020-06-15 DIAGNOSIS — Z20.828: ICD-10-CM
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
## 2020-06-15 DIAGNOSIS — Z95.1: ICD-10-CM

## 2020-06-15 DIAGNOSIS — I25.10: ICD-10-CM

## 2020-06-15 DIAGNOSIS — G89.18: ICD-10-CM

## 2020-06-15 DIAGNOSIS — I10: ICD-10-CM

## 2020-06-15 DIAGNOSIS — E78.5: ICD-10-CM

## 2020-06-15 DIAGNOSIS — E66.9: ICD-10-CM

## 2020-06-15 LAB
ALBUMIN SERPL-MCNC: 3.3 G/DL (ref 3.4–5)
ALP SERPL-CCNC: 120 U/L (ref 45–117)
ALT SERPL-CCNC: 56 U/L (ref 12–78)
ANION GAP SERPL CALC-SCNC: 6 MMOL/L (ref 5–15)
APTT BLD: 25 SECONDS (ref 25–31)
BASOPHILS # BLD AUTO: 0.03 X10^3/UL (ref 0–0.1)
BASOPHILS NFR BLD AUTO: 1 % (ref 0–1)
BILIRUB SERPL-MCNC: 0.4 MG/DL (ref 0.2–1)
CALCIUM SERPL-MCNC: 8 MG/DL (ref 8.5–10.1)
CHLORIDE SERPL-SCNC: 114 MMOL/L (ref 98–107)
CREAT SERPL-MCNC: 1.02 MG/DL (ref 0.7–1.3)
EOSINOPHIL # BLD AUTO: 0.2 X10^3/UL (ref 0–0.4)
EOSINOPHIL NFR BLD AUTO: 3 % (ref 1–7)
ERYTHROCYTE [DISTWIDTH] IN BLOOD BY AUTOMATED COUNT: 22.9 % (ref 9.4–14.8)
INR PPP: 1.03 (ref 0.93–1.1)
LYMPHOCYTES # BLD AUTO: 1.19 X10^3/UL (ref 1–3.4)
LYMPHOCYTES NFR BLD AUTO: 17 % (ref 22–44)
MCH RBC QN AUTO: 25.8 PG (ref 27.5–34.5)
MCHC RBC AUTO-ENTMCNC: 31.8 G/DL (ref 33.2–36.2)
MCV RBC AUTO: 81.1 FL (ref 81–97)
MD: (no result)
MONOCYTES # BLD AUTO: 0.74 X10^3/UL (ref 0.2–0.8)
MONOCYTES NFR BLD AUTO: 11 % (ref 2–9)
NEUTROPHILS # BLD AUTO: 4.85 X10^3/UL (ref 1.8–6.8)
NEUTROPHILS NFR BLD AUTO: 69 % (ref 42–75)
PLATELET # BLD AUTO: 228 X10^3/UL (ref 130–400)
PMV BLD AUTO: 9.8 FL (ref 7.4–10.4)
PROT SERPL-MCNC: 6.6 G/DL (ref 6.4–8.2)
PROTHROMBIN TIME: 10.9 SECONDS (ref 9.6–11.5)
RBC # BLD AUTO: 5.66 X10^6/UL (ref 4.38–5.82)

## 2020-06-15 PROCEDURE — 85610 PROTHROMBIN TIME: CPT

## 2020-06-15 PROCEDURE — 86900 BLOOD TYPING SEROLOGIC ABO: CPT

## 2020-06-15 PROCEDURE — 86923 COMPATIBILITY TEST ELECTRIC: CPT

## 2020-06-15 PROCEDURE — 86850 RBC ANTIBODY SCREEN: CPT

## 2020-06-15 PROCEDURE — 71045 X-RAY EXAM CHEST 1 VIEW: CPT

## 2020-06-15 PROCEDURE — 0PC00ZZ EXTIRPATION OF MATTER FROM STERNUM, OPEN APPROACH: ICD-10-PCS | Performed by: THORACIC SURGERY (CARDIOTHORACIC VASCULAR SURGERY)

## 2020-06-15 PROCEDURE — 80053 COMPREHEN METABOLIC PANEL: CPT

## 2020-06-15 PROCEDURE — 85025 COMPLETE CBC W/AUTO DIFF WBC: CPT

## 2020-06-15 PROCEDURE — 85730 THROMBOPLASTIN TIME PARTIAL: CPT

## 2020-06-15 PROCEDURE — 93005 ELECTROCARDIOGRAM TRACING: CPT

## 2020-06-15 PROCEDURE — 82962 GLUCOSE BLOOD TEST: CPT

## 2020-06-15 PROCEDURE — 36415 COLL VENOUS BLD VENIPUNCTURE: CPT

## 2020-06-15 RX ADMIN — SODIUM CHLORIDE, PRESERVATIVE FREE SCH ML: 5 INJECTION INTRAVENOUS at 09:29

## 2020-06-15 RX ADMIN — SODIUM CHLORIDE, PRESERVATIVE FREE SCH ML: 5 INJECTION INTRAVENOUS at 20:52

## 2020-06-15 RX ADMIN — DOCUSATE SODIUM SCH MG: 100 CAPSULE, LIQUID FILLED ORAL at 20:51

## 2020-06-15 RX ADMIN — GABAPENTIN SCH MG: 300 CAPSULE ORAL at 20:54

## 2020-06-15 RX ADMIN — AMIODARONE HYDROCHLORIDE SCH MG: 200 TABLET ORAL at 20:53

## 2020-06-15 RX ADMIN — CARVEDILOL SCH MG: 6.25 TABLET, FILM COATED ORAL at 18:06

## 2020-06-16 VITALS — DIASTOLIC BLOOD PRESSURE: 88 MMHG | SYSTOLIC BLOOD PRESSURE: 135 MMHG

## 2020-06-16 VITALS — DIASTOLIC BLOOD PRESSURE: 72 MMHG | SYSTOLIC BLOOD PRESSURE: 135 MMHG

## 2020-06-16 RX ADMIN — DOCUSATE SODIUM SCH MG: 100 CAPSULE, LIQUID FILLED ORAL at 08:55

## 2020-06-16 RX ADMIN — CARVEDILOL SCH MG: 6.25 TABLET, FILM COATED ORAL at 06:10

## 2020-06-16 RX ADMIN — AMIODARONE HYDROCHLORIDE SCH MG: 200 TABLET ORAL at 08:55

## 2020-06-16 RX ADMIN — GABAPENTIN SCH MG: 300 CAPSULE ORAL at 08:56

## 2020-06-16 RX ADMIN — SODIUM CHLORIDE, PRESERVATIVE FREE SCH ML: 5 INJECTION INTRAVENOUS at 08:57

## 2020-10-21 NOTE — PROGRESS NOTES
Patient contacted and feeling well.  He was told he can see is as needed because his stress test, echo, and Holter were normal.  He was in good understanding and feeling well at this time.   Pt. Is refusing bed alarms at this time. Education provided.

## 2023-08-07 ENCOUNTER — HOSPITAL ENCOUNTER (OUTPATIENT)
Dept: RADIOLOGY | Facility: MEDICAL CENTER | Age: 68
End: 2023-08-07
Attending: GENERAL PRACTICE
Payer: MEDICARE

## 2023-08-07 NOTE — H&P
Subjective:   8/9/2023 11:22 AM  Primary care physician: Luis Fernando Culp M.D.    Chief Complaint: Pancreatic mass    Diagnosis:   1. Abnormal finding of diagnostic imaging        2. Pancreatic mass  Comp Metabolic Panel    Complete Blood Count w/o    Carbohydrate Antigen 19-9      3. History of acute inferior wall myocardial infarction        4. Neoplasm of digestive system  CT-ABDOMEN WITH & W/O, PELVIS WITH    CT-ABDOMEN WITH & W/O, PELVIS WITH          History of presenting illness:  Lucas Rojas is a pleasant 68 y.o. male with CAD and hx of inferior STEMI on Effient, dysplipidemia and hypertension who presented  to his primary care provider for cardiology follow up with complaint of a lump on his incision site. He had undergone cardiac catheterization on 2/22/2019. He underwent a chest CT scan on 7/28/23 that demonstrated a 3.5 cm mass body of the pancreas concerning for pancreatic cancer.     He states he really has never had symptoms secondary to this.  Sometimes he has a little bit of epigastric discomfort which he was relating to his previously reconstructed sternal separation.  He denies any ongoing weight loss.  He has not noticed any jaundice.  He is tolerating a diet and having regular bowel movements and is remained active and healthy.  He has had no unintended weight loss.  Denies any nausea or vomiting.  No melena.  This mass was identified secondary to lower cuts on a chest CT that was done to evaluate his sternal closure.  No history of smoking or drinking or drug use.  No significant family history of cancer        Past Medical History:   Diagnosis Date    Anesthesia     post op  nausea, last neurosurgery no problems    Arthritis     back/right ankle and right wrist    Coronary artery disease involving native coronary artery without angina pectoris 3/6/2019    Diabetes (HCC)     oral meds    Emphysema of lung (HCC)     per xray result. pt states no active disease    High cholesterol      Hypertension     Pain 03-30-17     back, 7-8/10    Snoring     sometimes, very mild, no sleep study     Past Surgical History:   Procedure Laterality Date    LUMBAR FUSION POSTERIOR  4/4/2017    Procedure: LUMBAR FUSION POSTERIOR - L4-5 FUSION/EXPLORATION ;  Surgeon: Osmani Engel M.D.;  Location: SURGERY [facility];  Service:     LUMBAR LAMINECTOMY DISKECTOMY Right 4/4/2017    Procedure: LUMBAR LAMINECTOMY DISKECTOMY L3-S1 AND MEDIAL FACETECTOMY;  Surgeon: Osmani Engel M.D.;  Location: SURGERY [facility];  Service:     FORAMINOTOMY  4/4/2017    Procedure: FORAMINOTOMY;  Surgeon: Osmani Engel M.D.;  Location: SURGERY [facility];  Service:     HARDWARE REMOVAL ORTHO  4/4/2017    Procedure: HARDWARE REMOVAL ORTHO - L4-5;  Surgeon: Osmani Engel M.D.;  Location: SURGERY [facility];  Service:     LUMBAR FUSION POSTERIOR N/A 12/15/2015    Procedure: LUMBAR FUSION POSTERIOR INSTRUMENTED TLIF L4-5;  Surgeon: Osmani Engel M.D.;  Location: SURGERY [facility];  Service:     LUMBAR LAMINECTOMY DISKECTOMY Left 12/15/2015    Procedure: LUMBAR LAMINECTOMY DISKECTOMY L1-2;  Surgeon: Osmani Engel M.D.;  Location: SURGERY [facility];  Service:     SHOULDER SURGERY  2013    arthroscopic     SHOULDER SURGERY  2012    right shoulder    APPENDECTOMY  1976     No Known Allergies    Outpatient Encounter Medications as of 8/9/2023   Medication Sig Dispense Refill    iron dextran complex (INFED) 50 MG/ML Solution 100 mg every day.      Omega-3 1000 MG Cap Take  by mouth.      Ascorbic Acid (VITAMIN C) 1000 MG Tab Take  by mouth.      losartan (COZAAR) 25 MG Tab Take 25 mg by mouth every day.      metoprolol SR (TOPROL XL) 25 MG TABLET SR 24 HR Take 0.5 Tabs by mouth every day. 90 Tab 3    aspirin EC (ECOTRIN) 81 MG Tablet Delayed Response Take 81 mg by mouth every bedtime.      allopurinol (ZYLOPRIM) 100 MG Tab Take 100 mg by mouth every day.      oxymetazoline (AFRIN) 0.05 % Solution Spray 2  "Sprays in nose 2 times a day as needed for Congestion.      atorvastatin (LIPITOR) 80 MG tablet TAKE 1 TABLET BY MOUTH EVERY NIGHT AT BEDTIME 90 Tab 1    testosterone cypionate (DEPO-TESTOSTERONE) 200 MG/ML Solution injection INJECT 1 ML IM Q 2 WEEKS  4    prasugrel (EFFIENT) 10 MG Tab Take 1 Tab by mouth every day. 90 Tab 3    DICLOFENAC PO Take 1 Tab by mouth 2 Times a Day.      raNITidine (ZANTAC) 150 MG Tab Take 150 mg by mouth every day.      Cyanocobalamin (VITAMIN B-12 PO) Take 1 Tab by mouth every day.       No facility-administered encounter medications on file as of 8/9/2023.     Social History     Socioeconomic History    Marital status:      Spouse name: Not on file    Number of children: Not on file    Years of education: Not on file    Highest education level: Not on file   Occupational History    Not on file   Tobacco Use    Smoking status: Passive Smoke Exposure - Never Smoker    Smokeless tobacco: Never   Substance and Sexual Activity    Alcohol use: Yes     Alcohol/week: 0.0 oz     Comment: \"maybe 1 per week\"       ,    parents smoked growing up    Drug use: No    Sexual activity: Not on file   Other Topics Concern    Not on file   Social History Narrative    Not on file     Social Determinants of Health     Financial Resource Strain: Not on file   Food Insecurity: Not on file   Transportation Needs: Not on file   Physical Activity: Not on file   Stress: Not on file   Social Connections: Not on file   Intimate Partner Violence: Not on file   Housing Stability: Not on file      Social History     Tobacco Use   Smoking Status Passive Smoke Exposure - Never Smoker   Smokeless Tobacco Never     Social History     Substance and Sexual Activity   Alcohol Use Yes    Alcohol/week: 0.0 oz    Comment: \"maybe 1 per week\"       ,    parents smoked growing up     Social History     Substance and Sexual Activity   Drug Use No      Family History   Problem Relation Age of Onset    Hypertension Father     " Heart Attack Brother          Review of Systems   Constitutional:  Negative for chills, fever, malaise/fatigue and weight loss.   HENT:  Negative for congestion, ear discharge, ear pain, hearing loss and nosebleeds.    Eyes:  Negative for blurred vision, double vision, photophobia, pain and discharge.   Respiratory:  Negative for cough, hemoptysis and sputum production.    Cardiovascular:  Negative for chest pain, palpitations, orthopnea and claudication.   Gastrointestinal:  Negative for abdominal pain, constipation, diarrhea, heartburn, nausea and vomiting.   Genitourinary:  Negative for dysuria, frequency, hematuria and urgency.   Musculoskeletal:  Negative for back pain, joint pain, myalgias and neck pain.   Skin:  Negative for itching and rash.   Neurological:  Negative for dizziness, tingling, tremors, sensory change, speech change, focal weakness and headaches.   Endo/Heme/Allergies:  Negative for environmental allergies. Does not bruise/bleed easily.   Psychiatric/Behavioral:  Negative for depression, hallucinations, substance abuse and suicidal ideas. The patient is not nervous/anxious.         Objective:   /74 (BP Location: Left arm, Patient Position: Sitting, BP Cuff Size: Adult)   Pulse 72   Temp 36.6 °C (97.9 °F) (Temporal)   Wt 75.3 kg (166 lb)   SpO2 98%   BMI 23.82 kg/m²     Physical Exam  Constitutional:       General: He is not in acute distress.     Appearance: He is not ill-appearing.   HENT:      Head: Normocephalic.   Eyes:      General: No scleral icterus.     Pupils: Pupils are equal, round, and reactive to light.   Cardiovascular:      Rate and Rhythm: Normal rate and regular rhythm.   Pulmonary:      Effort: Pulmonary effort is normal. No respiratory distress.   Chest:      Comments: Prior sternotomy scar well-healed.  Abdominal:      General: Abdomen is flat. There is no distension.      Palpations: Abdomen is soft.      Tenderness: There is no abdominal tenderness.   Skin:      Coloration: Skin is not jaundiced.   Neurological:      General: No focal deficit present.      Mental Status: He is alert and oriented to person, place, and time.         Labs  None     Imaging  CT scan chest 7/28/23      Pathology  None    Procedures  None      Diagnosis:     1. Abnormal finding of diagnostic imaging        2. Pancreatic mass  Comp Metabolic Panel    Complete Blood Count w/o    Carbohydrate Antigen 19-9      3. History of acute inferior wall myocardial infarction        4. Neoplasm of digestive system  CT-ABDOMEN WITH & W/O, PELVIS WITH    CT-ABDOMEN WITH & W/O, PELVIS WITH          Medical Decision Making:  Today's Assessment / Status / Plan:   This is 68-year-old male he does have a history of cardiac disease and is undergone a prior CABG who had issues with his sternal wound and has had reconstruction of the sternum secondary to this and underwent a CT scan to evaluate his sternal closure which identified a mass in the body of the pancreas.      He presents to me for surgical evaluation today.  We had a long discussion in regards to the appearance of this mass.  I did explain to him that it is a little difficult to clearly delineate this mass as well as its true location without the appropriate imaging.  I did explain that I do have a concern that this represents a malignancy just based on its appearance  We did discuss the general management of pancreatic cancer, the staging, prognosis and general management options.  We will plan to get a CT pancreas protocol as well as some labs to include tumor markers.  Following this we will meet again with the patient to further discuss whether or not this is going to entail upfront surgery or if he will need neoadjuvant therapy if so he would need a biopsy performed but we will get this coordinated once we have further delineated the mass.      The patient asked several great questions which were answered to their satisfaction.  They  are in agreement  with the care plan as outlined above.    Srikanth Escalera MD  Surgical Oncology

## 2023-08-09 ENCOUNTER — OFFICE VISIT (OUTPATIENT)
Dept: SURGICAL ONCOLOGY | Facility: MEDICAL CENTER | Age: 68
End: 2023-08-09
Payer: MEDICARE

## 2023-08-09 VITALS
TEMPERATURE: 97.9 F | DIASTOLIC BLOOD PRESSURE: 74 MMHG | SYSTOLIC BLOOD PRESSURE: 132 MMHG | HEART RATE: 72 BPM | BODY MASS INDEX: 23.82 KG/M2 | OXYGEN SATURATION: 98 % | WEIGHT: 166 LBS

## 2023-08-09 DIAGNOSIS — I25.2 HISTORY OF ACUTE INFERIOR WALL MYOCARDIAL INFARCTION: ICD-10-CM

## 2023-08-09 DIAGNOSIS — K86.89 PANCREATIC MASS: ICD-10-CM

## 2023-08-09 DIAGNOSIS — D49.0 NEOPLASM OF DIGESTIVE SYSTEM: ICD-10-CM

## 2023-08-09 DIAGNOSIS — R93.89 ABNORMAL FINDING OF DIAGNOSTIC IMAGING: ICD-10-CM

## 2023-08-09 PROCEDURE — 3078F DIAST BP <80 MM HG: CPT | Performed by: SURGERY

## 2023-08-09 PROCEDURE — 3075F SYST BP GE 130 - 139MM HG: CPT | Performed by: SURGERY

## 2023-08-09 PROCEDURE — 99205 OFFICE O/P NEW HI 60 MIN: CPT | Performed by: SURGERY

## 2023-08-09 RX ORDER — CHLORAL HYDRATE 500 MG
1 CAPSULE ORAL
Status: ON HOLD | COMMUNITY
End: 2023-10-06

## 2023-08-09 RX ORDER — LOSARTAN POTASSIUM 25 MG/1
25 TABLET ORAL DAILY
COMMUNITY
End: 2023-12-24

## 2023-08-09 RX ORDER — MULTIVIT WITH MINERALS/LUTEIN
1 TABLET ORAL
Status: ON HOLD | COMMUNITY
End: 2023-10-06

## 2023-08-10 ENCOUNTER — TELEPHONE (OUTPATIENT)
Dept: SURGICAL ONCOLOGY | Facility: MEDICAL CENTER | Age: 68
End: 2023-08-10
Payer: MEDICARE

## 2023-08-10 NOTE — TELEPHONE ENCOUNTER
Pt called in stating that [facility] has not received the order placed by .   After reviewing chart there is not an order placed yet. I did let Pt know I will send order, he provided me with a fax for [facility] [phone].

## 2023-08-14 ENCOUNTER — HOSPITAL ENCOUNTER (OUTPATIENT)
Dept: RADIOLOGY | Facility: MEDICAL CENTER | Age: 68
End: 2023-08-14
Attending: SURGERY
Payer: MEDICARE

## 2023-08-14 ASSESSMENT — ENCOUNTER SYMPTOMS
COUGH: 0
CLAUDICATION: 0
SPUTUM PRODUCTION: 0
BRUISES/BLEEDS EASILY: 0
ABDOMINAL PAIN: 0
ORTHOPNEA: 0
BACK PAIN: 0
TREMORS: 0
DIZZINESS: 0
HEARTBURN: 0
TINGLING: 0
NERVOUS/ANXIOUS: 0
MYALGIAS: 0
FOCAL WEAKNESS: 0
WEIGHT LOSS: 0
PHOTOPHOBIA: 0
EYE PAIN: 0
BLURRED VISION: 0
FEVER: 0
PALPITATIONS: 0
HEADACHES: 0
SENSORY CHANGE: 0
NAUSEA: 0
HALLUCINATIONS: 0
EYE DISCHARGE: 0
CONSTIPATION: 0
SPEECH CHANGE: 0
DIARRHEA: 0
NECK PAIN: 0
VOMITING: 0
CHILLS: 0
DOUBLE VISION: 0
HEMOPTYSIS: 0
DEPRESSION: 0

## 2023-08-14 ASSESSMENT — LIFESTYLE VARIABLES: SUBSTANCE_ABUSE: 0

## 2023-08-17 DIAGNOSIS — K86.89 MASS OF PANCREAS: ICD-10-CM

## 2023-08-21 ENCOUNTER — TELEPHONE (OUTPATIENT)
Dept: SURGICAL ONCOLOGY | Facility: MEDICAL CENTER | Age: 68
End: 2023-08-21
Payer: MEDICARE

## 2023-08-21 DIAGNOSIS — K86.89 MASS OF PANCREAS: ICD-10-CM

## 2023-08-21 NOTE — TELEPHONE ENCOUNTER
Called to follow up on pt. Dr. Escalera has discussed CT scan with pt and referred to Dr. Issa. Pt is scheduled for biopsy on Wed on 8/23.

## 2023-09-06 ENCOUNTER — PHARMACY VISIT (OUTPATIENT)
Dept: PHARMACY | Facility: MEDICAL CENTER | Age: 68
End: 2023-09-06
Payer: COMMERCIAL

## 2023-09-06 ENCOUNTER — HOSPITAL ENCOUNTER (OUTPATIENT)
Dept: HEMATOLOGY ONCOLOGY | Facility: MEDICAL CENTER | Age: 68
End: 2023-09-06
Attending: STUDENT IN AN ORGANIZED HEALTH CARE EDUCATION/TRAINING PROGRAM
Payer: MEDICARE

## 2023-09-06 VITALS
BODY MASS INDEX: 23.53 KG/M2 | DIASTOLIC BLOOD PRESSURE: 62 MMHG | HEART RATE: 85 BPM | OXYGEN SATURATION: 97 % | SYSTOLIC BLOOD PRESSURE: 150 MMHG | WEIGHT: 164 LBS | TEMPERATURE: 98.5 F

## 2023-09-06 DIAGNOSIS — Z00.6 RESEARCH STUDY PATIENT: ICD-10-CM

## 2023-09-06 DIAGNOSIS — G89.3 CANCER RELATED PAIN: ICD-10-CM

## 2023-09-06 DIAGNOSIS — G55 NERVE ROOT AND PLEXUS COMPRESSIONS IN DISEASES CLASSIFIED ELSEWHERE: ICD-10-CM

## 2023-09-06 DIAGNOSIS — C25.9 PANCREATIC ADENOCARCINOMA (HCC): ICD-10-CM

## 2023-09-06 PROCEDURE — 99212 OFFICE O/P EST SF 10 MIN: CPT | Performed by: STUDENT IN AN ORGANIZED HEALTH CARE EDUCATION/TRAINING PROGRAM

## 2023-09-06 PROCEDURE — RXMED WILLOW AMBULATORY MEDICATION CHARGE: Performed by: STUDENT IN AN ORGANIZED HEALTH CARE EDUCATION/TRAINING PROGRAM

## 2023-09-06 PROCEDURE — 99205 OFFICE O/P NEW HI 60 MIN: CPT | Performed by: STUDENT IN AN ORGANIZED HEALTH CARE EDUCATION/TRAINING PROGRAM

## 2023-09-06 RX ORDER — 0.9 % SODIUM CHLORIDE 0.9 %
10 VIAL (ML) INJECTION PRN
Status: CANCELLED | OUTPATIENT
Start: 2023-09-20

## 2023-09-06 RX ORDER — EPINEPHRINE 1 MG/ML(1)
0.5 AMPUL (ML) INJECTION PRN
Status: CANCELLED | OUTPATIENT
Start: 2023-09-20

## 2023-09-06 RX ORDER — 0.9 % SODIUM CHLORIDE 0.9 %
20 VIAL (ML) INJECTION PRN
Status: CANCELLED | OUTPATIENT
Start: 2023-09-22

## 2023-09-06 RX ORDER — 0.9 % SODIUM CHLORIDE 0.9 %
3 VIAL (ML) INJECTION PRN
Status: CANCELLED | OUTPATIENT
Start: 2023-09-19

## 2023-09-06 RX ORDER — PROCHLORPERAZINE MALEATE 10 MG
10 TABLET ORAL EVERY 6 HOURS PRN
Status: CANCELLED | OUTPATIENT
Start: 2023-09-20

## 2023-09-06 RX ORDER — DIPHENHYDRAMINE HYDROCHLORIDE 50 MG/ML
50 INJECTION INTRAMUSCULAR; INTRAVENOUS PRN
Status: CANCELLED | OUTPATIENT
Start: 2023-09-20

## 2023-09-06 RX ORDER — ONDANSETRON 2 MG/ML
4 INJECTION INTRAMUSCULAR; INTRAVENOUS PRN
Status: CANCELLED | OUTPATIENT
Start: 2023-09-20

## 2023-09-06 RX ORDER — 0.9 % SODIUM CHLORIDE 0.9 %
VIAL (ML) INJECTION PRN
Status: CANCELLED | OUTPATIENT
Start: 2023-09-19

## 2023-09-06 RX ORDER — DEXTROSE MONOHYDRATE 50 MG/ML
INJECTION, SOLUTION INTRAVENOUS CONTINUOUS
Status: CANCELLED | OUTPATIENT
Start: 2023-09-20

## 2023-09-06 RX ORDER — METHYLPREDNISOLONE SODIUM SUCCINATE 125 MG/2ML
125 INJECTION, POWDER, LYOPHILIZED, FOR SOLUTION INTRAMUSCULAR; INTRAVENOUS PRN
Status: CANCELLED | OUTPATIENT
Start: 2023-09-20

## 2023-09-06 RX ORDER — 0.9 % SODIUM CHLORIDE 0.9 %
3 VIAL (ML) INJECTION PRN
Status: CANCELLED | OUTPATIENT
Start: 2023-09-20

## 2023-09-06 RX ORDER — 0.9 % SODIUM CHLORIDE 0.9 %
10 VIAL (ML) INJECTION PRN
Status: CANCELLED | OUTPATIENT
Start: 2023-09-19

## 2023-09-06 RX ORDER — 0.9 % SODIUM CHLORIDE 0.9 %
VIAL (ML) INJECTION PRN
Status: CANCELLED | OUTPATIENT
Start: 2023-09-20

## 2023-09-06 RX ORDER — LOPERAMIDE HYDROCHLORIDE 2 MG/1
4 CAPSULE ORAL PRN
Status: CANCELLED | OUTPATIENT
Start: 2023-09-20

## 2023-09-06 RX ORDER — ONDANSETRON 8 MG/1
8 TABLET, ORALLY DISINTEGRATING ORAL PRN
Status: CANCELLED | OUTPATIENT
Start: 2023-09-20

## 2023-09-06 RX ORDER — MORPHINE SULFATE 15 MG/1
15 TABLET ORAL EVERY 8 HOURS PRN
Qty: 90 TABLET | Refills: 0 | Status: SHIPPED | OUTPATIENT
Start: 2023-09-06 | End: 2023-09-09 | Stop reason: RX

## 2023-09-06 RX ORDER — LOPERAMIDE HYDROCHLORIDE 2 MG/1
2 CAPSULE ORAL
Status: CANCELLED | OUTPATIENT
Start: 2023-09-20

## 2023-09-06 ASSESSMENT — ENCOUNTER SYMPTOMS
INSOMNIA: 0
ABDOMINAL PAIN: 1
WEAKNESS: 0
SINUS PAIN: 0
COUGH: 0
SHORTNESS OF BREATH: 0
DIAPHORESIS: 0
WEIGHT LOSS: 0
SPUTUM PRODUCTION: 0
NERVOUS/ANXIOUS: 0
HEADACHES: 0
BRUISES/BLEEDS EASILY: 0
MYALGIAS: 0
DOUBLE VISION: 0
CONSTIPATION: 0
SORE THROAT: 0
BLOOD IN STOOL: 0
CHILLS: 0
ORTHOPNEA: 0
FEVER: 0
BLURRED VISION: 0
DIARRHEA: 0
TINGLING: 0
VOMITING: 0
HEARTBURN: 0
BACK PAIN: 0
WHEEZING: 0
DIZZINESS: 0
NAUSEA: 0
PALPITATIONS: 0

## 2023-09-06 ASSESSMENT — PAIN SCALES - GENERAL: PAINLEVEL: 6=MODERATE PAIN

## 2023-09-06 NOTE — PROGRESS NOTES
Consult:  Hematology/Oncology      Referring Physician: Srikanth Escalera MD  Primary Care:  Luis Fernando Culp M.D.    Diagnosis: Pancreatic adenocarcinoma    Chief Complaint:  Evaluation for systemic therapy    History of Presenting Illness:  Lucas Rojas is a 68 y.o.  man who presents to the clinic for evaluation for systemic therapy for diagnosis of pancreatic adenocarcinoma.  The patient had been having abdominal pain with worsening intensity over the past few months, and had a CT scan done which revealed a mass in the pancreatic neck.  He was referred to GI and surgical oncology, and ultimately underwent a biopsy which revealed pancreatic adenocarcinoma, moderately differentiated.  He underwent an endoscopic ultrasound which revealed a [id number] disease.  Staging scans did not reveal any signs of disease elsewhere, and he was referred to me by Dr. Escalera for neoadjuvant chemotherapy accordingly. His baseline Ca 19-9 was 12.     Interval History:  Patient is here for consultation.  He is having worsening abdominal pain and has been taking Tylenol, 4 g/day.  This has not been helping his abdominal pain.  He is having difficulty sleeping because of the pain.  He is also anxious about getting started on therapy, and otherwise is feeling okay.  His wife is with him today.    Past Medical History:   Diagnosis Date    Anesthesia     post op  nausea, last neurosurgery no problems    Arthritis     back/right ankle and right wrist    Coronary artery disease involving native coronary artery without angina pectoris 3/6/2019    Diabetes (HCC)     oral meds    Emphysema of lung (HCC)     per xray result. pt states no active disease    High cholesterol     Hypertension     Pain 03-30-17     back, 7-8/10    Snoring     sometimes, very mild, no sleep study       Past Surgical History:   Procedure Laterality Date    FUSION, SPINE, LUMBAR, PLIF  4/4/2017    Procedure: LUMBAR FUSION POSTERIOR - L4-5 FUSION/EXPLORATION ;  " Surgeon: Osmani Engel M.D.;  Location: SURGERY [facility];  Service:     LUMBAR LAMINECTOMY DISKECTOMY Right 4/4/2017    Procedure: LUMBAR LAMINECTOMY DISKECTOMY L3-S1 AND MEDIAL FACETECTOMY;  Surgeon: Osmani Engel M.D.;  Location: SURGERY [facility];  Service:     FORAMINOTOMY  4/4/2017    Procedure: FORAMINOTOMY;  Surgeon: Osmani Engel M.D.;  Location: SURGERY [facility];  Service:     HARDWARE REMOVAL ORTHO  4/4/2017    Procedure: HARDWARE REMOVAL ORTHO - L4-5;  Surgeon: Osmani Engel M.D.;  Location: SURGERY [facility];  Service:     FUSION, SPINE, LUMBAR, PLIF N/A 12/15/2015    Procedure: LUMBAR FUSION POSTERIOR INSTRUMENTED TLIF L4-5;  Surgeon: Osmani Engel M.D.;  Location: SURGERY [facility];  Service:     LUMBAR LAMINECTOMY DISKECTOMY Left 12/15/2015    Procedure: LUMBAR LAMINECTOMY DISKECTOMY L1-2;  Surgeon: Osmani Engel M.D.;  Location: SURGERY [facility];  Service:     SHOULDER SURGERY  2013    arthroscopic     SHOULDER SURGERY  2012    right shoulder    APPENDECTOMY  1976       Social History     Socioeconomic History    Marital status:      Spouse name: Not on file    Number of children: Not on file    Years of education: Not on file    Highest education level: Not on file   Occupational History    Not on file   Tobacco Use    Smoking status: Never     Passive exposure: Yes    Smokeless tobacco: Never   Substance and Sexual Activity    Alcohol use: Yes     Alcohol/week: 0.0 oz     Comment: \"maybe 1 per week\"       ,    parents smoked growing up    Drug use: No    Sexual activity: Not on file   Other Topics Concern    Not on file   Social History Narrative    Not on file     Social Determinants of Health     Financial Resource Strain: Not on file   Food Insecurity: Not on file   Transportation Needs: Not on file   Physical Activity: Not on file   Stress: Not on file   Social Connections: Not on file   Intimate Partner Violence: Not on file   Housing " Stability: Not on file       Family History   Problem Relation Age of Onset    Cancer Mother         Lung cancer    Hypertension Father     Heart Attack Brother        OB History   No obstetric history on file.       Allergies as of 09/06/2023    (No Known Allergies)         Current Outpatient Medications:     morphine (MS IR) 15 MG tablet, Take 1 Tablet by mouth every 8 hours as needed for Severe Pain for up to 30 days., Disp: 90 Tablet, Rfl: 0    iron dextran complex (INFED) 50 MG/ML Solution, 100 mg every day., Disp: , Rfl:     Omega-3 1000 MG Cap, Take  by mouth., Disp: , Rfl:     Ascorbic Acid (VITAMIN C) 1000 MG Tab, Take  by mouth., Disp: , Rfl:     losartan (COZAAR) 25 MG Tab, Take 25 mg by mouth every day., Disp: , Rfl:     metoprolol SR (TOPROL XL) 25 MG TABLET SR 24 HR, Take 0.5 Tabs by mouth every day., Disp: 90 Tab, Rfl: 3    aspirin EC (ECOTRIN) 81 MG Tablet Delayed Response, Take 81 mg by mouth every bedtime., Disp: , Rfl:     allopurinol (ZYLOPRIM) 100 MG Tab, Take 100 mg by mouth every day., Disp: , Rfl:     oxymetazoline (AFRIN) 0.05 % Solution, Spray 2 Sprays in nose 2 times a day as needed for Congestion., Disp: , Rfl:     atorvastatin (LIPITOR) 80 MG tablet, TAKE 1 TABLET BY MOUTH EVERY NIGHT AT BEDTIME, Disp: 90 Tab, Rfl: 1    testosterone cypionate (DEPO-TESTOSTERONE) 200 MG/ML Solution injection, INJECT 1 ML IM Q 2 WEEKS, Disp: , Rfl: 4    prasugrel (EFFIENT) 10 MG Tab, Take 1 Tab by mouth every day., Disp: 90 Tab, Rfl: 3    DICLOFENAC PO, Take 1 Tab by mouth 2 Times a Day., Disp: , Rfl:     raNITidine (ZANTAC) 150 MG Tab, Take 150 mg by mouth every day., Disp: , Rfl:     Cyanocobalamin (VITAMIN B-12 PO), Take 1 Tab by mouth every day., Disp: , Rfl:     Review of Systems:  Review of Systems   Constitutional:  Positive for malaise/fatigue. Negative for chills, diaphoresis, fever and weight loss.   HENT:  Negative for hearing loss, nosebleeds, sinus pain and sore throat.    Eyes:  Negative for  blurred vision and double vision.   Respiratory:  Negative for cough, sputum production, shortness of breath and wheezing.    Cardiovascular:  Negative for chest pain, palpitations, orthopnea and leg swelling.   Gastrointestinal:  Positive for abdominal pain. Negative for blood in stool, constipation, diarrhea, heartburn, melena, nausea and vomiting.   Genitourinary:  Negative for dysuria, frequency, hematuria and urgency.   Musculoskeletal:  Negative for back pain, joint pain and myalgias.   Skin:  Negative for rash.   Neurological:  Negative for dizziness, tingling, weakness and headaches.   Endo/Heme/Allergies:  Does not bruise/bleed easily.   Psychiatric/Behavioral:  The patient is not nervous/anxious and does not have insomnia.           Physical Exam:  Vitals:    09/06/23 1407   BP: (!) 150/62   BP Location: Left arm   Patient Position: Sitting   BP Cuff Size: Adult   Pulse: 85   Temp: 36.9 °C (98.5 °F)   TempSrc: Temporal   SpO2: 97%   Weight: 74.4 kg (164 lb)       DESC; KARNOFSKY SCALE WITH ECOG EQUIVALENT: 90, Able to carry on normal activity; minor signs or symptoms of disease (ECOG equivalent 0)    DISTRESS LEVEL: no apparent distress    Physical Exam  Vitals and nursing note reviewed.   Constitutional:       General: He is awake. He is not in acute distress.     Appearance: Normal appearance. He is normal weight. He is not ill-appearing, toxic-appearing or diaphoretic.   HENT:      Head: Normocephalic and atraumatic.      Nose: Nose normal. No congestion.      Mouth/Throat:      Pharynx: Oropharynx is clear. No oropharyngeal exudate or posterior oropharyngeal erythema.   Eyes:      General: No scleral icterus.     Extraocular Movements: Extraocular movements intact.      Conjunctiva/sclera: Conjunctivae normal.      Pupils: Pupils are equal, round, and reactive to light.   Cardiovascular:      Rate and Rhythm: Normal rate and regular rhythm.      Pulses: Normal pulses.      Heart sounds: Normal heart  sounds. No murmur heard.     No friction rub. No gallop.   Pulmonary:      Effort: Pulmonary effort is normal.      Breath sounds: Normal breath sounds. No decreased air movement. No wheezing, rhonchi or rales.   Abdominal:      General: Bowel sounds are normal. There is no distension.      Tenderness: There is no abdominal tenderness.   Musculoskeletal:         General: No deformity. Normal range of motion.      Cervical back: Normal range of motion and neck supple. No tenderness.      Right lower leg: No edema.      Left lower leg: No edema.   Lymphadenopathy:      Cervical: No cervical adenopathy.      Upper Body:      Right upper body: No axillary adenopathy.      Left upper body: No axillary adenopathy.      Lower Body: No right inguinal adenopathy. No left inguinal adenopathy.   Skin:     General: Skin is warm and dry.      Coloration: Skin is not jaundiced.      Findings: No erythema or rash.   Neurological:      General: No focal deficit present.      Mental Status: He is alert and oriented to person, place, and time.      Sensory: Sensation is intact.      Motor: Motor function is intact. No weakness.      Gait: Gait is intact.   Psychiatric:         Attention and Perception: Attention normal.         Mood and Affect: Mood normal.         Behavior: Behavior normal. Behavior is cooperative.         Thought Content: Thought content normal.         Judgment: Judgment normal.          Depression Screening    Little interest or pleasure in doing things?      Feeling down, depressed , or hopeless?     Trouble falling or staying asleep, or sleeping too much?      Feeling tired or having little energy?      Poor appetite or overeating?      Feeling bad about yourself - or that you are a failure or have let yourself or your family down?     Trouble concentrating on things, such as reading the newspaper or watching television?     Moving or speaking so slowly that other people could have noticed.  Or the opposite -  being so fidgety or restless that you have been moving around a lot more than usual?      Thoughts that you would be better off dead, or of hurting yourself?      Patient Health Questionnaire Score:         If depressive symptoms identified deferred to follow up visit unless specifically addressed in assesment and plan.    Interpretation of PHQ-9 Total Score   Score Severity   1-4 No Depression   5-9 Mild Depression   10-14 Moderate Depression   15-19 Moderately Severe Depression   20-27 Severe Depression    Labs:  No visits with results within 7 Day(s) from this visit.   Latest known visit with results is:   Admission on 02/22/2019, Discharged on 02/24/2019   Component Date Value Ref Range Status    Troponin I 02/22/2019 9.72 (H)  0.00 - 0.04 ng/mL Final    Comment: The Ultra Troponin I is a highly sensitive assay.  Effective 4-1-2011, the reference range for positive Troponin  has been changed.  This change follows the recommendation of  the American College of Cardiology (ACC) committee in  conjunction with the 99th percentile reference population.  ___________________________________________________  Normal ultra TNI:  0.00-0.04 ng/mL  Clinical Correlation Indicated:  0.05 - 0.78 ng/mL  Suggestive of MI:  >0.78 ng/mL      B Natriuretic Peptide 02/22/2019 35  0 - 100 pg/mL Final    Comment: Effective 11/08/2012, this assay is being performed using new  instrumentation.  Correlation studies with the previous  instrumentation showed a negative bias.  For the most part  this is clinically insignificant.  Clinical correlation may  be required when comparing previous results with results  received after 11/07/2012.      WBC 02/22/2019 13.1 (H)  4.8 - 10.8 K/uL Final    RBC 02/22/2019 5.17  4.70 - 6.10 M/uL Final    Hemoglobin 02/22/2019 14.6  14.0 - 18.0 g/dL Final    Hematocrit 02/22/2019 46.9  42.0 - 52.0 % Final    MCV 02/22/2019 90.7  81.4 - 97.8 fL Final    MCH 02/22/2019 28.2  27.0 - 33.0 pg Final    MCHC  02/22/2019 31.1 (L)  33.7 - 35.3 g/dL Final    RDW 02/22/2019 42.8  35.9 - 50.0 fL Final    Platelet Count 02/22/2019 251  164 - 446 K/uL Final    MPV 02/22/2019 11.1  9.0 - 12.9 fL Final    Neutrophils-Polys 02/22/2019 90.00 (H)  44.00 - 72.00 % Final    Lymphocytes 02/22/2019 5.30 (L)  22.00 - 41.00 % Final    Monocytes 02/22/2019 3.90  0.00 - 13.40 % Final    Eosinophils 02/22/2019 0.10  0.00 - 6.90 % Final    Basophils 02/22/2019 0.20  0.00 - 1.80 % Final    Immature Granulocytes 02/22/2019 0.50  0.00 - 0.90 % Final    Nucleated RBC 02/22/2019 0.00  /100 WBC Final    Neutrophils (Absolute) 02/22/2019 11.75 (H)  1.82 - 7.42 K/uL Final    Includes immature neutrophils, if present.    Lymphs (Absolute) 02/22/2019 0.69 (L)  1.00 - 4.80 K/uL Final    Monos (Absolute) 02/22/2019 0.51  0.00 - 0.85 K/uL Final    Eos (Absolute) 02/22/2019 0.01  0.00 - 0.51 K/uL Final    Baso (Absolute) 02/22/2019 0.03  0.00 - 0.12 K/uL Final    Immature Granulocytes (abs) 02/22/2019 0.07  0.00 - 0.11 K/uL Final    NRBC (Absolute) 02/22/2019 0.00  K/uL Final    Sodium 02/22/2019 135  135 - 145 mmol/L Final    Potassium 02/22/2019 5.1  3.6 - 5.5 mmol/L Final    Specimen slightly hemolyzed.    Chloride 02/22/2019 104  96 - 112 mmol/L Final    Co2 02/22/2019 24  20 - 33 mmol/L Final    Anion Gap 02/22/2019 7.0  0.0 - 11.9 Final    Glucose 02/22/2019 147 (H)  65 - 99 mg/dL Final    Bun 02/22/2019 26 (H)  8 - 22 mg/dL Final    Creatinine 02/22/2019 1.21  0.50 - 1.40 mg/dL Final    Calcium 02/22/2019 9.1  8.5 - 10.5 mg/dL Final    AST(SGOT) 02/22/2019 94 (H)  12 - 45 U/L Final    ALT(SGPT) 02/22/2019 39  2 - 50 U/L Final    Alkaline Phosphatase 02/22/2019 131 (H)  30 - 99 U/L Final    Total Bilirubin 02/22/2019 0.3  0.1 - 1.5 mg/dL Final    Albumin 02/22/2019 4.6  3.2 - 4.9 g/dL Final    Total Protein 02/22/2019 6.9  6.0 - 8.2 g/dL Final    Globulin 02/22/2019 2.3  1.9 - 3.5 g/dL Final    A-G Ratio 02/22/2019 2.0  g/dL Final    Lipase  2019 29  11 - 82 U/L Final    PT 2019 13.5  12.0 - 14.6 sec Final    INR 2019 1.02  0.87 - 1.13 Final    Comment: INR - Non-therapeutic Reference Range: 0.87-1.13  INR - Therapeutic Reference Range: 2.0-4.0      APTT 2019 24.6 (L)  24.7 - 36.0 sec Final    Therapeutic Heparin Range: 63-96 seconds    Report 2019    Final                    Value:[facility]    Test Date:  2019  Pt Name:    JANEEN PRO                 Department: ER  MRN:        [id number]                      Room:       TRAUMA - EXAM 1  Gender:     Male                         Technician: [id number]  :        1955                   Requested By:ER TRIAGE PROTOCOL  Order #:    [id number]                    Reading MD: RONNIE SAINI MD    Measurements  Intervals                                Axis  Rate:       76                           P:          40  KY:         188                          QRS:        56  QRSD:       90                           T:          105  QT:         336  QTc:        378    Interpretive Statements  Normal sinus rhythm with a ventricular rate of 76, normal QRS, normal  intervals,  ST segment elevation inferiorly and laterally as well as ST segment  depression  anteriorly consistent with an acute myocardial infarction    Electronically Signed On 2019 1:02:17 PST by RONNIE SAINI MD      GFR If  2019 >60  >60 mL/min/1.73 m 2 Final    GFR If Non  2019 >60  >60 mL/min/1.73 m 2 Final    Report 2019    Final                    Value:[facility]    Test Date:  2019  Pt Name:    JANEEN PRO                 Department: 161  MRN:        [id number]                      Room:       T605  Gender:     Male                         Technician: [id number]  :        1955                   Requested By:YASMINE WHITTAKER  Order #:    [id number]                     Reading MD: Adalberto Rosales MD    Measurements  Intervals                                Axis  Rate:       85                           P:          57  GA:         166                          QRS:        8  QRSD:       96                           T:          -35  QT:         353  QTc:        420    Interpretive Statements  SINUS RHYTHM  INFERIOR INFARCT, RECENT  Compared to ECG 2019 00:51:50  ST (T wave) deviation no longer present  Myocardial infarct finding still present    Electronically Signed On 2019 8:44:30 PST by Adalberto Rosales MD      Report 2019    Final                    Value:[facility]    Test Date:  2019  Pt Name:    JANEEN PRO                 Department: 161  MRN:        [id number]                      Room:       [facility]  Gender:     Male                         Technician: [facility]  :        1955                   Requested By:MELI SILVEIRA  Order #:    [id number]                    Reading MD: Adalberto Rosales MD    Measurements  Intervals                                Axis  Rate:       90                           P:          43  GA:         165                          QRS:        -11  QRSD:       98                           T:          -34  QT:         363  QTc:        444    Interpretive Statements  SINUS RHYTHM  INFERIOR INFARCT, RECENT  LATERAL LEADS ARE ALSO INVOLVED  Compared to ECG 2019 03:45:49  No significant changes    Electronically Signed On 2019 8:44:43 PST by Adalberto Rosales MD      Magnesium 2019 2.0  1.5 - 2.5 mg/dL Final    Specimen slightly hemolyzed.    Sodium 2019 135  135 - 145 mmol/L Final    Potassium 2019 4.4  3.6 - 5.5 mmol/L Final    Chloride 2019 108  96 - 112 mmol/L Final    Co2 2019 21  20 - 33 mmol/L Final    Glucose 2019 104 (H)  65 - 99 mg/dL Final    Bun 2019 18  8 - 22 mg/dL Final    Creatinine 2019 1.05  0.50 - 1.40 mg/dL Final    Calcium 2019 8.3 (L)   8.5 - 10.5 mg/dL Final    Anion Gap 02/23/2019 6.0  0.0 - 11.9 Final    WBC 02/23/2019 13.4 (H)  4.8 - 10.8 K/uL Final    RBC 02/23/2019 4.77  4.70 - 6.10 M/uL Final    Hemoglobin 02/23/2019 13.6 (L)  14.0 - 18.0 g/dL Final    Hematocrit 02/23/2019 43.3  42.0 - 52.0 % Final    MCV 02/23/2019 90.8  81.4 - 97.8 fL Final    MCH 02/23/2019 28.5  27.0 - 33.0 pg Final    MCHC 02/23/2019 31.4 (L)  33.7 - 35.3 g/dL Final    RDW 02/23/2019 43.4  35.9 - 50.0 fL Final    Platelet Count 02/23/2019 247  164 - 446 K/uL Final    MPV 02/23/2019 10.7  9.0 - 12.9 fL Final    Cholesterol,Tot 02/23/2019 72 (L)  100 - 199 mg/dL Final    Triglycerides 02/23/2019 66  0 - 149 mg/dL Final    HDL 02/23/2019 31 (A)  >=40 mg/dL Final    LDL 02/23/2019 28  <100 mg/dL Final    GFR If  02/23/2019 >60  >60 mL/min/1.73 m 2 Final    GFR If Non  02/23/2019 >60  >60 mL/min/1.73 m 2 Final    Left Ventrical Ejection Fraction 02/23/2019 55   Final    WBC 02/24/2019 13.2 (H)  4.8 - 10.8 K/uL Final    RBC 02/24/2019 4.58 (L)  4.70 - 6.10 M/uL Final    Hemoglobin 02/24/2019 13.2 (L)  14.0 - 18.0 g/dL Final    Hematocrit 02/24/2019 41.1 (L)  42.0 - 52.0 % Final    MCV 02/24/2019 89.7  81.4 - 97.8 fL Final    MCH 02/24/2019 28.8  27.0 - 33.0 pg Final    MCHC 02/24/2019 32.1 (L)  33.7 - 35.3 g/dL Final    RDW 02/24/2019 43.7  35.9 - 50.0 fL Final    Platelet Count 02/24/2019 235  164 - 446 K/uL Final    MPV 02/24/2019 10.9  9.0 - 12.9 fL Final    B Natriuretic Peptide 02/24/2019 137 (H)  0 - 100 pg/mL Final    Comment: Effective 11/08/2012, this assay is being performed using new  instrumentation.  Correlation studies with the previous  instrumentation showed a negative bias.  For the most part  this is clinically insignificant.  Clinical correlation may  be required when comparing previous results with results  received after 11/07/2012.      Sodium 02/24/2019 138  135 - 145 mmol/L Final    Potassium 02/24/2019 4.4  3.6 - 5.5  mmol/L Final    Chloride 02/24/2019 107  96 - 112 mmol/L Final    Co2 02/24/2019 22  20 - 33 mmol/L Final    Glucose 02/24/2019 117 (H)  65 - 99 mg/dL Final    Bun 02/24/2019 23 (H)  8 - 22 mg/dL Final    Creatinine 02/24/2019 1.15  0.50 - 1.40 mg/dL Final    Calcium 02/24/2019 8.3 (L)  8.5 - 10.5 mg/dL Final    Anion Gap 02/24/2019 9.0  0.0 - 11.9 Final    Magnesium 02/24/2019 1.8  1.5 - 2.5 mg/dL Final    Phosphorus 02/24/2019 3.6  2.5 - 4.5 mg/dL Final    GFR If  02/24/2019 >60  >60 mL/min/1.73 m 2 Final    GFR If Non  02/24/2019 >60  >60 mL/min/1.73 m 2 Final       Imaging:   All listed images below have been independently reviewed by me. I agree with the findings as summarized below:    CT a/p 08/11/23:        Pathology:        Assessment & Plan:  1. Pancreatic adenocarcinoma (HCC)  CT-INJECT NERV BLOCK,CELIAC PLEX    IR-CVC PORT PLACEMENT > AGE 5    Referral to Oncology Palliative Care    morphine (MS IR) 15 MG tablet    Referral to Genetic Research Studies      2. Nerve root and plexus compressions in diseases classified elsewhere  CT-INJECT NERV BLOCK,CELIAC PLEX      3. Cancer related pain  morphine (MS IR) 15 MG tablet          This is a 68 year old  man with pancreatic adenocarcinoma, [id number] stage II-a disease, who presents for evaluation for systemic therapy.     Current Diagnosis and Staging: Pancreatic adenocarcinoma, [id number] stage II-a disease, moderately differentiated    Treatment Plan: FOLFIRINOX    Treatment Citation: N[facility]N    Plan of Care:    Primary Therapy: mFOLFIRINOX to start in 2 weeks  Supportive Therapy: Antiemetics per protocol, morphine prescribed for pain, referral for celiac plexus nerve block  Toxicity: Patient is getting antineoplastic therapy and needs monitoring of blood counts, hepatic function, and renal function due to potential for organ dysfunction.   Labs: CBC with diff, CMP, Ca 19-9 monitoring  Imaging: Repeat CT chest, pancreatic  protocol after C4  Treatment Planning: Plan for neoadjuvant chemotherapy with FOLFIRINOX followed by evaluation for surgical resection.   Consultations: Surgical oncology (Dr. Escalera); GI (Dr. Dorsey); Palliative care (Dr. Al)  Code Status: Full  Miscellaneous: Patient declined referral to medical genetics, but was amenable to [facility] and [employer] trials  Return for Follow Up: For start of therapy    Any questions and concerns raised by the patient were answered to the best of my ability. Thank you for allowing me to participate in the care for this patient. Please feel free to contact me for any questions or concerns.     Total time spent on chart review, clinic encounter, and documentation: 61 minutes.

## 2023-09-06 NOTE — RESEARCH NOTE
[employer] [place] [employer] enrollment complete. Saliva sample collected onsite.     Confirmed with the participant which designated provider they would like study results shared with Dr. David Issa. Patient will have an opportunity to share the results with any providers of their choosing in the future by accessing their results from [employer].

## 2023-09-07 ENCOUNTER — APPOINTMENT (OUTPATIENT)
Dept: ADMISSIONS | Facility: MEDICAL CENTER | Age: 68
End: 2023-09-07
Attending: STUDENT IN AN ORGANIZED HEALTH CARE EDUCATION/TRAINING PROGRAM
Payer: MEDICARE

## 2023-09-07 NOTE — ADDENDUM NOTE
Encounter addended by: Barbara Jacob, Med Ass't on: 9/7/2023 10:42 AM   Actions taken: Charge Capture section accepted

## 2023-09-09 DIAGNOSIS — C25.9 PANCREATIC ADENOCARCINOMA (HCC): ICD-10-CM

## 2023-09-09 DIAGNOSIS — G89.3 CANCER RELATED PAIN: ICD-10-CM

## 2023-09-11 RX ORDER — MORPHINE SULFATE 15 MG/1
15 TABLET ORAL EVERY 8 HOURS PRN
Qty: 90 TABLET | Refills: 0 | Status: SHIPPED | OUTPATIENT
Start: 2023-09-11 | End: 2023-10-13

## 2023-09-12 ENCOUNTER — PRE-ADMISSION TESTING (OUTPATIENT)
Dept: ADMISSIONS | Facility: MEDICAL CENTER | Age: 68
End: 2023-09-12
Attending: STUDENT IN AN ORGANIZED HEALTH CARE EDUCATION/TRAINING PROGRAM
Payer: MEDICARE

## 2023-09-12 NOTE — OR NURSING
Patient states he is completing his current round of chemo on 9/22/23. Instructed patient to discuss this with his doctor. Patient states he has a pre procedure appt 9/13/23 and will do so.

## 2023-09-13 ENCOUNTER — HOSPITAL ENCOUNTER (OUTPATIENT)
Dept: HEMATOLOGY ONCOLOGY | Facility: MEDICAL CENTER | Age: 68
End: 2023-09-13
Attending: FAMILY MEDICINE
Payer: COMMERCIAL

## 2023-09-13 ENCOUNTER — PHARMACY VISIT (OUTPATIENT)
Dept: PHARMACY | Facility: MEDICAL CENTER | Age: 68
End: 2023-09-13
Payer: COMMERCIAL

## 2023-09-13 VITALS
RESPIRATION RATE: 18 BRPM | BODY MASS INDEX: 23.02 KG/M2 | WEIGHT: 160.8 LBS | SYSTOLIC BLOOD PRESSURE: 118 MMHG | HEART RATE: 82 BPM | OXYGEN SATURATION: 97 % | HEIGHT: 70 IN | DIASTOLIC BLOOD PRESSURE: 70 MMHG | TEMPERATURE: 97.4 F

## 2023-09-13 DIAGNOSIS — G89.3 CANCER RELATED PAIN: ICD-10-CM

## 2023-09-13 PROCEDURE — 99213 OFFICE O/P EST LOW 20 MIN: CPT | Performed by: FAMILY MEDICINE

## 2023-09-13 PROCEDURE — 99212 OFFICE O/P EST SF 10 MIN: CPT | Performed by: FAMILY MEDICINE

## 2023-09-13 PROCEDURE — RXMED WILLOW AMBULATORY MEDICATION CHARGE: Performed by: STUDENT IN AN ORGANIZED HEALTH CARE EDUCATION/TRAINING PROGRAM

## 2023-09-13 ASSESSMENT — ENCOUNTER SYMPTOMS
COUGH: 0
PALPITATIONS: 0
SHORTNESS OF BREATH: 0
BACK PAIN: 1
NAUSEA: 1
DEPRESSION: 0
BLURRED VISION: 0
WEIGHT LOSS: 0
HEADACHES: 0
VOMITING: 1
FEVER: 0
CHILLS: 0
BRUISES/BLEEDS EASILY: 0
CONSTIPATION: 0
NERVOUS/ANXIOUS: 0
DIARRHEA: 0

## 2023-09-13 ASSESSMENT — PAIN SCALES - GENERAL: PAINLEVEL: 3=SLIGHT PAIN

## 2023-09-13 ASSESSMENT — PATIENT HEALTH QUESTIONNAIRE - PHQ9: CLINICAL INTERPRETATION OF PHQ2 SCORE: 0

## 2023-09-13 NOTE — ASSESSMENT & PLAN NOTE
Patient with known pancreatic cancer.  Is currently followed by medical oncology and will be starting chemotherapy soon.  Has abdominal pain that has been responding well to morphine.  We will also have a celiac plexus block next week.  Verified with pharmacy they have inventory to fill morphine 15 mg as needed 3 times daily.  Controlled substance agreement signed today.   reviewed no signs of diversion or abuse, risk and benefits of medications discussed.  Currently does not have constipation but advised opioids may cause constipation and if needed he may use MiraLAX and stool softeners.  We will follow-up as needed following his nerve block to reevaluate pain needs.

## 2023-09-13 NOTE — ADDENDUM NOTE
Encounter addended by: Michelle Cooley, Med Ass't on: 9/13/2023 10:52 AM   Actions taken: Charge Capture section accepted

## 2023-09-13 NOTE — PROGRESS NOTES
Date & Time note created:    9/13/2023   9:46 AM       Requesting Provider: Dr. Issa  Reason for Referral: Goals of care and symptom management    Chief Complaint: I have abdominal pain  HPI:   Lucas Rojas is a 68 y.o. male presenting to palliative care clinic for symptom management and goals of care.  Patient was accompanied by his wife.  The role of palliative care was discussed and they were open to a conversation.    Patient reports that his cancer was initially found incidentally on imaging when he was having chest and abdominal pain that was thought to be related to his prior cardiac surgeries.  He is establishing with medical oncology for neoadjuvant chemotherapy. He currently does not have evidence of metastatic disease and reports that his oncologist has told him this may be a curable pancreatic cancer.  He reports abdominal pain that is epigastric but lateralized to his left upper quadrant and towards his back.  He has been referred for a celiac plexus block.  For his pain he currently is using morphine 15 mg as needed up to 3 times a day.  He reports the use of this medication is controlling his symptoms.  Does not endorse any nausea or vomiting on a regular basis but does have occasional rapid onset episodes of vomiting.  No reported constipation or diarrhea.  He is active and able to complete all of his activities of daily living.  He is ready to begin his chemotherapy soon and is looking forward to meeting with the nurse navigator later this week.  They also have a 4-year-old grandson which they care for weekly and is the highlight of their week.  I shared that my role in palliative medicine will evolve as needed.  And that currently as long as he has a good response to his nerve block and his pain is controlled by the morphine we can follow-up as needed.  He does have an advanced directive on file which is accurate.    Oncological history: Pancreatic adenocarcinoma    Past Medical History:    Diagnosis Date    Anemia     Anesthesia     post op  nausea, last neurosurgery no problems    Arthritis     osteo; back/right ankle and right wrist    Cancer (HCC) Current    Pancreatic stage 2    Cataract     Coronary artery disease involving native coronary artery without angina pectoris 03/06/2019    Diabetes (HCC)     oral meds    Emphysema of lung (HCC)     per xray result. pt states no active disease    High cholesterol     Hypertension     Myocardial infarct (HCC) Feb 22, 2019    Followed by Dr. Kulkarni    Pain 03/30/2017     back, 7-8/10    PONV (postoperative nausea and vomiting)     Snoring     sometimes, very mild, no sleep study     Past Surgical History:   Procedure Laterality Date    FUSION, SPINE, LUMBAR, PLIF  04/04/2017    Procedure: LUMBAR FUSION POSTERIOR - L4-5 FUSION/EXPLORATION ;  Surgeon: Osmani Engel M.D.;  Location: [facility];  Service:     LUMBAR LAMINECTOMY DISKECTOMY Right 04/04/2017    Procedure: LUMBAR LAMINECTOMY DISKECTOMY L3-S1 AND MEDIAL FACETECTOMY;  Surgeon: Osmani Engel M.D.;  Location: SURGERY [facility];  Service:     FORAMINOTOMY  04/04/2017    Procedure: FORAMINOTOMY;  Surgeon: Osmani Engel M.D.;  Location: SURGERY [facility];  Service:     HARDWARE REMOVAL ORTHO  04/04/2017    Procedure: HARDWARE REMOVAL ORTHO - L4-5;  Surgeon: Osmani Engel M.D.;  Location: SURGERY [facility];  Service:     FUSION, SPINE, LUMBAR, PLIF N/A 12/15/2015    Procedure: LUMBAR FUSION POSTERIOR INSTRUMENTED TLIF L4-5;  Surgeon: Osmani Engel M.D.;  Location: SURGERY [facility];  Service:     LUMBAR LAMINECTOMY DISKECTOMY Left 12/15/2015    Procedure: LUMBAR LAMINECTOMY DISKECTOMY L1-2;  Surgeon: Osmani Engel M.D.;  Location: SURGERY [facility];  Service:     SHOULDER SURGERY  01/01/2013    arthroscopic     SHOULDER SURGERY  01/01/2012    right shoulder    APPENDECTOMY  01/01/1976    CATARACT EXTRACTION WITH IOL Bilateral     OTHER CARDIAC  SURGERY  Sept 6, 2019    CABG     Current Medications:  Current Outpatient Medications on File Prior to Encounter   Medication Sig Dispense Refill    Ferrous Sulfate (IRON PO) Take 65 mg by mouth every day.      morphine (MS IR) 15 MG tablet Take 1 Tablet by mouth every 8 hours as needed for Severe Pain for up to 30 days. 90 Tablet 0    Omega-3 1000 MG Cap Take  by mouth.      Ascorbic Acid (VITAMIN C) 1000 MG Tab Take  by mouth.      losartan (COZAAR) 25 MG Tab Take 25 mg by mouth every day.      metoprolol SR (TOPROL XL) 25 MG TABLET SR 24 HR Take 0.5 Tabs by mouth every day. (Patient taking differently: Take 25 mg by mouth every day.) 90 Tab 3    aspirin EC (ECOTRIN) 81 MG Tablet Delayed Response Take 81 mg by mouth every bedtime.      allopurinol (ZYLOPRIM) 100 MG Tab Take 100 mg by mouth every day.      oxymetazoline (AFRIN) 0.05 % Solution Spray 2 Sprays in nose 2 times a day as needed for Congestion.       No current facility-administered medications on file prior to encounter.     Medication Allergy/Sensitivities:  No Known Allergies  Family History   Problem Relation Age of Onset    Cancer Mother         Lung cancer    Hypertension Father     Heart Attack Brother     Drug abuse Brother     Alcohol abuse Brother        Social History     Socioeconomic History    Marital status:      Spouse name: Not on file    Number of children: Not on file    Years of education: Not on file    Highest education level: Not on file   Occupational History    Not on file   Tobacco Use    Smoking status: Never     Passive exposure: Yes    Smokeless tobacco: Never   Vaping Use    Vaping Use: Never used   Substance and Sexual Activity    Alcohol use: Not Currently    Drug use: No    Sexual activity: Not on file   Other Topics Concern    Not on file   Social History Narrative    Not on file     Social Determinants of Health     Financial Resource Strain: Not on file   Food Insecurity: Not on file   Transportation Needs: Not  "on file   Physical Activity: Not on file   Stress: Not on file   Social Connections: Not on file   Intimate Partner Violence: Not on file   Housing Stability: Not on file       Palliative Performance Scale: 100%  ECO    ROS:    Review of Systems   Constitutional:  Negative for chills, fever, malaise/fatigue and weight loss.   HENT:  Negative for congestion and hearing loss.    Eyes:  Negative for blurred vision.   Respiratory:  Negative for cough and shortness of breath.    Cardiovascular:  Negative for chest pain and palpitations.   Gastrointestinal:  Positive for nausea and vomiting. Negative for constipation and diarrhea.   Musculoskeletal:  Positive for back pain. Negative for joint pain.   Skin:  Negative for rash.   Neurological:  Negative for headaches.   Endo/Heme/Allergies:  Does not bruise/bleed easily.   Psychiatric/Behavioral:  Negative for depression. The patient is not nervous/anxious.        PE:   Recent vital signs  Weight/BMI: Body mass index is 23.07 kg/m².  /70 (BP Location: Left arm, Patient Position: Sitting, BP Cuff Size: Adult)   Pulse 82   Temp 36.3 °C (97.4 °F) (Temporal)   Resp 18   Ht 1.778 m (5' 10\")   Wt 72.9 kg (160 lb 12.8 oz)   SpO2 97%   BMI 23.07 kg/m²   Vitals:    23 0840   BP: 118/70   BP Location: Left arm   Patient Position: Sitting   BP Cuff Size: Adult   Pulse: 82   Resp: 18   Temp: 36.3 °C (97.4 °F)   TempSrc: Temporal   SpO2: 97%   Weight: 72.9 kg (160 lb 12.8 oz)   Height: 1.778 m (5' 10\")     Oxygen Therapy:  Pulse Oximetry: 97 %  Physical Exam  Constitutional:       Appearance: Normal appearance.   HENT:      Head: Normocephalic and atraumatic.      Nose: Nose normal.      Mouth/Throat:      Mouth: Mucous membranes are moist.      Pharynx: Oropharynx is clear.   Eyes:      Pupils: Pupils are equal, round, and reactive to light.   Cardiovascular:      Rate and Rhythm: Normal rate.   Pulmonary:      Effort: Pulmonary effort is normal.   Abdominal:     "  General: Abdomen is flat.   Musculoskeletal:         General: Normal range of motion.   Skin:     General: Skin is warm and dry.   Neurological:      General: No focal deficit present.      Mental Status: He is alert.   Psychiatric:         Mood and Affect: Mood normal.           ASSESSMENT/PLAN WITH SHARED DECISION MAKING:   Cancer related pain  Patient with known pancreatic cancer.  Is currently followed by medical oncology and will be starting chemotherapy soon.  Has abdominal pain that has been responding well to morphine.  We will also have a celiac plexus block next week.  Verified with pharmacy they have inventory to fill morphine 15 mg as needed 3 times daily.  Controlled substance agreement signed today.   reviewed no signs of diversion or abuse, risk and benefits of medications discussed.  Currently does not have constipation but advised opioids may cause constipation and if needed he may use MiraLAX and stool softeners.  We will follow-up as needed following his nerve block to reevaluate pain needs.    45 minutes in total spent on patient encounter including chart review and consultation with patient's oncological provider.

## 2023-09-14 NOTE — PROGRESS NOTES
"Pharmacy Chemotherapy Calculation:    Dx: pancreatic adenocarcinoma         Cycle 1  Previous Treatment: none    Protocol: mFOLFIRINOX     *Dosing Reference*  Oxaliplatin 85 mg/m² IV over 2 hrs on Day 1  Irinotecan 150 mg/m² IV over 90 min on Day 1  Leucovorin 400 mg/m² IV over 90 min on Day 1  Fluorouracil 1200 mg/Irinotecan 150 mg/m² IV over 90 min  CIVI daily on Days 1-2 (2400 mg/m² IV over 46 hrs)  14-day cycle for 4-12 cycles (neoadjuvant or adjuvant) or 4-6 cycles (as induction) (locally advanced) or until disease progression or unacceptable toxicity (locally advanced,recurrent, or metastatic  NCCN Guidelines for Pancreatic Adenocarcinoma V.2.2023  Deyvi T, et al. N Engl J Med. 2018;379(53):1740-1438    Allergies:  Patient has no known allergies.     /66   Pulse 66   Temp 36.1 °C (97 °F) (Temporal)   Resp 18   Ht 1.7 m (5' 6.93\")   Wt 71.8 kg (158 lb 4.6 oz)   SpO2 96%   BMI 24.84 kg/m²  Body surface area is 1.84 meters squared.    All labs (9/20/23) within treatment plan parameters.      Oxaliplatin (Eloxatin) 85 mg/m² x 1.84 m² = 156.4 mg   <10% difference, okay to treat with final dose = 155 mg IV    Irinotecan (Camptosar) 150 mg/m² x 1.84 m² = 276 mg   <10% difference, okay to treat with final dose = 276 mg IV    Leucovorin 400 mg/m² x 1.84 m² = 736 mg   <10% difference, okay to treat with final dose = 740 mg IV    Fluorouracil (Adrucil) 2400 mg/m² x 1.84 m² = 4416 mg   <10% difference, okay to treat with final dose = 4415 mg IV over 46 hrs @ 1.9 mL/hr      Mayra Vasquez, PharmD  "

## 2023-09-15 ENCOUNTER — HOSPITAL ENCOUNTER (OUTPATIENT)
Facility: MEDICAL CENTER | Age: 68
End: 2023-09-15
Attending: STUDENT IN AN ORGANIZED HEALTH CARE EDUCATION/TRAINING PROGRAM | Admitting: STUDENT IN AN ORGANIZED HEALTH CARE EDUCATION/TRAINING PROGRAM
Payer: MEDICARE

## 2023-09-15 ENCOUNTER — APPOINTMENT (OUTPATIENT)
Dept: HEMATOLOGY ONCOLOGY | Facility: MEDICAL CENTER | Age: 68
End: 2023-09-15
Attending: STUDENT IN AN ORGANIZED HEALTH CARE EDUCATION/TRAINING PROGRAM
Payer: MEDICARE

## 2023-09-15 ENCOUNTER — RESEARCH ENCOUNTER (OUTPATIENT)
Dept: HEMATOLOGY ONCOLOGY | Facility: MEDICAL CENTER | Age: 68
End: 2023-09-15
Payer: MEDICARE

## 2023-09-15 ENCOUNTER — APPOINTMENT (OUTPATIENT)
Dept: RADIOLOGY | Facility: MEDICAL CENTER | Age: 68
End: 2023-09-15
Attending: STUDENT IN AN ORGANIZED HEALTH CARE EDUCATION/TRAINING PROGRAM
Payer: MEDICARE

## 2023-09-15 VITALS
RESPIRATION RATE: 16 BRPM | HEART RATE: 62 BPM | SYSTOLIC BLOOD PRESSURE: 119 MMHG | DIASTOLIC BLOOD PRESSURE: 65 MMHG | OXYGEN SATURATION: 99 %

## 2023-09-15 DIAGNOSIS — C25.9 PANCREATIC ADENOCARCINOMA (HCC): ICD-10-CM

## 2023-09-15 PROCEDURE — 700111 HCHG RX REV CODE 636 W/ 250 OVERRIDE (IP)

## 2023-09-15 PROCEDURE — 700111 HCHG RX REV CODE 636 W/ 250 OVERRIDE (IP): Mod: JZ | Performed by: STUDENT IN AN ORGANIZED HEALTH CARE EDUCATION/TRAINING PROGRAM

## 2023-09-15 PROCEDURE — RXMED WILLOW AMBULATORY MEDICATION CHARGE: Performed by: STUDENT IN AN ORGANIZED HEALTH CARE EDUCATION/TRAINING PROGRAM

## 2023-09-15 PROCEDURE — 99152 MOD SED SAME PHYS/QHP 5/>YRS: CPT

## 2023-09-15 RX ORDER — ONDANSETRON 4 MG/1
4 TABLET, FILM COATED ORAL EVERY 4 HOURS PRN
Qty: 30 TABLET | Refills: 6 | Status: SHIPPED | OUTPATIENT
Start: 2023-09-15

## 2023-09-15 RX ORDER — SODIUM CHLORIDE 9 MG/ML
500 INJECTION, SOLUTION INTRAVENOUS
Status: DISCONTINUED | OUTPATIENT
Start: 2023-09-15 | End: 2023-09-15 | Stop reason: HOSPADM

## 2023-09-15 RX ORDER — MIDAZOLAM HYDROCHLORIDE 1 MG/ML
.5-2 INJECTION INTRAMUSCULAR; INTRAVENOUS PRN
Status: DISCONTINUED | OUTPATIENT
Start: 2023-09-15 | End: 2023-09-15 | Stop reason: HOSPADM

## 2023-09-15 RX ORDER — MIDAZOLAM HYDROCHLORIDE 1 MG/ML
INJECTION INTRAMUSCULAR; INTRAVENOUS
Status: COMPLETED
Start: 2023-09-15 | End: 2023-09-15

## 2023-09-15 RX ORDER — PROCHLORPERAZINE MALEATE 10 MG
10 TABLET ORAL EVERY 6 HOURS PRN
Qty: 30 TABLET | Refills: 6 | Status: SHIPPED | OUTPATIENT
Start: 2023-09-15

## 2023-09-15 RX ORDER — LOPERAMIDE HYDROCHLORIDE 2 MG/1
2 TABLET ORAL SEE ADMIN INSTRUCTIONS
Qty: 30 TABLET | Refills: 6 | Status: ON HOLD | OUTPATIENT
Start: 2023-09-15 | End: 2023-09-22

## 2023-09-15 RX ORDER — LIDOCAINE HCL/EPINEPHRINE/PF 2%-1:200K
VIAL (ML) INJECTION
Status: DISCONTINUED
Start: 2023-09-15 | End: 2023-09-15 | Stop reason: HOSPADM

## 2023-09-15 RX ADMIN — MIDAZOLAM 1 MG: 1 INJECTION, SOLUTION INTRAMUSCULAR; INTRAVENOUS at 08:33

## 2023-09-15 RX ADMIN — FENTANYL CITRATE 25 MCG: 50 INJECTION, SOLUTION INTRAMUSCULAR; INTRAVENOUS at 08:33

## 2023-09-15 RX ADMIN — FENTANYL CITRATE 50 MCG: 50 INJECTION, SOLUTION INTRAMUSCULAR; INTRAVENOUS at 08:40

## 2023-09-15 RX ADMIN — MIDAZOLAM 0.5 MG: 1 INJECTION, SOLUTION INTRAMUSCULAR; INTRAVENOUS at 08:40

## 2023-09-15 NOTE — PROGRESS NOTES
The following appointments, labs, and medications have been provided to the patient:  Line: Port placed 9/15/23  ECHO: N/A  WBC Support (g-csf): Neulasta OnPro day of chemo r/t living in [place]  Labs: CBC, CMP, CA 19-9  Follow up appts: 10/4/23  Chemo/immunotherapy class: 09/15/23  Chemotherapy Regimen: OP Pancreatic mFOLFIRINOX   Nausea medication: zofran/compazine prescribed to [facility] per Pt request  Other treatment specific meds: TBD  Oral chemo follow up: N/A  Pain medication: Per provider discretion  Nurse pieter: Referred on 09/15/23 Established with TBD  Dietician:  DIANE  SW: DIANE  FRA: Referred on TBD    Additional info/teaching for immunotherapy patients:  If hospitalized, inform staff of immunotherapy treatment and have them contact oncologist - immunotherapy alert card provided.    Additional info/teaching for chemotherapy patients:  Neutropenia reminder card provided to patient      Additional teaching:  Patient was provided with drug specific handouts and [facility] side effects sheet. Patient verbalized that questions and concerns regarding treatment have been addressed. Consent to proceed with treatment was reviewed and signed during this visit.    Spent 45 minutes of continuous, non-interrupted, face-to-face patient contact in which greater than 50% of the visit was spent counseling and coordinating of care.

## 2023-09-15 NOTE — PROGRESS NOTES
The following appointments, labs, and medications have been provided to the patient:  Line: Port placed 9/15/2  ECHO: N/A  WBC Support (g-csf): [place]ly (Pt states was told he can have it the same day as chemo since lives in [place].  Labs: CBC, CMP. CA 19-9  Follow up appts: 10/4/23  Chemo/immunotherapy class: 09/15/23  Chemotherapy Regimen: FOLFIRINOX  Nausea medication: zofran/compazine prescribed sent to [facility]  Other treatment specific meds: TBD  Oral chemo follow up: N/A  Pain medication: Per provider discretion  Nurse pieter: Referred on 09/15/23   Dietician:  DIANE  SW: DIANE  FRA: Referred on TBD    Additional info/teaching for immunotherapy patients:  If hospitalized, inform staff of immunotherapy treatment and have them contact oncologist - immunotherapy alert card provided.    Additional info/teaching for chemotherapy patients:  Neutropenia reminder card provided to patient      Additional teaching:  Patient was provided with drug specific handouts and [facility] side effects sheet. Patient verbalized that questions and concerns regarding treatment have been addressed. Consent to proceed with treatment was reviewed and signed during this visit.    Spent 45 minutes of continuous, non-interrupted, face-to-face patient contact in which greater than 50% of the visit was spent counseling and coordinating of care.

## 2023-09-15 NOTE — H&P
History and Physical    Date: 9/15/2023    PCP: Luis Fernando Culp M.D.      CC: needs venous access for systemic cancer therapy    HPI: This is a 68 y.o. male who is presenting with pancreatic cancer requiring central venous access for chemotherapy.    Past Medical History:   Diagnosis Date    Anemia     Anesthesia     post op  nausea, last neurosurgery no problems    Arthritis     osteo; back/right ankle and right wrist    Cancer (HCC) Current    Pancreatic stage 2    Cataract     Coronary artery disease involving native coronary artery without angina pectoris 03/06/2019    Diabetes (HCC)     oral meds    Emphysema of lung (HCC)     per xray result. pt states no active disease    High cholesterol     Hypertension     Myocardial infarct (HCC) Feb 22, 2019    Followed by Dr. Kulkarni    Pain 03/30/2017     back, 7-8/10    PONV (postoperative nausea and vomiting)     Snoring     sometimes, very mild, no sleep study       Past Surgical History:   Procedure Laterality Date    FUSION, SPINE, LUMBAR, PLIF  04/04/2017    Procedure: LUMBAR FUSION POSTERIOR - L4-5 FUSION/EXPLORATION ;  Surgeon: Osmani Engel M.D.;  Location: [facility];  Service:     LUMBAR LAMINECTOMY DISKECTOMY Right 04/04/2017    Procedure: LUMBAR LAMINECTOMY DISKECTOMY L3-S1 AND MEDIAL FACETECTOMY;  Surgeon: Osmani Engel M.D.;  Location: SURGERY [facility];  Service:     FORAMINOTOMY  04/04/2017    Procedure: FORAMINOTOMY;  Surgeon: Osmani Engel M.D.;  Location: [facility];  Service:     HARDWARE REMOVAL ORTHO  04/04/2017    Procedure: HARDWARE REMOVAL ORTHO - L4-5;  Surgeon: Osmani Engel M.D.;  Location: [facility];  Service:     FUSION, SPINE, LUMBAR, PLIF N/A 12/15/2015    Procedure: LUMBAR FUSION POSTERIOR INSTRUMENTED TLIF L4-5;  Surgeon: Osmani Engel M.D.;  Location: [facility];  Service:     LUMBAR LAMINECTOMY DISKECTOMY Left 12/15/2015    Procedure: LUMBAR LAMINECTOMY DISKECTOMY L1-2;   Surgeon: Osmani Engel M.D.;  Location: SURGERY [facility];  Service:     SHOULDER SURGERY  01/01/2013    arthroscopic     SHOULDER SURGERY  01/01/2012    right shoulder    APPENDECTOMY  01/01/1976    CATARACT EXTRACTION WITH IOL Bilateral     OTHER CARDIAC SURGERY  Sept 6, 2019    CABG       Current Facility-Administered Medications   Medication Dose Route Frequency Provider Last Rate Last Admin    LIDOCAINE-EPINEPHRINE 2 %-1:[id number] INJ SOLN             HEPARIN SOD (PORK) LOCK FLUSH 100 UNIT/ML IV SOLN (WRAPPER)                 Social History     Socioeconomic History    Marital status:      Spouse name: Not on file    Number of children: Not on file    Years of education: Not on file    Highest education level: Not on file   Occupational History    Not on file   Tobacco Use    Smoking status: Never     Passive exposure: Yes    Smokeless tobacco: Never   Vaping Use    Vaping Use: Never used   Substance and Sexual Activity    Alcohol use: Not Currently    Drug use: No    Sexual activity: Not on file   Other Topics Concern    Not on file   Social History Narrative    Not on file     Social Determinants of Health     Financial Resource Strain: Not on file   Food Insecurity: Not on file   Transportation Needs: Not on file   Physical Activity: Not on file   Stress: Not on file   Social Connections: Not on file   Intimate Partner Violence: Not on file   Housing Stability: Not on file       Family History   Problem Relation Age of Onset    Cancer Mother         Lung cancer    Hypertension Father     Heart Attack Brother     Drug abuse Brother     Alcohol abuse Brother        Allergies:  Patient has no known allergies.    Review of Systems:  Negative     Physical Exam  Constitutional:       General: He is not in acute distress.     Appearance: He is well-developed. He is not diaphoretic.   Eyes:      General: No scleral icterus.  Pulmonary:      Effort: Pulmonary effort is normal. No respiratory distress.    Abdominal:      General: There is no distension.   Musculoskeletal:      Comments: Flat bedrest, sandbag on rt fem site   Skin:     General: Skin is warm and dry.      Coloration: Skin is not pale.      Findings: No erythema or rash.   Neurological:      Mental Status: He is alert and oriented to person, place, and time.      Cranial Nerves: No cranial nerve deficit.      Coordination: Coordination normal.   Psychiatric:         Behavior: Behavior normal.         Thought Content: Thought content normal.         Judgment: Judgment normal.         Vital Signs                           Labs:                    Radiology:  IR-CVC PORT PLACEMENT > AGE 3    (Results Pending)             Assessment and Plan:This is a 68 y.o. presenting for tunneled port placement for chemotherapy.                  167.5

## 2023-09-15 NOTE — RESEARCH NOTE
I/E criteria/Screening/Consent note:       Participation in the [employer] Study clinical trial was discussed with the patient today. All aspects of the study purpose and procedures were explained.  They were given ample time to review the consent and all questions were answered to thier satisfaction. Patient aware that the clinical trial is voluntary and they may withdraw consent at any time without affecting the level of care they receive.  Subject signed consent without coercion and undue influence and was given a copy of the signed consent. No study-related procedures took place prior to consenting and all assessments were conducted per protocol.  Did patient accept stipend?: Yes  Did the subject consent to the blood samples being used for future research? Yes  Did the subject consent to the medical information being used for future research? Yes  Did the subject consent to being re-contacted in the future regarding other studies or to provide feedback on this study? Yes  Master Informed Consent Version: 3  Protocol Version: 2    1) Inclusion criteria:  Age ?30 years within 30 days of enrollment: Yes  Able and willing to provide blood samples per protocol: Yes  Able to comprehend and willing to sign and date the informed consent and HIPAA Authorization documents: Yes  Able and willing to allow their retrospective and prospective data to be utilized for study purposes: Yes  Site/Sponsor has access to subject’s health information including past diagnoses, medications, and procedures and a minimum of 1 encounter in the site’s EHR system in the past 12 months: Yes    Pancreatic Group:  Subject must be diagnosed with pathologically-confirmed pancreatic cancer (i.e., adenocarcinoma, acinar cell carcinoma, cystadenocarcinomas, and mucinous cystic neoplasms) or have a presumptive diagnosis of or high clinical suspicion for the same by imaging (e.g., CT, MRI, PET or ultrasound) and have not yet received any  cancer treatment (including but not limited to surgery, chemotherapy, and/or radiation)-Yes      Exclusion criteria:  Any history of solid organ or bone marrow transplantation: No  Any physical trauma or surgery requiring inpatient overnight hospitalization in the 30 days preceding enrollment: No  Received a blood transfusion in the 30 days preceding enrollment: No  A medical condition that, in the opinion of the Investigator, should preclude enrollment in the study: No   Known to be pregnant: No  Any therapy for cancer, including but not limited to surgery, chemotherapy, biologic therapy, immunotherapy, and/or radiation therapy in the 5 years preceding enrollment: No   Participated or currently participating in a clinical research study in which an experimental medication has been administered during the 30 days preceding enrollment: No   Participated or currently participating in another [employer]-sponsored clinical study: No    Pancreatic Group: Any previous cancer diagnosis (with the exception of basal cell skin cancer or squamous cell skin cancer) in the 5 years preceding enrollment, apart from the current pancreatic cancer diagnosis; OR recurrence of the same primary cancer within any timeframe; OR concurrent diagnosis of multiple primary cancers within any timeframe -No     Patient meets all eligibility criteria    Group: pancreatic  Cancer addendum: 1  Does the subject have a confirmed cancer diagnosis or have a presumptive cancer diagnosis / high clinical suspicion? Confirmed    Study specimen was drawn prior to standard care cancer treatment:    Accession Number: [id number]   Date of collection: 15Sep2023   Start time: 1049   End time: 1053   Phlebotomist initials: TG   Subject did not experience any adverse events or serious adverse events during today's study visit or blood draw    Was the medical history questionnaire completed by the patient? Yes

## 2023-09-15 NOTE — OR SURGEON
Immediate Post- Operative Note        Findings: Port      Procedure(s): Port placement      Estimated Blood Loss: Less than 5 ml        Complications: None            9/15/2023     8:53 AM     Osmani Balderrama M.D.

## 2023-09-15 NOTE — PROGRESS NOTES
Pt presents to OPIR. Pt was consented by MD at bedside, confirmed by this RN and consent at bedside. Pt transferred to OPIR table in supine position. Patient underwent a tunneled port placement by Dr. Balderrama. Procedure site was marked by MD and verified using imaging guidance. Pt placed on monitor, prepped and draped in a sterile fashion. Vitals were taken every 5 minutes and remained stable during procedure (see doc flow sheet for results). CO2 waveform capnography was monitored and remained WNL throughout procedure. Pt remained in OPIR post procedure until pt in safe condition to discharge. Pt discharged with wife. A&Ox4, VSS.      PowerPort: ClearVue implantable port  8Fr. Cut at 25cm  REF: [id number]  LOT: [id number]  EXP: 03/31/2025

## 2023-09-18 ENCOUNTER — PATIENT OUTREACH (OUTPATIENT)
Dept: ONCOLOGY | Facility: MEDICAL CENTER | Age: 68
End: 2023-09-18
Payer: MEDICARE

## 2023-09-18 ENCOUNTER — TELEPHONE (OUTPATIENT)
Dept: HEMATOLOGY ONCOLOGY | Facility: MEDICAL CENTER | Age: 68
End: 2023-09-18
Payer: MEDICARE

## 2023-09-18 DIAGNOSIS — Z65.8 PSYCHOSOCIAL DISTRESS: ICD-10-CM

## 2023-09-18 NOTE — PROGRESS NOTES
"[facility] Intake    Diagnosis Type: \"Pancreatic cancer\"   Preferred Language: English   Insurance: Medicare & [place]   Dental Insurance:Yes; Last Appointment Date: \"6 months ago\"   Primary Care Provider Reviewed: yes  Last Appointment Date: \" a couple months ago with Dr. Culp\"  Introduced Marino Rojas to Oncology Support Services and provided contact information on 9/18/2023 .  Patient Care Team: Surgeon Dr. Escalera  Current Barriers Identified Include: None at this time  MyChart Status: Activated  Communication Preferences:Emails;  Best Contact Number: [phone]  Patient Contact's Reviewed: yes     Outbound call to patient for JALYN intake, patient placed on speaker so wife Lashae can be on call. Pt. States his family, Muslim,and neighbors are his support system. Educated pt. On [email or web address] for Cancer Support Groups and reviewed the Resource Center. Administered Distress Screening and patient scored a seven stating \"issues with scheduling\". Verified appointment listed in patient chart and he stated Tet states the appointment time is reading different times. Informed patient that I would Samish back with team and reach out about appointments.    Placed [facility] referral.    Outcome:  Will call patient to follow-up on appointment concern.         "

## 2023-09-18 NOTE — PROGRESS NOTES
Outbound call to patient and verified appointment scheduled on 9/22/23 notes read to check in at 11:00am. Advised patient to call Imaging [phone] number for appointment preparation and check-in information. Patient agreed and thanked for information. No further needs at this time.

## 2023-09-19 NOTE — PROGRESS NOTES
"Pharmacy Chemotherapy Calculation    Dx: Pancreatic cancer         Protocol: mFOLFIRINOX     Oxaliplatin 85 mg/m2 IV over 2 hours  Leucovorin 400 mg/m2 IV over 90 minutes concurrently with  Irinotecan 150 mg/m2 IV over 90 minutes followed by  Fluorouracil 2400 mg/m2 CIVI over 46 hours on Days 1-2  14-day cycle for 4-12 cycles (neoadjuvant or adjuvant) or 4-6 cycles (as induction) (locally advanced) or until DP/UT (locally advanced, recurrent, or metastatic)  NCCN Guidelines for Pancreatic Adenocarcinoma V.2.2023.  Deyvi T, et al. NEJM. 2018;379(25):8697-6435.  Herring SM, et al. Br J Cancer. 2016;114(7):737-43.  Arnav MH et al. Ailyn Surg Oncol. 2013;20(8):2787-95.    Allergies:  Patient has no known allergies.       /66   Pulse 66   Temp 36.1 °C (97 °F) (Temporal)   Resp 18   Ht 1.7 m (5' 6.93\")   Wt 71.8 kg (158 lb 4.6 oz)   SpO2 96%   BMI 24.84 kg/m²  Body surface area is 1.84 meters squared.    Labs 9/20/23:  ANC~ 5280 Plt = 163k   Hgb = 13.6     SCr = 0.85 mg/dL CrCl ~ 82 mL/min   AST/ALT/AP = 31/26/135 TBili = 0.4     Drug Order   (Drug name, dose, route, IV Fluid & volume, frequency, number of doses) Cycle 1      Previous treatment: n/a     Medication = Oxaliplatin  Base Dose = 85 mg/m2  Calc Dose: Base Dose x 1.84 m2 = 156.4 mg  Final Dose = 155 mg  Route = IV  Fluid & Volume = D5 250 mL  Admin Duration = Over 2 hours          <10% difference, OK to treat with final dose   Medication = Irinotecan  Base Dose = 150 mg/m2  Calc Dose: Base Dose x 1.84 m2 = 276 mg  Final Dose = 276 mg  Route = IV  Fluid & Volume = D5 500 mL  Admin Duration = Over 90 mins    Concurrently with leucovorin      <10% difference, OK to treat with final dose   Medication = Leucovorin  Base Dose = 400 mg/m2  Calc Dose:Base Dose x 1.84 m2 = 736 mg  Final Dose = 740 mg  Route = IV  Fluid & Volume = D5 250 mL  Admin Duration = Over 90 mins     Concurrently with irinotecan      <10% difference, OK to treat with final dose "   Medication = Fluorouracil  Base Dose= 2400 mg/m2  Calc Dose: Base Dose x 1.84 m2 = 4416 mg  Final Dose = 4415 mg  Route = CIVI  Drug conc. = 50 mg/mL  Fluid & Volume = 88.3 mL (+3 mL overfill = 91.3 mL)  Admin Duration = Over 46 hours at 1.9 mL/hr          <10% difference, OK to treat with final dose     By my signature below, I confirm this process was performed independently with the BSA and all final chemotherapy dosing calculations congruent. I have reviewed the above chemotherapy order and that my calculation of the final dose and BSA (when applicable) corroborate those calculations of the  pharmacist. Discrepancies of 10% or greater in the written dose have been addressed and documented within the EPIC Progress notes.    Srikanth Fairbanks, PharmD

## 2023-09-20 ENCOUNTER — PHARMACY VISIT (OUTPATIENT)
Dept: PHARMACY | Facility: MEDICAL CENTER | Age: 68
End: 2023-09-20
Payer: COMMERCIAL

## 2023-09-20 ENCOUNTER — OUTPATIENT INFUSION SERVICES (OUTPATIENT)
Dept: ONCOLOGY | Facility: MEDICAL CENTER | Age: 68
End: 2023-09-20
Attending: STUDENT IN AN ORGANIZED HEALTH CARE EDUCATION/TRAINING PROGRAM
Payer: MEDICARE

## 2023-09-20 ENCOUNTER — DOCUMENTATION (OUTPATIENT)
Dept: ONCOLOGY | Facility: MEDICAL CENTER | Age: 68
End: 2023-09-20
Payer: MEDICARE

## 2023-09-20 VITALS
TEMPERATURE: 97 F | DIASTOLIC BLOOD PRESSURE: 66 MMHG | WEIGHT: 158.29 LBS | RESPIRATION RATE: 18 BRPM | HEART RATE: 66 BPM | HEIGHT: 67 IN | OXYGEN SATURATION: 96 % | SYSTOLIC BLOOD PRESSURE: 108 MMHG | BODY MASS INDEX: 24.84 KG/M2

## 2023-09-20 DIAGNOSIS — C25.9 PANCREATIC ADENOCARCINOMA (HCC): ICD-10-CM

## 2023-09-20 LAB
ALBUMIN SERPL BCP-MCNC: 3.6 G/DL (ref 3.2–4.9)
ALBUMIN/GLOB SERPL: 1.3 G/DL
ALP SERPL-CCNC: 135 U/L (ref 30–99)
ALT SERPL-CCNC: 26 U/L (ref 2–50)
ANION GAP SERPL CALC-SCNC: 11 MMOL/L (ref 7–16)
AST SERPL-CCNC: 31 U/L (ref 12–45)
BASOPHILS # BLD AUTO: 0.6 % (ref 0–1.8)
BASOPHILS # BLD: 0.04 K/UL (ref 0–0.12)
BILIRUB SERPL-MCNC: 0.4 MG/DL (ref 0.1–1.5)
BUN SERPL-MCNC: 32 MG/DL (ref 8–22)
CALCIUM ALBUM COR SERPL-MCNC: 8.3 MG/DL (ref 8.5–10.5)
CALCIUM SERPL-MCNC: 8 MG/DL (ref 8.5–10.5)
CHLORIDE SERPL-SCNC: 110 MMOL/L (ref 96–112)
CO2 SERPL-SCNC: 17 MMOL/L (ref 20–33)
CREAT SERPL-MCNC: 0.85 MG/DL (ref 0.5–1.4)
EOSINOPHIL # BLD AUTO: 0.07 K/UL (ref 0–0.51)
EOSINOPHIL NFR BLD: 1 % (ref 0–6.9)
ERYTHROCYTE [DISTWIDTH] IN BLOOD BY AUTOMATED COUNT: 45.8 FL (ref 35.9–50)
GFR SERPLBLD CREATININE-BSD FMLA CKD-EPI: 93 ML/MIN/1.73 M 2
GLOBULIN SER CALC-MCNC: 2.8 G/DL (ref 1.9–3.5)
GLUCOSE SERPL-MCNC: 102 MG/DL (ref 65–99)
HCT VFR BLD AUTO: 41.9 % (ref 42–52)
HGB BLD-MCNC: 13.6 G/DL (ref 14–18)
IMM GRANULOCYTES # BLD AUTO: 0.02 K/UL (ref 0–0.11)
IMM GRANULOCYTES NFR BLD AUTO: 0.3 % (ref 0–0.9)
LYMPHOCYTES # BLD AUTO: 0.8 K/UL (ref 1–4.8)
LYMPHOCYTES NFR BLD: 11.6 % (ref 22–41)
MCH RBC QN AUTO: 31.5 PG (ref 27–33)
MCHC RBC AUTO-ENTMCNC: 32.5 G/DL (ref 32.3–36.5)
MCV RBC AUTO: 97 FL (ref 81.4–97.8)
MONOCYTES # BLD AUTO: 0.7 K/UL (ref 0–0.85)
MONOCYTES NFR BLD AUTO: 10.1 % (ref 0–13.4)
NEUTROPHILS # BLD AUTO: 5.28 K/UL (ref 1.82–7.42)
NEUTROPHILS NFR BLD: 76.4 % (ref 44–72)
NRBC # BLD AUTO: 0 K/UL
NRBC BLD-RTO: 0 /100 WBC (ref 0–0.2)
PLATELET # BLD AUTO: 163 K/UL (ref 164–446)
PMV BLD AUTO: 11.6 FL (ref 9–12.9)
POTASSIUM SERPL-SCNC: 4.6 MMOL/L (ref 3.6–5.5)
PROT SERPL-MCNC: 6.4 G/DL (ref 6–8.2)
RBC # BLD AUTO: 4.32 M/UL (ref 4.7–6.1)
SODIUM SERPL-SCNC: 138 MMOL/L (ref 135–145)
WBC # BLD AUTO: 6.9 K/UL (ref 4.8–10.8)

## 2023-09-20 PROCEDURE — 700111 HCHG RX REV CODE 636 W/ 250 OVERRIDE (IP): Mod: JZ | Performed by: STUDENT IN AN ORGANIZED HEALTH CARE EDUCATION/TRAINING PROGRAM

## 2023-09-20 PROCEDURE — 80053 COMPREHEN METABOLIC PANEL: CPT

## 2023-09-20 PROCEDURE — 96413 CHEMO IV INFUSION 1 HR: CPT

## 2023-09-20 PROCEDURE — 96417 CHEMO IV INFUS EACH ADDL SEQ: CPT

## 2023-09-20 PROCEDURE — 96415 CHEMO IV INFUSION ADDL HR: CPT

## 2023-09-20 PROCEDURE — 85025 COMPLETE CBC W/AUTO DIFF WBC: CPT

## 2023-09-20 PROCEDURE — 96375 TX/PRO/DX INJ NEW DRUG ADDON: CPT

## 2023-09-20 PROCEDURE — 96367 TX/PROPH/DG ADDL SEQ IV INF: CPT

## 2023-09-20 PROCEDURE — 96411 CHEMO IV PUSH ADDL DRUG: CPT

## 2023-09-20 PROCEDURE — A4212 NON CORING NEEDLE OR STYLET: HCPCS

## 2023-09-20 PROCEDURE — 700105 HCHG RX REV CODE 258: Performed by: STUDENT IN AN ORGANIZED HEALTH CARE EDUCATION/TRAINING PROGRAM

## 2023-09-20 RX ADMIN — FLUOROURACIL 4415 MG: 50 INJECTION, SOLUTION INTRAVENOUS at 16:58

## 2023-09-20 RX ADMIN — IRINOTECAN HYDROCHLORIDE 276 MG: 20 INJECTION, SOLUTION INTRAVENOUS at 15:06

## 2023-09-20 RX ADMIN — ONDANSETRON 16 MG: 2 INJECTION INTRAMUSCULAR; INTRAVENOUS at 11:31

## 2023-09-20 RX ADMIN — ATROPINE SULFATE 0.5 MG: 1 INJECTION, SOLUTION INTRAVENOUS at 12:39

## 2023-09-20 RX ADMIN — DEXAMETHASONE SODIUM PHOSPHATE 12 MG: 4 INJECTION, SOLUTION INTRA-ARTICULAR; INTRALESIONAL; INTRAMUSCULAR; INTRAVENOUS; SOFT TISSUE at 11:48

## 2023-09-20 RX ADMIN — OXALIPLATIN 155 MG: 100 INJECTION, SOLUTION INTRAVENOUS at 12:47

## 2023-09-20 RX ADMIN — LEUCOVORIN CALCIUM 740 MG: 350 INJECTION, POWDER, LYOPHILIZED, FOR SUSPENSION INTRAMUSCULAR; INTRAVENOUS at 15:14

## 2023-09-20 RX ADMIN — FOSAPREPITANT 150 MG: 150 INJECTION, POWDER, LYOPHILIZED, FOR SOLUTION INTRAVENOUS at 12:00

## 2023-09-20 NOTE — PROGRESS NOTES
Nutrition Services: RD Consultation/ New Chemotherapy Start  Lucas Rojas is a 68 y.o. male with diagnosis of pancreatic adenocarcinoma: Stage IIA: cT3, cN0, cM0    RD met with pt to assess current intake, appetite, and nutritional status.  Pt presents to appointment accompanied by spouse, Lashae. Pt states wife is great support system and is helping prepare many different foods. States does have abdominal pain, which worsens with large meals and has therefore limited self to smaller, more frequent meals. Mentions visit with Dr. Al and upcoming plexus block for pain management. States bowel movements somewhat normal, though are slightly light in color. Denies any bloating, gas, cramping after meals. States does sometimes have sudden onset of nausea. Mentions had intentionally lost weight the past few years by omitting added sugars and increasing protein. States has lost muscle tone in the process.  Mentions hx of anemia and previously taking iron, states planning to take multivitamin with iron. Pt did not express any further nutrition-related questions or concerns at this time.     Dietary intake pattern:  fried rice with egg, includes seldom amounts of honey; no lactose intolerance, does not eat red meat r/t gout, states is currently well controlled.     Past Medical History:   Diagnosis Date    Anemia     Anesthesia     post op  nausea, last neurosurgery no problems    Arthritis     osteo; back/right ankle and right wrist    Cancer (HCC) Current    Pancreatic stage 2    Cataract     Coronary artery disease involving native coronary artery without angina pectoris 03/06/2019    Diabetes (HCC)     oral meds    Emphysema of lung (HCC)     per xray result. pt states no active disease    High cholesterol     Hypertension     Myocardial infarct (HCC) Feb 22, 2019    Followed by Dr. Kulkarni    Pain 03/30/2017     back, 7-8/10    PONV (postoperative nausea and vomiting)     Snoring     sometimes, very mild, no  "sleep study       Past Surgical History:   Procedure Laterality Date    FUSION, SPINE, LUMBAR, PLIF  04/04/2017    Procedure: LUMBAR FUSION POSTERIOR - L4-5 FUSION/EXPLORATION ;  Surgeon: Osmani Engel M.D.;  Location: SURGERY [facility];  Service:     LUMBAR LAMINECTOMY DISKECTOMY Right 04/04/2017    Procedure: LUMBAR LAMINECTOMY DISKECTOMY L3-S1 AND MEDIAL FACETECTOMY;  Surgeon: Osmani Engel M.D.;  Location: SURGERY [facility];  Service:     FORAMINOTOMY  04/04/2017    Procedure: FORAMINOTOMY;  Surgeon: Osmani Engel M.D.;  Location: SURGERY [facility];  Service:     HARDWARE REMOVAL ORTHO  04/04/2017    Procedure: HARDWARE REMOVAL ORTHO - L4-5;  Surgeon: Osmani Engel M.D.;  Location: SURGERY [facility];  Service:     FUSION, SPINE, LUMBAR, PLIF N/A 12/15/2015    Procedure: LUMBAR FUSION POSTERIOR INSTRUMENTED TLIF L4-5;  Surgeon: Osmani Engel M.D.;  Location: SURGERY [facility];  Service:     LUMBAR LAMINECTOMY DISKECTOMY Left 12/15/2015    Procedure: LUMBAR LAMINECTOMY DISKECTOMY L1-2;  Surgeon: Osmani Engle M.D.;  Location: SURGERY [facility];  Service:     SHOULDER SURGERY  01/01/2013    arthroscopic     SHOULDER SURGERY  01/01/2012    right shoulder    APPENDECTOMY  01/01/1976    CATARACT EXTRACTION WITH IOL Bilateral     OTHER CARDIAC SURGERY  Sept 6, 2019    CABG     Biochemical/ Anthropometric Assessment:  Treatment Regimen: OP Pancreatic mFOLFIRINOX (OXALIplatin + Leucovorin + Irinotecan + Fluorouracil  Pertinent Labs: BUN 32, total cholesterol 72 in 2019  Pertinent Meds: zofran, imodium, morphine, compazine, MVM-iron  Wt Readings from Last 1 Encounters:   09/20/23 71.8 kg (158 lb 4.6 oz)      Ht Readings from Last 1 Encounters:   09/20/23 1.7 m (5' 6.93\")      BMI Readings from Last 1 Encounters:   09/20/23 24.84 kg/m²   BMI Classification: WNL  Nutrition-Focused Physical Exam: Appears appropriately nourished     Weight History:  Wt Readings from Last 6 " Encounters:   09/20/23 71.8 kg (158 lb 4.6 oz)   09/13/23 72.9 kg (160 lb 12.8 oz)   09/06/23 74.4 kg (164 lb)   08/09/23 75.3 kg (166 lb)   05/22/19 98.6 kg (217 lb 7.7 oz)   03/28/19 100 kg (220 lb 7.4 oz)     Weight Change/Malnutrition Risk: weight loss observed of 4.6% within past > 1 month. This is not considered significant, though is worth noting.     Interventions:  Introduced self and discussed role of dietitian throughout treatment process   Provided information on healthy snacks to include and resources: www.pancan.org and www.CytoPherx.org  Encouraged balanced diet. Discussed including healthy sources of fat, provided list.  Discussed supplements on market, discussed use of MCT oil given its digestibility without pancreatic stimulation as well as Liquacel for protein in low volume manner.   Encourage physical activity as tolerated with emphasis on reducing long periods of sedentary activity as able.   Continue meal and snack frequency to 4-6 x/day.    Pt reports understanding and was receptive to information provided.   RD provided contact information. Encouraged pt to reach out as questions/concerns arise.   RD available PRN   [phone]

## 2023-09-20 NOTE — PROGRESS NOTES
Mr Rojas is here today for day 1 cycle of of FolFirInox for pancreatic adenocarcinoma.    He has no new concerns to report, he states he feels well today.    Medi port to his right upper chest was flushed and labs were drawn. Results are within parameters for treatment.    Pre medications given and were tolerated well.    Chemotherapy given and was tolerated well.    CADD 5 FU pump was connected, unclamped and placed in RUN mode.     Pt education provided, viewed CADD pump video. Questions answered.      Mr Rojas was discharged in stable condition and will return Friday for disconnect.

## 2023-09-20 NOTE — PROGRESS NOTES
Chemotherapy Verification - SECONDARY RN   C1 D1    Height = 170 cm  Weight = 71.8 kg  BSA = 1.84 m^2       Medication: oxaliplatin (ELOXATIN)  Dose: 85 mg/m2  Calculated Dose: 156.4 mg                             (In mg/m2, AUC, mg/kg)     Medication: irinotecan (CAMPTOSAR)  Dose: 150 mg/m2  Calculated Dose: 276 mg                             (In mg/m2, AUC, mg/kg)    Medication: leucovorin  Dose: 400 mg/m2  Calculated Dose: 736 mg                             (In mg/m2, AUC, mg/kg)    Medication: fluorouracil (ARUCIL) in CADD  Dose: 2400 mg/m2  Calculated Dose: 4416 mg                             (In mg/m2, AUC, mg/kg)      I confirm that this process was performed independently.

## 2023-09-20 NOTE — PROGRESS NOTES
Chemotherapy Verification - PRIMARY RN      Height = 1.7 m  Weight = 71.8 kg  BSA = 1.84 m2       Medication: oxaliplatin  Dose: 85mg/m2  Calculated Dose: 156.4 mg                             (In mg/m2, AUC, mg/kg)     Medication: irinotecan  Dose: 205bcx0  Calculated Dose: 276 mg                             (In mg/m2, AUC, mg/kg)    Medication: Adrucil  Dose: 2400mg/m2  Calculated Dose: 4416 mg                             (In mg/m2, AUC, mg/kg)      I confirm this process was performed independently with the BSA and all final chemotherapy dosing calculations congruent.  Any discrepancies of 10% or greater have been addressed with the chemotherapy pharmacist. The resolution of the discrepancy has been documented in the EPIC progress notes.

## 2023-09-22 ENCOUNTER — OUTPATIENT INFUSION SERVICES (OUTPATIENT)
Dept: ONCOLOGY | Facility: MEDICAL CENTER | Age: 68
End: 2023-09-22
Attending: STUDENT IN AN ORGANIZED HEALTH CARE EDUCATION/TRAINING PROGRAM
Payer: MEDICARE

## 2023-09-22 ENCOUNTER — HOSPITAL ENCOUNTER (OUTPATIENT)
Facility: MEDICAL CENTER | Age: 68
End: 2023-09-22
Attending: STUDENT IN AN ORGANIZED HEALTH CARE EDUCATION/TRAINING PROGRAM | Admitting: STUDENT IN AN ORGANIZED HEALTH CARE EDUCATION/TRAINING PROGRAM
Payer: MEDICARE

## 2023-09-22 ENCOUNTER — APPOINTMENT (OUTPATIENT)
Dept: RADIOLOGY | Facility: MEDICAL CENTER | Age: 68
End: 2023-09-22
Attending: STUDENT IN AN ORGANIZED HEALTH CARE EDUCATION/TRAINING PROGRAM
Payer: MEDICARE

## 2023-09-22 VITALS
HEIGHT: 70 IN | TEMPERATURE: 97.3 F | WEIGHT: 157.63 LBS | DIASTOLIC BLOOD PRESSURE: 62 MMHG | SYSTOLIC BLOOD PRESSURE: 147 MMHG | HEART RATE: 64 BPM | OXYGEN SATURATION: 94 % | RESPIRATION RATE: 16 BRPM | BODY MASS INDEX: 22.57 KG/M2

## 2023-09-22 DIAGNOSIS — C25.9 PANCREATIC ADENOCARCINOMA (HCC): ICD-10-CM

## 2023-09-22 DIAGNOSIS — G55 NERVE ROOT AND PLEXUS COMPRESSIONS IN DISEASES CLASSIFIED ELSEWHERE: ICD-10-CM

## 2023-09-22 LAB
INR PPP: 1.14 (ref 0.87–1.13)
PROTHROMBIN TIME: 14.7 SEC (ref 12–14.6)

## 2023-09-22 PROCEDURE — 160002 HCHG RECOVERY MINUTES (STAT)

## 2023-09-22 PROCEDURE — 700111 HCHG RX REV CODE 636 W/ 250 OVERRIDE (IP)

## 2023-09-22 PROCEDURE — 77012 CT SCAN FOR NEEDLE BIOPSY: CPT

## 2023-09-22 PROCEDURE — 160046 HCHG PACU - 1ST 60 MINS PHASE II

## 2023-09-22 PROCEDURE — 96377 APPLICATON ON-BODY INJECTOR: CPT

## 2023-09-22 PROCEDURE — 700111 HCHG RX REV CODE 636 W/ 250 OVERRIDE (IP): Performed by: RADIOLOGY

## 2023-09-22 PROCEDURE — 700105 HCHG RX REV CODE 258: Performed by: RADIOLOGY

## 2023-09-22 PROCEDURE — 96523 IRRIG DRUG DELIVERY DEVICE: CPT

## 2023-09-22 PROCEDURE — 700117 HCHG RX CONTRAST REV CODE 255: Performed by: STUDENT IN AN ORGANIZED HEALTH CARE EDUCATION/TRAINING PROGRAM

## 2023-09-22 PROCEDURE — 700111 HCHG RX REV CODE 636 W/ 250 OVERRIDE (IP): Mod: JZ,JG | Performed by: STUDENT IN AN ORGANIZED HEALTH CARE EDUCATION/TRAINING PROGRAM

## 2023-09-22 PROCEDURE — 36415 COLL VENOUS BLD VENIPUNCTURE: CPT

## 2023-09-22 PROCEDURE — 160036 HCHG PACU - EA ADDL 30 MINS PHASE I

## 2023-09-22 PROCEDURE — 85610 PROTHROMBIN TIME: CPT

## 2023-09-22 PROCEDURE — 160035 HCHG PACU - 1ST 60 MINS PHASE I

## 2023-09-22 RX ORDER — ONDANSETRON 2 MG/ML
4 INJECTION INTRAMUSCULAR; INTRAVENOUS PRN
Status: ACTIVE | OUTPATIENT
Start: 2023-09-22 | End: 2023-09-22

## 2023-09-22 RX ORDER — MIDAZOLAM HYDROCHLORIDE 1 MG/ML
INJECTION INTRAMUSCULAR; INTRAVENOUS
Status: COMPLETED
Start: 2023-09-22 | End: 2023-09-22

## 2023-09-22 RX ORDER — DIPHENHYDRAMINE HYDROCHLORIDE 50 MG/ML
INJECTION INTRAMUSCULAR; INTRAVENOUS
Status: DISCONTINUED
Start: 2023-09-22 | End: 2023-09-22 | Stop reason: HOSPADM

## 2023-09-22 RX ORDER — BUPIVACAINE HYDROCHLORIDE 2.5 MG/ML
INJECTION, SOLUTION EPIDURAL; INFILTRATION; INTRACAUDAL
Status: DISCONTINUED
Start: 2023-09-22 | End: 2023-09-22 | Stop reason: HOSPADM

## 2023-09-22 RX ORDER — SODIUM CHLORIDE 9 MG/ML
500 INJECTION, SOLUTION INTRAVENOUS
Status: ACTIVE | OUTPATIENT
Start: 2023-09-22 | End: 2023-09-22

## 2023-09-22 RX ORDER — ONDANSETRON 2 MG/ML
INJECTION INTRAMUSCULAR; INTRAVENOUS
Status: DISCONTINUED
Start: 2023-09-22 | End: 2023-09-22 | Stop reason: HOSPADM

## 2023-09-22 RX ORDER — DIPHENHYDRAMINE HYDROCHLORIDE 50 MG/ML
INJECTION INTRAMUSCULAR; INTRAVENOUS
Status: COMPLETED | OUTPATIENT
Start: 2023-09-22 | End: 2023-09-22

## 2023-09-22 RX ORDER — ALCOHOL 1 ML/ML
20 INJECTION, SOLUTION PERCUTANEOUS ONCE
Status: DISCONTINUED | OUTPATIENT
Start: 2023-09-22 | End: 2023-09-22 | Stop reason: HOSPADM

## 2023-09-22 RX ORDER — SODIUM CHLORIDE 9 MG/ML
1000 INJECTION, SOLUTION INTRAVENOUS CONTINUOUS
Status: DISCONTINUED | OUTPATIENT
Start: 2023-09-22 | End: 2023-09-22 | Stop reason: HOSPADM

## 2023-09-22 RX ORDER — MIDAZOLAM HYDROCHLORIDE 1 MG/ML
.5-2 INJECTION INTRAMUSCULAR; INTRAVENOUS PRN
Status: ACTIVE | OUTPATIENT
Start: 2023-09-22 | End: 2023-09-22

## 2023-09-22 RX ORDER — METOPROLOL SUCCINATE 25 MG/1
25 TABLET, EXTENDED RELEASE ORAL
COMMUNITY

## 2023-09-22 RX ADMIN — FENTANYL CITRATE 25 MCG: 50 INJECTION, SOLUTION INTRAMUSCULAR; INTRAVENOUS at 15:50

## 2023-09-22 RX ADMIN — MIDAZOLAM 1 MG: 1 INJECTION, SOLUTION INTRAMUSCULAR; INTRAVENOUS at 15:50

## 2023-09-22 RX ADMIN — ONDANSETRON 4 MG: 2 INJECTION INTRAMUSCULAR; INTRAVENOUS at 14:40

## 2023-09-22 RX ADMIN — FENTANYL CITRATE 50 MCG: 50 INJECTION, SOLUTION INTRAMUSCULAR; INTRAVENOUS at 14:45

## 2023-09-22 RX ADMIN — HEPARIN 500 UNITS: 100 SYRINGE at 17:07

## 2023-09-22 RX ADMIN — MIDAZOLAM 1 MG: 1 INJECTION, SOLUTION INTRAMUSCULAR; INTRAVENOUS at 14:45

## 2023-09-22 RX ADMIN — PEGFILGRASTIM 6 MG: KIT SUBCUTANEOUS at 17:09

## 2023-09-22 RX ADMIN — IOHEXOL 5 ML: 300 INJECTION, SOLUTION INTRAVENOUS at 16:02

## 2023-09-22 RX ADMIN — MIDAZOLAM 1 MG: 1 INJECTION, SOLUTION INTRAMUSCULAR; INTRAVENOUS at 14:58

## 2023-09-22 RX ADMIN — MIDAZOLAM 1 MG: 1 INJECTION, SOLUTION INTRAMUSCULAR; INTRAVENOUS at 15:17

## 2023-09-22 RX ADMIN — SODIUM CHLORIDE 1000 ML: 9 INJECTION, SOLUTION INTRAVENOUS at 12:00

## 2023-09-22 RX ADMIN — FENTANYL CITRATE 25 MCG: 50 INJECTION, SOLUTION INTRAMUSCULAR; INTRAVENOUS at 15:05

## 2023-09-22 RX ADMIN — DIPHENHYDRAMINE HYDROCHLORIDE 50 MG: 50 INJECTION, SOLUTION INTRAMUSCULAR; INTRAVENOUS at 15:34

## 2023-09-22 RX ADMIN — MIDAZOLAM 1 MG: 1 INJECTION, SOLUTION INTRAMUSCULAR; INTRAVENOUS at 15:38

## 2023-09-22 RX ADMIN — FENTANYL CITRATE 25 MCG: 50 INJECTION, SOLUTION INTRAMUSCULAR; INTRAVENOUS at 15:12

## 2023-09-22 ASSESSMENT — PAIN DESCRIPTION - PAIN TYPE
TYPE: SURGICAL PAIN

## 2023-09-22 ASSESSMENT — FIBROSIS 4 INDEX: FIB4 SCORE: 2.61

## 2023-09-22 NOTE — OR NURSING
Patient arrived in PACU, VSS. Patient awake, alert and oriented x4 with no complaints of pain. Nerve block entry point covered with guaze and tegaderm, CDI.  Updated Lashae, spouse, on POC.   Patients chemo infusion pump alarmed stating reservoir empty. Contacted MINDY Bryant from the infusion center. Ok'd to turn pump off. She will come to PACU to de-access and flush port.    Annette from infusion here at bedside. De-accessing Port.  Drinking water with no complications.   Bedrest complete at 1805.   Report called to MINDY Knapp. Transported to phase 2 room #31

## 2023-09-22 NOTE — PROGRESS NOTES
Pt presents to CT. Patient was consented by MD at bedside, confirmed by this RN and consent at bedside. Pt transferred to CT table in Supine position. Patient underwent a Celiac Ganglial Injection by Dr. Prieto. Procedure site was marked by MD and verified using imaging guidance. Pt placed on monitor, prepped and draped in a sterile fashion. Vitals were taken every 5 minutes and remained stable during procedure (see doc flow sheet for results). CO2 waveform capnography was monitored and remained WNL throughout procedure. Report called to PACU RN. Pt transported by stretcher with RN to PACU 10a.

## 2023-09-22 NOTE — OR SURGEON
Immediate Post- Operative Note        Findings: Pancreatic cancer. Refractory abdominal pain despite maximum opioid therapy.      Procedure(s): Celiac plexus nerve block.      Estimated Blood Loss: Less than 5 ml        Complications: None            9/22/2023     1550 PM     Sacha Prieto M.D.

## 2023-09-23 NOTE — DISCHARGE INSTRUCTIONS
HOME CARE INSTRUCTIONS    ACTIVITY: Rest and take it easy for the first 24 hours.  A responsible adult is recommended to remain with you during that time.  It is normal to feel sleepy.  We encourage you to not do anything that requires balance, judgment or coordination.    FOR 24 HOURS DO NOT:  Drive, operate machinery or run household appliances.  Drink beer or alcoholic beverages.  Make important decisions or sign legal documents.    SPECIAL INSTRUCTIONS: At home  You can continue taking your pain medication right after your procedure.  Your back may feel sore for a few days in the area where the needle was placed.  You may have diarrhea (loose or watery bowel movements) for 3 to 5 days.  Don’t drive or use heavy machinery for 24 hours after your procedure.  Don’t drink alcohol for 24 hours after your procedure.  You can take the Band-Aid® off the night of or the morning after your procedure.  You can shower the day after your procedure.    Pain management  You may have more pain for 24 hours after the procedure. You may have to take extra doses of your medication for 1 to 2 days. If the pain continues for over 48 hours (2 days), call your doctor.    It may be a few days or more before you feel the full effects of the block. Keep taking your pain medication as prescribed. Your healthcare provider will tell you how to slowly lower your pain medication based on how well the block relieves your pain.    The celiac plexus block works differently for everyone. The block can last several weeks to several months. When it wears off, your doctor will discuss other options with you.    When to Call Your Healthcare Provider  Call your healthcare provider if you have any of the following:    You have a fever of 100.4° F (38° C) or higher.  You have changes to your pain within 24 to 48 hours (1 to 2 days) after your procedure.  You have redness or swelling at the injection site.  You have any problems.  You have questions or  concerns.    DIET: To avoid nausea, slowly advance diet as tolerated, avoiding spicy or greasy foods for the first day.  Add more substantial food to your diet according to your physician's instructions.  Babies can be fed formula or breast milk as soon as they are hungry.  INCREASE FLUIDS AND FIBER TO AVOID CONSTIPATION.    SURGICAL DRESSING/BATHING: ok to shower    MEDICATIONS: Resume taking daily medication.  Take prescribed pain medication with food.  If no medication is prescribed, you may take non-aspirin pain medication if needed.  PAIN MEDICATION CAN BE VERY CONSTIPATING.  Take a stool softener or laxative such as senokot, pericolace, or milk of magnesia if needed.      You should CALL YOUR PHYSICIAN if you develop:  Fever greater than 101 degrees F.  Pain not relieved by medication, or persistent nausea or vomiting.  Excessive bleeding (blood soaking through dressing) or unexpected drainage from the wound.  Extreme redness or swelling around the incision site, drainage of pus or foul smelling drainage.  Inability to urinate or empty your bladder within 8 hours.  Problems with breathing or chest pain.    You should call 911 if you develop problems with breathing or chest pain.  If you are unable to contact your doctor or surgical center, you should go to the nearest emergency room or urgent care center.  Physician's telephone #: [phone]    MILD FLU-LIKE SYMPTOMS ARE NORMAL.  YOU MAY EXPERIENCE GENERALIZED MUSCLE ACHES, THROAT IRRITATION, HEADACHE AND/OR SOME NAUSEA.    If any questions arise, call your doctor.  If your doctor is not available, please feel free to call the Surgical Center at [phone].  The Center is open Monday through Friday from 7AM to 7PM.      A registered nurse may call you a few days after your surgery to see how you are doing after your procedure.    You may also receive a survey in the mail within the next two weeks and we ask that you take a few moments to complete the  survey and return it to us.  Our goal is to provide you with very good care and we value your comments.     Depression / Suicide Risk    As you are discharged from this [facility] facility, it is important to learn how to keep safe from harming yourself.    Recognize the warning signs:  Abrupt changes in personality, positive or negative- including increase in energy   Giving away possessions  Change in eating patterns- significant weight changes-  positive or negative  Change in sleeping patterns- unable to sleep or sleeping all the time   Unwillingness or inability to communicate  Depression  Unusual sadness, discouragement and loneliness  Talk of wanting to die  Neglect of personal appearance   Rebelliousness- reckless behavior  Withdrawal from people/activities they love  Confusion- inability to concentrate     If you or a loved one observes any of these behaviors or has concerns about self-harm, here's what you can do:  Talk about it- your feelings and reasons for harming yourself  Remove any means that you might use to hurt yourself (examples: pills, rope, extension cords, firearm)  Get professional help from the community (Mental Health, Substance Abuse, psychological counseling)  Do not be alone:Call your Safe Contact- someone whom you trust who will be there for you.  Call your local CRISIS HOTLINE 471-3573 or 478-711-2430  Call your local Children's Mobile Crisis Response Team [place] (742) 996-9029 or www.Hitch  Call the toll free National Suicide Prevention Hotlines   National Suicide Prevention Lifeline 796-443-ANWX (3663)  National [place] Line Network 800-SUICIDE (955-9074)    I acknowledge receipt and understanding of these Home Care instructions.

## 2023-09-23 NOTE — PROGRESS NOTES
Pt currently in PACU, resting comfortably and arouses to noise. 5FU pump de-access. Did well with chemo, nothing new to report. Pump had empty reservoir and total ordered dose plus overfill delivered. Pump disconnected, cleaned and returned to pharmacy. Port flushed per protocol, brisk blood return observed, heparinized and de-accessed with needle intact, gauze dressing placed. Marino reports he watched video about neulasta on-pro at home, but was still groggy and snored slightly after response. Reviewed booklet with patient and placed booklet and patient handout from  in black beatriz pack. Called wife, Lashae, and reviewed questions and provided education about how to care for pump. Lashae is aware that the educational materials are in the black beatriz pack with patient. Lashae denies having further needs at this time. Marino is still in PACU under observation of care team. Next appointment scheduled and patient reports having number to call  if anything arises.

## 2023-09-25 ENCOUNTER — PATIENT OUTREACH (OUTPATIENT)
Dept: ONCOLOGY | Facility: MEDICAL CENTER | Age: 68
End: 2023-09-25
Payer: MEDICARE

## 2023-09-25 NOTE — PROGRESS NOTES
Oncology nurse navigator called patient to follow up on a referral sent over my medical oncology.  ONN introduced self my role and services.  Patient reports that he is doing good and is recovering from his nerve block and his first round of chemotherapy.  He states that the block ha really helped with the pain and he is just starting to come out day 3 of recovery.  He stated that the diarrhea has let up and is still present but much improved.  Patient states that his wife has been driving him back and forth and that they are both retired.  He has no concerns with the cost of the gas and that their finances are secure and intact.  He states that they have been offered local assistance as well but the patient would like the assistance to go to people who are in need of it.

## 2023-09-28 LAB
APOB+LDLR+PCSK9 GENE MUT ANL BLD/T: NOT DETECTED
BRCA1+BRCA2 DEL+DUP + FULL MUT ANL BLD/T: NOT DETECTED
MLH1+MSH2+MSH6+PMS2 GN DEL+DUP+FUL M: NOT DETECTED

## 2023-09-29 ENCOUNTER — TELEPHONE (OUTPATIENT)
Dept: HEMATOLOGY ONCOLOGY | Facility: MEDICAL CENTER | Age: 68
End: 2023-09-29
Payer: MEDICARE

## 2023-09-29 DIAGNOSIS — T45.1X5A CHEMOTHERAPY-INDUCED NAUSEA: ICD-10-CM

## 2023-09-29 DIAGNOSIS — T45.1X5A CHEMOTHERAPY INDUCED DIARRHEA: ICD-10-CM

## 2023-09-29 DIAGNOSIS — R11.0 CHEMOTHERAPY-INDUCED NAUSEA: ICD-10-CM

## 2023-09-29 DIAGNOSIS — K52.1 CHEMOTHERAPY INDUCED DIARRHEA: ICD-10-CM

## 2023-09-29 RX ORDER — PROMETHAZINE HYDROCHLORIDE 25 MG/1
12.5-25 TABLET ORAL EVERY 6 HOURS PRN
Qty: 30 TABLET | Refills: 1 | Status: SHIPPED | OUTPATIENT
Start: 2023-09-29

## 2023-09-29 RX ORDER — DIPHENOXYLATE HYDROCHLORIDE AND ATROPINE SULFATE 2.5; .025 MG/1; MG/1
1 TABLET ORAL 4 TIMES DAILY PRN
Qty: 30 TABLET | Refills: 0 | Status: ON HOLD | OUTPATIENT
Start: 2023-09-29 | End: 2023-10-09

## 2023-09-29 NOTE — PROGRESS NOTES
Patient reports persistent nausea, vomiting, diarrhea that is unrelieved with Zofran/Compazine/Imodium    He is provided prescription for Phenergan rather than Ativan given he lives in [facility].    Lomotil sent, hoping for fill in [place]

## 2023-09-29 NOTE — TELEPHONE ENCOUNTER
Spoke with Dr Issa and Anabella r/t Pt losing 10 lbs in 9 days.  Having diarrhea and vomiting.  Pt states taking the zorfan and compazine alternating without any relief.  Also, state taking the imodium per our instructions with no relief.  Educated the Pt and spouse that this can become very serious and if something happens over the weekend to please take the Pt to the ED there in [place]. Reported that we can not prescribe Ativan over state lines for the nausea but MADALYN Kyle did order Phenergan and sent a script for Lomotil for the diarrhea but since it is also controlled substance they may not be able to pick it up.  Pt states he will call his local PCP and see if he can get the Zofran for the nausea.

## 2023-10-01 NOTE — PROGRESS NOTES
"Pharmacy Chemotherapy Calculation    Dx: Pancreatic cancer         Protocol: mFOLFIRINOX     *Dosing Reference*  Oxaliplatin 85 mg/m2 IV over 2 hours  Leucovorin 400 mg/m2 IV over 90 minutes concurrently with  Irinotecan 150 mg/m2 IV over 90 minutes followed by  Fluorouracil 2400 mg/m2 CIVI over 46 hours on Days 1-2  14-day cycle for 4-12 cycles (neoadjuvant or adjuvant) or 4-6 cycles (as induction) (locally advanced) or until DP/UT (locally advanced, recurrent, or metastatic)  NCCN Guidelines for Pancreatic Adenocarcinoma V.2.2023.  Deyvi T, et al. NEJM. 2018;379(25):5478-4253.  Herring SM, et al. Br J Cancer. 2016;114(7):737-43.  Arnav MH et al. Ailyn Surg Oncol. 2013;20(8):2787-95.    Allergies:  Patient has no known allergies.     /74   Pulse 87   Temp 36.4 °C (97.5 °F) (Temporal)   Resp 18   Ht 1.7 m (5' 6.93\")   Wt 64.8 kg (142 lb 13.7 oz)   SpO2 98%   BMI 22.42 kg/m²  Body surface area is 1.75 meters squared.    All labs (10/2/23 @ [facility]) within treatment plan parameters.      Drug Order   (Drug name, dose, route, IV Fluid & volume, frequency, number of doses) Cycle 2  Previous treatment: C1 on 9/20/23     Medication = Oxaliplatin  Base Dose = 85 mg/m2  Calc Dose: Base Dose x 1.75 m2 = 148.8 mg  Final Dose = 150 mg  Route = IV  Fluid & Volume = D5W 250 mL  Admin Duration = Over 2 hours          <10% difference, OK to treat with final dose   Medication = Irinotecan  Base Dose = 150 mg/m2  Calc Dose: Base Dose x 1.75 m2 = 262.5 mg  Final Dose = 262.6 mg  Route = IV  Fluid & Volume = D5W 500 mL  Admin Duration = Over 90 mins    Concurrently with leucovorin      <10% difference, OK to treat with final dose   Medication = Leucovorin  Base Dose = 400 mg/m2  Calc Dose:Base Dose x 1.75 m2 = 700 mg  Final Dose = 700 mg  Route = IV  Fluid & Volume = D5W 250 mL  Admin Duration = Over 90 mins     Concurrently with irinotecan      <10% difference, OK to treat with final dose   Medication = " Fluorouracil  Base Dose= 2400 mg/m2  Calc Dose: Base Dose x 1.75 m2 = 4200 mg  Final Dose = 4200 mg  Route = CIVI  Drug conc. = 50 mg/mL  Fluid & Volume = 84 mL (+3 mL overfill = 87 mL)  Admin Duration = Over 46 hours at 1.8 mL/hr          <10% difference, OK to treat with final dose     By my signature below, I confirm this process was performed independently with the BSA and all final chemotherapy dosing calculations congruent. I have reviewed the above chemotherapy order and that my calculation of the final dose and BSA (when applicable) corroborate those calculations of the  pharmacist. Discrepancies of 10% or greater in the written dose have been addressed and documented within the EPIC Progress notes.    Mayra Vasquez, PharmD

## 2023-10-02 RX ORDER — 0.9 % SODIUM CHLORIDE 0.9 %
10 VIAL (ML) INJECTION PRN
Status: CANCELLED | OUTPATIENT
Start: 2023-10-03

## 2023-10-02 RX ORDER — 0.9 % SODIUM CHLORIDE 0.9 %
10 VIAL (ML) INJECTION PRN
Status: CANCELLED | OUTPATIENT
Start: 2023-10-04

## 2023-10-02 RX ORDER — 0.9 % SODIUM CHLORIDE 0.9 %
VIAL (ML) INJECTION PRN
Status: CANCELLED | OUTPATIENT
Start: 2023-10-03

## 2023-10-02 RX ORDER — 0.9 % SODIUM CHLORIDE 0.9 %
3 VIAL (ML) INJECTION PRN
Status: CANCELLED | OUTPATIENT
Start: 2023-10-03

## 2023-10-02 RX ORDER — 0.9 % SODIUM CHLORIDE 0.9 %
3 VIAL (ML) INJECTION PRN
Status: CANCELLED | OUTPATIENT
Start: 2023-10-04

## 2023-10-02 RX ORDER — METHYLPREDNISOLONE SODIUM SUCCINATE 125 MG/2ML
125 INJECTION, POWDER, LYOPHILIZED, FOR SOLUTION INTRAMUSCULAR; INTRAVENOUS PRN
Status: CANCELLED | OUTPATIENT
Start: 2023-10-04

## 2023-10-02 RX ORDER — LOPERAMIDE HYDROCHLORIDE 2 MG/1
2 CAPSULE ORAL
Status: CANCELLED | OUTPATIENT
Start: 2023-10-04

## 2023-10-02 RX ORDER — 0.9 % SODIUM CHLORIDE 0.9 %
20 VIAL (ML) INJECTION PRN
Status: CANCELLED | OUTPATIENT
Start: 2023-10-06

## 2023-10-02 RX ORDER — ONDANSETRON 8 MG/1
8 TABLET, ORALLY DISINTEGRATING ORAL PRN
Status: CANCELLED | OUTPATIENT
Start: 2023-10-04

## 2023-10-02 RX ORDER — PROCHLORPERAZINE MALEATE 10 MG
10 TABLET ORAL EVERY 6 HOURS PRN
Status: CANCELLED | OUTPATIENT
Start: 2023-10-04

## 2023-10-02 RX ORDER — 0.9 % SODIUM CHLORIDE 0.9 %
VIAL (ML) INJECTION PRN
Status: CANCELLED | OUTPATIENT
Start: 2023-10-04

## 2023-10-02 RX ORDER — ONDANSETRON 2 MG/ML
4 INJECTION INTRAMUSCULAR; INTRAVENOUS PRN
Status: CANCELLED | OUTPATIENT
Start: 2023-10-04

## 2023-10-02 RX ORDER — LOPERAMIDE HYDROCHLORIDE 2 MG/1
4 CAPSULE ORAL PRN
Status: CANCELLED | OUTPATIENT
Start: 2023-10-04

## 2023-10-02 RX ORDER — DIPHENHYDRAMINE HYDROCHLORIDE 50 MG/ML
50 INJECTION INTRAMUSCULAR; INTRAVENOUS PRN
Status: CANCELLED | OUTPATIENT
Start: 2023-10-04

## 2023-10-02 RX ORDER — DEXTROSE MONOHYDRATE 50 MG/ML
INJECTION, SOLUTION INTRAVENOUS CONTINUOUS
Status: CANCELLED | OUTPATIENT
Start: 2023-10-04

## 2023-10-02 RX ORDER — EPINEPHRINE 1 MG/ML(1)
0.5 AMPUL (ML) INJECTION PRN
Status: CANCELLED | OUTPATIENT
Start: 2023-10-04

## 2023-10-03 NOTE — PROGRESS NOTES
"Pharmacy Chemotherapy Calculation:    Dx: pancreatic adenocarcinoma         Cycle 2  Previous Treatment: C1 9/20/23    Protocol: mFOLFIRINOX     *Dosing Reference*  Oxaliplatin 85 mg/m² IV over 2 hrs on Day 1  Irinotecan 150 mg/m² IV over 90 min on Day 1  Leucovorin 400 mg/m² IV over 90 min on Day 1  Fluorouracil 1200 mg/Irinotecan 150 mg/m² IV over 90 min  CIVI daily on Days 1-2 (2400 mg/m² IV over 46 hrs)  14-day cycle for 4-12 cycles (neoadjuvant or adjuvant) or 4-6 cycles (as induction) (locally advanced) or until disease progression or unacceptable toxicity (locally advanced,recurrent, or metastatic  NCCN Guidelines for Pancreatic Adenocarcinoma V.2.2023  Deyvi T, et al. N Engl J Med. 2018;379(38):6673-4467    Allergies:  Patient has no known allergies.     /74   Pulse 87   Temp 36.4 °C (97.5 °F) (Temporal)   Resp 18   Ht 1.7 m (5' 6.93\")   Wt 64.8 kg (142 lb 13.7 oz)   SpO2 98%   BMI 22.42 kg/m²  Body surface area is 1.75 meters squared.    Labs 10/2/23 ([facility]):  ANC~ [id number] Plt = 244k   Hgb = 15.0     SCr = 1.1 mg/dL CrCl ~ 57 mL/min   AST/ALT/AP = 27/23/187 TBili = 0.8   WBCs = 31.4 - no s/sx of infection, afebrile, had OnPro placed 9/22,     Oxaliplatin (Eloxatin) 85 mg/m² x 1.75 m² = 148.75 mg   <10% difference, okay to treat with final dose = 150 mg IV    Irinotecan (Camptosar) 150 mg/m² x 1.75 m² = 262.5 mg   <10% difference, okay to treat with final dose = 262.6 mg IV    Leucovorin 400 mg/m² x 1.75 m² = 700 mg   <10% difference, okay to treat with final dose = 700 mg IV    Fluorouracil (Adrucil) 2400 mg/m² x 1.75 m² = 4200 mg   <10% difference, okay to treat with final dose = 4200 mg IV over 46 hrs @ 1.8 mL/hr      Srikanth Fairbanks, PharmD  "

## 2023-10-04 ENCOUNTER — OUTPATIENT INFUSION SERVICES (OUTPATIENT)
Dept: ONCOLOGY | Facility: MEDICAL CENTER | Age: 68
End: 2023-10-04
Attending: STUDENT IN AN ORGANIZED HEALTH CARE EDUCATION/TRAINING PROGRAM
Payer: MEDICARE

## 2023-10-04 ENCOUNTER — PHARMACY VISIT (OUTPATIENT)
Dept: PHARMACY | Facility: MEDICAL CENTER | Age: 68
End: 2023-10-04
Payer: COMMERCIAL

## 2023-10-04 ENCOUNTER — HOSPITAL ENCOUNTER (OUTPATIENT)
Dept: HEMATOLOGY ONCOLOGY | Facility: MEDICAL CENTER | Age: 68
End: 2023-10-04
Attending: NURSE PRACTITIONER
Payer: MEDICARE

## 2023-10-04 VITALS
SYSTOLIC BLOOD PRESSURE: 105 MMHG | HEIGHT: 67 IN | TEMPERATURE: 97.5 F | OXYGEN SATURATION: 98 % | WEIGHT: 142.86 LBS | DIASTOLIC BLOOD PRESSURE: 74 MMHG | HEART RATE: 87 BPM | BODY MASS INDEX: 22.42 KG/M2 | RESPIRATION RATE: 18 BRPM

## 2023-10-04 VITALS
OXYGEN SATURATION: 97 % | WEIGHT: 141.6 LBS | TEMPERATURE: 97.4 F | SYSTOLIC BLOOD PRESSURE: 100 MMHG | RESPIRATION RATE: 16 BRPM | HEIGHT: 70 IN | DIASTOLIC BLOOD PRESSURE: 54 MMHG | HEART RATE: 72 BPM | BODY MASS INDEX: 20.27 KG/M2

## 2023-10-04 DIAGNOSIS — K52.1 CHEMOTHERAPY INDUCED DIARRHEA: ICD-10-CM

## 2023-10-04 DIAGNOSIS — C25.9 PANCREATIC ADENOCARCINOMA (HCC): ICD-10-CM

## 2023-10-04 DIAGNOSIS — R11.2 CHEMOTHERAPY INDUCED NAUSEA AND VOMITING: ICD-10-CM

## 2023-10-04 DIAGNOSIS — T45.1X5A CHEMOTHERAPY INDUCED DIARRHEA: ICD-10-CM

## 2023-10-04 DIAGNOSIS — T45.1X5A CHEMOTHERAPY INDUCED NAUSEA AND VOMITING: ICD-10-CM

## 2023-10-04 DIAGNOSIS — Z79.899 ENCOUNTER FOR LONG-TERM CURRENT USE OF HIGH RISK MEDICATION: ICD-10-CM

## 2023-10-04 PROCEDURE — 99214 OFFICE O/P EST MOD 30 MIN: CPT | Performed by: NURSE PRACTITIONER

## 2023-10-04 PROCEDURE — A4212 NON CORING NEEDLE OR STYLET: HCPCS

## 2023-10-04 PROCEDURE — G0498 CHEMO EXTEND IV INFUS W/PUMP: HCPCS

## 2023-10-04 PROCEDURE — 96413 CHEMO IV INFUSION 1 HR: CPT

## 2023-10-04 PROCEDURE — 96415 CHEMO IV INFUSION ADDL HR: CPT

## 2023-10-04 PROCEDURE — 700105 HCHG RX REV CODE 258: Performed by: NURSE PRACTITIONER

## 2023-10-04 PROCEDURE — 96367 TX/PROPH/DG ADDL SEQ IV INF: CPT

## 2023-10-04 PROCEDURE — 700111 HCHG RX REV CODE 636 W/ 250 OVERRIDE (IP): Mod: JZ | Performed by: NURSE PRACTITIONER

## 2023-10-04 PROCEDURE — 99212 OFFICE O/P EST SF 10 MIN: CPT | Mod: 25 | Performed by: NURSE PRACTITIONER

## 2023-10-04 PROCEDURE — 96375 TX/PRO/DX INJ NEW DRUG ADDON: CPT

## 2023-10-04 PROCEDURE — 96417 CHEMO IV INFUS EACH ADDL SEQ: CPT

## 2023-10-04 PROCEDURE — RXMED WILLOW AMBULATORY MEDICATION CHARGE: Performed by: NURSE PRACTITIONER

## 2023-10-04 RX ORDER — OLANZAPINE 5 MG/1
TABLET ORAL
Qty: 12 TABLET | Refills: 1 | Status: SHIPPED | OUTPATIENT
Start: 2023-10-04 | End: 2023-12-03

## 2023-10-04 RX ADMIN — DEXAMETHASONE SODIUM PHOSPHATE 12 MG: 4 INJECTION, SOLUTION INTRA-ARTICULAR; INTRALESIONAL; INTRAMUSCULAR; INTRAVENOUS; SOFT TISSUE at 10:42

## 2023-10-04 RX ADMIN — OXALIPLATIN 150 MG: 5 INJECTION, SOLUTION INTRAVENOUS at 12:09

## 2023-10-04 RX ADMIN — ATROPINE SULFATE 0.5 MG: 1 INJECTION, SOLUTION INTRAVENOUS at 14:42

## 2023-10-04 RX ADMIN — IRINOTECAN HYDROCHLORIDE 262.6 MG: 20 INJECTION, SOLUTION INTRAVENOUS at 14:53

## 2023-10-04 RX ADMIN — LEUCOVORIN CALCIUM 700 MG: 350 INJECTION, POWDER, LYOPHILIZED, FOR SUSPENSION INTRAMUSCULAR; INTRAVENOUS at 14:52

## 2023-10-04 RX ADMIN — FLUOROURACIL 4200 MG: 50 INJECTION, SOLUTION INTRAVENOUS at 16:49

## 2023-10-04 RX ADMIN — FOSAPREPITANT 150 MG: 150 INJECTION, POWDER, LYOPHILIZED, FOR SOLUTION INTRAVENOUS at 11:00

## 2023-10-04 RX ADMIN — ONDANSETRON 16 MG: 2 INJECTION INTRAMUSCULAR; INTRAVENOUS at 10:57

## 2023-10-04 ASSESSMENT — ENCOUNTER SYMPTOMS
WHEEZING: 0
COUGH: 0
SHORTNESS OF BREATH: 0
SENSORY CHANGE: 1
DIZZINESS: 0
CONSTIPATION: 0
PALPITATIONS: 0
MYALGIAS: 0
NAUSEA: 0
INSOMNIA: 0
FEVER: 0
CHILLS: 0
HEADACHES: 0

## 2023-10-04 ASSESSMENT — PAIN SCALES - GENERAL: PAINLEVEL: 1=MINIMAL PAIN

## 2023-10-04 ASSESSMENT — FIBROSIS 4 INDEX
FIB4 SCORE: 2.61
FIB4 SCORE: 2.61

## 2023-10-04 NOTE — PROGRESS NOTES
Chemotherapy Verification - PRIMARY RN      Height = 1.7m  Weight = 64.8kg  BSA = 1.75m2       Medication: oxaliplatin  Dose: 85mg/m2  Calculated Dose: 148.75mg                            (In mg/m2, AUC, mg/kg)     Medication: irinotecan  Dose: 150mg/m2 Calculated Dose: 262.5 mg                             (In mg/m2, AUC, mg/kg)    Medication: fluorouracil  Dose: 2,400mg/m2  Calculated Dose: 4,200 mg                             (In mg/m2, AUC, mg/kg)          I confirm this process was performed independently with the BSA and all final chemotherapy dosing calculations congruent.  Any discrepancies of 10% or greater have been addressed with the chemotherapy pharmacist. The resolution of the discrepancy has been documented in the EPIC progress notes.

## 2023-10-04 NOTE — PROGRESS NOTES
Subjective     Marino Rojas is a 68 y.o. male who presents with Pancreatic Cancer (Prechemo )            HPI  Mr. Rojas presents for evaluation prior to cycle 2 FOLFIRINOX for treatment of stage IIa, cT3 N0 M0, invasive moderately differentiated pancreatic adenocarcinoma.  He is accompanied by his wife for today's visit.    Patient was in his usual state of health until approximately 8/2023 when he noted increasing abdominal pain over the previous several months.  CT was completed 8/11/2023 showing pancreatic mass of 3.8 x 2.9 cm, worrisome for malignancy.  He underwent biopsy per upper EUS 8/23/23 with pathology confirming malignancy. He initiated neoadjuvant FOLFIRINOX on 9/20/2023. He underwent celiac plexus block on 9/22/2023.    Patient is lost 4 pounds since last visit but feels his appetite is improving.  Diarrhea is was not well managed with Imodium or Lomotil and he will try Metamucil or Pepto.  He noted spontaneous vomiting without nausea despite staggering Zofran/Compazine, Ativan (per PCP) seemed to help at the end of cycle 1, patient is amenable to trying olanzapine.      Review of Systems   Constitutional:  Positive for weight loss (4# since last visit - appetite better past 2 days). Negative for chills, fever and malaise/fatigue.   Respiratory:  Negative for cough, shortness of breath and wheezing.    Cardiovascular:  Negative for chest pain, palpitations and leg swelling.   Gastrointestinal:  Positive for diarrhea (imodium and lomotil did not really help will try metamucil and pepto) and vomiting (without nausea; despite staggering Z/C; using lorazepam at the end of C1 which helped sleep and night symptoms, open to olanzapine). Negative for constipation and nausea.        Imodium and lomotil did not help and actually side effect of lomotil affects compliance - ok with trying old fashioned metamucil and pepto   Genitourinary:  Negative for dysuria.   Musculoskeletal:  Negative for joint pain and  "myalgias.   Neurological:  Positive for tingling and sensory change (cold sensitivity subsided slowly). Negative for dizziness and headaches.   Psychiatric/Behavioral:  The patient does not have insomnia (better with lorazepam).        No Known Allergies      Current Outpatient Medications on File Prior to Encounter   Medication Sig Dispense Refill    promethazine (PHENERGAN) 25 MG Tab Take 0.5-1 Tablets by mouth every 6 hours as needed for Nausea/Vomiting. 30 Tablet 1    metoprolol SR (TOPROL XL) 25 MG TABLET SR 24 HR Take 25 mg by mouth at bedtime.      ondansetron (ZOFRAN) 4 MG Tab tablet Take 1 Tablet by mouth every four hours as needed for Nausea/Vomiting (for nausea, vomiting). 30 Tablet 6    prochlorperazine (COMPAZINE) 10 MG Tab Take 1 Tablet by mouth every 6 hours as needed (for nausea, vomiting). 30 Tablet 6    losartan (COZAAR) 25 MG Tab Take 25 mg by mouth every day.      allopurinol (ZYLOPRIM) 100 MG Tab Take 100 mg by mouth every day.       No current facility-administered medications on file prior to encounter.              Objective     /54   Pulse 72   Temp 36.3 °C (97.4 °F) (Temporal)   Resp 16   Ht 1.778 m (5' 10\")   Wt 64.2 kg (141 lb 9.6 oz)   SpO2 97%   BMI 20.32 kg/m²      Physical Exam  Vitals reviewed.   Constitutional:       General: He is not in acute distress.     Appearance: He is well-developed. He is not diaphoretic.   HENT:      Head: Normocephalic and atraumatic.   Eyes:      General: No scleral icterus.        Right eye: No discharge.         Left eye: No discharge.   Cardiovascular:      Rate and Rhythm: Normal rate and regular rhythm.      Heart sounds: Normal heart sounds. No murmur heard.     No friction rub. No gallop.   Pulmonary:      Effort: Pulmonary effort is normal. No respiratory distress.      Breath sounds: Normal breath sounds. No wheezing.   Abdominal:      General: There is no distension.      Palpations: Abdomen is soft.      Tenderness: There is no " abdominal tenderness.   Musculoskeletal:         General: Normal range of motion.      Cervical back: Normal range of motion.   Skin:     General: Skin is warm and dry.      Coloration: Skin is not pale.      Findings: No erythema or rash.   Neurological:      Mental Status: He is alert and oriented to person, place, and time.   Psychiatric:         Behavior: Behavior normal.       Treatment labs to be completed prior to dosing       Latest Reference Range & Units 09/20/23 10:20   WBC 4.8 - 10.8 K/uL 6.9   RBC 4.70 - 6.10 M/uL 4.32 (L)   Hemoglobin 14.0 - 18.0 g/dL 13.6 (L)   Hematocrit 42.0 - 52.0 % 41.9 (L)   MCV 81.4 - 97.8 fL 97.0   MCH 27.0 - 33.0 pg 31.5   MCHC 32.3 - 36.5 g/dL 32.5   RDW 35.9 - 50.0 fL 45.8   Platelet Count 164 - 446 K/uL 163 (L)   MPV 9.0 - 12.9 fL 11.6   Neutrophils-Polys 44.00 - 72.00 % 76.40 (H)   Neutrophils (Absolute) 1.82 - 7.42 K/uL 5.28   Lymphocytes 22.00 - 41.00 % 11.60 (L)   Lymphs (Absolute) 1.00 - 4.80 K/uL 0.80 (L)   Monocytes 0.00 - 13.40 % 10.10   Monos (Absolute) 0.00 - 0.85 K/uL 0.70   Eosinophils 0.00 - 6.90 % 1.00   Eos (Absolute) 0.00 - 0.51 K/uL 0.07   Basophils 0.00 - 1.80 % 0.60   Baso (Absolute) 0.00 - 0.12 K/uL 0.04   Immature Granulocytes 0.00 - 0.90 % 0.30   Immature Granulocytes (abs) 0.00 - 0.11 K/uL 0.02   Nucleated RBC 0.00 - 0.20 /100 WBC 0.00   NRBC (Absolute) K/uL 0.00   Sodium 135 - 145 mmol/L 138   Potassium 3.6 - 5.5 mmol/L 4.6   Chloride 96 - 112 mmol/L 110   Co2 20 - 33 mmol/L 17 (L)   Anion Gap 7.0 - 16.0  11.0   Glucose 65 - 99 mg/dL 102 (H)   Bun 8 - 22 mg/dL 32 (H)   Creatinine 0.50 - 1.40 mg/dL 0.85   GFR (CKD-EPI) >60 mL/min/1.73 m 2 93   Calcium 8.5 - 10.5 mg/dL 8.0 (L)   Correct Calcium 8.5 - 10.5 mg/dL 8.3 (L)   AST(SGOT) 12 - 45 U/L 31   ALT(SGPT) 2 - 50 U/L 26   Alkaline Phosphatase 30 - 99 U/L 135 (H)   Total Bilirubin 0.1 - 1.5 mg/dL 0.4   Albumin 3.2 - 4.9 g/dL 3.6   Total Protein 6.0 - 8.2 g/dL 6.4   Globulin 1.9 - 3.5 g/dL 2.8   A-G  Ratio g/dL 1.3   (L): Data is abnormally low  (H): Data is abnormally high         Assessment & Plan     1. Pancreatic adenocarcinoma (HCC)        2. Encounter for long-term current use of high risk medication        3. Chemotherapy induced nausea and vomiting  OLANZapine (ZYPREXA) 5 MG Tab      4. Chemotherapy induced diarrhea            1.  Diarrhea: Not relieved by Lomotil, Imodium patient is amenable to trying Metamucil and Pepto-Bismol, continue to monitor.    2.  Nausea: Not well relieved with Zofran, Compazine but some relief with Ativan provided per PCP for insomnia.  Okay to take Ativan as needed patient is amenable to olanzapine for days 1-5 following treatment.    3.  Pancreatic cancer: Diagnosed 8/23/23; initiated neoadjuvant FOLFIRINOX 9/20/23.    Patient appears to be tolerating treatment fairly well despite nausea and diarrhea as per items #1 and #2 above.  CBC, CMP will be evaluated prior to dosing and if within acceptable limits patient will proceed with cycle 2 of treatment and return in 2 weeks for evaluation prior to cycle 3, sooner as needed.      The patient verbalized agreement and understanding of current plan. All questions and concerns were addressed at time of visit.    Please note that this dictation was created using voice recognition software. I have made every reasonable attempt to correct obvious errors, but I expect that there are errors of grammar and possibly content that I did not discover before finalizing the note.

## 2023-10-04 NOTE — PROGRESS NOTES
Chemotherapy Verification - SECONDARY RN       Height = 170 cm  Weight = 64.8 kg  BSA = 1.75 m2       Medication: oxaliplatin  Dose: 85 mg/m2  Calculated Dose: 148.75 mg                             (In mg/m2, AUC, mg/kg)     Medication: irinotecan  Dose: 150 mg/m2  Calculated Dose: 262.5 mg                             (In mg/m2, AUC, mg/kg)    Medication: 5FU CADD pump  Dose: 2400 mg/m2  Calculated Dose: 4200 mg                             (In mg/m2, AUC, mg/kg)    I confirm that this process was performed independently.

## 2023-10-04 NOTE — PROGRESS NOTES
Marino to infusion for cycle 2 of FOLFIRINOX. Reports he did experience some cold-sensitive neuropathy in mouth and hands after last infusion that has almost completely resolved, did have some vomiting (no nausea but once he put food in his mouth it came right back out) for about 5 days after treatment as well as diarrhea which has been unresponsive to anti-diarrheals so far, but he is attempting to manage with diet and electrolyte drinks. MD aware and is working with patient to find medications that work. Patient had labs drawn 10/2 at [place], reviewed by RN, within parameters for treatment today. Elevated WBC noted, patient afebrile with no s/s of infection, Neulasta Onpro was administered 9/22. Port accessed in sterile manner, flushed with NS with positive blood return. Pre-treatment medications administered. Oxaliplatin infused over 2 hours. PRN atropine given prior to starting irinotecan. Irinotecan with concurrent leucovorin infused over 90 minutes, patient tolerated all well with no adverse effects. Port flushed post infusion with positive blood return and patient connected to 5-FU CADD pump. Settings confirmed and ensured all clamps were open and pump set to run prior to patient departure. Patient left in stable condition, knows when to return for CADD pump disconnect.

## 2023-10-06 ENCOUNTER — APPOINTMENT (OUTPATIENT)
Dept: RADIOLOGY | Facility: MEDICAL CENTER | Age: 68
DRG: 378 | End: 2023-10-06
Attending: EMERGENCY MEDICINE
Payer: MEDICARE

## 2023-10-06 ENCOUNTER — OUTPATIENT INFUSION SERVICES (OUTPATIENT)
Dept: ONCOLOGY | Facility: MEDICAL CENTER | Age: 68
End: 2023-10-06
Attending: STUDENT IN AN ORGANIZED HEALTH CARE EDUCATION/TRAINING PROGRAM
Payer: MEDICARE

## 2023-10-06 ENCOUNTER — HOSPITAL ENCOUNTER (INPATIENT)
Facility: MEDICAL CENTER | Age: 68
LOS: 3 days | DRG: 378 | End: 2023-10-09
Attending: EMERGENCY MEDICINE | Admitting: STUDENT IN AN ORGANIZED HEALTH CARE EDUCATION/TRAINING PROGRAM
Payer: MEDICARE

## 2023-10-06 VITALS — BODY MASS INDEX: 22.42 KG/M2 | WEIGHT: 142.86 LBS

## 2023-10-06 DIAGNOSIS — I47.20 V-TACH (HCC): ICD-10-CM

## 2023-10-06 DIAGNOSIS — K26.4 GASTROINTESTINAL HEMORRHAGE ASSOCIATED WITH DUODENAL ULCER: ICD-10-CM

## 2023-10-06 DIAGNOSIS — K92.2 GASTROINTESTINAL HEMORRHAGE, UNSPECIFIED GASTROINTESTINAL HEMORRHAGE TYPE: ICD-10-CM

## 2023-10-06 DIAGNOSIS — C25.9 PANCREATIC ADENOCARCINOMA (HCC): ICD-10-CM

## 2023-10-06 PROBLEM — Z71.89 ACP (ADVANCE CARE PLANNING): Status: ACTIVE | Noted: 2023-10-06

## 2023-10-06 LAB
ABO + RH BLD: NORMAL
ABO GROUP BLD: ABNORMAL
ALBUMIN SERPL BCP-MCNC: 2.6 G/DL (ref 3.2–4.9)
ALBUMIN/GLOB SERPL: 1.9 G/DL
ALP SERPL-CCNC: 108 U/L (ref 30–99)
ALT SERPL-CCNC: 20 U/L (ref 2–50)
ANION GAP SERPL CALC-SCNC: 10 MMOL/L (ref 7–16)
ANISOCYTOSIS BLD QL SMEAR: ABNORMAL
APTT PPP: 25.8 SEC (ref 24.7–36)
AST SERPL-CCNC: 22 U/L (ref 12–45)
BASOPHILS # BLD AUTO: 0 % (ref 0–1.8)
BASOPHILS # BLD: 0 K/UL (ref 0–0.12)
BILIRUB SERPL-MCNC: 0.5 MG/DL (ref 0.1–1.5)
BLD GP AB SCN SERPL QL: ABNORMAL
BUN SERPL-MCNC: 37 MG/DL (ref 8–22)
BURR CELLS BLD QL SMEAR: NORMAL
CALCIUM ALBUM COR SERPL-MCNC: 8.9 MG/DL (ref 8.5–10.5)
CALCIUM SERPL-MCNC: 7.8 MG/DL (ref 8.5–10.5)
CHLORIDE SERPL-SCNC: 110 MMOL/L (ref 96–112)
CO2 SERPL-SCNC: 14 MMOL/L (ref 20–33)
CREAT SERPL-MCNC: 1.11 MG/DL (ref 0.5–1.4)
DOHLE BOD BLD QL SMEAR: NORMAL
EKG IMPRESSION: NORMAL
EOSINOPHIL # BLD AUTO: 0 K/UL (ref 0–0.51)
EOSINOPHIL NFR BLD: 0 % (ref 0–6.9)
ERYTHROCYTE [DISTWIDTH] IN BLOOD BY AUTOMATED COUNT: 47.1 FL (ref 35.9–50)
GFR SERPLBLD CREATININE-BSD FMLA CKD-EPI: 71 ML/MIN/1.73 M 2
GLOBULIN SER CALC-MCNC: 1.4 G/DL (ref 1.9–3.5)
GLUCOSE SERPL-MCNC: 191 MG/DL (ref 65–99)
HCT VFR BLD AUTO: 34.1 % (ref 42–52)
HGB BLD-MCNC: 11.4 G/DL (ref 14–18)
INR PPP: 1.43 (ref 0.87–1.13)
LIPASE SERPL-CCNC: 44 U/L (ref 11–82)
LYMPHOCYTES # BLD AUTO: 0.66 K/UL (ref 1–4.8)
LYMPHOCYTES NFR BLD: 1.7 % (ref 22–41)
MANUAL DIFF BLD: NORMAL
MCH RBC QN AUTO: 31.6 PG (ref 27–33)
MCHC RBC AUTO-ENTMCNC: 33.4 G/DL (ref 32.3–36.5)
MCV RBC AUTO: 94.5 FL (ref 81.4–97.8)
MICROCYTES BLD QL SMEAR: ABNORMAL
MONOCYTES # BLD AUTO: 0.35 K/UL (ref 0–0.85)
MONOCYTES NFR BLD AUTO: 0.9 % (ref 0–13.4)
MORPHOLOGY BLD-IMP: NORMAL
NEUTROPHILS # BLD AUTO: 37.79 K/UL (ref 1.82–7.42)
NEUTROPHILS NFR BLD: 97.4 % (ref 44–72)
NRBC # BLD AUTO: 0 K/UL
NRBC BLD-RTO: 0 /100 WBC (ref 0–0.2)
PLATELET # BLD AUTO: 145 K/UL (ref 164–446)
PLATELET BLD QL SMEAR: NORMAL
PMV BLD AUTO: 10.8 FL (ref 9–12.9)
POIKILOCYTOSIS BLD QL SMEAR: NORMAL
POTASSIUM SERPL-SCNC: 4.4 MMOL/L (ref 3.6–5.5)
PROT SERPL-MCNC: 4 G/DL (ref 6–8.2)
PROTHROMBIN TIME: 17.7 SEC (ref 12–14.6)
RBC # BLD AUTO: 3.61 M/UL (ref 4.7–6.1)
RBC BLD AUTO: PRESENT
RH BLD: ABNORMAL
SODIUM SERPL-SCNC: 134 MMOL/L (ref 135–145)
TOXIC GRANULES BLD QL SMEAR: NORMAL
TROPONIN T SERPL-MCNC: 30 NG/L (ref 6–19)
TROPONIN T SERPL-MCNC: 31 NG/L (ref 6–19)
WBC # BLD AUTO: 38.8 K/UL (ref 4.8–10.8)

## 2023-10-06 PROCEDURE — 700117 HCHG RX CONTRAST REV CODE 255: Performed by: EMERGENCY MEDICINE

## 2023-10-06 PROCEDURE — 86900 BLOOD TYPING SEROLOGIC ABO: CPT

## 2023-10-06 PROCEDURE — 85610 PROTHROMBIN TIME: CPT

## 2023-10-06 PROCEDURE — 74174 CTA ABD&PLVS W/CONTRAST: CPT

## 2023-10-06 PROCEDURE — 96368 THER/DIAG CONCURRENT INF: CPT

## 2023-10-06 PROCEDURE — 99222 1ST HOSP IP/OBS MODERATE 55: CPT | Performed by: INTERNAL MEDICINE

## 2023-10-06 PROCEDURE — 85025 COMPLETE CBC W/AUTO DIFF WBC: CPT

## 2023-10-06 PROCEDURE — 36415 COLL VENOUS BLD VENIPUNCTURE: CPT

## 2023-10-06 PROCEDURE — 84484 ASSAY OF TROPONIN QUANT: CPT

## 2023-10-06 PROCEDURE — 96365 THER/PROPH/DIAG IV INF INIT: CPT

## 2023-10-06 PROCEDURE — 85730 THROMBOPLASTIN TIME PARTIAL: CPT

## 2023-10-06 PROCEDURE — 770000 HCHG ROOM/CARE - INTERMEDIATE ICU *

## 2023-10-06 PROCEDURE — 93005 ELECTROCARDIOGRAM TRACING: CPT | Performed by: EMERGENCY MEDICINE

## 2023-10-06 PROCEDURE — C9113 INJ PANTOPRAZOLE SODIUM, VIA: HCPCS | Performed by: INTERNAL MEDICINE

## 2023-10-06 PROCEDURE — 96375 TX/PRO/DX INJ NEW DRUG ADDON: CPT

## 2023-10-06 PROCEDURE — 71045 X-RAY EXAM CHEST 1 VIEW: CPT

## 2023-10-06 PROCEDURE — 99285 EMERGENCY DEPT VISIT HI MDM: CPT

## 2023-10-06 PROCEDURE — 99497 ADVNCD CARE PLAN 30 MIN: CPT | Performed by: STUDENT IN AN ORGANIZED HEALTH CARE EDUCATION/TRAINING PROGRAM

## 2023-10-06 PROCEDURE — 99223 1ST HOSP IP/OBS HIGH 75: CPT | Mod: 25,AI | Performed by: STUDENT IN AN ORGANIZED HEALTH CARE EDUCATION/TRAINING PROGRAM

## 2023-10-06 PROCEDURE — 86850 RBC ANTIBODY SCREEN: CPT

## 2023-10-06 PROCEDURE — 700111 HCHG RX REV CODE 636 W/ 250 OVERRIDE (IP): Performed by: INTERNAL MEDICINE

## 2023-10-06 PROCEDURE — 83690 ASSAY OF LIPASE: CPT

## 2023-10-06 PROCEDURE — 85007 BL SMEAR W/DIFF WBC COUNT: CPT

## 2023-10-06 PROCEDURE — 86901 BLOOD TYPING SEROLOGIC RH(D): CPT

## 2023-10-06 PROCEDURE — 80053 COMPREHEN METABOLIC PANEL: CPT

## 2023-10-06 PROCEDURE — 700105 HCHG RX REV CODE 258: Performed by: EMERGENCY MEDICINE

## 2023-10-06 PROCEDURE — 700111 HCHG RX REV CODE 636 W/ 250 OVERRIDE (IP): Mod: JZ | Performed by: EMERGENCY MEDICINE

## 2023-10-06 PROCEDURE — 96366 THER/PROPH/DIAG IV INF ADDON: CPT

## 2023-10-06 RX ORDER — DEXTROSE MONOHYDRATE 50 MG/ML
INJECTION, SOLUTION INTRAVENOUS CONTINUOUS
Status: DISCONTINUED | OUTPATIENT
Start: 2023-10-06 | End: 2023-10-08

## 2023-10-06 RX ORDER — CALCIUM GLUCONATE 20 MG/ML
1 INJECTION, SOLUTION INTRAVENOUS ONCE
Status: COMPLETED | OUTPATIENT
Start: 2023-10-06 | End: 2023-10-06

## 2023-10-06 RX ORDER — CALCIUM GLUCONATE 20 MG/ML
2 INJECTION, SOLUTION INTRAVENOUS ONCE
Status: COMPLETED | OUTPATIENT
Start: 2023-10-06 | End: 2023-10-06

## 2023-10-06 RX ORDER — ACETAMINOPHEN 325 MG/1
650 TABLET ORAL EVERY 6 HOURS PRN
Status: DISCONTINUED | OUTPATIENT
Start: 2023-10-06 | End: 2023-10-09 | Stop reason: HOSPADM

## 2023-10-06 RX ORDER — ROSUVASTATIN CALCIUM 20 MG/1
20 TABLET, COATED ORAL DAILY
COMMUNITY
Start: 2023-08-01 | End: 2023-12-03

## 2023-10-06 RX ORDER — PANTOPRAZOLE SODIUM 40 MG/10ML
40 INJECTION, POWDER, LYOPHILIZED, FOR SOLUTION INTRAVENOUS 2 TIMES DAILY
Status: DISCONTINUED | OUTPATIENT
Start: 2023-10-06 | End: 2023-10-09

## 2023-10-06 RX ORDER — LORAZEPAM 1 MG/1
1 TABLET ORAL EVERY 8 HOURS PRN
COMMUNITY
Start: 2023-09-29 | End: 2023-12-03

## 2023-10-06 RX ADMIN — AMIODARONE HYDROCHLORIDE 1 MG/MIN: 1.8 INJECTION, SOLUTION INTRAVENOUS at 16:10

## 2023-10-06 RX ADMIN — AMIODARONE HYDROCHLORIDE 0.5 MG/MIN: 1.8 INJECTION, SOLUTION INTRAVENOUS at 22:01

## 2023-10-06 RX ADMIN — CALCIUM GLUCONATE 1 G: 20 INJECTION, SOLUTION INTRAVENOUS at 17:18

## 2023-10-06 RX ADMIN — PANTOPRAZOLE SODIUM 40 MG: 40 INJECTION, POWDER, FOR SOLUTION INTRAVENOUS at 18:24

## 2023-10-06 RX ADMIN — DEXTROSE MONOHYDRATE: 50 INJECTION, SOLUTION INTRAVENOUS at 16:11

## 2023-10-06 RX ADMIN — CALCIUM GLUCONATE 2 G: 20 INJECTION, SOLUTION INTRAVENOUS at 16:18

## 2023-10-06 RX ADMIN — IOHEXOL 95 ML: 350 INJECTION, SOLUTION INTRAVENOUS at 18:23

## 2023-10-06 ASSESSMENT — ENCOUNTER SYMPTOMS
DIZZINESS: 1
MUSCULOSKELETAL NEGATIVE: 1
BLOOD IN STOOL: 1
DIARRHEA: 1
CARDIOVASCULAR NEGATIVE: 1
NAUSEA: 1
RESPIRATORY NEGATIVE: 1
NEUROLOGICAL NEGATIVE: 1
CONSTITUTIONAL NEGATIVE: 1
PSYCHIATRIC NEGATIVE: 1
EYES NEGATIVE: 1

## 2023-10-06 ASSESSMENT — FIBROSIS 4 INDEX
FIB4 SCORE: 2.61
FIB4 SCORE: 2.61

## 2023-10-06 ASSESSMENT — PATIENT HEALTH QUESTIONNAIRE - PHQ9
SUM OF ALL RESPONSES TO PHQ9 QUESTIONS 1 AND 2: 0
1. LITTLE INTEREST OR PLEASURE IN DOING THINGS: NOT AT ALL
2. FEELING DOWN, DEPRESSED, IRRITABLE, OR HOPELESS: NOT AT ALL

## 2023-10-06 ASSESSMENT — PAIN DESCRIPTION - PAIN TYPE: TYPE: ACUTE PAIN

## 2023-10-06 NOTE — ED NOTES
Assist RN:  Spoke with Candace GUILLEN in [facility] who will come to [facility] [address] and disconnect pt's pump.

## 2023-10-06 NOTE — ED TRIAGE NOTES
"Chief Complaint   Patient presents with    Lower GI Bleed     Pt presented to [facility] after near syncopal event. Pt has been having bloody stools for the past few days, but several times this am.     Weakness       Pt BIB as a transfer. Pt received 2 units of PRBC's at [facility], with 3rd infusing now. Pt was also given 2L NS, protonix bolus, and amio infusion after intermittent runs of reported vtach.     /81   Pulse (!) 102   Temp 37.2 °C (98.9 °F) (Temporal)   Resp 16   Ht 1.676 m (5' 6\")   Wt 64.4 kg (142 lb)   SpO2 97%   BMI 22.92 kg/m²       "

## 2023-10-06 NOTE — ED PROVIDER NOTES
ER Provider Note    Scribed for Bob Resnedez M.d. by Nikky Valerio. 10/6/2023  3:58 PM    Primary Care Provider: Luis Fernando Culp M.D.    CHIEF COMPLAINT  Chief Complaint   Patient presents with    Lower GI Bleed     Pt presented to [facility] after near syncopal event. Pt has been having bloody stools for the past few days, but several times this am.     Weakness     EXTERNAL RECORDS REVIEWED  Other looked at reports from outside hospital  White blood cell count was 45. Chest xray shows no pneumonia. I found a white blood cell count of 6.9 on 9/20/23.    HPI/ROS  LIMITATION TO HISTORY   Select: : None  OUTSIDE HISTORIAN(S):  EMS flight care present at bedside    Lucas Rojas is a 68 y.o. male who presents to the ED via flight care complaining of bright red blood in his stool onset yesterday evening. He states he had 3 episodes of diarrhea with some bright blood. Flight Care reports this morning, patient was bleeding from his rectum. Care flight reports patient received 2 units of PRBC's at [place], with 3rd infusing now. Flight Care also reports patient was given 2L NS, protonix bolus, and amino infusion after intermitten runs of reported vtach of 30 seconds. Patient denies feeling any pain at the moment.  Patient denies vomiting. Patient reprots he had a bypass 3 years ago and a heart attack. Patient denies smoking. He reports he takes baby asprin and last took one last night. Patient reports his oncologist is Dr. Issa.     PAST MEDICAL HISTORY  Past Medical History:   Diagnosis Date    Anemia     Anesthesia     post op  nausea, last neurosurgery no problems    Arthritis     osteo; back/right ankle and right wrist    Cancer (HCC) Current    Pancreatic stage 2    Cataract     Coronary artery disease involving native coronary artery without angina pectoris 03/06/2019    Emphysema of lung (HCC)     per xray result. pt states no active disease    High cholesterol     Hypertension     Myocardial infarct  ([facility]) Feb 22, 2019    Followed by Dr. Kulkarni    Pain 03/30/2017     back, 7-8/10    PONV (postoperative nausea and vomiting)     Snoring     sometimes, very mild, no sleep study       SURGICAL HISTORY  Past Surgical History:   Procedure Laterality Date    FUSION, SPINE, LUMBAR, PLIF  04/04/2017    Procedure: LUMBAR FUSION POSTERIOR - L4-5 FUSION/EXPLORATION ;  Surgeon: Osmani Engel M.D.;  Location: SURGERY [facility];  Service:     LUMBAR LAMINECTOMY DISKECTOMY Right 04/04/2017    Procedure: LUMBAR LAMINECTOMY DISKECTOMY L3-S1 AND MEDIAL FACETECTOMY;  Surgeon: Osmani Engel M.D.;  Location: SURGERY [facility];  Service:     FORAMINOTOMY  04/04/2017    Procedure: FORAMINOTOMY;  Surgeon: Osmani Engel M.D.;  Location: SURGERY [facility];  Service:     HARDWARE REMOVAL ORTHO  04/04/2017    Procedure: HARDWARE REMOVAL ORTHO - L4-5;  Surgeon: Osmani Engel M.D.;  Location: SURGERY [facility];  Service:     FUSION, SPINE, LUMBAR, PLIF N/A 12/15/2015    Procedure: LUMBAR FUSION POSTERIOR INSTRUMENTED TLIF L4-5;  Surgeon: Osmani Engel M.D.;  Location: SURGERY [facility];  Service:     LUMBAR LAMINECTOMY DISKECTOMY Left 12/15/2015    Procedure: LUMBAR LAMINECTOMY DISKECTOMY L1-2;  Surgeon: Osmani Engel M.D.;  Location: SURGERY [facility];  Service:     SHOULDER SURGERY  01/01/2013    arthroscopic     SHOULDER SURGERY  01/01/2012    right shoulder    APPENDECTOMY  01/01/1976    CATARACT EXTRACTION WITH IOL Bilateral     OTHER CARDIAC SURGERY  Sept 6, 2019    CABG       FAMILY HISTORY  Family History   Problem Relation Age of Onset    Cancer Mother         Lung cancer    Hypertension Father     Heart Attack Brother     Drug abuse Brother     Alcohol abuse Brother        SOCIAL HISTORY   reports that he has never smoked. He has been exposed to tobacco smoke. He has never used smokeless tobacco. He reports that he does not currently use alcohol. He reports that he does not use  "drugs.    CURRENT MEDICATIONS  Previous Medications    ALLOPURINOL (ZYLOPRIM) 100 MG TAB    Take 100 mg by mouth every day.    ASCORBIC ACID (VITAMIN C) 1000 MG TAB    Take 1 Tablet by mouth every day.    ASPIRIN EC (ECOTRIN) 81 MG TABLET DELAYED RESPONSE    Take 81 mg by mouth every bedtime.    DIPHENOXYLATE-ATROPINE (LOMOTIL) 2.5-0.025 MG TAB    Take 1 Tablet by mouth 4 times a day as needed for Diarrhea for up to 7 days.    FERROUS SULFATE (IRON PO)    Take 65 mg by mouth every day.    LOSARTAN (COZAAR) 25 MG TAB    Take 25 mg by mouth every day.    METOPROLOL SR (TOPROL XL) 25 MG TABLET SR 24 HR    Take 25 mg by mouth at bedtime.    MORPHINE (MS IR) 15 MG TABLET    Take 1 Tablet by mouth every 8 hours as needed for Severe Pain for up to 30 days.    OLANZAPINE (ZYPREXA) 5 MG TAB    Take 1 tablet by mouth nightly on days 1-5 of chemo treatment week    OMEGA-3 1000 MG CAP    Take 1 Capsule by mouth every day.    ONDANSETRON (ZOFRAN) 4 MG TAB TABLET    Take 1 Tablet by mouth every four hours as needed for Nausea/Vomiting (for nausea, vomiting).    OXYMETAZOLINE (AFRIN) 0.05 % SOLUTION    Spray 2 Sprays in nose 2 times a day as needed for Congestion.    PROCHLORPERAZINE (COMPAZINE) 10 MG TAB    Take 1 Tablet by mouth every 6 hours as needed (for nausea, vomiting).    PROMETHAZINE (PHENERGAN) 25 MG TAB    Take 0.5-1 Tablets by mouth every 6 hours as needed for Nausea/Vomiting.       ALLERGIES  Patient has no known allergies.    PHYSICAL EXAM  /81   Pulse (!) 102   Temp 37.2 °C (98.9 °F) (Temporal)   Resp 16   Ht 1.676 m (5' 6\")   Wt 64.4 kg (142 lb)   SpO2 97%   BMI 22.92 kg/m²   Constitutional: Alert in no apparent distress. Pale in appeareance. Ill appearing.   HENT: No signs of trauma, Bilateral external ears normal, Nose normal. Uvula midline.   Eyes: Pupils are equal and reactive, Conjunctiva normal, Non-icteric.   Neck: Normal range of motion, No tenderness, Supple, No stridor.   Lymphatic: No " lymphadenopathy noted.   Cardiovascular: Regular rate and rhythm, no murmurs.   Thorax & Lungs: Normal breath sounds, No respiratory distress, No wheezing, No chest tenderness.   Abdomen: Bowel sounds normal, Soft, No abdominal tednerness upon palpitation. No peritoneal signs, No masses, No pulsatile masses.   Skin: Warm, Dry, No erythema, No rash.   Back: No bony tenderness, No CVA tenderness.   Extremities: Intact distal pulses, No edema, No tenderness, No cyanosis.  Musculoskeletal: Good range of motion in all major joints. No tenderness to palpation or major deformities noted.   Neurologic: Alert , Normal motor function, Normal sensory function, No focal deficits noted.   Psychiatric: Affect normal, Judgment normal, Mood normal.        DIAGNOSTIC STUDIES    Labs:   Results for orders placed or performed during the hospital encounter of 10/06/23   COD (ADULT)   Result Value Ref Range    ABO Grouping Only A (A)     Rh Grouping Only POS (A)     Antibody Screen-Cod NEG    CBC WITH DIFFERENTIAL   Result Value Ref Range    WBC 38.8 (H) 4.8 - 10.8 K/uL    RBC 3.61 (L) 4.70 - 6.10 M/uL    Hemoglobin 11.4 (L) 14.0 - 18.0 g/dL    Hematocrit 34.1 (L) 42.0 - 52.0 %    MCV 94.5 81.4 - 97.8 fL    MCH 31.6 27.0 - 33.0 pg    MCHC 33.4 32.3 - 36.5 g/dL    RDW 47.1 35.9 - 50.0 fL    Platelet Count 145 (L) 164 - 446 K/uL    MPV 10.8 9.0 - 12.9 fL    Neutrophils-Polys 97.40 (H) 44.00 - 72.00 %    Lymphocytes 1.70 (L) 22.00 - 41.00 %    Monocytes 0.90 0.00 - 13.40 %    Eosinophils 0.00 0.00 - 6.90 %    Basophils 0.00 0.00 - 1.80 %    Nucleated RBC 0.00 0.00 - 0.20 /100 WBC    Neutrophils (Absolute) 37.79 (H) 1.82 - 7.42 K/uL    Lymphs (Absolute) 0.66 (L) 1.00 - 4.80 K/uL    Monos (Absolute) 0.35 0.00 - 0.85 K/uL    Eos (Absolute) 0.00 0.00 - 0.51 K/uL    Baso (Absolute) 0.00 0.00 - 0.12 K/uL    NRBC (Absolute) 0.00 K/uL    Anisocytosis 1+     Microcytosis 1+    COMP METABOLIC PANEL   Result Value Ref Range    Sodium 134 (L) 135 -  145 mmol/L    Potassium 4.4 3.6 - 5.5 mmol/L    Chloride 110 96 - 112 mmol/L    Co2 14 (L) 20 - 33 mmol/L    Anion Gap 10.0 7.0 - 16.0    Glucose 191 (H) 65 - 99 mg/dL    Bun 37 (H) 8 - 22 mg/dL    Creatinine 1.11 0.50 - 1.40 mg/dL    Calcium 7.8 (L) 8.5 - 10.5 mg/dL    Correct Calcium 8.9 8.5 - 10.5 mg/dL    AST(SGOT) 22 12 - 45 U/L    ALT(SGPT) 20 2 - 50 U/L    Alkaline Phosphatase 108 (H) 30 - 99 U/L    Total Bilirubin 0.5 0.1 - 1.5 mg/dL    Albumin 2.6 (L) 3.2 - 4.9 g/dL    Total Protein 4.0 (L) 6.0 - 8.2 g/dL    Globulin 1.4 (L) 1.9 - 3.5 g/dL    A-G Ratio 1.9 g/dL   LIPASE   Result Value Ref Range    Lipase 44 11 - 82 U/L   PROTHROMBIN TIME   Result Value Ref Range    PT 17.7 (H) 12.0 - 14.6 sec    INR 1.43 (H) 0.87 - 1.13   APTT   Result Value Ref Range    APTT 25.8 24.7 - 36.0 sec   TROPONIN   Result Value Ref Range    Troponin T 31 (H) 6 - 19 ng/L   ESTIMATED GFR   Result Value Ref Range    GFR (CKD-EPI) 71 >60 mL/min/1.73 m 2   DIFFERENTIAL MANUAL   Result Value Ref Range    Manual Diff Status PERFORMED    PERIPHERAL SMEAR REVIEW   Result Value Ref Range    Peripheral Smear Review see below    PLATELET ESTIMATE   Result Value Ref Range    Plt Estimation Decreased    MORPHOLOGY   Result Value Ref Range    RBC Morphology Present     Poikilocytosis 1+     Echinocytes 1+     Toxic Gran Few     Dohle Bodies Few    EKG   Result Value Ref Range    Report       [facility] Emergency Dept.    Test Date:  2023-10-06  Pt Name:    JANEEN PRO                 Department: ER  MRN:        [id number]                      Room:       RD 12  Gender:     Male                         Technician: [id number]  :        1955                   Requested By:TANISHA MCALLISTER  Order #:    [id number]                    Reading MD:    Measurements  Intervals                                Axis  Rate:       69                           P:          66  NY:         158                          QRS:        -13  QRSD:        115                          T:          -51  QT:         397  QTc:        426    Interpretive Statements  Sinus rhythm  Ventricular premature complex  Nonspecific intraventricular conduction delay  Inferior infarct, age indeterminate  Compared to ECG 2019 05:49:45  Ventricular premature complex(es) now present  Intraventricular conduction delay now present  Myocardial infarct finding still present          EKG:   I have independently interpreted this EKG  Results for orders placed or performed during the hospital encounter of 10/06/23   EKG   Result Value Ref Range    Report       [facility] Emergency Dept.    Test Date:  2023-10-06  Pt Name:    JANEEN PRO                 Department: ER  MRN:        [id number]                      Room:        12  Gender:     Male                         Technician: [id number]  :        1955                   Requested By:TANISHA MCALLISTER  Order #:    [id number]                    Reading MD:    Measurements  Intervals                                Axis  Rate:       69                           P:          66  WA:         158                          QRS:        -13  QRSD:       115                          T:          -51  QT:         397  QTc:        426    Interpretive Statements  Sinus rhythm  Ventricular premature complex  Nonspecific intraventricular conduction delay  Inferior infarct, age indeterminate  Compared to ECG 2019 05:49:45  Ventricular premature complex(es) now present  Intraventricular conduction delay now present  Myocardial infarct finding still present           Radiology:   The attending emergency physician has independently interpreted the diagnostic imaging associated with this visit and am waiting the final reading from the radiologist.   Preliminary interpretation is a follows: no ptx  Radiologist interpretation:   CTA ABDOMEN PELVIS W & W/O POST PROCESS   Final Result      1.  Pancreatic body 4.3 x 4.3 cm cystic and solid  mass, highly suspicious for malignancy      2.  Distal to the mass the pancreatic duct is dilated      3.  It is highly suspicious there is involvement of the splenic artery, common hepatic artery, and the proximal superior mesenteric artery      4.  Assessment of the venous vasculature including whether there is tumor involvement is nondiagnostic due to the arterial phase only imaging      DX-CHEST-PORTABLE (1 VIEW)   Final Result      No acute cardiopulmonary abnormality identified.             COURSE & MEDICAL DECISION MAKING       INITIAL ASSESSMENT, COURSE AND PLAN  Care Narrative:   4:16 PM Patient presents to the ED with a lower GI bleed. Patient will be treated with calcium GLUConate 2g, calcuim GLUConate 1 g, dextrose 5% infusion, and Nexterone 360mg/200 ml infusion. Ordered for DX-Chest, morphology, platelet estimate, peripheral smear review, differential manual, estimated GFR, Tropnin, APTT, Prothrombin Time, Lipase, CMP, CBC w/ diff, COD and CTA Abdomen pelvis to evaluate his symptoms.      Patient with episodes of V. tach in the setting of GI bleed  Patient on amnio drip  We will continue amnio drip at this time    Patient with acute blood loss anemia from GI bleed  Patient given 3 units of blood and per cardiology instructed to transfuse to level of hemoglobin 9  Patient given Protonix at outside hospital  Patient given antibiotics at outside hospital along with 30 cc/kg bolus    I spoke with oncologist who agrees to consult on the patient and states that elevated white blood cell count may be secondary to recent chemotherapy    4:16 PM - I spoke with Dr. Telles (oncology) for Dr. Issa.     4:29 PM - I spoke with Dr. Renteria (GI).     4:38 PM - I spoke with Dr. Garcia (cardiology) who requests for him to keep his hemoglobin above 9.     4:45 PM - I reevaluated patient. GI wants his CT-scan and cardiology wants his hemoglobin above 9.     CRITICAL CARE  The very real possibilty of a deterioration of  this patient's condition required the highest level of my preparedness for sudden, emergent intervention.  I provided critical care services, which included medication orders, frequent reevaluations of the patient's condition and response to treatment, ordering and reviewing test results, and discussing the case with various consultants.  The critical care time associated with the care of the patient was 80 minutes. Review chart for interventions. This time is exclusive of any other billable procedures.        DISPOSITION AND DISCUSSIONS  I have discussed management of the patient with the following physicians and HUSAM's:  Dr. Garcia (cardiology), Dr. Renteria (GI), and Dr Telles (oncology)    Barriers to care at this time, including but not limited to:  None .     FINAL DIANGOSIS  1. Gastrointestinal hemorrhage, unspecified gastrointestinal hemorrhage type    2. V-tach (HCC)          The note accurately reflects work and decisions made by me.  Bob Resendez M.D.  10/6/2023  8:05 PM   Nikky CANDELARIO ([place]), am scribing for, and in the presence of, Bob Resendez M.D..    Electronically signed by: Nikky Valerio ([place]), 10/6/2023    Bob CANDELARIO M.D. personally performed the services described in this documentation, as scribed by Nikky Valerio in my presence, and it is both accurate and complete.

## 2023-10-07 ENCOUNTER — APPOINTMENT (OUTPATIENT)
Dept: RADIOLOGY | Facility: MEDICAL CENTER | Age: 68
DRG: 378 | End: 2023-10-07
Attending: HOSPITALIST
Payer: MEDICARE

## 2023-10-07 ENCOUNTER — ANESTHESIA (OUTPATIENT)
Dept: SURGERY | Facility: MEDICAL CENTER | Age: 68
DRG: 378 | End: 2023-10-07
Payer: COMMERCIAL

## 2023-10-07 ENCOUNTER — ANESTHESIA EVENT (OUTPATIENT)
Dept: SURGERY | Facility: MEDICAL CENTER | Age: 68
DRG: 378 | End: 2023-10-07
Payer: COMMERCIAL

## 2023-10-07 PROBLEM — E83.42 HYPOMAGNESEMIA: Status: ACTIVE | Noted: 2023-10-07

## 2023-10-07 PROBLEM — D62 ACUTE POST-HEMORRHAGIC ANEMIA: Status: ACTIVE | Noted: 2023-10-07

## 2023-10-07 LAB
BASOPHILS # BLD AUTO: 0.2 % (ref 0–1.8)
BASOPHILS # BLD: 0.05 K/UL (ref 0–0.12)
C DIFF DNA SPEC QL NAA+PROBE: NEGATIVE
C DIFF TOX GENS STL QL NAA+PROBE: NEGATIVE
EOSINOPHIL # BLD AUTO: 0.03 K/UL (ref 0–0.51)
EOSINOPHIL NFR BLD: 0.1 % (ref 0–6.9)
ERYTHROCYTE [DISTWIDTH] IN BLOOD BY AUTOMATED COUNT: 47.1 FL (ref 35.9–50)
ERYTHROCYTE [DISTWIDTH] IN BLOOD BY AUTOMATED COUNT: 48.1 FL (ref 35.9–50)
ERYTHROCYTE [DISTWIDTH] IN BLOOD BY AUTOMATED COUNT: 48.6 FL (ref 35.9–50)
GLUCOSE BLD STRIP.AUTO-MCNC: 126 MG/DL (ref 65–99)
HCT VFR BLD AUTO: 30.1 % (ref 42–52)
HCT VFR BLD AUTO: 30.8 % (ref 42–52)
HCT VFR BLD AUTO: 30.8 % (ref 42–52)
HGB BLD-MCNC: 10.7 G/DL (ref 14–18)
HGB BLD-MCNC: 10.8 G/DL (ref 14–18)
HGB BLD-MCNC: 10.9 G/DL (ref 14–18)
IMM GRANULOCYTES # BLD AUTO: 0.34 K/UL (ref 0–0.11)
IMM GRANULOCYTES NFR BLD AUTO: 1.2 % (ref 0–0.9)
LYMPHOCYTES # BLD AUTO: 1.24 K/UL (ref 1–4.8)
LYMPHOCYTES NFR BLD: 4.4 % (ref 22–41)
MAGNESIUM SERPL-MCNC: 1.6 MG/DL (ref 1.5–2.5)
MCH RBC QN AUTO: 31.8 PG (ref 27–33)
MCH RBC QN AUTO: 32.2 PG (ref 27–33)
MCH RBC QN AUTO: 32.4 PG (ref 27–33)
MCHC RBC AUTO-ENTMCNC: 34.7 G/DL (ref 32.3–36.5)
MCHC RBC AUTO-ENTMCNC: 35.4 G/DL (ref 32.3–36.5)
MCHC RBC AUTO-ENTMCNC: 35.9 G/DL (ref 32.3–36.5)
MCV RBC AUTO: 89.9 FL (ref 81.4–97.8)
MCV RBC AUTO: 91.7 FL (ref 81.4–97.8)
MCV RBC AUTO: 91.7 FL (ref 81.4–97.8)
MONOCYTES # BLD AUTO: 0.35 K/UL (ref 0–0.85)
MONOCYTES NFR BLD AUTO: 1.3 % (ref 0–13.4)
NEUTROPHILS # BLD AUTO: 25.88 K/UL (ref 1.82–7.42)
NEUTROPHILS NFR BLD: 92.8 % (ref 44–72)
NRBC # BLD AUTO: 0 K/UL
NRBC BLD-RTO: 0 /100 WBC (ref 0–0.2)
PLATELET # BLD AUTO: 117 K/UL (ref 164–446)
PLATELET # BLD AUTO: 118 K/UL (ref 164–446)
PLATELET # BLD AUTO: 124 K/UL (ref 164–446)
PMV BLD AUTO: 11 FL (ref 9–12.9)
PMV BLD AUTO: 11.2 FL (ref 9–12.9)
PMV BLD AUTO: 11.4 FL (ref 9–12.9)
RBC # BLD AUTO: 3.35 M/UL (ref 4.7–6.1)
RBC # BLD AUTO: 3.36 M/UL (ref 4.7–6.1)
RBC # BLD AUTO: 3.36 M/UL (ref 4.7–6.1)
WBC # BLD AUTO: 27.9 K/UL (ref 4.8–10.8)
WBC # BLD AUTO: 36.7 K/UL (ref 4.8–10.8)
WBC # BLD AUTO: 37.4 K/UL (ref 4.8–10.8)

## 2023-10-07 PROCEDURE — C9113 INJ PANTOPRAZOLE SODIUM, VIA: HCPCS | Performed by: INTERNAL MEDICINE

## 2023-10-07 PROCEDURE — 87493 C DIFF AMPLIFIED PROBE: CPT

## 2023-10-07 PROCEDURE — 0DB58ZX EXCISION OF ESOPHAGUS, VIA NATURAL OR ARTIFICIAL OPENING ENDOSCOPIC, DIAGNOSTIC: ICD-10-PCS | Performed by: INTERNAL MEDICINE

## 2023-10-07 PROCEDURE — 770000 HCHG ROOM/CARE - INTERMEDIATE ICU *

## 2023-10-07 PROCEDURE — 99222 1ST HOSP IP/OBS MODERATE 55: CPT | Performed by: INTERNAL MEDICINE

## 2023-10-07 PROCEDURE — 700111 HCHG RX REV CODE 636 W/ 250 OVERRIDE (IP): Performed by: INTERNAL MEDICINE

## 2023-10-07 PROCEDURE — 85025 COMPLETE CBC W/AUTO DIFF WBC: CPT

## 2023-10-07 PROCEDURE — 88305 TISSUE EXAM BY PATHOLOGIST: CPT | Mod: 59

## 2023-10-07 PROCEDURE — 87899 AGENT NOS ASSAY W/OPTIC: CPT

## 2023-10-07 PROCEDURE — 99232 SBSQ HOSP IP/OBS MODERATE 35: CPT | Performed by: INTERNAL MEDICINE

## 2023-10-07 PROCEDURE — 700111 HCHG RX REV CODE 636 W/ 250 OVERRIDE (IP): Mod: JZ | Performed by: EMERGENCY MEDICINE

## 2023-10-07 PROCEDURE — 160048 HCHG OR STATISTICAL LEVEL 1-5: Performed by: INTERNAL MEDICINE

## 2023-10-07 PROCEDURE — 160002 HCHG RECOVERY MINUTES (STAT): Performed by: INTERNAL MEDICINE

## 2023-10-07 PROCEDURE — 0DB98ZX EXCISION OF DUODENUM, VIA NATURAL OR ARTIFICIAL OPENING ENDOSCOPIC, DIAGNOSTIC: ICD-10-PCS | Performed by: INTERNAL MEDICINE

## 2023-10-07 PROCEDURE — 160203 HCHG ENDO MINUTES - 1ST 30 MINS LEVEL 4: Performed by: INTERNAL MEDICINE

## 2023-10-07 PROCEDURE — 700111 HCHG RX REV CODE 636 W/ 250 OVERRIDE (IP): Mod: JZ | Performed by: HOSPITALIST

## 2023-10-07 PROCEDURE — 87045 FECES CULTURE AEROBIC BACT: CPT

## 2023-10-07 PROCEDURE — 83735 ASSAY OF MAGNESIUM: CPT

## 2023-10-07 PROCEDURE — 160009 HCHG ANES TIME/MIN: Performed by: INTERNAL MEDICINE

## 2023-10-07 PROCEDURE — 43239 EGD BIOPSY SINGLE/MULTIPLE: CPT | Performed by: INTERNAL MEDICINE

## 2023-10-07 PROCEDURE — 87046 STOOL CULTR AEROBIC BACT EA: CPT

## 2023-10-07 PROCEDURE — 85027 COMPLETE CBC AUTOMATED: CPT | Mod: 91

## 2023-10-07 PROCEDURE — 700105 HCHG RX REV CODE 258: Performed by: STUDENT IN AN ORGANIZED HEALTH CARE EDUCATION/TRAINING PROGRAM

## 2023-10-07 PROCEDURE — 160035 HCHG PACU - 1ST 60 MINS PHASE I: Performed by: INTERNAL MEDICINE

## 2023-10-07 PROCEDURE — 78278 ACUTE GI BLOOD LOSS IMAGING: CPT

## 2023-10-07 PROCEDURE — 82962 GLUCOSE BLOOD TEST: CPT

## 2023-10-07 PROCEDURE — 99233 SBSQ HOSP IP/OBS HIGH 50: CPT | Performed by: HOSPITALIST

## 2023-10-07 PROCEDURE — 700111 HCHG RX REV CODE 636 W/ 250 OVERRIDE (IP): Mod: JZ,JG | Performed by: INTERNAL MEDICINE

## 2023-10-07 PROCEDURE — 700111 HCHG RX REV CODE 636 W/ 250 OVERRIDE (IP): Performed by: STUDENT IN AN ORGANIZED HEALTH CARE EDUCATION/TRAINING PROGRAM

## 2023-10-07 PROCEDURE — 88312 SPECIAL STAINS GROUP 1: CPT

## 2023-10-07 RX ORDER — SODIUM CHLORIDE, SODIUM LACTATE, POTASSIUM CHLORIDE, CALCIUM CHLORIDE 600; 310; 30; 20 MG/100ML; MG/100ML; MG/100ML; MG/100ML
INJECTION, SOLUTION INTRAVENOUS
Status: DISCONTINUED | OUTPATIENT
Start: 2023-10-07 | End: 2023-10-07 | Stop reason: SURG

## 2023-10-07 RX ORDER — HYDROMORPHONE HYDROCHLORIDE 1 MG/ML
0.4 INJECTION, SOLUTION INTRAMUSCULAR; INTRAVENOUS; SUBCUTANEOUS
Status: DISCONTINUED | OUTPATIENT
Start: 2023-10-07 | End: 2023-10-07 | Stop reason: HOSPADM

## 2023-10-07 RX ORDER — METOPROLOL TARTRATE 1 MG/ML
1 INJECTION, SOLUTION INTRAVENOUS
Status: DISCONTINUED | OUTPATIENT
Start: 2023-10-07 | End: 2023-10-07 | Stop reason: HOSPADM

## 2023-10-07 RX ORDER — DIPHENHYDRAMINE HYDROCHLORIDE 50 MG/ML
12.5 INJECTION INTRAMUSCULAR; INTRAVENOUS
Status: DISCONTINUED | OUTPATIENT
Start: 2023-10-07 | End: 2023-10-07 | Stop reason: HOSPADM

## 2023-10-07 RX ORDER — DEXAMETHASONE SODIUM PHOSPHATE 4 MG/ML
INJECTION, SOLUTION INTRA-ARTICULAR; INTRALESIONAL; INTRAMUSCULAR; INTRAVENOUS; SOFT TISSUE PRN
Status: DISCONTINUED | OUTPATIENT
Start: 2023-10-07 | End: 2023-10-07 | Stop reason: SURG

## 2023-10-07 RX ORDER — HYDROMORPHONE HYDROCHLORIDE 1 MG/ML
0.1 INJECTION, SOLUTION INTRAMUSCULAR; INTRAVENOUS; SUBCUTANEOUS
Status: DISCONTINUED | OUTPATIENT
Start: 2023-10-07 | End: 2023-10-07 | Stop reason: HOSPADM

## 2023-10-07 RX ORDER — HYDROMORPHONE HYDROCHLORIDE 1 MG/ML
0.2 INJECTION, SOLUTION INTRAMUSCULAR; INTRAVENOUS; SUBCUTANEOUS
Status: DISCONTINUED | OUTPATIENT
Start: 2023-10-07 | End: 2023-10-07 | Stop reason: HOSPADM

## 2023-10-07 RX ORDER — ONDANSETRON 2 MG/ML
INJECTION INTRAMUSCULAR; INTRAVENOUS PRN
Status: DISCONTINUED | OUTPATIENT
Start: 2023-10-07 | End: 2023-10-07 | Stop reason: SURG

## 2023-10-07 RX ORDER — ONDANSETRON 2 MG/ML
4 INJECTION INTRAMUSCULAR; INTRAVENOUS
Status: DISCONTINUED | OUTPATIENT
Start: 2023-10-07 | End: 2023-10-07 | Stop reason: HOSPADM

## 2023-10-07 RX ORDER — HALOPERIDOL 5 MG/ML
1 INJECTION INTRAMUSCULAR
Status: DISCONTINUED | OUTPATIENT
Start: 2023-10-07 | End: 2023-10-07 | Stop reason: HOSPADM

## 2023-10-07 RX ORDER — LABETALOL HYDROCHLORIDE 5 MG/ML
5 INJECTION, SOLUTION INTRAVENOUS
Status: DISCONTINUED | OUTPATIENT
Start: 2023-10-07 | End: 2023-10-07 | Stop reason: HOSPADM

## 2023-10-07 RX ORDER — PHENYLEPHRINE HYDROCHLORIDE 10 MG/ML
INJECTION, SOLUTION INTRAMUSCULAR; INTRAVENOUS; SUBCUTANEOUS PRN
Status: DISCONTINUED | OUTPATIENT
Start: 2023-10-07 | End: 2023-10-07 | Stop reason: SURG

## 2023-10-07 RX ORDER — OXYCODONE HCL 5 MG/5 ML
5 SOLUTION, ORAL ORAL
Status: DISCONTINUED | OUTPATIENT
Start: 2023-10-07 | End: 2023-10-07 | Stop reason: HOSPADM

## 2023-10-07 RX ORDER — OXYCODONE HCL 5 MG/5 ML
10 SOLUTION, ORAL ORAL
Status: DISCONTINUED | OUTPATIENT
Start: 2023-10-07 | End: 2023-10-07 | Stop reason: HOSPADM

## 2023-10-07 RX ORDER — HYDRALAZINE HYDROCHLORIDE 20 MG/ML
5 INJECTION INTRAMUSCULAR; INTRAVENOUS
Status: DISCONTINUED | OUTPATIENT
Start: 2023-10-07 | End: 2023-10-07 | Stop reason: HOSPADM

## 2023-10-07 RX ORDER — EPHEDRINE SULFATE 50 MG/ML
5 INJECTION, SOLUTION INTRAVENOUS
Status: DISCONTINUED | OUTPATIENT
Start: 2023-10-07 | End: 2023-10-07 | Stop reason: HOSPADM

## 2023-10-07 RX ORDER — MAGNESIUM SULFATE HEPTAHYDRATE 40 MG/ML
2 INJECTION, SOLUTION INTRAVENOUS ONCE
Status: COMPLETED | OUTPATIENT
Start: 2023-10-07 | End: 2023-10-07

## 2023-10-07 RX ADMIN — PROPOFOL 120 MCG/KG/MIN: 10 INJECTION, EMULSION INTRAVENOUS at 08:50

## 2023-10-07 RX ADMIN — SODIUM CHLORIDE, POTASSIUM CHLORIDE, SODIUM LACTATE AND CALCIUM CHLORIDE: 600; 310; 30; 20 INJECTION, SOLUTION INTRAVENOUS at 08:48

## 2023-10-07 RX ADMIN — PANTOPRAZOLE SODIUM 40 MG: 40 INJECTION, POWDER, FOR SOLUTION INTRAVENOUS at 17:42

## 2023-10-07 RX ADMIN — MAGNESIUM SULFATE HEPTAHYDRATE 2 G: 2 INJECTION, SOLUTION INTRAVENOUS at 14:44

## 2023-10-07 RX ADMIN — PHENYLEPHRINE HYDROCHLORIDE 150 MCG: 10 INJECTION INTRAVENOUS at 08:52

## 2023-10-07 RX ADMIN — PANTOPRAZOLE SODIUM 40 MG: 40 INJECTION, POWDER, FOR SOLUTION INTRAVENOUS at 06:36

## 2023-10-07 RX ADMIN — AMIODARONE HYDROCHLORIDE 0.5 MG/MIN: 1.8 INJECTION, SOLUTION INTRAVENOUS at 21:24

## 2023-10-07 RX ADMIN — PHENYLEPHRINE HYDROCHLORIDE 150 MCG: 10 INJECTION INTRAVENOUS at 09:00

## 2023-10-07 RX ADMIN — ONDANSETRON 4 MG: 2 INJECTION INTRAMUSCULAR; INTRAVENOUS at 08:53

## 2023-10-07 RX ADMIN — TBO-FILGRASTIM 300 MCG: 300 INJECTION, SOLUTION SUBCUTANEOUS at 17:42

## 2023-10-07 RX ADMIN — PROPOFOL 40 MG: 10 INJECTION, EMULSION INTRAVENOUS at 08:49

## 2023-10-07 RX ADMIN — DEXAMETHASONE SODIUM PHOSPHATE 4 MG: 4 INJECTION INTRA-ARTICULAR; INTRALESIONAL; INTRAMUSCULAR; INTRAVENOUS; SOFT TISSUE at 08:53

## 2023-10-07 RX ADMIN — AMIODARONE HYDROCHLORIDE 0.5 MG/MIN: 1.8 INJECTION, SOLUTION INTRAVENOUS at 08:45

## 2023-10-07 ASSESSMENT — COGNITIVE AND FUNCTIONAL STATUS - GENERAL
MOBILITY SCORE: 24
DAILY ACTIVITIY SCORE: 24
SUGGESTED CMS G CODE MODIFIER DAILY ACTIVITY: CH
DAILY ACTIVITIY SCORE: 24
MOBILITY SCORE: 24
SUGGESTED CMS G CODE MODIFIER DAILY ACTIVITY: CH
SUGGESTED CMS G CODE MODIFIER MOBILITY: CH
SUGGESTED CMS G CODE MODIFIER MOBILITY: CH

## 2023-10-07 ASSESSMENT — LIFESTYLE VARIABLES
AVERAGE NUMBER OF DAYS PER WEEK YOU HAVE A DRINK CONTAINING ALCOHOL: 0
CONSUMPTION TOTAL: NEGATIVE
TOTAL SCORE: 0
DOES PATIENT WANT TO STOP DRINKING: NO
EVER HAD A DRINK FIRST THING IN THE MORNING TO STEADY YOUR NERVES TO GET RID OF A HANGOVER: NO
TOTAL SCORE: 0
TOTAL SCORE: 0
ON A TYPICAL DAY WHEN YOU DRINK ALCOHOL HOW MANY DRINKS DO YOU HAVE: 0
HAVE PEOPLE ANNOYED YOU BY CRITICIZING YOUR DRINKING: NO
DOES PATIENT WANT TO STOP DRINKING: NO
TOTAL SCORE: 0
EVER HAD A DRINK FIRST THING IN THE MORNING TO STEADY YOUR NERVES TO GET RID OF A HANGOVER: NO
HAVE YOU EVER FELT YOU SHOULD CUT DOWN ON YOUR DRINKING: NO
CONSUMPTION TOTAL: INCOMPLETE
HAVE YOU EVER FELT YOU SHOULD CUT DOWN ON YOUR DRINKING: NO
EVER FELT BAD OR GUILTY ABOUT YOUR DRINKING: NO
TOTAL SCORE: 0
TOTAL SCORE: 0
EVER FELT BAD OR GUILTY ABOUT YOUR DRINKING: NO
HOW MANY TIMES IN THE PAST YEAR HAVE YOU HAD 5 OR MORE DRINKS IN A DAY: 0
ALCOHOL_USE: NO
HAVE PEOPLE ANNOYED YOU BY CRITICIZING YOUR DRINKING: NO

## 2023-10-07 ASSESSMENT — PAIN DESCRIPTION - PAIN TYPE
TYPE: ACUTE PAIN
TYPE: ACUTE PAIN;SURGICAL PAIN
TYPE: ACUTE PAIN
TYPE: ACUTE PAIN;SURGICAL PAIN
TYPE: ACUTE PAIN
TYPE: ACUTE PAIN;SURGICAL PAIN
TYPE: ACUTE PAIN
TYPE: ACUTE PAIN

## 2023-10-07 ASSESSMENT — ENCOUNTER SYMPTOMS
DIZZINESS: 1
RESPIRATORY NEGATIVE: 1
SORE THROAT: 0
COUGH: 0
HEADACHES: 0
NAUSEA: 0
WEAKNESS: 1
EYES NEGATIVE: 1
ABDOMINAL PAIN: 0
BACK PAIN: 0
GASTROINTESTINAL NEGATIVE: 1
CARDIOVASCULAR NEGATIVE: 1
VOMITING: 0
PSYCHIATRIC NEGATIVE: 1
BLOOD IN STOOL: 1
DOUBLE VISION: 0
CHILLS: 0
LOSS OF CONSCIOUSNESS: 0
PALPITATIONS: 0
FEVER: 0
MUSCULOSKELETAL NEGATIVE: 1
DIARRHEA: 0
DIZZINESS: 0
SHORTNESS OF BREATH: 0
BLURRED VISION: 0

## 2023-10-07 ASSESSMENT — FIBROSIS 4 INDEX
FIB4 SCORE: 2.92
FIB4 SCORE: 2.78

## 2023-10-07 ASSESSMENT — PAIN SCALES - GENERAL: PAIN_LEVEL: 0

## 2023-10-07 NOTE — PROCEDURES
OPERATIVE REPORT    PATIENT:   Lucas Rojas   1955       PREOPERATIVE DIAGNOSES/INDICATIONS: hematochezia, melena, near syncope, acute blood loss anemia, pancreatic cancer    POSTOPERATIVE DIAGNOSIS: esophageal ulcer x 2, extensive ulceration  duodenum to jejunum, abnormal tissue in duodenum suspect malignant    PROCEDURE:  ESOPHAGOGASTRODUODENOSCOPY with biopsies    PHYSICIAN:  Sofi Renteria MD    ANESTHESIA:  Per anesthesiologist.    LOCATION: [facility]    CONSENT:  OBTAINED. The risks, benefits and alternatives of the procedure were discussed in details. The risks include and are not limited to bleeding, infection, perforation, missed lesions, and sedations risks (cardiopulmonary compromise and allergic reaction to medications).    DESCRIPTION: The patient presented to the procedure room.  After routine checkup was performed, patient was brought into the endoscopy suite.  Patient was placed on his left lateral decubitus position. A bite block was placed in patient's mouth. Patient was sedated by anesthesia.  Vital signs were monitored throughout the procedure.  Oxygenation support was provided throughout the procedure. Upper endoscope was inserted into patient's mouth and advanced to the second portion of the duodenum under direct visualization.      Once the site was reached and examined, the upper endoscope was withdrawn.  Retroflexion was made within the stomach.  The stomach was decompressed, scope was withdrawn and the procedure was terminated.     The patient tolerated the procedure well.  There were no immediate complications.    OPERATIVE FINDINGS:    1. Esophagus: 2 shallow ulcers at 35 cm without esophagitis.  Near one another so possible pill induced.  Biopsies taken    2. Stomach: Normal.  No blood in lumen    3. Duodenum: bulb normal.  2nd portion with abnormal mucosa and suspect malignant.  Biopsies taken.  Extensive ulceration throughout duodenum with ulceration extending into  jejunum.  No active bleeding at this time but multiple sites of pigmented spots that represent stigmata of previous bleeding. Too large for endoscopic treatment.    IMPRESSION/RECOMMENDATIONS:  Will follow up on pathology  Continue IV pantoprazole  Cl liq diet  I will speak with Surg Onc about this patient in terms of vascular involvement leading to ulceration  Will discuss with Dr. Brennan  If rebleeds, may benefit from bleeding scan instead of CTA

## 2023-10-07 NOTE — H&P
Hospital Medicine History & Physical Note    Date of Service  10/6/2023    Primary Care Physician  Luis Fernando Culp M.D.    Consultants  GI  Cardiology  Intensivist    Code Status  Full Code    Chief Complaint  Chief Complaint   Patient presents with    Lower GI Bleed     Pt presented to [facility] after near syncopal event. Pt has been having bloody stools for the past few days, but several times this am.     Weakness       History of Presenting Illness  Lucas Rojas is a 68 y.o. male who presented 10/6/2023 with generalized weakness.  Patient takes asa and has a history of pancreatic cancer on chemotherapy.  For the last 24 hours, patient has been having a mix of dark-colored stool and bright red blood per rectum.  Last night, he began to feel dizzy and an overall generalized weakness.  He woke up this morning feeling extremely tired, brought him to come to outside facility ER for evaluation.    He presented to outside facility ER.  He was found to be anemic and given 3 units of blood.  Also found to have ventricular tachycardia to which he was started amiodarone and transferred to [facility] for further care.    I discussed the plan of care with patient.    Review of Systems  Review of Systems   Constitutional:  Positive for malaise/fatigue.   HENT: Negative.     Eyes: Negative.    Respiratory: Negative.     Cardiovascular: Negative.    Gastrointestinal:  Positive for blood in stool, diarrhea, melena and nausea.   Genitourinary: Negative.    Musculoskeletal: Negative.    Skin: Negative.    Neurological: Negative.    Endo/Heme/Allergies: Negative.    Psychiatric/Behavioral: Negative.         Past Medical History   has a past medical history of Anemia, Anesthesia, Arthritis, Cancer ([facility]) (Current), Cataract, Coronary artery disease involving native coronary artery without angina pectoris (03/06/2019), Emphysema of lung (Former[facility]), High cholesterol, Hypertension, Myocardial infarct ([facility]) (Feb 22, 2019), Pain  (03/30/2017), PONV (postoperative nausea and vomiting), and Snoring.    Surgical History   has a past surgical history that includes shoulder surgery (01/01/2012); shoulder surgery (01/01/2013); appendectomy (01/01/1976); fusion, spine, lumbar, plif (N/A, 12/15/2015); lumbar laminectomy diskectomy (Left, 12/15/2015); fusion, spine, lumbar, plif (04/04/2017); lumbar laminectomy diskectomy (Right, 04/04/2017); foraminotomy (04/04/2017); hardware removal ortho (04/04/2017); other cardiac surgery (Sept 6, 2019); and cataract extraction with iol (Bilateral).     Family History  family history includes Alcohol abuse in his brother; Cancer in his mother; Drug abuse in his brother; Heart Attack in his brother; Hypertension in his father.   Family history reviewed with patient. There is no family history that is pertinent to the chief complaint.     Social History   reports that he has never smoked. He has been exposed to tobacco smoke. He has never used smokeless tobacco. He reports that he does not currently use alcohol. He reports that he does not use drugs.    Allergies  No Known Allergies    Medications  Prior to Admission Medications   Prescriptions Last Dose Informant Patient Reported? Taking?   Ascorbic Acid (VITAMIN C) 1000 MG Tab  Patient Yes No   Sig: Take 1 Tablet by mouth every day.   Ferrous Sulfate (IRON PO)  Patient Yes No   Sig: Take 65 mg by mouth every day.   OLANZapine (ZYPREXA) 5 MG Tab   No No   Sig: Take 1 tablet by mouth nightly on days 1-5 of chemo treatment week   Omega-3 1000 MG Cap  Patient Yes No   Sig: Take 1 Capsule by mouth every day.   allopurinol (ZYLOPRIM) 100 MG Tab  Patient Yes No   Sig: Take 100 mg by mouth every day.   aspirin EC (ECOTRIN) 81 MG Tablet Delayed Response  Patient Yes No   Sig: Take 81 mg by mouth every bedtime.   diphenoxylate-atropine (LOMOTIL) 2.5-0.025 MG Tab   No No   Sig: Take 1 Tablet by mouth 4 times a day as needed for Diarrhea for up to 7 days.   losartan  (COZAAR) 25 MG Tab  Patient Yes No   Sig: Take 25 mg by mouth every day.   metoprolol SR (TOPROL XL) 25 MG TABLET SR 24 HR   Yes No   Sig: Take 25 mg by mouth at bedtime.   morphine (MS IR) 15 MG tablet  Patient No No   Sig: Take 1 Tablet by mouth every 8 hours as needed for Severe Pain for up to 30 days.   ondansetron (ZOFRAN) 4 MG Tab tablet   No No   Sig: Take 1 Tablet by mouth every four hours as needed for Nausea/Vomiting (for nausea, vomiting).   oxymetazoline (AFRIN) 0.05 % Solution  Patient Yes No   Sig: Spray 2 Sprays in nose 2 times a day as needed for Congestion.   prochlorperazine (COMPAZINE) 10 MG Tab   No No   Sig: Take 1 Tablet by mouth every 6 hours as needed (for nausea, vomiting).   promethazine (PHENERGAN) 25 MG Tab   No No   Sig: Take 0.5-1 Tablets by mouth every 6 hours as needed for Nausea/Vomiting.      Facility-Administered Medications: None       Physical Exam  Temp:  [37.2 °C (98.9 °F)] 37.2 °C (98.9 °F)  Pulse:  [] 72  Resp:  [16-17] 17  BP: (108-125)/(73-81) 121/77  SpO2:  [94 %-98 %] 97 %  Blood Pressure : 121/77   Temperature: 37.2 °C (98.9 °F)   Pulse: 72   Respiration: 17   Pulse Oximetry: 97 %       Physical Exam  Constitutional:       Appearance: Normal appearance. He is normal weight.   HENT:      Head: Normocephalic.      Nose: Nose normal.      Mouth/Throat:      Mouth: Mucous membranes are moist.   Eyes:      Pupils: Pupils are equal, round, and reactive to light.   Cardiovascular:      Rate and Rhythm: Normal rate and regular rhythm.      Pulses: Normal pulses.   Pulmonary:      Effort: Pulmonary effort is normal.      Breath sounds: Normal breath sounds.   Abdominal:      General: Abdomen is flat. Bowel sounds are normal.      Palpations: Abdomen is soft.   Musculoskeletal:         General: Normal range of motion.      Cervical back: Neck supple.   Skin:     General: Skin is warm.   Neurological:      General: No focal deficit present.      Mental Status: He is alert and  "oriented to person, place, and time. Mental status is at baseline.   Psychiatric:         Mood and Affect: Mood normal.         Behavior: Behavior normal.         Thought Content: Thought content normal.         Judgment: Judgment normal.         Laboratory:  Recent Labs     10/06/23  1615   WBC 38.8*   RBC 3.61*   HEMOGLOBIN 11.4*   HEMATOCRIT 34.1*   MCV 94.5   MCH 31.6   MCHC 33.4   RDW 47.1   PLATELETCT 145*   MPV 10.8     Recent Labs     10/06/23  1615   SODIUM 134*   POTASSIUM 4.4   CHLORIDE 110   CO2 14*   GLUCOSE 191*   BUN 37*   CREATININE 1.11   CALCIUM 7.8*     Recent Labs     10/06/23  1615   ALTSGPT 20   ASTSGOT 22   ALKPHOSPHAT 108*   TBILIRUBIN 0.5   LIPASE 44   GLUCOSE 191*     Recent Labs     10/06/23  1615   APTT 25.8   INR 1.43*     No results for input(s): \"NTPROBNP\" in the last 72 hours.      Recent Labs     10/06/23  1615   TROPONINT 31*       Imaging:  CTA ABDOMEN PELVIS W & W/O POST PROCESS   Final Result      1.  Pancreatic body 4.3 x 4.3 cm cystic and solid mass, highly suspicious for malignancy      2.  Distal to the mass the pancreatic duct is dilated      3.  It is highly suspicious there is involvement of the splenic artery, common hepatic artery, and the proximal superior mesenteric artery      4.  Assessment of the venous vasculature including whether there is tumor involvement is nondiagnostic due to the arterial phase only imaging      DX-CHEST-PORTABLE (1 VIEW)   Final Result      No acute cardiopulmonary abnormality identified.          no X-Ray or EKG requiring interpretation    Assessment/Plan:  Justification for Admission Status  I anticipate this patient will require at least two midnights for appropriate medical management, necessitating inpatient admission because patient has anemia ventricular tachycardia    Patient will need a Intermediate Care (Adult and Pediatrics) bed on MEDICAL service .  The need is secondary to anemia.    * GIB (gastrointestinal bleeding)- (present " on admission)  Assessment & Plan  Patient will go to IMCU  Protonix  Fluids  Serial CBC, transfuse as needed.  Already given 3 units of blood outside facility  CT angio negative for active extravasation      ACP (advance care planning)  Assessment & Plan  16 minutes spent discussing goals of care with patient.  He was explained his clinical condition and diagnosis.  He states that he would like to be full code, including chest compressions and intubation.    V tach (HCC)  Assessment & Plan  Likely from demand ischemia from GI bleed  Fluid resuscitation, transfuse as needed  Cardiology on board recommends transfusing below 9  Echo in a.m.    Pancreatic cancer (HCC)  Assessment & Plan  On chemo  Following with oncology outpt          VTE prophylaxis: pharmacologic prophylaxis contraindicated due to anemia

## 2023-10-07 NOTE — OR NURSING
0946: pt's wife Lashae phoned and updated on pt status in Recovery and return to room [facility].  Lashae is at home in [place], [place].

## 2023-10-07 NOTE — PROGRESS NOTES
Patient is in the ER. This RN went to [facility] and disconnected CADD pump from patient. PORT flushed with + blood return, saline locked for ER to use as access.

## 2023-10-07 NOTE — ASSESSMENT & PLAN NOTE
Likely from demand ischemia from GI bleed  Fluid resuscitation, transfuse as needed  Cardiology on board recommends transfusing below 9  Follow and replace K, Mg, Ca  No episodes in last 24hrs  DC amio and start po BB  OK to Tele

## 2023-10-07 NOTE — ASSESSMENT & PLAN NOTE
On chemo  F/B Dr Luis Manuel Ku/Onc; will call him on Monday  F/B Dr Jerez Onc Srgry; DW Dr KOENIG on call for him this weekend  Hope is to shrink the tumour with chemo prior to hopefully curative resection

## 2023-10-07 NOTE — PROGRESS NOTES
Monitor summary:    SR ST, multiple PVCs, bigeminy, trigeminy. Non sustained run of vtach. Pt was

## 2023-10-07 NOTE — PROGRESS NOTES
"[facility] Medicine Daily Progress Note    Date of Service  10/7/2023    Chief Complaint  Lucas Rojas is a 68 y.o. male admitted 10/6/2023 with BRBPR    Hospital Course  69yo PMHx of CAD/CABG, COPD, HLD, HTN, pancreatic CA on chemo presenting with BRBPR.  Hypotensive on presentation to the outlying facility and was given 3 units of PRBCs.  While in [facility] ED pt had several runs of non sustained VT up to 30 sec.  Started on Amio.      Interval Problem Update  Pt feels \"OK\".  No abd pain, N or V.  Rich  Just had a large bloody BM    I have discussed this patient's plan of care and discharge plan at IDT rounds today with Case Management, Nursing, Nursing leadership, and other members of the IDT team.    Consultants/Specialty  GI    Code Status  Full Code    Disposition  The patient is not medically cleared for discharge to home or a post-acute facility.      I have placed the appropriate orders for post-discharge needs.    Review of Systems  Review of Systems   Constitutional:  Negative for chills and fever.   HENT:  Negative for nosebleeds and sore throat.    Eyes:  Negative for blurred vision and double vision.   Respiratory:  Negative for cough and shortness of breath.    Cardiovascular:  Negative for chest pain, palpitations and leg swelling.   Gastrointestinal:  Positive for blood in stool. Negative for abdominal pain, diarrhea, nausea and vomiting.   Genitourinary:  Negative for dysuria and urgency.   Musculoskeletal:  Negative for back pain.   Skin:  Negative for rash.   Neurological:  Negative for dizziness, loss of consciousness and headaches.        Physical Exam  Temp:  [36.1 °C (97 °F)-37.2 °C (98.9 °F)] 36.2 °C (97.2 °F)  Pulse:  [] 80  Resp:  [7-22] 22  BP: ()/(62-81) 118/72  SpO2:  [94 %-100 %] 96 %    Physical Exam  Constitutional:       General: He is not in acute distress.     Appearance: He is well-developed. He is not diaphoretic.   HENT:      Head: Normocephalic and atraumatic. "   Eyes:      Conjunctiva/sclera: Conjunctivae normal.   Neck:      Vascular: No JVD.   Cardiovascular:      Rate and Rhythm: Normal rate.      Heart sounds: No murmur heard.     No gallop.   Pulmonary:      Effort: Pulmonary effort is normal. No respiratory distress.      Breath sounds: No stridor. No wheezing or rales.   Abdominal:      Palpations: Abdomen is soft.      Tenderness: There is no abdominal tenderness. There is no guarding or rebound.   Musculoskeletal:         General: No tenderness.      Right lower leg: No edema.      Left lower leg: No edema.   Skin:     General: Skin is warm and dry.      Coloration: Skin is pale.      Findings: No rash.   Neurological:      Mental Status: He is oriented to person, place, and time.   Psychiatric:         Thought Content: Thought content normal.         Fluids    Intake/Output Summary (Last 24 hours) at 10/7/2023 0704  Last data filed at 10/7/2023 0400  Gross per 24 hour   Intake 487.34 ml   Output 175 ml   Net 312.34 ml       Laboratory  Recent Labs     10/06/23  1615 10/07/23  0318   WBC 38.8* 27.9*   RBC 3.61* 3.36*   HEMOGLOBIN 11.4* 10.9*   HEMATOCRIT 34.1* 30.8*   MCV 94.5 91.7   MCH 31.6 32.4   MCHC 33.4 35.4   RDW 47.1 48.6   PLATELETCT 145* 118*   MPV 10.8 11.4     Recent Labs     10/06/23  1615   SODIUM 134*   POTASSIUM 4.4   CHLORIDE 110   CO2 14*   GLUCOSE 191*   BUN 37*   CREATININE 1.11   CALCIUM 7.8*     Recent Labs     10/06/23  1615   APTT 25.8   INR 1.43*               Imaging  CTA ABDOMEN PELVIS W & W/O POST PROCESS   Final Result      1.  Pancreatic body 4.3 x 4.3 cm cystic and solid mass, highly suspicious for malignancy      2.  Distal to the mass the pancreatic duct is dilated      3.  It is highly suspicious there is involvement of the splenic artery, common hepatic artery, and the proximal superior mesenteric artery      4.  Assessment of the venous vasculature including whether there is tumor involvement is nondiagnostic due to the  arterial phase only imaging      DX-CHEST-PORTABLE (1 VIEW)   Final Result      No acute cardiopulmonary abnormality identified.      NM-GI BLEEDING SCAN    (Results Pending)        Assessment/Plan  * GIB (gastrointestinal bleeding)- (present on admission)  Assessment & Plan  S/p 3 units PRBCs  CTA neg  EGD 10/7: large duodenal ulcer, no active bleeding seen  GI recommends STAT NM bleedinig scan if any sign of further active bleeding  F/b Onc Srgr Dr Jerez  Serial H&H  STAT TEG  Access: pt has a port and several PIVs  Follow ionized Ca      Hypomagnesemia  Assessment & Plan  1.6 today  Giving 2g IV MgSO4  Repeat lab in am  Aim for >2    Acute post-hemorrhagic anemia  Assessment & Plan  Secondary to GI bleeding  Goal Hgb>9 in setting of demand induced VT    ACP (advance care planning)  Assessment & Plan  FULL CODE per dw my partner    V tach (HCC)  Assessment & Plan  Likely from demand ischemia from GI bleed  Fluid resuscitation, transfuse as needed  Cardiology on board recommends transfusing below 9  Follow and replace K, Mg, Ca  Tele monitoring in IMCU      Pancreatic cancer (HCC)  Assessment & Plan  On chemo  F/B Dr Issa Heme/Onc; will call him on Monday  F/B Dr Jerez Onc Srgry; DW Dr KOENIG on call for him this weekend  Hope is to shrink the tumour with chemo prior to hopefully curative resection      Coronary artery disease involving native coronary artery without angina pectoris- (present on admission)  Assessment & Plan  S/p 3 vessel CABG  Cards following  Not on antiplatele therapy for obvious reasons  No evidence of acute ishcemia         VTE prophylaxis:   SCDs/TEDs      I have performed a physical exam and reviewed and updated ROS and Plan today (10/7/2023). In review of yesterday's note (10/6/2023), there are no changes except as documented above.

## 2023-10-07 NOTE — CARE PLAN
The patient is Watcher - Medium risk of patient condition declining or worsening    Shift Goals  Clinical Goals: Hemodynamic Stability, Monitor Bleeding,  Patient Goals: Rest, would like some water.    Progress made toward(s) clinical / shift goals:    Problem: Knowledge Deficit - Standard  Goal: Patient and family/care givers will demonstrate understanding of plan of care, disease process/condition, diagnostic tests and medications  Outcome: Progressing  Note: Patient updated on POC multiple times through shift. All questions answered.      Problem: Hemodynamics  Goal: Patient's hemodynamics, fluid balance and neurologic status will be stable or improve  Outcome: Progressing     Problem: Respiratory  Goal: Patient will achieve/maintain optimum respiratory ventilation and gas exchange  Outcome: Progressing       Patient is not progressing towards the following goals:

## 2023-10-07 NOTE — ANESTHESIA TIME REPORT
Anesthesia Start and Stop Event Times     Date Time Event    10/7/2023 0839 Ready for Procedure     0848 Anesthesia Start     0911 Anesthesia Stop        Responsible Staff  10/07/23    Name Role Begin End    Tyler Soto M.D. Anesth 0848 0911        Overtime Reason:  no overtime (within assigned shift)    Comments:

## 2023-10-07 NOTE — ASSESSMENT & PLAN NOTE
Secondary to GI bleeding  Goal Hgb>9 in setting of demand induced VT    10/8  Hgb stable in 10s  No clinical signs of active bleeding  Cont daily CBC

## 2023-10-07 NOTE — CARE PLAN
The patient is Watcher - Medium risk of patient condition declining or worsening    Shift Goals  Clinical Goals: hemodynamically stable  Patient Goals: rest    Progress made toward(s) clinical / shift goals:        Problem: Knowledge Deficit - Standard  Goal: Patient and family/care givers will demonstrate understanding of plan of care, disease process/condition, diagnostic tests and medications  Outcome: Progressing  Note: Pt educated regarding plan of care and medications. All questions answered.       Problem: Hemodynamics  Goal: Patient's hemodynamics, fluid balance and neurologic status will be stable or improve  Outcome: Progressing  Note: IVF running, BP stabilized, VS taken Q2 hours, pt on continuous cardiac monitoring. Daily labs drawn.

## 2023-10-07 NOTE — PROGRESS NOTES
4 Eyes Skin Assessment Completed by MINDY Donovan and MINDY Kinsey.    Head WDL  Ears WDL  Nose WDL  Mouth WDL  Neck WDL  Breast/Chest WDL  Shoulder Blades WDL  Spine WDL  (R) Arm/Elbow/Hand WDL  (L) Arm/Elbow/Hand WDL  Abdomen WDL  Groin WDL  Scrotum/Coccyx/Buttocks WDL  (R) Leg WDL  (L) Leg WDL  (R) Heel/Foot/Toe WDL  (L) Heel/Foot/Toe WDL          Devices In Places Tele Box, Blood Pressure Cuff, Pulse Ox, and Central Line      Interventions In Place Q2 Turns and Low Air Loss Mattress    Possible Skin Injury No    Pictures Uploaded Into Epic N/A  Wound Consult Placed N/A  RN Wound Prevention Protocol Ordered No

## 2023-10-07 NOTE — CONSULTS
Gastroenterology Initial Consult Note               Author:  Sofi Renteria M.D. Date & Time Created: 10/6/2023 5:46 PM       Patient ID:  Name:             Lucas Rojas  YOB: 1955  Age:                 68 y.o.  male  MRN:               [id number]      Referring Provider:  Dr. Bob Resendez        Presenting Chief Complaint:  GI bleed      History of Present Illness:    This is a very pleasant 68 y.o. male, former EMT, diagnosed with [id number] pancreatic cancer currently on chemotherapy through Dr. Issa.  Last night had had diarrhea and noted bright red blood.  This morning he felt near syncopal and his SBP at home was 70's. He had 4-5 more loose stools with red blood although the last and most recent was black.      He has not had NsAIDs for 2 weeks  He denies GERD, dysphagia, anorexia, early satiety, previous PUD or GI bleeding  He had a negative colonoscopy 4 months ago  He had an EUS at [facility] by Dr. Dorsey but no records available          Review of Systems:  Review of Systems   Constitutional: Negative.    HENT: Negative.     Eyes: Negative.    Respiratory: Negative.     Cardiovascular: Negative.    Gastrointestinal:  Positive for blood in stool, diarrhea and melena.   Genitourinary: Negative.    Musculoskeletal: Negative.    Skin: Negative.    Neurological:  Positive for dizziness.   Endo/Heme/Allergies: Negative.              Past Medical History:  Past Medical History:   Diagnosis Date    Anemia     Anesthesia     post op  nausea, last neurosurgery no problems    Arthritis     osteo; back/right ankle and right wrist    Cancer (HCC) Current    Pancreatic stage 2    Cataract     Coronary artery disease involving native coronary artery without angina pectoris 03/06/2019    Emphysema of lung (HCC)     per xray result. pt states no active disease    High cholesterol     Hypertension     Myocardial infarct (HCC) Feb 22, 2019    Followed by Dr. Kulkarni    Pain 03/30/2017     back,  7-8/10    PONV (postoperative nausea and vomiting)     Snoring     sometimes, very mild, no sleep study     There are no active hospital problems to display for this patient.        Past Surgical History:  Past Surgical History:   Procedure Laterality Date    FUSION, SPINE, LUMBAR, PLIF  04/04/2017    Procedure: LUMBAR FUSION POSTERIOR - L4-5 FUSION/EXPLORATION ;  Surgeon: Osmani Engel M.D.;  Location: SURGERY [facility];  Service:     LUMBAR LAMINECTOMY DISKECTOMY Right 04/04/2017    Procedure: LUMBAR LAMINECTOMY DISKECTOMY L3-S1 AND MEDIAL FACETECTOMY;  Surgeon: Osmani Engel M.D.;  Location: SURGERY [facility];  Service:     FORAMINOTOMY  04/04/2017    Procedure: FORAMINOTOMY;  Surgeon: Osmani Engel M.D.;  Location: SURGERY [facility];  Service:     HARDWARE REMOVAL ORTHO  04/04/2017    Procedure: HARDWARE REMOVAL ORTHO - L4-5;  Surgeon: Osmani Engel M.D.;  Location: SURGERY [facility];  Service:     FUSION, SPINE, LUMBAR, PLIF N/A 12/15/2015    Procedure: LUMBAR FUSION POSTERIOR INSTRUMENTED TLIF L4-5;  Surgeon: Osmani Engel M.D.;  Location: SURGERY [facility];  Service:     LUMBAR LAMINECTOMY DISKECTOMY Left 12/15/2015    Procedure: LUMBAR LAMINECTOMY DISKECTOMY L1-2;  Surgeon: Osmani Engel M.D.;  Location: SURGERY [facility];  Service:     SHOULDER SURGERY  01/01/2013    arthroscopic     SHOULDER SURGERY  01/01/2012    right shoulder    APPENDECTOMY  01/01/1976    CATARACT EXTRACTION WITH IOL Bilateral     OTHER CARDIAC SURGERY  Sept 6, 2019    [facility]           Hospital Medications:  Current Facility-Administered Medications   Medication Dose Frequency Provider Last Rate Last Admin    calcium GLUConate 1 g in NaCl IVPB premix  1 g Once Bob Resendez M.D. 50 mL/hr at 10/06/23 1718 1 g at 10/06/23 1718    dextrose 5% infusion   Continuous Bob Resendez M.D. 30 mL/hr at 10/06/23 1611 New Bag at 10/06/23 1611    amiodarone (Nexterone) 360 mg/200 mL infusion  1  "mg/min Once Bob Resendez M.D. 33.3 mL/hr at 10/06/23 1610 1 mg/min at 10/06/23 1610    Followed by    amiodarone (Nexterone) 360 mg/200 mL infusion  0.5 mg/min Continuous Bob Resendez M.D.        [START ON 10/7/2023] tbo-filgrastim (Granix) 300 MCG/0.5ML injection 300 mcg  5 mcg/kg Q EVENING Cosmo Kellogg M.D.       Last reviewed on 10/4/2023 10:37 AM by Tabitha Villavicencio R.N.       Current Outpatient Medications:  (Not in a hospital admission)        Medication Allergies:  No Known Allergies      Family Medical History:  Family History   Problem Relation Age of Onset    Cancer Mother         Lung cancer    Hypertension Father     Heart Attack Brother     Drug abuse Brother     Alcohol abuse Brother          Social History:  Social History     Socioeconomic History    Marital status:      Spouse name: Not on file    Number of children: Not on file    Years of education: Not on file    Highest education level: Not on file   Occupational History    Not on file   Tobacco Use    Smoking status: Never     Passive exposure: Yes    Smokeless tobacco: Never   Vaping Use    Vaping Use: Never used   Substance and Sexual Activity    Alcohol use: Not Currently    Drug use: No    Sexual activity: Not on file   Other Topics Concern    Not on file   Social History Narrative    Not on file     Social Determinants of Health     Financial Resource Strain: Not on file   Food Insecurity: Not on file   Transportation Needs: Not on file   Physical Activity: Not on file   Stress: Not on file   Social Connections: Not on file   Intimate Partner Violence: Not on file   Housing Stability: Not on file         Vital signs:  Weight/BMI: Body mass index is 22.92 kg/m².  /78   Pulse (!) 116   Temp 37.2 °C (98.9 °F) (Temporal)   Resp 16   Ht 1.676 m (5' 6\")   Wt 64.4 kg (142 lb)   SpO2 94%   Vitals:    10/06/23 1601 10/06/23 1603 10/06/23 1624 10/06/23 1700   BP:  125/81 108/73 125/78   Pulse:  (!) 102 67 (!) 116 " "  Resp:  16 16 16   Temp:  37.2 °C (98.9 °F)     TempSrc:  Temporal     SpO2:  97% 96% 94%   Weight: 64.4 kg (142 lb)      Height: 1.676 m (5' 6\")        Oxygen Therapy:  Pulse Oximetry: 94 %, O2 (LPM): 0, O2 Delivery Device: None - Room Air  No intake or output data in the 24 hours ending 10/06/23 0016      Physical Exam:  Physical Exam  Constitutional:       General: He is not in acute distress.     Appearance: Normal appearance. He is normal weight. He is not ill-appearing or toxic-appearing.   HENT:      Head: Normocephalic and atraumatic.      Mouth/Throat:      Mouth: Mucous membranes are moist.   Eyes:      General: No scleral icterus.     Pupils: Pupils are equal, round, and reactive to light.   Cardiovascular:      Rate and Rhythm: Regular rhythm.   Pulmonary:      Breath sounds: Normal breath sounds.   Abdominal:      General: Bowel sounds are normal. There is no distension.      Palpations: Abdomen is soft.      Tenderness: There is no abdominal tenderness.   Musculoskeletal:         General: Normal range of motion.   Skin:     General: Skin is warm and dry.   Neurological:      Mental Status: He is alert and oriented to person, place, and time.                 Labs:  Recent Labs     10/06/23  1615   SODIUM 134*   POTASSIUM 4.4   CHLORIDE 110   CO2 14*   BUN 37*   CREATININE 1.11   CALCIUM 7.8*     Recent Labs     10/06/23  1615   ALTSGPT 20   ASTSGOT 22   ALKPHOSPHAT 108*   TBILIRUBIN 0.5   LIPASE 44   GLUCOSE 191*     Recent Labs     10/06/23  1615   WBC 38.8*   NEUTSPOLYS 97.40*   LYMPHOCYTES 1.70*   MONOCYTES 0.90   EOSINOPHILS 0.00   BASOPHILS 0.00   ASTSGOT 22   ALTSGPT 20   ALKPHOSPHAT 108*   TBILIRUBIN 0.5     Recent Labs     10/06/23  1615   RBC 3.61*   HEMOGLOBIN 11.4*   HEMATOCRIT 34.1*   PLATELETCT 145*   PROTHROMBTM 17.7*   APTT 25.8   INR 1.43*     Recent Results (from the past 24 hour(s))   COD (ADULT)    Collection Time: 10/06/23  4:15 PM   Result Value Ref Range    ABO Grouping Only A (A) "     Rh Grouping Only POS (A)     Antibody Screen-Cod NEG    CBC WITH DIFFERENTIAL    Collection Time: 10/06/23  4:15 PM   Result Value Ref Range    WBC 38.8 (H) 4.8 - 10.8 K/uL    RBC 3.61 (L) 4.70 - 6.10 M/uL    Hemoglobin 11.4 (L) 14.0 - 18.0 g/dL    Hematocrit 34.1 (L) 42.0 - 52.0 %    MCV 94.5 81.4 - 97.8 fL    MCH 31.6 27.0 - 33.0 pg    MCHC 33.4 32.3 - 36.5 g/dL    RDW 47.1 35.9 - 50.0 fL    Platelet Count 145 (L) 164 - 446 K/uL    MPV 10.8 9.0 - 12.9 fL    Neutrophils-Polys 97.40 (H) 44.00 - 72.00 %    Lymphocytes 1.70 (L) 22.00 - 41.00 %    Monocytes 0.90 0.00 - 13.40 %    Eosinophils 0.00 0.00 - 6.90 %    Basophils 0.00 0.00 - 1.80 %    Nucleated RBC 0.00 0.00 - 0.20 /100 WBC    Neutrophils (Absolute) 37.79 (H) 1.82 - 7.42 K/uL    Lymphs (Absolute) 0.66 (L) 1.00 - 4.80 K/uL    Monos (Absolute) 0.35 0.00 - 0.85 K/uL    Eos (Absolute) 0.00 0.00 - 0.51 K/uL    Baso (Absolute) 0.00 0.00 - 0.12 K/uL    NRBC (Absolute) 0.00 K/uL    Anisocytosis 1+     Microcytosis 1+    COMP METABOLIC PANEL    Collection Time: 10/06/23  4:15 PM   Result Value Ref Range    Sodium 134 (L) 135 - 145 mmol/L    Potassium 4.4 3.6 - 5.5 mmol/L    Chloride 110 96 - 112 mmol/L    Co2 14 (L) 20 - 33 mmol/L    Anion Gap 10.0 7.0 - 16.0    Glucose 191 (H) 65 - 99 mg/dL    Bun 37 (H) 8 - 22 mg/dL    Creatinine 1.11 0.50 - 1.40 mg/dL    Calcium 7.8 (L) 8.5 - 10.5 mg/dL    Correct Calcium 8.9 8.5 - 10.5 mg/dL    AST(SGOT) 22 12 - 45 U/L    ALT(SGPT) 20 2 - 50 U/L    Alkaline Phosphatase 108 (H) 30 - 99 U/L    Total Bilirubin 0.5 0.1 - 1.5 mg/dL    Albumin 2.6 (L) 3.2 - 4.9 g/dL    Total Protein 4.0 (L) 6.0 - 8.2 g/dL    Globulin 1.4 (L) 1.9 - 3.5 g/dL    A-G Ratio 1.9 g/dL   LIPASE    Collection Time: 10/06/23  4:15 PM   Result Value Ref Range    Lipase 44 11 - 82 U/L   PROTHROMBIN TIME    Collection Time: 10/06/23  4:15 PM   Result Value Ref Range    PT 17.7 (H) 12.0 - 14.6 sec    INR 1.43 (H) 0.87 - 1.13   APTT    Collection Time: 10/06/23   4:15 PM   Result Value Ref Range    APTT 25.8 24.7 - 36.0 sec   TROPONIN    Collection Time: 10/06/23  4:15 PM   Result Value Ref Range    Troponin T 31 (H) 6 - 19 ng/L   ESTIMATED GFR    Collection Time: 10/06/23  4:15 PM   Result Value Ref Range    GFR (CKD-EPI) 71 >60 mL/min/1.73 m 2   DIFFERENTIAL MANUAL    Collection Time: 10/06/23  4:15 PM   Result Value Ref Range    Manual Diff Status PERFORMED    PERIPHERAL SMEAR REVIEW    Collection Time: 10/06/23  4:15 PM   Result Value Ref Range    Peripheral Smear Review see below    PLATELET ESTIMATE    Collection Time: 10/06/23  4:15 PM   Result Value Ref Range    Plt Estimation Decreased    MORPHOLOGY    Collection Time: 10/06/23  4:15 PM   Result Value Ref Range    RBC Morphology Present     Poikilocytosis 1+     Echinocytes 1+     Toxic Gran Few     Dohle Bodies Few    EKG    Collection Time: 10/06/23  4:19 PM   Result Value Ref Range    Report       [facility] Emergency Dept[facility]    Test Date:  2023-10-06  Pt Name:    JANEEN PRO                 Department: ER  MRN:        [id number]                      Room:       [facility]  Gender:     Male                         Technician: [id number]  :        1955                   Requested By:TANISHA MCALLISTER  Order #:    [id number]                    Reading MD:    Measurements  Intervals                                Axis  Rate:       69                           P:          66  AK:         158                          QRS:        -13  QRSD:       115                          T:          -51  QT:         397  QTc:        426    Interpretive Statements  Sinus rhythm  Ventricular premature complex  Nonspecific intraventricular conduction delay  Inferior infarct, age indeterminate  Compared to ECG 2019 05:49:45  Ventricular premature complex(es) now present  Intraventricular conduction delay now present  Myocardial infarct finding still present           Radiology Review:  DX-CHEST-PORTABLE (1 VIEW)   Final  Result      No acute cardiopulmonary abnormality identified.      CTA ABDOMEN PELVIS W & W/O POST PROCESS    (Results Pending)         MDM (Data Review):   -Records reviewed and summarized in current documentation  -I personally reviewed and interpreted the laboratory results  -I personally reviewed the radiology images    Assessment/Recommendations:    Impression:  Hematochezia and melena  Acute blood loss anemia  Pancreatic cancer  Recent 30 sec VTach--on amiodarone  Leukocytosis 38.8.  No fever or chills  Mild thromobocytopenia  7.   NAG metabolic acidosis--poss due to diarrhea  8.   Hypoalbuminemia  9.   Mild coagulopathy  10.  CAD with history of inferior STEMI, s/p CABG    Recs:  CTA for painless hematochezia resulting in hypotension and near syncope.  Also will help with leukocytosis.    Cardiology here to see patient    I have him scheduled for EGD tomorrow but will follow up on cards recs and CT scan as well as am labs    Keep NPO  Started pantoprazole  Stool studies and C diff        Sofi Renteria M.D.          Core Quality Measures   Reviewed items:  Labs, Medications and Radiology reports reviewed

## 2023-10-07 NOTE — PROGRESS NOTES
Assumed care of patient from NOC RN. VSS. All drips verified. Call light in reach, bed in lowest position

## 2023-10-07 NOTE — ASSESSMENT & PLAN NOTE
S/p 3 units PRBCs  CTA neg  EGD 10/7: large duodenal ulcer, no active bleeding seen  GI recommends STAT NM bleedinig scan if any sign of further active bleeding  F/b Onc Srgr Dr Jerez  Serial H&H  STAT TEG  Access: pt has a port and several PIVs  Follow ionized Ca    10/8  Hgb stable  Clinically no sign of active bleeding  Ionized Ca WNL  Progress diet  CBC daily  STAT bleeding scan if any evidence of active bleeding  OK to Tele

## 2023-10-07 NOTE — ED NOTES
Med rec completed per patients home pharmacies, [facility], [facility] and [facility]  Unknown last doses  Allergies reviewed historically

## 2023-10-07 NOTE — PROGRESS NOTES
68 year old with pancreatic cancer presented with a GIB and Vtach. Vtach is improved on amiodarone; CTA without active extravasation. No significant ongoing hemorrhage. As his vitals are reassuring, vtach controlled, hgb 11.4 he is ok for close monitoring in the IMCU. A formal critical care consult has not been requested but is readily available should his condition change.     Osmani Kelly M.D.

## 2023-10-07 NOTE — CONSULTS
Cardiology Initial Consult Note    Reason for Consult:  Asked by Dr Riky Medrano M.D. to see this patient with known history of CAD s/p CABG, ventricular tachycardia in the setting of GI bleed  Patient's PCP: Luis Fernando Culp M.D.    CC:   Chief Complaint   Patient presents with    Lower GI Bleed     Pt presented to [facility] after near syncopal event. Pt has been having bloody stools for the past few days, but several times this am.     Weakness       HPI:     This is a 68-year-old man with past medical history significant for coronary artery disease status post inferior STEMI status post thrombolytics and eventually PCI, history of CABG, history of pancreatic cancer on chemotherapy, hypertension, dyslipidemia who initially presented to [facility] with near syncopal event.  Patient has been experiencing significant bloody stools over the past several days.  In addition to lightheadedness/dizziness, he has experienced generalized fatigue and weakness.  While at the outside hospital, he was noted to be anemic and was transfused 3 units of PRBCs.    While on telemetry there, he was noted to have salvos of nonsustained ventricular tachycardia.  ERP at that institution also noted sustained run of VT lasting approximately 30 seconds.  Started on amiodarone and transferred to [facility] for ongoing care. While here, he remained hemodynamically stable.  Review of telemetry shows no recurrent episodes of ventricular tachycardia.  Noted to be in sinus rhythm with PACs.    Patient states that prior to these events, he has generally been well overall.  Has experienced minor fatigue from chemotherapy.  Otherwise, he is able to perform activities of daily living without cardiac limitations.  He states that he is able to walk up a flight of stairs without chest pain or dyspnea.    Medications / Drug list prior to admission:  No current facility-administered medications on file prior to encounter.     Current  Outpatient Medications on File Prior to Encounter   Medication Sig Dispense Refill    OLANZapine (ZYPREXA) 5 MG Tab Take 1 tablet by mouth nightly on days 1-5 of chemo treatment week 12 Tablet 1    diphenoxylate-atropine (LOMOTIL) 2.5-0.025 MG Tab Take 1 Tablet by mouth 4 times a day as needed for Diarrhea for up to 7 days. 30 Tablet 0    promethazine (PHENERGAN) 25 MG Tab Take 0.5-1 Tablets by mouth every 6 hours as needed for Nausea/Vomiting. 30 Tablet 1    metoprolol SR (TOPROL XL) 25 MG TABLET SR 24 HR Take 25 mg by mouth at bedtime.      ondansetron (ZOFRAN) 4 MG Tab tablet Take 1 Tablet by mouth every four hours as needed for Nausea/Vomiting (for nausea, vomiting). 30 Tablet 6    prochlorperazine (COMPAZINE) 10 MG Tab Take 1 Tablet by mouth every 6 hours as needed (for nausea, vomiting). 30 Tablet 6    Ferrous Sulfate (IRON PO) Take 65 mg by mouth every day.      morphine (MS IR) 15 MG tablet Take 1 Tablet by mouth every 8 hours as needed for Severe Pain for up to 30 days. 90 Tablet 0    Omega-3 1000 MG Cap Take 1 Capsule by mouth every day.      Ascorbic Acid (VITAMIN C) 1000 MG Tab Take 1 Tablet by mouth every day.      losartan (COZAAR) 25 MG Tab Take 25 mg by mouth every day.      aspirin EC (ECOTRIN) 81 MG Tablet Delayed Response Take 81 mg by mouth every bedtime.      allopurinol (ZYLOPRIM) 100 MG Tab Take 100 mg by mouth every day.      oxymetazoline (AFRIN) 0.05 % Solution Spray 2 Sprays in nose 2 times a day as needed for Congestion.         Current list of administered Medications:    Current Facility-Administered Medications:     dextrose 5% infusion, , Intravenous, Continuous, Bob eRsendez M.D., Last Rate: 30 mL/hr at 10/06/23 1611, New Bag at 10/06/23 1611    amiodarone (Nexterone) 360 mg/200 mL infusion, 1 mg/min, Intravenous, Once, Last Rate: 33.3 mL/hr at 10/06/23 1610, 1 mg/min at 10/06/23 1610 **FOLLOWED BY** amiodarone (Nexterone) 360 mg/200 mL infusion, 0.5 mg/min, Intravenous,  Continuous, Bob Resendez M.D.    [START ON 10/7/2023] tbo-filgrastim (Granix) 300 MCG/0.5ML injection 300 mcg, 5 mcg/kg, Subcutaneous, Q EVENING, Cosmo Kellogg M.D.    pantoprazole (Protonix) injection 40 mg, 40 mg, Intravenous, BID, Sofi Renteria M.D., 40 mg at 10/06/23 1824    Special Contact Isolation, , , CONTINUOUS **AND** C Diff by PCR rflx Toxin, , , Once **AND** Pharmacy Consult Request - C. difficile consult, 1 Each, Other, PHARMACY TO DOSE, Sofi Renteria M.D.    acetaminophen (Tylenol) tablet 650 mg, 650 mg, Oral, Q6HRS PRN, Riky Medrano M.D.    Current Outpatient Medications:     OLANZapine (ZYPREXA) 5 MG Tab, Take 1 tablet by mouth nightly on days 1-5 of chemo treatment week, Disp: 12 Tablet, Rfl: 1    diphenoxylate-atropine (LOMOTIL) 2.5-0.025 MG Tab, Take 1 Tablet by mouth 4 times a day as needed for Diarrhea for up to 7 days., Disp: 30 Tablet, Rfl: 0    promethazine (PHENERGAN) 25 MG Tab, Take 0.5-1 Tablets by mouth every 6 hours as needed for Nausea/Vomiting., Disp: 30 Tablet, Rfl: 1    metoprolol SR (TOPROL XL) 25 MG TABLET SR 24 HR, Take 25 mg by mouth at bedtime., Disp: , Rfl:     ondansetron (ZOFRAN) 4 MG Tab tablet, Take 1 Tablet by mouth every four hours as needed for Nausea/Vomiting (for nausea, vomiting)., Disp: 30 Tablet, Rfl: 6    prochlorperazine (COMPAZINE) 10 MG Tab, Take 1 Tablet by mouth every 6 hours as needed (for nausea, vomiting)., Disp: 30 Tablet, Rfl: 6    Ferrous Sulfate (IRON PO), Take 65 mg by mouth every day., Disp: , Rfl:     morphine (MS IR) 15 MG tablet, Take 1 Tablet by mouth every 8 hours as needed for Severe Pain for up to 30 days., Disp: 90 Tablet, Rfl: 0    Omega-3 1000 MG Cap, Take 1 Capsule by mouth every day., Disp: , Rfl:     Ascorbic Acid (VITAMIN C) 1000 MG Tab, Take 1 Tablet by mouth every day., Disp: , Rfl:     losartan (COZAAR) 25 MG Tab, Take 25 mg by mouth every day., Disp: , Rfl:     aspirin EC (ECOTRIN) 81 MG Tablet Delayed  Response, Take 81 mg by mouth every bedtime., Disp: , Rfl:     allopurinol (ZYLOPRIM) 100 MG Tab, Take 100 mg by mouth every day., Disp: , Rfl:     oxymetazoline (AFRIN) 0.05 % Solution, Spray 2 Sprays in nose 2 times a day as needed for Congestion., Disp: , Rfl:     Past Medical History:   Diagnosis Date    Anemia     Anesthesia     post op  nausea, last neurosurgery no problems    Arthritis     osteo; back/right ankle and right wrist    Cancer (HCC) Current    Pancreatic stage 2    Cataract     Coronary artery disease involving native coronary artery without angina pectoris 03/06/2019    Emphysema of lung (HCC)     per xray result. pt states no active disease    High cholesterol     Hypertension     Myocardial infarct (HCC) Feb 22, 2019    Followed by Dr. uKlkarni    Pain 03/30/2017     back, 7-8/10    PONV (postoperative nausea and vomiting)     Snoring     sometimes, very mild, no sleep study       Past Surgical History:   Procedure Laterality Date    FUSION, SPINE, LUMBAR, PLIF  04/04/2017    Procedure: LUMBAR FUSION POSTERIOR - L4-5 FUSION/EXPLORATION ;  Surgeon: Osmani Engel M.D.;  Location: [facility];  Service:     LUMBAR LAMINECTOMY DISKECTOMY Right 04/04/2017    Procedure: LUMBAR LAMINECTOMY DISKECTOMY L3-S1 AND MEDIAL FACETECTOMY;  Surgeon: Osmani Engel M.D.;  Location: [facility];  Service:     FORAMINOTOMY  04/04/2017    Procedure: FORAMINOTOMY;  Surgeon: Osmani Engel M.D.;  Location: [facility];  Service:     HARDWARE REMOVAL ORTHO  04/04/2017    Procedure: HARDWARE REMOVAL ORTHO - L4-5;  Surgeon: Osmani Engel M.D.;  Location: [facility];  Service:     FUSION, SPINE, LUMBAR, PLIF N/A 12/15/2015    Procedure: LUMBAR FUSION POSTERIOR INSTRUMENTED TLIF L4-5;  Surgeon: Osmani Engel M.D.;  Location: [facility];  Service:     LUMBAR LAMINECTOMY DISKECTOMY Left 12/15/2015    Procedure: LUMBAR LAMINECTOMY DISKECTOMY L1-2;  Surgeon:  "Osmani Engel M.D.;  Location: SURGERY [facility];  Service:     SHOULDER SURGERY  01/01/2013    arthroscopic     SHOULDER SURGERY  01/01/2012    right shoulder    APPENDECTOMY  01/01/1976    CATARACT EXTRACTION WITH IOL Bilateral     OTHER CARDIAC SURGERY  Sept 6, 2019    CABG       Family History   Problem Relation Age of Onset    Cancer Mother         Lung cancer    Hypertension Father     Heart Attack Brother     Drug abuse Brother     Alcohol abuse Brother      Patient family history was personally reviewed, no pertinent family history to current presentation    Social History     Tobacco Use    Smoking status: Never     Passive exposure: Yes    Smokeless tobacco: Never   Vaping Use    Vaping Use: Never used   Substance Use Topics    Alcohol use: Not Currently    Drug use: No       ALLERGIES:  No Known Allergies    Review of systems:  A complete review of symptoms was reviewed with the patient. This is reviewed in H&P and PMH. ALL OTHERS reviewed and negative    Physical exam:  Patient Vitals for the past 24 hrs:   BP Temp Temp src Pulse Resp SpO2 Height Weight   10/06/23 1800 121/77 -- -- 72 17 97 % -- --   10/06/23 1748 110/79 -- -- 66 16 98 % -- --   10/06/23 1700 125/78 -- -- (!) 116 16 94 % -- --   10/06/23 1624 108/73 -- -- 67 16 96 % -- --   10/06/23 1603 125/81 37.2 °C (98.9 °F) Temporal (!) 102 16 97 % -- --   10/06/23 1601 -- -- -- -- -- -- 1.676 m (5' 6\") 64.4 kg (142 lb)       General: Not in acute distress, lying comfortably in bed  HEENT: OP clear   Neck:  No carotid bruits, No JVD appreciated  CVS:  RRR, Normal S1, S2. No murmurs, rubs or gallops  Resp: Normal respiratory effort, lungs CTA bilaterally. No rales or rhonchi  Abdomen: Soft, non-distended, non-tender to palpation  Skin: No obvious rashes, no cyanosis  Neurological: Alert and oriented x3, moves all extremities, no focal neurologic deficits  Psychiatric: Appropriate affect  Extremities:   Extremities warm, pulses intact, no " lower extremity edema bilaterally      Data:  Laboratory studies personally reviewed by me:  Recent Results (from the past 24 hour(s))   COD (ADULT)    Collection Time: 10/06/23  4:15 PM   Result Value Ref Range    ABO Grouping Only A (A)     Rh Grouping Only POS (A)     Antibody Screen-Cod NEG    CBC WITH DIFFERENTIAL    Collection Time: 10/06/23  4:15 PM   Result Value Ref Range    WBC 38.8 (H) 4.8 - 10.8 K/uL    RBC 3.61 (L) 4.70 - 6.10 M/uL    Hemoglobin 11.4 (L) 14.0 - 18.0 g/dL    Hematocrit 34.1 (L) 42.0 - 52.0 %    MCV 94.5 81.4 - 97.8 fL    MCH 31.6 27.0 - 33.0 pg    MCHC 33.4 32.3 - 36.5 g/dL    RDW 47.1 35.9 - 50.0 fL    Platelet Count 145 (L) 164 - 446 K/uL    MPV 10.8 9.0 - 12.9 fL    Neutrophils-Polys 97.40 (H) 44.00 - 72.00 %    Lymphocytes 1.70 (L) 22.00 - 41.00 %    Monocytes 0.90 0.00 - 13.40 %    Eosinophils 0.00 0.00 - 6.90 %    Basophils 0.00 0.00 - 1.80 %    Nucleated RBC 0.00 0.00 - 0.20 /100 WBC    Neutrophils (Absolute) 37.79 (H) 1.82 - 7.42 K/uL    Lymphs (Absolute) 0.66 (L) 1.00 - 4.80 K/uL    Monos (Absolute) 0.35 0.00 - 0.85 K/uL    Eos (Absolute) 0.00 0.00 - 0.51 K/uL    Baso (Absolute) 0.00 0.00 - 0.12 K/uL    NRBC (Absolute) 0.00 K/uL    Anisocytosis 1+     Microcytosis 1+    COMP METABOLIC PANEL    Collection Time: 10/06/23  4:15 PM   Result Value Ref Range    Sodium 134 (L) 135 - 145 mmol/L    Potassium 4.4 3.6 - 5.5 mmol/L    Chloride 110 96 - 112 mmol/L    Co2 14 (L) 20 - 33 mmol/L    Anion Gap 10.0 7.0 - 16.0    Glucose 191 (H) 65 - 99 mg/dL    Bun 37 (H) 8 - 22 mg/dL    Creatinine 1.11 0.50 - 1.40 mg/dL    Calcium 7.8 (L) 8.5 - 10.5 mg/dL    Correct Calcium 8.9 8.5 - 10.5 mg/dL    AST(SGOT) 22 12 - 45 U/L    ALT(SGPT) 20 2 - 50 U/L    Alkaline Phosphatase 108 (H) 30 - 99 U/L    Total Bilirubin 0.5 0.1 - 1.5 mg/dL    Albumin 2.6 (L) 3.2 - 4.9 g/dL    Total Protein 4.0 (L) 6.0 - 8.2 g/dL    Globulin 1.4 (L) 1.9 - 3.5 g/dL    A-G Ratio 1.9 g/dL   LIPASE    Collection Time: 10/06/23   4:15 PM   Result Value Ref Range    Lipase 44 11 - 82 U/L   PROTHROMBIN TIME    Collection Time: 10/06/23  4:15 PM   Result Value Ref Range    PT 17.7 (H) 12.0 - 14.6 sec    INR 1.43 (H) 0.87 - 1.13   APTT    Collection Time: 10/06/23  4:15 PM   Result Value Ref Range    APTT 25.8 24.7 - 36.0 sec   TROPONIN    Collection Time: 10/06/23  4:15 PM   Result Value Ref Range    Troponin T 31 (H) 6 - 19 ng/L   ESTIMATED GFR    Collection Time: 10/06/23  4:15 PM   Result Value Ref Range    GFR (CKD-EPI) 71 >60 mL/min/1.73 m 2   DIFFERENTIAL MANUAL    Collection Time: 10/06/23  4:15 PM   Result Value Ref Range    Manual Diff Status PERFORMED    PERIPHERAL SMEAR REVIEW    Collection Time: 10/06/23  4:15 PM   Result Value Ref Range    Peripheral Smear Review see below    PLATELET ESTIMATE    Collection Time: 10/06/23  4:15 PM   Result Value Ref Range    Plt Estimation Decreased    MORPHOLOGY    Collection Time: 10/06/23  4:15 PM   Result Value Ref Range    RBC Morphology Present     Poikilocytosis 1+     Echinocytes 1+     Toxic Gran Few     Dohle Bodies Few    EKG    Collection Time: 10/06/23  4:19 PM   Result Value Ref Range    Report       [facility] Emergency Dept.    Test Date:  2023-10-06  Pt Name:    JANEEN PRO                 Department: ER  MRN:        [id number]                      Room:       [facility]  Gender:     Male                         Technician: [id number]  :        1955                   Requested By:TANISHA MCALLISTER  Order #:    [id number]                    Reading MD:    Measurements  Intervals                                Axis  Rate:       69                           P:          66  ME:         158                          QRS:        -13  QRSD:       115                          T:          -51  QT:         397  QTc:        426    Interpretive Statements  Sinus rhythm  Ventricular premature complex  Nonspecific intraventricular conduction delay  Inferior infarct, age  indeterminate  Compared to ECG 02/22/2019 05:49:45  Ventricular premature complex(es) now present  Intraventricular conduction delay now present  Myocardial infarct finding still present         Imaging:  CTA ABDOMEN PELVIS W & W/O POST PROCESS   Final Result      1.  Pancreatic body 4.3 x 4.3 cm cystic and solid mass, highly suspicious for malignancy      2.  Distal to the mass the pancreatic duct is dilated      3.  It is highly suspicious there is involvement of the splenic artery, common hepatic artery, and the proximal superior mesenteric artery      4.  Assessment of the venous vasculature including whether there is tumor involvement is nondiagnostic due to the arterial phase only imaging      DX-CHEST-PORTABLE (1 VIEW)   Final Result      No acute cardiopulmonary abnormality identified.        Rhythm strip from [facility] reviewed shows sinus rhythm by run of nonsustained ventricular tachycardia.    EKG tracings personally reviewed by me shows sinus rhythm, nonspecific ST-T wave changes, PVC, prior inferior infarct    Echo 20/2019:  Normal left ventricular systolic function. Left ventricular ejection   fraction is visually estimated to be 55%.   Indeterminate diastolic function.  Normal inferior vena cava size and inspiratory collapse.  Right ventricular systolic pressure is estimated to be 37 mmHg.    Stress test 2019:  Large territory, moderate to severe intensity inferior and inferolateral    infarct.   No ischemia.   Mild inferior wall hypokinesis. LV ejection fraction = 59%.   TID absent 0.91.   Sinus rhythm.   Old inferior MI on ecg.   Nondiagnostic ECG portion of a regadenoson nuclear stress test.     Left heart catheterization 2/22/2019:  1.  Acute inferolateral ST elevation myocardial infarction with failed thrombolytic therapy secondary to total occlusion of distal right coronary artery  2.  Status post stenting of the distal right coronary artery with 4x 20 mm Synergy drug-eluting  stent with no significant residual stenosis DAPHNE-3 flow  3.  80-90% stenosis in the mid left anterior descending artery and 40-50% proximal left circumflex artery stenosis       All pertinent features of laboratory and imaging reviewed including primary images where applicable      Principal Problem:    GIB (gastrointestinal bleeding) (POA: Yes)  Active Problems:    Pancreatic cancer (HCC) (POA: Unknown)    V tach (HCC) (POA: Unknown)    ACP (advance care planning) (POA: Unknown)  Resolved Problems:    * No resolved hospital problems. *      Assessment / Plan:  This is a 68-year-old man with past medical history significant for coronary artery disease status post inferior STEMI status post thrombolytics and eventually PCI, history of CABG, history of pancreatic cancer on chemotherapy, hypertension, dyslipidemia admitted with presyncope in the setting of acute GI bleed and anemia.    Ventricular tachycardia, in the setting of acute anemia and GI bleed  GI bleed  CAD status post CABG  Hypertension  Dyslipidemia  Pancreatic cancer on chemotherapy    Recommendations:  I personally reviewed the rhythm strip performed at [facility] which confirms runs of nonsustained VT.  He has been electrically quiet since transfer to [facility].  Episode of ventricular arrhythmia likely due to coronary hypoperfusion in the setting of acute anemia and GI bleed.  As such, it is imperative that he be appropriately volume resuscitated.  He is status post 3 unit PRBC transfusion. Aim to keep hemoglobin > 9.  Reasonable to continue amiodarone for now.  Can likely discontinue in the next 24 hours.  Currently stable without chest pain or dyspnea.  Hold aspirin.  Hold antihypertensive medications.  Plan for endoscopy/colonoscopy with GI tomorrow.  He is at moderate risk to undergo low risk procedure.  No additional cardiac testing recommended prior to his procedure.  Cardiology will follow.      Future Appointments   Date Time  Provider Department [place]   10/18/2023  8:30 AM IZZY Montaño ONCRMO None   10/18/2023  9:00 AM RENOWN IQ INFUSION ONP [place]   10/20/2023  5:45 PM [facility]OWN [facility] INFUSION ONP [address]   11/1/2023  9:20 AM David Issa D.O. ONCRMO None   11/1/2023 10:00 AM [facility] ON[facility]   11/3/2023  [facility]       It is my pleasure to participate in the care of Mr. Rojas.  Please do not hesitate to contact me with questions or concerns.    Matt Garcia MD  Cardiologist, [facility]    10/6/2023    Please note that this dictation was created using voice recognition software. I have made every reasonable attempt to correct obvious errors, but it is possible there are errors of grammar and possibly content that I did not discover before finalizing the note.

## 2023-10-07 NOTE — OR NURSING
Pt on RA.   No c/o nausea, tolerating PO fluids.  Pt denies procedural discomfort/pain.  VSS, afebrile, SRIVASTAVA, A/O x4.     Post procedure FSBS 126, no interventions    Left hand wedding ring in place.     Transferred on monitor by this RN.     Bedside handoff to Desi GUILLEN.

## 2023-10-07 NOTE — ANESTHESIA POSTPROCEDURE EVALUATION
Patient: Lucas Rojas    Procedure Summary     Date: 10/07/23 Room / Location: [facility] 06 / SURGERY [facility]    Anesthesia Start: 0848 Anesthesia Stop: 0911    Procedures:       GASTROSCOPY (Esophagus)      GASTROSCOPY, WITH BIOPSY (Esophagus) Diagnosis: (esophageal biopsy, duodenal mass with ulceration)    Surgeons: Sofi Renteria M.D. Responsible Provider: Tyler Soto M.D.    Anesthesia Type: MAC ASA Status: 3          Final Anesthesia Type: MAC  Last vitals  BP   Blood Pressure : 132/72, NIBP: 120/64    Temp   36.5 °C (97.7 °F)    Pulse   62   Resp   20    SpO2   99 %      Anesthesia Post Evaluation    Patient location during evaluation: PACU  Patient participation: complete - patient participated  Level of consciousness: awake and alert  Pain score: 0    Airway patency: patent  Anesthetic complications: no  Cardiovascular status: hemodynamically stable  Respiratory status: acceptable  Hydration status: euvolemic    PONV: none          No notable events documented.     Nurse Pain Score: 0 (NPRS)

## 2023-10-07 NOTE — ASSESSMENT & PLAN NOTE
S/p 3 vessel CABG  Cards following  Not on antiplatele therapy for obvious reasons  No evidence of acute ishcemia

## 2023-10-07 NOTE — THERAPY
Physical Therapy Contact Note    Patient Name: Lucas Rojas  Age:  68 y.o., Sex:  male  Medical Record #: [id number]  Today's Date: 10/7/2023    PT consult received. Per EMR, pt pending EGD today. Will initiate PT evaluation as medical workup complete and appropriate. Thanks    Kristine Spivey, PT, DPT    [employer] [id number]

## 2023-10-07 NOTE — CONSULTS
Hematology & Oncology Consultation    Date of Service  10/7/2023    Referring Physician  Magdiel Gamez, *    Consulting Physician  Destin Telles M.D.    Reason for Consultation  Pancreatic adenocarcinoma, stage IIA, [id number]    History of Presenting Illness  68 y.o. male who presented 10/6/2023 with near syncope. Patient was having dark bowel movements, but became more alarmed when he noticed bright red blood. He had associated hypotension and decided to present to ER. Patient was noted to be very anemic and had associated runs of ventricular tachycardia. He was seen last night by GI and cardiology and was going for EGD at time of evaluation this morning. His dizziness has subsided and he denies any chest pain. Blood pressure has greatly improved as well. CTA done last night shows involvement of proximal SMA and other vessels.    Review of Systems  Review of Systems   Constitutional:  Positive for malaise/fatigue.   HENT: Negative.     Eyes: Negative.    Respiratory: Negative.     Cardiovascular: Negative.    Gastrointestinal: Negative.    Genitourinary: Negative.    Musculoskeletal: Negative.    Skin: Negative.    Neurological:  Positive for dizziness and weakness.   Endo/Heme/Allergies: Negative.    Psychiatric/Behavioral: Negative.         Past Medical History   has a past medical history of Anemia, Anesthesia, Arthritis, Cancer (HCC) (Current), Cataract, Coronary artery disease involving native coronary artery without angina pectoris (03/06/2019), Emphysema of lung (HCC), High cholesterol, Hypertension, Myocardial infarct (HCC) (Feb 22, 2019), Pain (03/30/2017), PONV (postoperative nausea and vomiting), and Snoring.    He has no past medical history of Acute nasopharyngitis, Anginal syndrome (HCC), Arrhythmia, Asthma, Blood clotting disorder (HCC), Bowel habit changes, Breath shortness, Bronchitis, Carcinoma in situ of respiratory system, Congestive heart failure (HCC), Continuous ambulatory peritoneal  dialysis status (HCC), Coughing blood, Dental disorder, Dialysis patient (HCC), Disorder of thyroid, Glaucoma, Gynecological disorder, Heart burn, Heart murmur, Heart valve disease, Hemorrhagic disorder (HCC), Hepatitis A, Hepatitis B, Hepatitis C, Hiatus hernia syndrome, Indigestion, Infectious disease, Jaundice, Pacemaker, Pneumonia, Pregnant, Psychiatric problem, Renal disorder, Rheumatic fever, Seizure (HCC), Sleep apnea, Stroke (HCC), Tuberculosis, Urinary bladder disorder, or Urinary incontinence.    Surgical History   has a past surgical history that includes shoulder surgery (01/01/2012); shoulder surgery (01/01/2013); appendectomy (01/01/1976); fusion, spine, lumbar, plif (N/A, 12/15/2015); lumbar laminectomy diskectomy (Left, 12/15/2015); fusion, spine, lumbar, plif (04/04/2017); lumbar laminectomy diskectomy (Right, 04/04/2017); foraminotomy (04/04/2017); hardware removal ortho (04/04/2017); other cardiac surgery (Sept 6, 2019); and cataract extraction with iol (Bilateral).    Family History  family history includes Alcohol abuse in his brother; Cancer in his mother; Drug abuse in his brother; Heart Attack in his brother; Hypertension in his father.    Social History   reports that he has never smoked. He has been exposed to tobacco smoke. He has never used smokeless tobacco. He reports that he does not currently use alcohol. He reports that he does not use drugs.    Medications  Prior to Admission Medications   Prescriptions Last Dose Informant Patient Reported? Taking?   LORazepam (ATIVAN) 1 MG Tab [facility] at [facility] Patient's Home Pharm[facility] Yes No   Sig: Take 1 mg by mouth every 8 hours as needed for Anxiety.   OLANZapine (ZYPREXA) 5 MG Tab [facility] at [facility] No No   Sig: Take 1 tablet by mouth nightly on days 1-5 of chemo treatment week   allopurinol (ZYLOPRIM) 100 MG Tab [facility] at [facility] Patient[facility] Home Pharm[facility] Yes No   Sig: Take 100 mg by mouth every day.   diphenoxylate-atropine (LOMOTIL)  2.5-0.025 MG Tab [facility] at [facility] Pharm[facility] No No   Sig: Take 1 Tablet by mouth 4 times a day as needed for Diarrhea for up to 7 days.   losartan (COZAAR) 25 MG Tab [facility]KN at [facility] Patient[facility] Home Pharm[facility] Yes No   Sig: Take 25 mg by mouth every day.   metoprolol SR (TOPROL XL) 25 MG TABLET SR 24 HR [facility] at [facility] Pharm[facility] Yes No   Sig: Take 25 mg by mouth at bedtime.   morphine (MS IR) 15 MG tablet UNKN at [facility] Patient[facility] Home Pharm[facility] No No   Sig: Take 1 Tablet by mouth every 8 hours as needed for Severe Pain for up to 30 days.   ondansetron (ZOFRAN) 4 MG Tab tablet [facility] at [facility] Patient[facility] Home Pharm[facility] No No   Sig: Take 1 Tablet by mouth every four hours as needed for Nausea/Vomiting (for nausea, vomiting).   prochlorperazine (COMPAZINE) 10 MG Tab [facility] at [facility] Patient[facility] Pharm[facility] No No   Sig: Take 1 Tablet by mouth every 6 hours as needed (for nausea, vomiting).   promethazine (PHENERGAN) 25 MG Tab [facility]KN at [facility] Patient[facility] Home Pharm[facility] No No   Sig: Take 0.5-1 Tablets by mouth every 6 hours as needed for Nausea/Vomiting.   rosuvastatin (CRESTOR) 20 MG Tab [facility]KN at [facility] Patient's Home Pharmacy Yes No   Sig: Take 20 mg by mouth every day.      Facility-Administered Medications: None       Allergies  No Known Allergies    Physical Exam  Temp:  [36 °C (96.8 °F)-37.2 °C (98.9 °F)] 36.7 °C (98 °F)  Pulse:  [] 59  Resp:  [7-22] 20  BP: ()/(51-81) 119/67  SpO2:  [93 %-100 %] 99 %    Physical Exam  Constitutional:       Appearance: Normal appearance. He is normal weight.   HENT:      Head: Normocephalic and atraumatic.      Right Ear: External ear normal.      Left Ear: External ear normal.      Nose: Nose normal.      Mouth/Throat:      Mouth: Mucous membranes are moist.      Pharynx: Oropharynx is clear.   Eyes:      Conjunctiva/sclera: Conjunctivae normal.      Pupils: Pupils are equal, round, and reactive to light.   Cardiovascular:      Rate and Rhythm: Normal rate and regular rhythm.    Pulmonary:      Effort: Pulmonary effort is normal.      Breath sounds: Normal breath sounds.   Abdominal:      General: Abdomen is flat. Bowel sounds are normal.      Palpations: Abdomen is soft.   Musculoskeletal:         General: Normal range of motion.      Cervical back: Normal range of motion and neck supple.   Skin:     General: Skin is warm and dry.   Neurological:      General: No focal deficit present.      Mental Status: He is alert and oriented to person, place, and time.   Psychiatric:         Mood and Affect: Mood normal.         Behavior: Behavior normal.         Thought Content: Thought content normal.         Judgment: Judgment normal.         Fluids  Date 10/07/23 0700 - 10/08/23 0659   Shift 4207-6291 8659-1322 0933-3774 24 Hour Total   INTAKE   P.O. 125   125   I.V. 250   250   Shift Total 375   375   OUTPUT   Shift Total       Weight (kg) 68 68 68 68       Laboratory  Recent Labs     10/06/23  1615 10/07/23  0318   WBC 38.8* 27.9*   RBC 3.61* 3.36*   HEMOGLOBIN 11.4* 10.9*   HEMATOCRIT 34.1* 30.8*   MCV 94.5 91.7   MCH 31.6 32.4   MCHC 33.4 35.4   RDW 47.1 48.6   PLATELETCT 145* 118*   MPV 10.8 11.4     Recent Labs     10/06/23  1615   SODIUM 134*   POTASSIUM 4.4   CHLORIDE 110   CO2 14*   GLUCOSE 191*   BUN 37*   CREATININE 1.11   CALCIUM 7.8*     Recent Labs     10/06/23  1615   APTT 25.8   INR 1.43*                 Imaging  10/6/2023 5:55 PM     HISTORY/REASON FOR EXAM:  GI bleed        TECHNIQUE/EXAM DESCRIPTION:  CT angiogram of the abdomen and pelvis without and with contrast, with reconstructions.     Initial precontrast helical sections were obtained from the diaphragmatic domes to the lesser trochanters. Following this, 95 mL of Omnipaque 350 nonionic contrast was administered at 5.0 mL/sec and helical scanning was obtained from the diaphragmatic   domes through the lesser trochanters. Thin section overlapping reconstruction interval was utilized and multiplanar volume reformatted  were generated in the sagittal and coronal planes.     3D angiographic images were reviewed on PACS. Maximum intensity projection (MIP) images were generated and reviewed.     Low dose optimization technique was utilized for this CT exam including automated exposure control and adjustment of the mA and/or kV according to patient size.     COMPARISON:  Outside CT abdomen and pelvis 11/12/2023 of which only a portion of the images are presumably provided.     FINDINGS:     Noncontrast images:  There is no intramural hematoma.     There is right gynecomastia.     There has been previous sternotomy. There is facet arthropathy of lumbar spine. There is levoconvex scoliosis and there is bilateral sacroiliac joint osteoarthritis.           Contrast images:     Aorta and vasculature: There is aortic atherosclerotic plaque without aneurysm.     The celiac axis is patent with conventional branching pattern. The distal celiac axis is close proximity with the mass and could be involvement.     Common hepatic artery has lack of soft tissue plane with the mass and is likely involved.     The splenic artery abuts the mass and is likely involved.     The superior mesenteric artery is patent and there is no vascular involvement of this structure.     There is a dominant right main renal artery and a small accessory artery which are patent.     There is a single left renal artery with atherosclerotic plaque at its origin.     No active GI hemorrhage is identified.     Liver is unremarkable.  Spleen is normal in size.     Pancreas: There is a cystic and solid pancreatic body mass measuring 4.3 x 4.3 cm, highly suspicious for malignancy. Distal to the mass the pancreatic duct is dilated.              Gallbladder and biliary tree are normal.     Adrenal glands are normal.  Kidneys: There is a left renal cyst. Right kidney is normal in appearance.     Bowel is unremarkable.  There is no adenopathy or free fluid.           3D  angiographic/MIP images of the vasculature confirm the vascular findings as described above.     IMPRESSION:     1.  Pancreatic body 4.3 x 4.3 cm cystic and solid mass, highly suspicious for malignancy     2.  Distal to the mass the pancreatic duct is dilated     3.  It is highly suspicious there is involvement of the splenic artery, common hepatic artery, and the proximal superior mesenteric artery     4.  Assessment of the venous vasculature including whether there is tumor involvement is nondiagnostic due to the arterial phase only imaging    Assessment/Plan  Pancreatic adenocarcinoma - CTA may demonstrate disease progression despite neoadjuvant chemotherapy, f/u outpatient with Dr. Issa and surgery  Anemia - f/u results of EGD, improved s/p transfusion, monitor for bleeding  Leukocytosis - previously received Neulasta OnPro, hold any further G-CSF, monitor for any signs of infection  Melena/BRBPR - defer to GI  Ventricular tachycardia - defer to cardiology    Case discussed with ED physician via phone and patient.    Thank you very much for the consult, please call with any questions.       Destin Telles M.D.

## 2023-10-07 NOTE — ANESTHESIA PREPROCEDURE EVALUATION
Case: [id number] Date/Time: 10/07/23 0854    Procedure: GASTROSCOPY    Location: [facility] OR 06 / SURGERY [facility]    Surgeons: Sofi Renteria M.D.          Relevant Problems   CARDIAC   (positive) Coronary artery disease involving native coronary artery without angina pectoris   (positive) Essential hypertension       Physical Exam    Airway   Mallampati: II  TM distance: >3 FB  Neck ROM: full       Cardiovascular - normal exam  Rhythm: regular  Rate: normal  (-) murmur     Dental - normal exam           Pulmonary - normal exam  Breath sounds clear to auscultation     Abdominal    Neurological - normal exam                 Anesthesia Plan    ASA 3   ASA physical status 3 criteria: COPD - poorly controlled, CAD/stents (> 3 months) and MI or angina - history (> 3 months)    Plan - MAC               Induction: intravenous    Postoperative Plan: Postoperative administration of opioids is intended.    Pertinent diagnostic labs and testing reviewed    Informed Consent:    Anesthetic plan and risks discussed with patient.    Use of blood products discussed with: patient whom consented to blood products.

## 2023-10-07 NOTE — ED NOTES
.Bedside report received from off going RN/tech: Christy, assumed care of patient.  POC discussed with patient. Call light within reach, all needs addressed at this time.       Fall risk interventions in place: Move the patient closer to the nurse's station, Patient's personal possessions are with in their safe reach, Place socks on patient, Place fall risk sign on patient's door, Give patient urinal if applicable, Keep floor surfaces clean and dry, and Accompanied to restroom (all applicable per [place] Fall risk assessment)   Continuous monitoring: Cardiac Leads, Pulse Ox, or Blood Pressure  IVF/IV medications: Infusion per MAR (List Med(s)) Amio 1  Oxygen: Room Air  Bedside sitter: Not Applicable   Isolation: Isolation precautions for Special contact. (Isolation order)

## 2023-10-07 NOTE — PROGRESS NOTES
Cardiology Progress Note    Interval History:  Patient is being seen in cardiac follow-up for NSVT in the setting of acute anemia    Patient was admitted to the medicine service overnight.  Remains on amiodarone drip.  Review of telemetry shows sinus rhythm with PVCs and short runs of NSVT.  Patient remains asymptomatic without chest pain or dyspnea.  Denies having palpitations, lightheadedness/dizziness.  Currently n.p.o. with tentative plans for endoscopy.  Hemoglobin remained stable this morning at 10.9.    Telemetry: sinus rhythm, PVCs, NSVT    Medications / Drug list prior to admission:  No current facility-administered medications on file prior to encounter.     Current Outpatient Medications on File Prior to Encounter   Medication Sig Dispense Refill    LORazepam (ATIVAN) 1 MG Tab Take 1 mg by mouth every 8 hours as needed for Anxiety.      rosuvastatin (CRESTOR) 20 MG Tab Take 20 mg by mouth every day.      OLANZapine (ZYPREXA) 5 MG Tab Take 1 tablet by mouth nightly on days 1-5 of chemo treatment week 12 Tablet 1    promethazine (PHENERGAN) 25 MG Tab Take 0.5-1 Tablets by mouth every 6 hours as needed for Nausea/Vomiting. 30 Tablet 1    metoprolol SR (TOPROL XL) 25 MG TABLET SR 24 HR Take 25 mg by mouth at bedtime.      ondansetron (ZOFRAN) 4 MG Tab tablet Take 1 Tablet by mouth every four hours as needed for Nausea/Vomiting (for nausea, vomiting). 30 Tablet 6    prochlorperazine (COMPAZINE) 10 MG Tab Take 1 Tablet by mouth every 6 hours as needed (for nausea, vomiting). 30 Tablet 6    morphine (MS IR) 15 MG tablet Take 1 Tablet by mouth every 8 hours as needed for Severe Pain for up to 30 days. 90 Tablet 0    losartan (COZAAR) 25 MG Tab Take 25 mg by mouth every day.      allopurinol (ZYLOPRIM) 100 MG Tab Take 100 mg by mouth every day.         Current list of administered Medications:    Current Facility-Administered Medications:     dextrose 5% infusion, , Intravenous, Continuous, Bob Resendez M.D.,  Last Rate: 30 mL/hr at 10/06/23 1611, New Bag at 10/06/23 1611    [COMPLETED] amiodarone (Nexterone) 360 mg/200 mL infusion, 1 mg/min, Intravenous, Once, Stopped at 10/06/23 2210 **FOLLOWED BY** amiodarone (Nexterone) 360 mg/200 mL infusion, 0.5 mg/min, Intravenous, Continuous, Bob Resendez M.D., Last Rate: 16.7 mL/hr at 10/06/23 2201, 0.5 mg/min at 10/06/23 2201    [MAR Hold] tbo-filgrastim (Granix) 300 MCG/0.5ML injection 300 mcg, 5 mcg/kg, Subcutaneous, Q EVENING, Cosmo Kellogg M.D.    [MAR Hold] pantoprazole (Protonix) injection 40 mg, 40 mg, Intravenous, BID, Sofi Renteria M.D., 40 mg at 10/07/23 0636    Special Contact Isolation, , , CONTINUOUS **AND** C Diff by PCR rflx Toxin, , , Once **AND** [MAR Hold] Pharmacy Consult Request - C. difficile consult, 1 Each, Other, PHARMACY TO DOSE, Sofi Renteria M.D.    [MAR Hold] acetaminophen (Tylenol) tablet 650 mg, 650 mg, Oral, Q6HRS PRN, Riky Medrano M.D.    Past Medical History:   Diagnosis Date    Anemia     Anesthesia     post op  nausea, last neurosurgery no problems    Arthritis     osteo; back/right ankle and right wrist    Cancer (HCC) Current    Pancreatic stage 2    Cataract     Coronary artery disease involving native coronary artery without angina pectoris 03/06/2019    Emphysema of lung (HCC)     per xray result. pt states no active disease    High cholesterol     Hypertension     Myocardial infarct (HCC) Feb 22, 2019    Followed by Dr. Kulkarni    Pain 03/30/2017     back, 7-8/10    PONV (postoperative nausea and vomiting)     Snoring     sometimes, very mild, no sleep study       Past Surgical History:   Procedure Laterality Date    FUSION, SPINE, LUMBAR, PLIF  04/04/2017    Procedure: LUMBAR FUSION POSTERIOR - L4-5 FUSION/EXPLORATION ;  Surgeon: Osmani nEgel M.D.;  Location: SURGERY [facility];  Service:     LUMBAR LAMINECTOMY DISKECTOMY Right 04/04/2017    Procedure: LUMBAR LAMINECTOMY DISKECTOMY L3-S1 AND MEDIAL  FACETECTOMY;  Surgeon: Osmani Engel M.D.;  Location: SURGERY [facility];  Service:     FORAMINOTOMY  04/04/2017    Procedure: FORAMINOTOMY;  Surgeon: Osmani Engel M.D.;  Location: SURGERY [facility];  Service:     HARDWARE REMOVAL ORTHO  04/04/2017    Procedure: HARDWARE REMOVAL ORTHO - L4-5;  Surgeon: Osmani Engel M.D.;  Location: SURGERY [facility];  Service:     FUSION, SPINE, LUMBAR, PLIF N/A 12/15/2015    Procedure: LUMBAR FUSION POSTERIOR INSTRUMENTED TLIF L4-5;  Surgeon: Osmani Engel M.D.;  Location: SURGERY [facility];  Service:     LUMBAR LAMINECTOMY DISKECTOMY Left 12/15/2015    Procedure: LUMBAR LAMINECTOMY DISKECTOMY L1-2;  Surgeon: Osmani Engel M.D.;  Location: SURGERY [facility];  Service:     SHOULDER SURGERY  01/01/2013    arthroscopic     SHOULDER SURGERY  01/01/2012    right shoulder    APPENDECTOMY  01/01/1976    CATARACT EXTRACTION WITH IOL Bilateral     OTHER CARDIAC SURGERY  Sept 6, 2019    CABG       Family History   Problem Relation Age of Onset    Cancer Mother         Lung cancer    Hypertension Father     Heart Attack Brother     Drug abuse Brother     Alcohol abuse Brother      Patient family history was personally reviewed, no pertinent family history to current presentation    Social History     Tobacco Use    Smoking status: Never     Passive exposure: Yes    Smokeless tobacco: Never   Vaping Use    Vaping Use: Never used   Substance Use Topics    Alcohol use: Not Currently    Drug use: No       ALLERGIES:  No Known Allergies    Review of systems:  A complete review of symptoms was reviewed with the patient. This is reviewed in H&P and PMH. ALL OTHERS reviewed and negative    Physical exam:  Patient Vitals for the past 24 hrs:   BP Temp Temp src Pulse Resp SpO2 Height Weight   10/07/23 0600 118/72 -- -- 80 (!) 22 96 % -- --   10/07/23 0500 118/66 -- -- 87 20 98 % -- --   10/07/23 0459 120/64 36.2 °C (97.2 °F) Temporal (!) 55 12 96 % 1.778 m (5'  "10\") 68 kg (149 lb 14.6 oz)   10/07/23 0400 120/64 36.2 °C (97.2 °F) Temporal (!) 55 16 96 % -- --   10/07/23 0300 96/65 -- -- (!) 54 18 98 % -- --   10/07/23 0200 121/66 -- -- (!) 59 20 95 % -- --   10/07/23 0100 108/62 -- -- 61 16 96 % -- --   10/07/23 0000 101/69 36.1 °C (97 °F) Temporal 62 18 96 % -- --   10/06/23 2300 116/64 -- -- 67 20 96 % -- --   10/06/23 2200 104/67 -- -- 69 18 96 % -- --   10/06/23 2100 112/70 -- -- 61 14 97 % -- --   10/06/23 2000 132/75 36.4 °C (97.6 °F) Temporal 82 12 98 % -- --   10/06/23 1900 118/65 -- -- 64 (!) 7 100 % -- --   10/06/23 1800 121/77 -- -- 72 17 97 % -- --   10/06/23 1748 110/79 -- -- 66 16 98 % -- --   10/06/23 1700 125/78 -- -- (!) 116 16 94 % -- --   10/06/23 1624 108/73 -- -- 67 16 96 % -- --   10/06/23 1603 125/81 37.2 °C (98.9 °F) Temporal (!) 102 16 97 % -- --   10/06/23 1601 -- -- -- -- -- -- 1.676 m (5' 6\") 64.4 kg (142 lb)       General: Not in acute distress, lying comfortably in bed  HEENT: OP clear   Neck:  No carotid bruits, No JVD appreciated  CVS:  RRR, Normal S1, S2. No murmurs, rubs or gallops  Resp: Normal respiratory effort, lungs CTA bilaterally. No rales or rhonchi  Abdomen: Soft, non-distended, non-tender to palpation  Skin: No obvious rashes, no cyanosis  Neurological: Alert and oriented x3, moves all extremities, no focal neurologic deficits  Extremities:   Extremities warm, pulses intact, no lower extremity edema bilaterally    Data:  Laboratory studies personally reviewed by me:  Recent Results (from the past 24 hour(s))   COD (ADULT)    Collection Time: 10/06/23  4:15 PM   Result Value Ref Range    ABO Grouping Only A (A)     Rh Grouping Only POS (A)     Antibody Screen-Cod NEG    CBC WITH DIFFERENTIAL    Collection Time: 10/06/23  4:15 PM   Result Value Ref Range    WBC 38.8 (H) 4.8 - 10.8 K/uL    RBC 3.61 (L) 4.70 - 6.10 M/uL    Hemoglobin 11.4 (L) 14.0 - 18.0 g/dL    Hematocrit 34.1 (L) 42.0 - 52.0 %    MCV 94.5 81.4 - 97.8 fL    MCH 31.6 " 27.0 - 33.0 pg    MCHC 33.4 32.3 - 36.5 g/dL    RDW 47.1 35.9 - 50.0 fL    Platelet Count 145 (L) 164 - 446 K/uL    MPV 10.8 9.0 - 12.9 fL    Neutrophils-Polys 97.40 (H) 44.00 - 72.00 %    Lymphocytes 1.70 (L) 22.00 - 41.00 %    Monocytes 0.90 0.00 - 13.40 %    Eosinophils 0.00 0.00 - 6.90 %    Basophils 0.00 0.00 - 1.80 %    Nucleated RBC 0.00 0.00 - 0.20 /100 WBC    Neutrophils (Absolute) 37.79 (H) 1.82 - 7.42 K/uL    Lymphs (Absolute) 0.66 (L) 1.00 - 4.80 K/uL    Monos (Absolute) 0.35 0.00 - 0.85 K/uL    Eos (Absolute) 0.00 0.00 - 0.51 K/uL    Baso (Absolute) 0.00 0.00 - 0.12 K/uL    NRBC (Absolute) 0.00 K/uL    Anisocytosis 1+     Microcytosis 1+    COMP METABOLIC PANEL    Collection Time: 10/06/23  4:15 PM   Result Value Ref Range    Sodium 134 (L) 135 - 145 mmol/L    Potassium 4.4 3.6 - 5.5 mmol/L    Chloride 110 96 - 112 mmol/L    Co2 14 (L) 20 - 33 mmol/L    Anion Gap 10.0 7.0 - 16.0    Glucose 191 (H) 65 - 99 mg/dL    Bun 37 (H) 8 - 22 mg/dL    Creatinine 1.11 0.50 - 1.40 mg/dL    Calcium 7.8 (L) 8.5 - 10.5 mg/dL    Correct Calcium 8.9 8.5 - 10.5 mg/dL    AST(SGOT) 22 12 - 45 U/L    ALT(SGPT) 20 2 - 50 U/L    Alkaline Phosphatase 108 (H) 30 - 99 U/L    Total Bilirubin 0.5 0.1 - 1.5 mg/dL    Albumin 2.6 (L) 3.2 - 4.9 g/dL    Total Protein 4.0 (L) 6.0 - 8.2 g/dL    Globulin 1.4 (L) 1.9 - 3.5 g/dL    A-G Ratio 1.9 g/dL   LIPASE    Collection Time: 10/06/23  4:15 PM   Result Value Ref Range    Lipase 44 11 - 82 U/L   PROTHROMBIN TIME    Collection Time: 10/06/23  4:15 PM   Result Value Ref Range    PT 17.7 (H) 12.0 - 14.6 sec    INR 1.43 (H) 0.87 - 1.13   APTT    Collection Time: 10/06/23  4:15 PM   Result Value Ref Range    APTT 25.8 24.7 - 36.0 sec   TROPONIN    Collection Time: 10/06/23  4:15 PM   Result Value Ref Range    Troponin T 31 (H) 6 - 19 ng/L   ESTIMATED GFR    Collection Time: 10/06/23  4:15 PM   Result Value Ref Range    GFR (CKD-EPI) 71 >60 mL/min/1.73 m 2   DIFFERENTIAL MANUAL    Collection  Time: 10/06/23  4:15 PM   Result Value Ref Range    Manual Diff Status PERFORMED    PERIPHERAL SMEAR REVIEW    Collection Time: 10/06/23  4:15 PM   Result Value Ref Range    Peripheral Smear Review see below    PLATELET ESTIMATE    Collection Time: 10/06/23  4:15 PM   Result Value Ref Range    Plt Estimation Decreased    MORPHOLOGY    Collection Time: 10/06/23  4:15 PM   Result Value Ref Range    RBC Morphology Present     Poikilocytosis 1+     Echinocytes 1+     Toxic Gran Few     Dohle Bodies Few    EKG    Collection Time: 10/06/23  4:19 PM   Result Value Ref Range    Report       [facility] Emergency Dept.    Test Date:  2023-10-06  Pt Name:    JANEEN PRO                 Department: ER  MRN:        [id number]                      Room:        12  Gender:     Male                         Technician: [id number]  :        [id number]                   Requested By:TANISHA MCALLISTER  Order #:    [id number]                    Reading MD:    Measurements  Intervals                                Axis  Rate:       69                           P:          66  KY:         158                          QRS:        -13  QRSD:       115                          T:          -51  QT:         397  QTc:        426    Interpretive Statements  Sinus rhythm  Ventricular premature complex  Nonspecific intraventricular conduction delay  Inferior infarct, age indeterminate  Compared to ECG 2019 05:49:45  Ventricular premature complex(es) now present  Intraventricular conduction delay now present  Myocardial infarct finding still present     TROPONIN    Collection Time: 10/06/23  8:22 PM   Result Value Ref Range    Troponin T 30 (H) 6 - 19 ng/L   ABO Rh Confirm    Collection Time: 10/06/23  8:22 PM   Result Value Ref Range    ABO Rh Confirm A POS    CBC WITH DIFFERENTIAL    Collection Time: 10/07/23  3:18 AM   Result Value Ref Range    WBC 27.9 (H) 4.8 - 10.8 K/uL    RBC 3.36 (L) 4.70 - 6.10 M/uL    Hemoglobin 10.9  (L) 14.0 - 18.0 g/dL    Hematocrit 30.8 (L) 42.0 - 52.0 %    MCV 91.7 81.4 - 97.8 fL    MCH 32.4 27.0 - 33.0 pg    MCHC 35.4 32.3 - 36.5 g/dL    RDW 48.6 35.9 - 50.0 fL    Platelet Count 118 (L) 164 - 446 K/uL    MPV 11.4 9.0 - 12.9 fL    Neutrophils-Polys 92.80 (H) 44.00 - 72.00 %    Lymphocytes 4.40 (L) 22.00 - 41.00 %    Monocytes 1.30 0.00 - 13.40 %    Eosinophils 0.10 0.00 - 6.90 %    Basophils 0.20 0.00 - 1.80 %    Immature Granulocytes 1.20 (H) 0.00 - 0.90 %    Nucleated RBC 0.00 0.00 - 0.20 /100 WBC    Neutrophils (Absolute) 25.88 (H) 1.82 - 7.42 K/uL    Lymphs (Absolute) 1.24 1.00 - 4.80 K/uL    Monos (Absolute) 0.35 0.00 - 0.85 K/uL    Eos (Absolute) 0.03 0.00 - 0.51 K/uL    Baso (Absolute) 0.05 0.00 - 0.12 K/uL    Immature Granulocytes (abs) 0.34 (H) 0.00 - 0.11 K/uL    NRBC (Absolute) 0.00 K/uL       Imaging:  CTA ABDOMEN PELVIS W & W/O POST PROCESS   Final Result      1.  Pancreatic body 4.3 x 4.3 cm cystic and solid mass, highly suspicious for malignancy      2.  Distal to the mass the pancreatic duct is dilated      3.  It is highly suspicious there is involvement of the splenic artery, common hepatic artery, and the proximal superior mesenteric artery      4.  Assessment of the venous vasculature including whether there is tumor involvement is nondiagnostic due to the arterial phase only imaging      DX-CHEST-PORTABLE (1 VIEW)   Final Result      No acute cardiopulmonary abnormality identified.          Rhythm strip from [facility] reviewed shows sinus rhythm by run of nonsustained ventricular tachycardia.     EKG tracings personally reviewed by me shows sinus rhythm, nonspecific ST-T wave changes, PVC, prior inferior infarct     Echo 20/2019:  Normal left ventricular systolic function. Left ventricular ejection   fraction is visually estimated to be 55%.   Indeterminate diastolic function.  Normal inferior vena cava size and inspiratory collapse.  Right ventricular systolic  pressure is estimated to be 37 mmHg.     Stress test 2019:  Large territory, moderate to severe intensity inferior and inferolateral    infarct.   No ischemia.   Mild inferior wall hypokinesis. LV ejection fraction = 59%.   TID absent 0.91.   Sinus rhythm.   Old inferior MI on ecg.   Nondiagnostic ECG portion of a regadenoson nuclear stress test.     Left heart catheterization 2/22/2019:  1.  Acute inferolateral ST elevation myocardial infarction with failed thrombolytic therapy secondary to total occlusion of distal right coronary artery  2.  Status post stenting of the distal right coronary artery with 4x 20 mm Synergy drug-eluting stent with no significant residual stenosis DAPHNE-3 flow  3.  80-90% stenosis in the mid left anterior descending artery and 40-50% proximal left circumflex artery stenosis    All pertinent features of laboratory and imaging reviewed including primary images where applicable      Principal Problem:    GIB (gastrointestinal bleeding) (POA: Yes)  Active Problems:    Pancreatic cancer (HCC) (POA: Unknown)    V tach (HCC) (POA: Unknown)    ACP (advance care planning) (POA: Unknown)  Resolved Problems:    * No resolved hospital problems. *      Assessment / Plan:  This is a 68-year-old man with past medical history significant for coronary artery disease status post inferior STEMI status post thrombolytics and eventually PCI, history of CABG, history of pancreatic cancer on chemotherapy, hypertension, dyslipidemia admitted with presyncope in the setting of acute GI bleed and anemia.     Ventricular tachycardia, in the setting of acute anemia and GI bleed  GI bleed  CAD status post CABG  Hypertension  Dyslipidemia  Pancreatic cancer on chemotherapy     Recommendations:  Tele since admission shows frequent PVCs and runs of NSVT.  He has known coronary artery disease status post CABG and found to ventricular arrhythmias with coronary hypoperfusion.  Suspect that ventricular arrhythmia being  driven by demand ischemia in the setting of acute anemia and GI bleed.  Remains asymptomatic with cardiac complaints.  Transfuse as needed to maintain hemoglobin > 9  Continue amiodarone drip for now.  Will eventually need to be restarted on home beta-blocker therapy once GI bleed has been addressed.      It is my pleasure to participate in the care of Mr. Rojas.  Please do not hesitate to contact me with questions or concerns.    Matt Garcia MD  Cardiologist, [facility]    10/7/2023    Please note that this dictation was created using voice recognition software. I have made every reasonable attempt to correct obvious errors, but it is possible there are errors of grammar and possibly content that I did not discover before finalizing the note.

## 2023-10-08 LAB
CA-I SERPL-SCNC: 1.1 MMOL/L (ref 1.1–1.3)
E COLI SXT1+2 STL IA: NORMAL
ERYTHROCYTE [DISTWIDTH] IN BLOOD BY AUTOMATED COUNT: 46 FL (ref 35.9–50)
ERYTHROCYTE [DISTWIDTH] IN BLOOD BY AUTOMATED COUNT: 46.9 FL (ref 35.9–50)
ERYTHROCYTE [DISTWIDTH] IN BLOOD BY AUTOMATED COUNT: 47 FL (ref 35.9–50)
HCT VFR BLD AUTO: 29.2 % (ref 42–52)
HCT VFR BLD AUTO: 29.4 % (ref 42–52)
HCT VFR BLD AUTO: 29.5 % (ref 42–52)
HGB BLD-MCNC: 10.2 G/DL (ref 14–18)
HGB BLD-MCNC: 10.2 G/DL (ref 14–18)
HGB BLD-MCNC: 10.5 G/DL (ref 14–18)
MAGNESIUM SERPL-MCNC: 1.8 MG/DL (ref 1.5–2.5)
MCH RBC QN AUTO: 31.4 PG (ref 27–33)
MCH RBC QN AUTO: 31.4 PG (ref 27–33)
MCH RBC QN AUTO: 32.5 PG (ref 27–33)
MCHC RBC AUTO-ENTMCNC: 34.6 G/DL (ref 32.3–36.5)
MCHC RBC AUTO-ENTMCNC: 34.7 G/DL (ref 32.3–36.5)
MCHC RBC AUTO-ENTMCNC: 36 G/DL (ref 32.3–36.5)
MCV RBC AUTO: 90.4 FL (ref 81.4–97.8)
MCV RBC AUTO: 90.5 FL (ref 81.4–97.8)
MCV RBC AUTO: 90.8 FL (ref 81.4–97.8)
PATH REV: NORMAL
PATH REV: NORMAL
PLATELET # BLD AUTO: 105 K/UL (ref 164–446)
PLATELET # BLD AUTO: 109 K/UL (ref 164–446)
PLATELET # BLD AUTO: 109 K/UL (ref 164–446)
PMV BLD AUTO: 11.1 FL (ref 9–12.9)
PMV BLD AUTO: 11.4 FL (ref 9–12.9)
PMV BLD AUTO: 11.5 FL (ref 9–12.9)
RBC # BLD AUTO: 3.23 M/UL (ref 4.7–6.1)
RBC # BLD AUTO: 3.25 M/UL (ref 4.7–6.1)
RBC # BLD AUTO: 3.25 M/UL (ref 4.7–6.1)
SIGNIFICANT IND 70042: NORMAL
SITE SITE: NORMAL
SOURCE SOURCE: NORMAL
WBC # BLD AUTO: 43.3 K/UL (ref 4.8–10.8)
WBC # BLD AUTO: 47.1 K/UL (ref 4.8–10.8)
WBC # BLD AUTO: 50.1 K/UL (ref 4.8–10.8)

## 2023-10-08 PROCEDURE — A9270 NON-COVERED ITEM OR SERVICE: HCPCS | Performed by: INTERNAL MEDICINE

## 2023-10-08 PROCEDURE — 99232 SBSQ HOSP IP/OBS MODERATE 35: CPT | Performed by: INTERNAL MEDICINE

## 2023-10-08 PROCEDURE — 700111 HCHG RX REV CODE 636 W/ 250 OVERRIDE (IP): Mod: JZ | Performed by: EMERGENCY MEDICINE

## 2023-10-08 PROCEDURE — 700111 HCHG RX REV CODE 636 W/ 250 OVERRIDE (IP): Mod: JZ | Performed by: HOSPITALIST

## 2023-10-08 PROCEDURE — 700111 HCHG RX REV CODE 636 W/ 250 OVERRIDE (IP): Mod: JZ,JG | Performed by: INTERNAL MEDICINE

## 2023-10-08 PROCEDURE — C9113 INJ PANTOPRAZOLE SODIUM, VIA: HCPCS | Performed by: INTERNAL MEDICINE

## 2023-10-08 PROCEDURE — 82330 ASSAY OF CALCIUM: CPT

## 2023-10-08 PROCEDURE — 83735 ASSAY OF MAGNESIUM: CPT

## 2023-10-08 PROCEDURE — 85027 COMPLETE CBC AUTOMATED: CPT | Mod: 91

## 2023-10-08 PROCEDURE — 770020 HCHG ROOM/CARE - TELE (206)

## 2023-10-08 PROCEDURE — 80503 PATH CLIN CONSLTJ SF 5-20: CPT

## 2023-10-08 PROCEDURE — 99232 SBSQ HOSP IP/OBS MODERATE 35: CPT | Performed by: HOSPITALIST

## 2023-10-08 PROCEDURE — 700105 HCHG RX REV CODE 258: Performed by: EMERGENCY MEDICINE

## 2023-10-08 PROCEDURE — 700111 HCHG RX REV CODE 636 W/ 250 OVERRIDE (IP): Performed by: INTERNAL MEDICINE

## 2023-10-08 PROCEDURE — 700102 HCHG RX REV CODE 250 W/ 637 OVERRIDE(OP): Performed by: INTERNAL MEDICINE

## 2023-10-08 RX ORDER — MAGNESIUM SULFATE HEPTAHYDRATE 40 MG/ML
2 INJECTION, SOLUTION INTRAVENOUS ONCE
Status: COMPLETED | OUTPATIENT
Start: 2023-10-08 | End: 2023-10-08

## 2023-10-08 RX ORDER — LANOLIN ALCOHOL/MO/W.PET/CERES
400 CREAM (GRAM) TOPICAL DAILY
Status: DISCONTINUED | OUTPATIENT
Start: 2023-10-09 | End: 2023-10-09 | Stop reason: HOSPADM

## 2023-10-08 RX ORDER — METOPROLOL SUCCINATE 25 MG/1
25 TABLET, EXTENDED RELEASE ORAL
Status: DISCONTINUED | OUTPATIENT
Start: 2023-10-08 | End: 2023-10-09 | Stop reason: HOSPADM

## 2023-10-08 RX ADMIN — AMIODARONE HYDROCHLORIDE 0.5 MG/MIN: 1.8 INJECTION, SOLUTION INTRAVENOUS at 05:54

## 2023-10-08 RX ADMIN — PANTOPRAZOLE SODIUM 40 MG: 40 INJECTION, POWDER, FOR SOLUTION INTRAVENOUS at 17:17

## 2023-10-08 RX ADMIN — MAGNESIUM SULFATE HEPTAHYDRATE 2 G: 2 INJECTION, SOLUTION INTRAVENOUS at 15:55

## 2023-10-08 RX ADMIN — PANTOPRAZOLE SODIUM 40 MG: 40 INJECTION, POWDER, FOR SOLUTION INTRAVENOUS at 05:54

## 2023-10-08 RX ADMIN — TBO-FILGRASTIM 300 MCG: 300 INJECTION, SOLUTION SUBCUTANEOUS at 17:24

## 2023-10-08 RX ADMIN — METOPROLOL SUCCINATE 25 MG: 25 TABLET, EXTENDED RELEASE ORAL at 10:01

## 2023-10-08 RX ADMIN — DEXTROSE MONOHYDRATE: 50 INJECTION, SOLUTION INTRAVENOUS at 01:58

## 2023-10-08 ASSESSMENT — ENCOUNTER SYMPTOMS
FEVER: 0
RESPIRATORY NEGATIVE: 1
DOUBLE VISION: 0
CHILLS: 0
BLOOD IN STOOL: 1
LOSS OF CONSCIOUSNESS: 0
HEADACHES: 0
SHORTNESS OF BREATH: 0
NEUROLOGICAL NEGATIVE: 1
BACK PAIN: 0
ABDOMINAL PAIN: 0
CARDIOVASCULAR NEGATIVE: 1
BLURRED VISION: 0
NAUSEA: 0
COUGH: 0
EYES NEGATIVE: 1
PSYCHIATRIC NEGATIVE: 1
DIZZINESS: 0
DIARRHEA: 0
MUSCULOSKELETAL NEGATIVE: 1
CONSTITUTIONAL NEGATIVE: 1
VOMITING: 0
PALPITATIONS: 0
GASTROINTESTINAL NEGATIVE: 1

## 2023-10-08 ASSESSMENT — COGNITIVE AND FUNCTIONAL STATUS - GENERAL
CLIMB 3 TO 5 STEPS WITH RAILING: A LITTLE
SUGGESTED CMS G CODE MODIFIER DAILY ACTIVITY: CH
DAILY ACTIVITIY SCORE: 24
MOBILITY SCORE: 23
SUGGESTED CMS G CODE MODIFIER MOBILITY: CI

## 2023-10-08 ASSESSMENT — PAIN DESCRIPTION - PAIN TYPE
TYPE: ACUTE PAIN

## 2023-10-08 NOTE — PROGRESS NOTES
Oncology/Hematology Progress Note               Author: Destin Telles M.D. Date & Time created: 10/8/2023  8:49 AM     Interval History:  Patient had EGD and several areas that could have been bleeding, he reports that BM's have normalized and having much less flatus    Review of Systems:  Review of Systems   Constitutional: Negative.    HENT: Negative.     Eyes: Negative.    Respiratory: Negative.     Cardiovascular: Negative.    Gastrointestinal: Negative.    Genitourinary: Negative.    Musculoskeletal: Negative.    Skin: Negative.    Neurological: Negative.    Endo/Heme/Allergies: Negative.    Psychiatric/Behavioral: Negative.         Physical Exam:  Physical Exam  Constitutional:       Appearance: Normal appearance. He is normal weight.   HENT:      Head: Normocephalic and atraumatic.      Right Ear: External ear normal.      Left Ear: External ear normal.      Nose: Nose normal.      Mouth/Throat:      Mouth: Mucous membranes are moist.      Pharynx: Oropharynx is clear.   Eyes:      Extraocular Movements: Extraocular movements intact.      Conjunctiva/sclera: Conjunctivae normal.      Pupils: Pupils are equal, round, and reactive to light.   Cardiovascular:      Rate and Rhythm: Normal rate and regular rhythm.      Pulses: Normal pulses.      Heart sounds: Normal heart sounds.   Pulmonary:      Effort: Pulmonary effort is normal.      Breath sounds: Normal breath sounds.   Abdominal:      General: Abdomen is flat. Bowel sounds are normal.      Palpations: Abdomen is soft.   Musculoskeletal:         General: Normal range of motion.      Cervical back: Normal range of motion and neck supple.   Skin:     General: Skin is warm and dry.   Neurological:      General: No focal deficit present.      Mental Status: He is alert and oriented to person, place, and time.   Psychiatric:         Mood and Affect: Mood normal.         Behavior: Behavior normal.         Thought Content: Thought content normal.          Judgment: Judgment normal.         Labs:          Recent Labs     10/06/23  1615 10/07/23  1338 10/08/23  0558   SODIUM 134*  --   --    POTASSIUM 4.4  --   --    CHLORIDE 110  --   --    CO2 14*  --   --    BUN 37*  --   --    CREATININE 1.11  --   --    MAGNESIUM  --  1.6 1.8   CALCIUM 7.8*  --   --      Recent Labs     10/06/23  1615   ALTSGPT 20   ASTSGOT 22   ALKPHOSPHAT 108*   TBILIRUBIN 0.5   LIPASE 44   GLUCOSE 191*     Recent Labs     10/06/23  1615 10/07/23  0318 10/07/23  1741 10/08/23  0006 10/08/23  0558   RBC 3.61*   < > 3.35* 3.25* 3.25*   HEMOGLOBIN 11.4*   < > 10.8* 10.2* 10.2*   HEMATOCRIT 34.1*   < > 30.1* 29.4* 29.5*   PLATELETCT 145*   < > 124* 109* 109*   PROTHROMBTM 17.7*  --   --   --   --    APTT 25.8  --   --   --   --    INR 1.43*  --   --   --   --     < > = values in this interval not displayed.     Recent Labs     10/06/23  1615 10/07/23  0318 10/07/23  1244 10/07/23  1741 10/08/23  0006 10/08/23  0558   WBC 38.8* 27.9*   < > 37.4* 47.1* 50.1*   NEUTSPOLYS 97.40* 92.80*  --   --   --   --    LYMPHOCYTES 1.70* 4.40*  --   --   --   --    MONOCYTES 0.90 1.30  --   --   --   --    EOSINOPHILS 0.00 0.10  --   --   --   --    BASOPHILS 0.00 0.20  --   --   --   --    ASTSGOT 22  --   --   --   --   --    ALTSGPT 20  --   --   --   --   --    ALKPHOSPHAT 108*  --   --   --   --   --    TBILIRUBIN 0.5  --   --   --   --   --     < > = values in this interval not displayed.     Recent Labs     10/06/23  1615   SODIUM 134*   POTASSIUM 4.4   CHLORIDE 110   CO2 14*   GLUCOSE 191*   BUN 37*   CREATININE 1.11   CALCIUM 7.8*     Hemodynamics:  Temp (24hrs), Av.3 °C (97.4 °F), Min:36 °C (96.8 °F), Max:36.7 °C (98 °F)  Temperature: 36.1 °C (97 °F)  Pulse  Av.5  Min: 48  Max: 116   Blood Pressure : 111/57     Respiratory:    Respiration: 18, Pulse Oximetry: 97 %        RUL Breath Sounds: Clear, RML Breath Sounds: Clear, RLL Breath Sounds: Clear, BRENDAN Breath Sounds: Clear, LLL Breath Sounds:  Clear  Fluids:    Intake/Output Summary (Last 24 hours) at 10/8/2023 0849  Last data filed at 10/8/2023 0800  Gross per 24 hour   Intake 2594.71 ml   Output 625 ml   Net 1969.71 ml     Weight: 67.3 kg (148 lb 5.9 oz)  GI/Nutrition:  Orders Placed This Encounter   Procedures    Diet Order Diet: Clear Liquid; Miscellaneous modifications: (optional): No Red     Standing Status:   Standing     Number of Occurrences:   1     Order Specific Question:   Diet:     Answer:   Clear Liquid [10]     Order Specific Question:   Miscellaneous modifications: (optional)     Answer:   No Red [61]     Medical Decision Making, by Problem:  Active Hospital Problems    Diagnosis     *GIB (gastrointestinal bleeding) [K92.2]     Acute post-hemorrhagic anemia [D62]     Hypomagnesemia [E83.42]     V tach (HCC) [I47.20]     ACP (advance care planning) [Z71.89]     Pancreatic cancer (HCC) [C25.9]     Coronary artery disease involving native coronary artery without angina pectoris [I25.10]        Plan:  Pancreatic adenocarcinoma - CTA may demonstrate disease progression despite neoadjuvant chemotherapy, await pathology from EGD, f/u outpatient with Dr. Issa and surgery  Anemia - improved s/p transfusion, likely iron deficient, monitor for bleeding  Leukocytosis - previously received Neulasta OnPro, hold any further G-CSF, monitor for any signs of infection  Melena/BRBPR - resolved, defer to GI  Ventricular tachycardia - defer to cardiology     Case discussed with patient and RN at bedside.    Destin Telles M.D.      Quality-Core Measures   Morgan catheter::  No Morgan  Central line in place:  Need for access  DVT prophylaxis pharmacological::  Contraindicated - High bleeding risk  DVT prophylaxis - mechanical:  SCDs  Ulcer Prophylaxis::  Yes

## 2023-10-08 NOTE — PROGRESS NOTES
Patient is A&O x 4 upon entering room, HOB in high-Goodwin's position. Performed assessment-see Assessment for details. Ambulated patient (~150 feet)- patient tolerated well and steady gate noted. All needs were addressed at this time. Call light within reach.

## 2023-10-08 NOTE — PROGRESS NOTES
Gastroenterology Progress Note     Author: Sofi Renteria M.D.   Date & Time Created: 10/8/2023 1:09 PM    Chief Complaint:  Hematochezia/melena and near syncopal day of admission    Interval History:  HPI:  This is a very pleasant 68 y.o. male, former EMT, diagnosed with [id number] pancreatic cancer currently on chemotherapy through Dr. Issa.  Last night had had diarrhea and noted bright red blood.  This morning he felt near syncopal and his SBP at home was 70's. He had 4-5 more loose stools with red blood although the last and most recent was black.       He has not had NsAIDs for 2 weeks  He denies GERD, dysphagia, anorexia, early satiety, previous PUD or GI bleeding  He had a negative colonoscopy 4 months ago  He had an EUS at [facility] by Dr. Dorsey late Aug 2023:  duodenal erosions    10/7/23:  EGD: shallow esophageal ulcers ?pill induced; extensive ulceration from D2 throughout duodenum and ulceration extending into jejunum    Passed large bloody stool post procedure and tagged RBC scan negative.    10/8/23: stools were dark but not bloody.  Tolerating diet.  No complaints.  Looking forward to speaking with Dr. Issa.  He believes he is stage II and that treatment will be curative.    Review of Systems:  Review of Systems   Constitutional: Negative.    HENT: Negative.     Eyes: Negative.    Respiratory: Negative.     Cardiovascular: Negative.    Gastrointestinal: Negative.    Genitourinary: Negative.    Musculoskeletal: Negative.    Skin: Negative.    Neurological: Negative.        Physical Exam:  Physical Exam  Constitutional:       Appearance: Normal appearance.   HENT:      Head: Normocephalic and atraumatic.      Nose: Nose normal.   Eyes:      Pupils: Pupils are equal, round, and reactive to light.   Cardiovascular:      Rate and Rhythm: Regular rhythm.      Heart sounds: Normal heart sounds.   Pulmonary:      Breath sounds: Normal breath sounds.   Abdominal:      General: Bowel sounds are  normal.      Palpations: Abdomen is soft.      Tenderness: There is no abdominal tenderness.   Musculoskeletal:         General: Normal range of motion.      Cervical back: Neck supple.   Skin:     General: Skin is warm and dry.   Neurological:      Mental Status: He is alert and oriented to person, place, and time.         Labs:          Recent Labs     10/06/23  1615 10/07/23  1338 10/08/23  0558   SODIUM 134*  --   --    POTASSIUM 4.4  --   --    CHLORIDE 110  --   --    CO2 14*  --   --    BUN 37*  --   --    CREATININE 1.11  --   --    MAGNESIUM  --  1.6 1.8   CALCIUM 7.8*  --   --      Recent Labs     10/06/23  1615   ALTSGPT 20   ASTSGOT 22   ALKPHOSPHAT 108*   TBILIRUBIN 0.5   LIPASE 44   GLUCOSE 191*     Recent Labs     10/06/23  1615 10/07/23  0318 10/08/23  0006 10/08/23  0558 10/08/23  1210   RBC 3.61*   < > 3.25* 3.25* 3.23*   HEMOGLOBIN 11.4*   < > 10.2* 10.2* 10.5*   HEMATOCRIT 34.1*   < > 29.4* 29.5* 29.2*   PLATELETCT 145*   < > 109* 109* 105*   PROTHROMBTM 17.7*  --   --   --   --    APTT 25.8  --   --   --   --    INR 1.43*  --   --   --   --     < > = values in this interval not displayed.     Recent Labs     10/06/23  1615 10/07/23  0318 10/07/23  1244 10/08/23  0006 10/08/23  0558 10/08/23  1210   WBC 38.8* 27.9*   < > 47.1* 50.1* 43.3*   NEUTSPOLYS 97.40* 92.80*  --   --   --   --    LYMPHOCYTES 1.70* 4.40*  --   --   --   --    MONOCYTES 0.90 1.30  --   --   --   --    EOSINOPHILS 0.00 0.10  --   --   --   --    BASOPHILS 0.00 0.20  --   --   --   --    ASTSGOT 22  --   --   --   --   --    ALTSGPT 20  --   --   --   --   --    ALKPHOSPHAT 108*  --   --   --   --   --    TBILIRUBIN 0.5  --   --   --   --   --     < > = values in this interval not displayed.     Hemodynamics:  Temp (24hrs), Av.2 °C (97.1 °F), Min:36.1 °C (97 °F), Max:36.2 °C (97.2 °F)  Temperature: 36.1 °C (97 °F)  Pulse  Av.5  Min: 48  Max: 116   Blood Pressure : 111/57     Respiratory:    Respiration: 18, Pulse  Oximetry: 97 %        RUL Breath Sounds: Clear, RML Breath Sounds: Clear, RLL Breath Sounds: Clear, BRENDAN Breath Sounds: Clear, LLL Breath Sounds: Clear  Fluids:    Intake/Output Summary (Last 24 hours) at 10/8/2023 1309  Last data filed at 10/8/2023 0800  Gross per 24 hour   Intake 2091.98 ml   Output 625 ml   Net 1466.98 ml     Weight: 67.3 kg (148 lb 5.9 oz)  GI/Nutrition:  Orders Placed This Encounter   Procedures    Diet Order Diet: Regular     Standing Status:   Standing     Number of Occurrences:   1     Order Specific Question:   Diet:     Answer:   Regular [1]     Medical Decision Making, by Problem:  Active Hospital Problems    Diagnosis     *GIB (gastrointestinal bleeding) [K92.2]     Acute post-hemorrhagic anemia [D62]     Hypomagnesemia [E83.42]     V tach (HCC) [I47.20]     ACP (advance care planning) [Z71.89]     Pancreatic cancer (HCC) [C25.9]     Coronary artery disease involving native coronary artery without angina pectoris [I25.10]      Impression:   Pancreatic adenocarcinoma on round 2 chemotherapy (has not had radiation) with presumed extension into duodenum (path pending) and vascular involvement leading to extensive ulceration  GI bleeding  Acute blood loss anemia    Plan:  Appreciate assistance of hospitalist service, Cards, Heme-Onc  Giving PPI IV BID  Regular diet as ordered  Serial H&H  ??? Radiation this admission    Quality-Core Measures

## 2023-10-08 NOTE — CARE PLAN
The patient is Watcher - Medium risk of patient condition declining or worsening    Shift Goals  Clinical Goals: Hemodynamic stability, monitor bleeding  Patient Goals: Rest, see family in the morning  Family Goals: Updates    Progress made toward(s) clinical / shift goals:    Problem: Knowledge Deficit - Standard  Goal: Patient and family/care givers will demonstrate understanding of plan of care, disease process/condition, diagnostic tests and medications  Outcome: Progressing     Problem: Hemodynamics  Goal: Patient's hemodynamics, fluid balance and neurologic status will be stable or improve  Outcome: Progressing     Problem: Fall Risk  Goal: Patient will remain free from falls  Outcome: Progressing     Problem: Pain - Standard  Goal: Alleviation of pain or a reduction in pain to the patient’s comfort goal  Outcome: Progressing  Flowsheets  Taken 10/8/2023 0200 by Amelie Hill RJESSE  Pain Rating Scale (NPRS): 0  Taken 10/7/2023 1000 by Joselyn Robles RJESSE  Non Verbal Scale: Calm

## 2023-10-08 NOTE — PROGRESS NOTES
Cardiology Progress Note    Interval History:  Patient is being seen in cardiac follow-up for nonsustained VT in the setting of acute anemia and GI bleed.    Yesterday patient underwent GI work-up.  Was found to have 2 esophageal ulcers, extensive ulceration involving the duodenum to the jejunum.  Hemoglobin remained stable today.    Patient was evaluated bedside today.  He has no major cardiac complaints.  Denies having chest pain, palpitations or dyspnea.  Review of telemetry shows sinus rhythm with PVCs    Medications / Drug list prior to admission:  No current facility-administered medications on file prior to encounter.     Current Outpatient Medications on File Prior to Encounter   Medication Sig Dispense Refill    LORazepam (ATIVAN) 1 MG Tab Take 1 mg by mouth every 8 hours as needed for Anxiety.      rosuvastatin (CRESTOR) 20 MG Tab Take 20 mg by mouth every day.      OLANZapine (ZYPREXA) 5 MG Tab Take 1 tablet by mouth nightly on days 1-5 of chemo treatment week 12 Tablet 1    promethazine (PHENERGAN) 25 MG Tab Take 0.5-1 Tablets by mouth every 6 hours as needed for Nausea/Vomiting. 30 Tablet 1    metoprolol SR (TOPROL XL) 25 MG TABLET SR 24 HR Take 25 mg by mouth at bedtime.      ondansetron (ZOFRAN) 4 MG Tab tablet Take 1 Tablet by mouth every four hours as needed for Nausea/Vomiting (for nausea, vomiting). 30 Tablet 6    prochlorperazine (COMPAZINE) 10 MG Tab Take 1 Tablet by mouth every 6 hours as needed (for nausea, vomiting). 30 Tablet 6    morphine (MS IR) 15 MG tablet Take 1 Tablet by mouth every 8 hours as needed for Severe Pain for up to 30 days. 90 Tablet 0    losartan (COZAAR) 25 MG Tab Take 25 mg by mouth every day.      allopurinol (ZYLOPRIM) 100 MG Tab Take 100 mg by mouth every day.         Current list of administered Medications:    Current Facility-Administered Medications:     metoprolol SR (Toprol XL) tablet 25 mg, 25 mg, Oral, Q DAY, Matt MASON M.D.    dextrose 5% infusion, ,  Intravenous, Continuous, Bob Resendez M.D., Last Rate: 30 mL/hr at 10/08/23 0158, New Bag at 10/08/23 0158    [COMPLETED] amiodarone (Nexterone) 360 mg/200 mL infusion, 1 mg/min, Intravenous, Once, Stopped at 10/06/23 2210 **FOLLOWED BY** amiodarone (Nexterone) 360 mg/200 mL infusion, 0.5 mg/min, Intravenous, Continuous, Bob Resendez M.D., Last Rate: 16.7 mL/hr at 10/08/23 0554, 0.5 mg/min at 10/08/23 0554    tbo-filgrastim (Granix) 300 MCG/0.5ML injection 300 mcg, 5 mcg/kg, Subcutaneous, Q EVENING, Cosmo Kellogg M.D., 300 mcg at 10/07/23 1742    pantoprazole (Protonix) injection 40 mg, 40 mg, Intravenous, BID, Sofi Renteria M.D., 40 mg at 10/08/23 0554    acetaminophen (Tylenol) tablet 650 mg, 650 mg, Oral, Q6HRS PRN, Riky Medrano M.D.    Past Medical History:   Diagnosis Date    Anemia     Anesthesia     post op  nausea, last neurosurgery no problems    Arthritis     osteo; back/right ankle and right wrist    Cancer (HCC) Current    Pancreatic stage 2    Cataract     Coronary artery disease involving native coronary artery without angina pectoris 03/06/2019    Emphysema of lung (HCC)     per xray result. pt states no active disease    High cholesterol     Hypertension     Myocardial infarct (HCC) Feb 22, 2019    Followed by Dr. Kulkarni    Pain 03/30/2017     back, 7-8/10    PONV (postoperative nausea and vomiting)     Snoring     sometimes, very mild, no sleep study       Past Surgical History:   Procedure Laterality Date    OK UPPER GI ENDOSCOPY,DIAGNOSIS N/A 10/7/2023    Procedure: GASTROSCOPY;  Surgeon: Sofi Renteria M.D.;  Location: SURGERY [facility];  Service: Gastroenterology    OK UPPER GI ENDOSCOPY,BIOPSY N/A 10/7/2023    Procedure: GASTROSCOPY, WITH BIOPSY;  Surgeon: Sofi Renteria M.D.;  Location: SURGERY [facility];  Service: Gastroenterology    FUSION, SPINE, LUMBAR, PLIF  04/04/2017    Procedure: LUMBAR FUSION POSTERIOR - L4-5 FUSION/EXPLORATION ;  Surgeon: Osmani WEBER  LICHA Engel;  Location: SURGERY [facility];  Service:     LUMBAR LAMINECTOMY DISKECTOMY Right 04/04/2017    Procedure: LUMBAR LAMINECTOMY DISKECTOMY L3-S1 AND MEDIAL FACETECTOMY;  Surgeon: Osmani Engel M.D.;  Location: SURGERY [facility];  Service:     FORAMINOTOMY  04/04/2017    Procedure: FORAMINOTOMY;  Surgeon: Osmani Engel M.D.;  Location: SURGERY [facility];  Service:     HARDWARE REMOVAL ORTHO  04/04/2017    Procedure: HARDWARE REMOVAL ORTHO - L4-5;  Surgeon: Osmani Engel M.D.;  Location: SURGERY [facility];  Service:     FUSION, SPINE, LUMBAR, PLIF N/A 12/15/2015    Procedure: LUMBAR FUSION POSTERIOR INSTRUMENTED TLIF L4-5;  Surgeon: Osmani Engel M.D.;  Location: SURGERY [facility];  Service:     LUMBAR LAMINECTOMY DISKECTOMY Left 12/15/2015    Procedure: LUMBAR LAMINECTOMY DISKECTOMY L1-2;  Surgeon: Osmani Engel M.D.;  Location: SURGERY [facility];  Service:     SHOULDER SURGERY  01/01/2013    arthroscopic     SHOULDER SURGERY  01/01/2012    right shoulder    APPENDECTOMY  01/01/1976    CATARACT EXTRACTION WITH IOL Bilateral     OTHER CARDIAC SURGERY  Sept 6, 2019    CABG       Family History   Problem Relation Age of Onset    Cancer Mother         Lung cancer    Hypertension Father     Heart Attack Brother     Drug abuse Brother     Alcohol abuse Brother      Patient family history was personally reviewed, no pertinent family history to current presentation    Social History     Tobacco Use    Smoking status: Never     Passive exposure: Yes    Smokeless tobacco: Never   Vaping Use    Vaping Use: Never used   Substance Use Topics    Alcohol use: Not Currently    Drug use: No       ALLERGIES:  No Known Allergies    Review of systems:  A complete review of symptoms was reviewed with the patient. This is reviewed in H&P and PMH. ALL OTHERS reviewed and negative    Physical exam:  Patient Vitals for the past 24 hrs:   BP Temp Temp src Pulse Resp SpO2 Weight   10/08/23  0600 111/57 -- -- 69 18 97 % --   10/08/23 0500 138/77 -- -- 85 -- 95 % --   10/08/23 0400 99/61 36.1 °C (97 °F) Temporal 82 20 98 % --   10/08/23 0300 129/70 -- -- 80 -- 95 % --   10/08/23 0200 112/60 -- -- 60 19 96 % --   10/08/23 0100 (!) 147/69 -- -- 98 -- 98 % --   10/08/23 0000 118/66 36.2 °C (97.2 °F) Temporal 87 18 92 % --   10/07/23 2300 126/75 -- -- 88 -- 95 % --   10/07/23 2200 134/73 -- -- 85 20 97 % --   10/07/23 2100 125/78 -- -- 80 -- 97 % --   10/07/23 2000 (!) 141/74 36.2 °C (97.2 °F) Temporal 61 18 98 % 67.3 kg (148 lb 5.9 oz)   10/07/23 1900 -- -- -- (!) 59 -- 98 % --   10/07/23 1845 -- -- -- (!) 57 -- 98 % --   10/07/23 1830 -- -- -- 75 -- (!) 86 % --   10/07/23 1815 132/77 -- -- 62 -- 98 % --   10/07/23 1800 -- -- -- 62 -- 97 % --   10/07/23 1745 127/78 -- -- 65 -- 99 % --   10/07/23 1730 -- -- -- 65 -- 97 % --   10/07/23 1715 -- -- -- 68 -- 98 % --   10/07/23 1700 -- -- -- 66 18 98 % --   10/07/23 1645 -- -- -- 61 16 98 % --   10/07/23 1630 123/71 -- -- 64 (!) 30 97 % --   10/07/23 1521 126/78 -- -- -- -- -- --   10/07/23 1400 -- -- -- 66 -- 98 % --   10/07/23 1345 -- -- -- (!) 59 -- 98 % --   10/07/23 1330 -- -- -- 67 -- 98 % --   10/07/23 1315 -- -- -- 63 -- 99 % --   10/07/23 1300 -- -- -- (!) 58 -- 99 % --   10/07/23 1245 -- -- -- 62 -- 99 % --   10/07/23 1230 132/72 -- -- 77 -- 100 % --   10/07/23 1215 -- -- -- 73 -- 100 % --   10/07/23 1200 -- -- -- 73 -- 97 % --   10/07/23 1145 116/68 -- -- 72 -- 99 % --   10/07/23 1130 117/66 -- -- 68 -- 98 % --   10/07/23 1115 121/70 -- -- 63 -- 99 % --   10/07/23 1100 119/69 -- -- 81 -- 97 % --   10/07/23 1045 123/65 -- -- 68 -- 98 % --   10/07/23 1030 134/64 -- -- (!) 56 -- 97 % --   10/07/23 1000 132/63 36.5 °C (97.7 °F) Temporal 63 20 99 % --   10/07/23 0945 119/67 -- -- 61 16 98 % --   10/07/23 0930 112/62 36.7 °C (98 °F) Axillary 65 20 100 % --   10/07/23 0923 -- -- -- 63 18 100 % --       General: Not in acute distress, lying comfortably in  bed  HEENT: OP clear   Neck:  No carotid bruits, No JVD appreciated  CVS:  RRR, Normal S1, S2. No murmurs, rubs or gallops  Resp: Normal respiratory effort, lungs CTA bilaterally. No rales or rhonchi  Abdomen: Soft, non-distended, non-tender to palpation  Skin: No obvious rashes, no cyanosis  Neurological: Alert and oriented x3, moves all extremities, no focal neurologic deficits  Extremities:   Extremities warm, pulses intact, no lower extremity edema bilaterally      Data:  Laboratory studies personally reviewed by me:  Recent Results (from the past 24 hour(s))   C Diff by PCR rflx Toxin    Collection Time: 10/07/23 12:24 PM    Specimen: Stool   Result Value Ref Range    C Diff by PCR Negative Negative    [id number] Presumptive Negative Negative   CBC WITHOUT DIFFERENTIAL    Collection Time: 10/07/23 12:44 PM   Result Value Ref Range    WBC 36.7 (H) 4.8 - 10.8 K/uL    RBC 3.36 (L) 4.70 - 6.10 M/uL    Hemoglobin 10.7 (L) 14.0 - 18.0 g/dL    Hematocrit 30.8 (L) 42.0 - 52.0 %    MCV 91.7 81.4 - 97.8 fL    MCH 31.8 27.0 - 33.0 pg    MCHC 34.7 32.3 - 36.5 g/dL    RDW 48.1 35.9 - 50.0 fL    Platelet Count 117 (L) 164 - 446 K/uL    MPV 11.2 9.0 - 12.9 fL   MAGNESIUM    Collection Time: 10/07/23  1:38 PM   Result Value Ref Range    Magnesium 1.6 1.5 - 2.5 mg/dL   CBC WITHOUT DIFFERENTIAL    Collection Time: 10/07/23  5:41 PM   Result Value Ref Range    WBC 37.4 (H) 4.8 - 10.8 K/uL    RBC 3.35 (L) 4.70 - 6.10 M/uL    Hemoglobin 10.8 (L) 14.0 - 18.0 g/dL    Hematocrit 30.1 (L) 42.0 - 52.0 %    MCV 89.9 81.4 - 97.8 fL    MCH 32.2 27.0 - 33.0 pg    MCHC 35.9 32.3 - 36.5 g/dL    RDW 47.1 35.9 - 50.0 fL    Platelet Count 124 (L) 164 - 446 K/uL    MPV 11.0 9.0 - 12.9 fL   CBC WITHOUT DIFFERENTIAL    Collection Time: 10/08/23 12:06 AM   Result Value Ref Range    WBC 47.1 (H) 4.8 - 10.8 K/uL    RBC 3.25 (L) 4.70 - 6.10 M/uL    Hemoglobin 10.2 (L) 14.0 - 18.0 g/dL    Hematocrit 29.4 (L) 42.0 - 52.0 %    MCV 90.5 81.4 - 97.8 fL     MCH 31.4 27.0 - 33.0 pg    MCHC 34.7 32.3 - 36.5 g/dL    RDW 47.0 35.9 - 50.0 fL    Platelet Count 109 (L) 164 - 446 K/uL    MPV 11.5 9.0 - 12.9 fL   MAGNESIUM    Collection Time: 10/08/23  5:58 AM   Result Value Ref Range    Magnesium 1.8 1.5 - 2.5 mg/dL   CBC WITHOUT DIFFERENTIAL    Collection Time: 10/08/23  5:58 AM   Result Value Ref Range    WBC 50.1 (HH) 4.8 - 10.8 K/uL    RBC 3.25 (L) 4.70 - 6.10 M/uL    Hemoglobin 10.2 (L) 14.0 - 18.0 g/dL    Hematocrit 29.5 (L) 42.0 - 52.0 %    MCV 90.8 81.4 - 97.8 fL    MCH 31.4 27.0 - 33.0 pg    MCHC 34.6 32.3 - 36.5 g/dL    RDW 46.9 35.9 - 50.0 fL    Platelet Count 109 (L) 164 - 446 K/uL    MPV 11.4 9.0 - 12.9 fL       Imaging:  NM-GI BLEEDING SCAN   Final Result      No scintigraphy evidence of active GI bleeding.      CTA ABDOMEN PELVIS W & W/O POST PROCESS   Final Result      1.  Pancreatic body 4.3 x 4.3 cm cystic and solid mass, highly suspicious for malignancy      2.  Distal to the mass the pancreatic duct is dilated      3.  It is highly suspicious there is involvement of the splenic artery, common hepatic artery, and the proximal superior mesenteric artery      4.  Assessment of the venous vasculature including whether there is tumor involvement is nondiagnostic due to the arterial phase only imaging      DX-CHEST-PORTABLE (1 VIEW)   Final Result      No acute cardiopulmonary abnormality identified.          Rhythm strip from [facility] reviewed shows sinus rhythm by run of nonsustained ventricular tachycardia.     EKG tracings personally reviewed by me shows sinus rhythm, nonspecific ST-T wave changes, PVC, prior inferior infarct     Echo 20/2019:  Normal left ventricular systolic function. Left ventricular ejection   fraction is visually estimated to be 55%.   Indeterminate diastolic function.  Normal inferior vena cava size and inspiratory collapse.  Right ventricular systolic pressure is estimated to be 37 mmHg.     Stress test 2019:  Large  territory, moderate to severe intensity inferior and inferolateral    infarct.   No ischemia.   Mild inferior wall hypokinesis. LV ejection fraction = 59%.   TID absent 0.91.   Sinus rhythm.   Old inferior MI on ecg.   Nondiagnostic ECG portion of a regadenoson nuclear stress test.     Left heart catheterization 2/22/2019:  1.  Acute inferolateral ST elevation myocardial infarction with failed thrombolytic therapy secondary to total occlusion of distal right coronary artery  2.  Status post stenting of the distal right coronary artery with 4x 20 mm Synergy drug-eluting stent with no significant residual stenosis DAPHNE-3 flow  3.  80-90% stenosis in the mid left anterior descending artery and 40-50% proximal left circumflex artery stenosis    All pertinent features of laboratory and imaging reviewed including primary images where applicable      Principal Problem:    GIB (gastrointestinal bleeding) (POA: Yes)  Active Problems:    Coronary artery disease involving native coronary artery without angina pectoris (POA: Yes)      Overview: 2/22/2019: A 4x 20 mm Synergy drug-eluting stent was placed to distal RCA    Pancreatic cancer (HCC) (POA: Unknown)    V tach (HCC) (POA: Unknown)    ACP (advance care planning) (POA: Unknown)    Acute post-hemorrhagic anemia (POA: Unknown)    Hypomagnesemia (POA: Unknown)  Resolved Problems:    * No resolved hospital problems. *      Assessment / Plan:  This is a 68-year-old man with past medical history significant for coronary artery disease status post inferior STEMI status post thrombolytics and eventually PCI, history of CABG, history of pancreatic cancer on chemotherapy, hypertension, dyslipidemia admitted with presyncope in the setting of acute GI bleed and anemia.     NSVT/VT, in the setting of acute anemia and GI bleed  GI bleed  CAD status post CABG  Hypertension  Dyslipidemia  Pancreatic cancer on chemotherapy  Esophageal ulcers, duodenal ulcerations      Recommendations:  Remains on amiodarone drip at this time.  Review of telemetry shows sinus rhythm with PVCs.  He has known CAD status post CABG history which increases his risk of ventricular arrhythmias with coronary hypoperfusion.  Increased ventricular ectopy/VT at outside facility was likely being driven by demand ischemia in the setting of acute anemia and GI bleed.  Start beta-blocker therapy with metoprolol succinate 25 mg daily today.  Uptitrate as tolerated.  Stop amiodarone. Keep on telemetry. If develops recurrent NSVT, increase BB therapy further.  Continue to hold aspirin in the setting of upper GI bleed  Trend hgb. Keep Hgb > 9  No additional cardiac recommendations at this time. Cardiology will sign off.    It is my pleasure to participate in the care of Mr. Rojas.  Please do not hesitate to contact me with questions or concerns.    Matt Garcia MD  Cardiologist, [facility]    10/8/2023    Please note that this dictation was created using voice recognition software. I have made every reasonable attempt to correct obvious errors, but it is possible there are errors of grammar and possibly content that I did not discover before finalizing the note.

## 2023-10-08 NOTE — PROGRESS NOTES
[facility] Medicine Daily Progress Note    Date of Service  10/8/2023    Chief Complaint  Lucas Rojas is a 68 y.o. male admitted 10/6/2023 with BRBPR    Hospital Course  69yo PMHx of CAD/CABG, COPD, HLD, HTN, pancreatic CA on chemo presenting with BRBPR.  Hypotensive on presentation to the outlying facility and was given 3 units of PRBCs.  While in the ED pt had several runs of non sustained VT up to 30 sec.  Started on Amio.      Interval Problem Update  Pt feeling better than yesterday.  Hungry.  Had a small BM with a few small dark clots but no BRBPR.  No abd pain  No WCT's on monitor overnight    DW GI    I have discussed this patient's plan of care and discharge plan at IDT rounds today with Case Management, Nursing, Nursing leadership, and other members of the IDT team.    Consultants/Specialty  GI    Code Status  Full Code    Disposition  The patient is not medically cleared for discharge to home or a post-acute facility.      I have placed the appropriate orders for post-discharge needs.    Review of Systems  Review of Systems   Constitutional:  Negative for chills and fever.   Eyes:  Negative for blurred vision and double vision.   Respiratory:  Negative for cough and shortness of breath.    Cardiovascular:  Negative for chest pain, palpitations and leg swelling.   Gastrointestinal:  Positive for blood in stool. Negative for abdominal pain, diarrhea, nausea and vomiting.   Genitourinary:  Negative for dysuria and urgency.   Musculoskeletal:  Negative for back pain.   Neurological:  Negative for dizziness, loss of consciousness and headaches.        Physical Exam  Temp:  [36 °C (96.8 °F)-36.7 °C (98 °F)] 36.1 °C (97 °F)  Pulse:  [48-98] 69  Resp:  [16-30] 18  BP: ()/(51-79) 111/57  SpO2:  [86 %-100 %] 97 %    Physical Exam  Constitutional:       General: He is not in acute distress.     Appearance: He is well-developed. He is not diaphoretic.   HENT:      Head: Normocephalic and atraumatic.    Eyes:      Conjunctiva/sclera: Conjunctivae normal.   Neck:      Vascular: No JVD.   Cardiovascular:      Rate and Rhythm: Normal rate.      Heart sounds: No murmur heard.     No gallop.   Pulmonary:      Effort: Pulmonary effort is normal. No respiratory distress.      Breath sounds: No stridor. No wheezing or rales.   Abdominal:      Palpations: Abdomen is soft.      Tenderness: There is no abdominal tenderness. There is no guarding or rebound.   Musculoskeletal:         General: No tenderness.      Right lower leg: No edema.      Left lower leg: No edema.   Skin:     General: Skin is warm and dry.      Coloration: Skin is pale.      Findings: No rash.   Neurological:      Mental Status: He is oriented to person, place, and time.   Psychiatric:         Mood and Affect: Mood normal.         Behavior: Behavior normal.         Thought Content: Thought content normal.         Fluids    Intake/Output Summary (Last 24 hours) at 10/8/2023 0654  Last data filed at 10/8/2023 0600  Gross per 24 hour   Intake 2594.71 ml   Output --   Net 2594.71 ml         Laboratory  Recent Labs     10/07/23  1244 10/07/23  1741 10/08/23  0006   WBC 36.7* 37.4* 47.1*   RBC 3.36* 3.35* 3.25*   HEMOGLOBIN 10.7* 10.8* 10.2*   HEMATOCRIT 30.8* 30.1* 29.4*   MCV 91.7 89.9 90.5   MCH 31.8 32.2 31.4   MCHC 34.7 35.9 34.7   RDW 48.1 47.1 47.0   PLATELETCT 117* 124* 109*   MPV 11.2 11.0 11.5       Recent Labs     10/06/23  1615   SODIUM 134*   POTASSIUM 4.4   CHLORIDE 110   CO2 14*   GLUCOSE 191*   BUN 37*   CREATININE 1.11   CALCIUM 7.8*       Recent Labs     10/06/23  1615   APTT 25.8   INR 1.43*                 Imaging  NM-GI BLEEDING SCAN   Final Result      No scintigraphy evidence of active GI bleeding.      CTA ABDOMEN PELVIS W & W/O POST PROCESS   Final Result      1.  Pancreatic body 4.3 x 4.3 cm cystic and solid mass, highly suspicious for malignancy      2.  Distal to the mass the pancreatic duct is dilated      3.  It is highly  suspicious there is involvement of the splenic artery, common hepatic artery, and the proximal superior mesenteric artery      4.  Assessment of the venous vasculature including whether there is tumor involvement is nondiagnostic due to the arterial phase only imaging      DX-CHEST-PORTABLE (1 VIEW)   Final Result      No acute cardiopulmonary abnormality identified.             Assessment/Plan  * GIB (gastrointestinal bleeding)- (present on admission)  Assessment & Plan  S/p 3 units PRBCs  CTA neg  EGD 10/7: large duodenal ulcer, no active bleeding seen  GI recommends STAT NM bleedinig scan if any sign of further active bleeding  F/b Onc Srgr Dr Jerez  Serial H&H  STAT TEG  Access: pt has a port and several PIVs  Follow ionized Ca    10/8  Hgb stable  Clinically no sign of active bleeding  Ionized Ca WNL  Progress diet  CBC daily  STAT bleeding scan if any evidence of active bleeding  OK to Tele    Hypomagnesemia  Assessment & Plan  1.8 today  Giving 2g IV MgSO4  Start po supplementation  Repeat lab in am  Aim for >2    Acute post-hemorrhagic anemia  Assessment & Plan  Secondary to GI bleeding  Goal Hgb>9 in setting of demand induced VT    10/8  Hgb stable in 10s  No clinical signs of active bleeding  Cont daily CBC    ACP (advance care planning)  Assessment & Plan  FULL CODE per dw my partner    V tach (HCC)  Assessment & Plan  Likely from demand ischemia from GI bleed  Fluid resuscitation, transfuse as needed  Cardiology on board recommends transfusing below 9  Follow and replace K, Mg, Ca  No episodes in last 24hrs  DC amio and start po BB  OK to Tele      Pancreatic cancer (HCC)  Assessment & Plan  On chemo  F/B Dr Luis Manuel Ku/Onc; will call him on Monday  F/B Dr Jerez Onc Srgry; HALLEY KOENIG on call for him this weekend  Hope is to shrink the tumour with chemo prior to hopefully curative resection      Coronary artery disease involving native coronary artery without angina pectoris- (present on  admission)  Assessment & Plan  S/p 3 vessel CABG  Cards following  Not on antiplatele therapy for obvious reasons  No evidence of acute ishcemia         VTE prophylaxis:   SCDs/TEDs      I have performed a physical exam and reviewed and updated ROS and Plan today (10/8/2023). In review of yesterday's note (10/7/2023), there are no changes except as documented above.

## 2023-10-08 NOTE — CARE PLAN
Problem: Knowledge Deficit - Standard  Goal: Patient and family/care givers will demonstrate understanding of plan of care, disease process/condition, diagnostic tests and medications  Outcome: Progressing     Problem: Fall Risk  Goal: Patient will remain free from falls  Outcome: Progressing   The patient is Stable - Low risk of patient condition declining or worsening    Shift Goals  Clinical Goals: cbc, VSS  Patient Goals: cbc, VSS  Family Goals: Updates    Progress made toward(s) clinical / shift goals:  pt and family updated on POC for today.  Hourly rounding in place for safety.    Patient is not progressing towards the following goals:

## 2023-10-09 ENCOUNTER — PHARMACY VISIT (OUTPATIENT)
Dept: PHARMACY | Facility: MEDICAL CENTER | Age: 68
End: 2023-10-09
Payer: COMMERCIAL

## 2023-10-09 VITALS
HEART RATE: 72 BPM | SYSTOLIC BLOOD PRESSURE: 102 MMHG | TEMPERATURE: 97 F | WEIGHT: 148.37 LBS | BODY MASS INDEX: 21.24 KG/M2 | RESPIRATION RATE: 14 BRPM | HEIGHT: 70 IN | DIASTOLIC BLOOD PRESSURE: 65 MMHG | OXYGEN SATURATION: 99 %

## 2023-10-09 PROBLEM — K26.4 GASTROINTESTINAL HEMORRHAGE ASSOCIATED WITH DUODENAL ULCER: Status: RESOLVED | Noted: 2023-10-06 | Resolved: 2023-10-09

## 2023-10-09 PROBLEM — E83.42 HYPOMAGNESEMIA: Status: RESOLVED | Noted: 2023-10-07 | Resolved: 2023-10-09

## 2023-10-09 PROBLEM — I47.20 V TACH (HCC): Status: RESOLVED | Noted: 2023-10-06 | Resolved: 2023-10-09

## 2023-10-09 PROBLEM — C25.1 MALIGNANT NEOPLASM OF BODY OF PANCREAS (HCC): Status: ACTIVE | Noted: 2023-09-06

## 2023-10-09 PROBLEM — K26.4 GASTROINTESTINAL HEMORRHAGE ASSOCIATED WITH DUODENAL ULCER: Status: ACTIVE | Noted: 2023-10-06

## 2023-10-09 LAB — PATHOLOGY CONSULT NOTE: NORMAL

## 2023-10-09 PROCEDURE — 700111 HCHG RX REV CODE 636 W/ 250 OVERRIDE (IP): Performed by: INTERNAL MEDICINE

## 2023-10-09 PROCEDURE — RXMED WILLOW AMBULATORY MEDICATION CHARGE: Performed by: STUDENT IN AN ORGANIZED HEALTH CARE EDUCATION/TRAINING PROGRAM

## 2023-10-09 PROCEDURE — A9270 NON-COVERED ITEM OR SERVICE: HCPCS | Performed by: HOSPITALIST

## 2023-10-09 PROCEDURE — 700102 HCHG RX REV CODE 250 W/ 637 OVERRIDE(OP): Performed by: HOSPITALIST

## 2023-10-09 PROCEDURE — 99231 SBSQ HOSP IP/OBS SF/LOW 25: CPT | Performed by: INTERNAL MEDICINE

## 2023-10-09 PROCEDURE — 99239 HOSP IP/OBS DSCHRG MGMT >30: CPT | Performed by: STUDENT IN AN ORGANIZED HEALTH CARE EDUCATION/TRAINING PROGRAM

## 2023-10-09 PROCEDURE — 700111 HCHG RX REV CODE 636 W/ 250 OVERRIDE (IP)

## 2023-10-09 PROCEDURE — C9113 INJ PANTOPRAZOLE SODIUM, VIA: HCPCS | Performed by: INTERNAL MEDICINE

## 2023-10-09 RX ORDER — OXYCODONE HYDROCHLORIDE 5 MG/1
5 TABLET ORAL EVERY 4 HOURS PRN
Status: DISCONTINUED | OUTPATIENT
Start: 2023-10-09 | End: 2023-10-09

## 2023-10-09 RX ORDER — OMEPRAZOLE 20 MG/1
20 CAPSULE, DELAYED RELEASE ORAL 2 TIMES DAILY
Qty: 60 CAPSULE | Refills: 0 | Status: SHIPPED | OUTPATIENT
Start: 2023-10-09 | End: 2023-11-08

## 2023-10-09 RX ORDER — OMEPRAZOLE 20 MG/1
20 CAPSULE, DELAYED RELEASE ORAL 2 TIMES DAILY
Status: DISCONTINUED | OUTPATIENT
Start: 2023-10-09 | End: 2023-10-09 | Stop reason: HOSPADM

## 2023-10-09 RX ADMIN — Medication 400 MG: at 05:30

## 2023-10-09 RX ADMIN — HEPARIN 500 UNITS: 100 SYRINGE at 10:44

## 2023-10-09 RX ADMIN — PANTOPRAZOLE SODIUM 40 MG: 40 INJECTION, POWDER, FOR SOLUTION INTRAVENOUS at 05:31

## 2023-10-09 ASSESSMENT — ENCOUNTER SYMPTOMS
MUSCULOSKELETAL NEGATIVE: 1
CONSTITUTIONAL NEGATIVE: 1
GASTROINTESTINAL NEGATIVE: 1
RESPIRATORY NEGATIVE: 1
CARDIOVASCULAR NEGATIVE: 1
PSYCHIATRIC NEGATIVE: 1
NEUROLOGICAL NEGATIVE: 1
EYES NEGATIVE: 1

## 2023-10-09 ASSESSMENT — FIBROSIS 4 INDEX
FIB4 SCORE: 3.28
FIB4 SCORE: 3.28

## 2023-10-09 ASSESSMENT — PAIN SCALES - WONG BAKER: WONGBAKER_NUMERICALRESPONSE: DOESN'T HURT AT ALL

## 2023-10-09 NOTE — PROGRESS NOTES
New to floor 22:08  Rate 58-67  Rhythm SR, SB, Trigm, BBB, PVC  0.20/0.10/0.56    As per Tele Monitor 2 hour flow sheet

## 2023-10-09 NOTE — PROGRESS NOTES
4 Eyes Skin Assessment Completed by MINDY Klein and MINDY Andrews.    Head WDL  Ears WDL  Nose WDL  Mouth WDL  Neck WDL  Breast/Chest WDL  Shoulder Blades WDL  Spine WDL  (R) Arm/Elbow/Hand WDL  (L) Arm/Elbow/Hand WDL  Abdomen WDL  Groin Redness  Scrotum/Coccyx/Buttocks WDL  (R) Leg Scab and Bruising  (L) Leg Scab and Bruising  (R) Heel/Foot/Toe WDL  (L) Heel/Foot/Toe WDL          Devices In Places Tele Box      Interventions In Place Pillows    Possible Skin Injury No    Pictures Uploaded Into Epic N/A  Wound Consult Placed N/A  RN Wound Prevention Protocol Ordered Yes

## 2023-10-09 NOTE — DISCHARGE SUMMARY
Discharge Summary    CHIEF COMPLAINT ON ADMISSION  Chief Complaint   Patient presents with    Lower GI Bleed     Pt presented to [facility] after near syncopal event. Pt has been having bloody stools for the past few days, but several times this am.     Weakness       Reason for Admission  Melena    Admission Date  10/6/2023    CODE STATUS  Full Code    HPI & HOSPITAL COURSE  This is a 68 y.o. male with Stage 2A pancreatic adenocarcinoma on neoadjuvant mFOLFIRINOX, CAD s/p CABG, HTN, and COPD here with melena.    He presented to [facility] with generalized weakness, dizziness, dark stools, and BRBPR. He was hypotensive for which received 3 units PRBC (unclear Hgb per review of notes) and protonix prior to careflight to [facility] ED. Hgb upon arrival to [facility] was 11.3 from Hgb 15 at outpatient labs 10/2/23. CTA A&P demonstrated no apparent extravasation. Subsequent NM scan was also negative for evidence of active GI bleed. Gastroenterology was consulted and he underwent EGD 10/7/23 which demonstrated esophageal ulcers and duodenal ulcers likely from malignancy. Post-procedure his hgb remained >10 units and he was hemodynamically stable without further bloody BM prior to discharge.    He presented with nonsustained ventricular tachycardia for which Cardiology was consulted and initiated on amiodarone, which was subsequently discontinued. He was reinitiated on his prior metoprolol for rate control. Hgb was maintained >9 due to history of cardiac ischemia.     He had leukocytosis for which CXR, CT A/P, Cdiff PCR, and stool cultures were not indicative of infection. Oncology was consulted and advised no antineoplastic therapy nor GCSF was indicated acutely.    POC discussed with consulting oncologist Dr. Telles, primary oncologist Dr. Issa, and surgical oncologist Dr. Escalera. No acute oncologic needs and recommend follow up as outpatient for ongoing antineoplastic therapy.    Therefore, he is discharged  in fair and stable condition to home with close outpatient follow-up.    The patient met 2-midnight criteria for an inpatient stay at the time of discharge.    Discharge Date  10/9/2023    FOLLOW UP ITEMS POST DISCHARGE    Gastrointestinal ulcers - follow up on biopsies, referred to [facility], indefinite PPI BID due to underlying malignancy    Pancreatic cancer - follow up with oncology    DISCHARGE DIAGNOSES  Principal Problem (Resolved):    Gastrointestinal hemorrhage associated with duodenal ulcer (POA: Yes)  Active Problems:    Coronary artery disease involving native coronary artery without angina pectoris (POA: Yes)      Overview: 2/22/2019: A 4x 20 mm Synergy drug-eluting stent was placed to distal RCA    Malignant neoplasm of body of pancreas (HCC) (POA: Yes)    ACP (advance care planning) (POA: Yes)    Acute post-hemorrhagic anemia (POA: Yes)  Resolved Problems:    V tach (HCC) (POA: Yes)    Hypomagnesemia (POA: Yes)      FOLLOW UP  Future Appointments   Date Time Provider Department Center   10/18/2023  8:30 AM IZZY Montaño ON[facility] None   10/18/2023  9:00 AM [facility] [place]   10/20/2023  5[facility]   11/1/2023  9:20 AM David Issa D.O. [facility] None   11/1/2023 10:00 [facility]   11/3/2023  5:45 PM [facility]     Srikanth Escalera M.D.  [address]  [facility] [id number] [phone]    Schedule an appointment as soon as possible for a visit  for oncology surgery follow up    David Issa D.O.  [address]  [address]  [facility] [id number] [phone]    Go to  scheduled outpatient appointments for oncology treatment    [facility]  [address] [place] [id number] [phone]  Schedule an appointment as soon as possible for a visit in 3 month(s)  after-hospital ulcer follow up, call for status of referral    [facility]  [address] [place] [address]  Drive  [place] [id number] [phone]        Luz Elena Velazquez, MARYLINP.N.  [address]  [place] [id number] [phone]    Schedule an appointment as soon as possible for a visit  cardiology follow up      MEDICATIONS ON DISCHARGE     Medication List        START taking these medications        Instructions   omeprazole 20 MG delayed-release capsule  Commonly known as: PriLOSEC   Take 1 Capsule by mouth 2 times a day for 30 days.  Dose: 20 mg            CONTINUE taking these medications        Instructions   allopurinol 100 MG Tabs  Commonly known as: Zyloprim   Take 100 mg by mouth every day.  Dose: 100 mg     LORazepam 1 MG Tabs  Commonly known as: Ativan   Take 1 mg by mouth every 8 hours as needed for Anxiety.  Dose: 1 mg     losartan 25 MG Tabs  Commonly known as: Cozaar   Take 25 mg by mouth every day.  Dose: 25 mg     metoprolol SR 25 MG Tb24  Commonly known as: Toprol XL   Take 25 mg by mouth at bedtime.  Dose: 25 mg     morphine 15 MG tablet  Commonly known as: MS IR   Take 1 Tablet by mouth every 8 hours as needed for Severe Pain for up to 30 days.  Dose: 15 mg     OLANZapine 5 MG Tabs  Commonly known as: ZyPREXA   Take 1 tablet by mouth nightly on days 1-5 of chemo treatment week     ondansetron 4 MG Tabs tablet  Commonly known as: Zofran   Take 1 Tablet by mouth every four hours as needed for Nausea/Vomiting (for nausea, vomiting).  Dose: 4 mg     prochlorperazine 10 MG Tabs  Commonly known as: Compazine   Take 1 Tablet by mouth every 6 hours as needed (for nausea, vomiting).  Dose: 10 mg     promethazine 25 MG Tabs  Commonly known as: Phenergan   Take 0.5-1 Tablets by mouth every 6 hours as needed for Nausea/Vomiting.  Dose: 12.5-25 mg     rosuvastatin 20 MG Tabs  Commonly known as: Crestor   Take 20 mg by mouth every day.  Dose: 20 mg            STOP taking these medications      diphenoxylate-atropine 2.5-0.025 MG Tabs  Commonly known as: Lomotil              Allergies  No Known  Allergies    DIET  Orders Placed This Encounter   Procedures    Diet Order Diet: Regular     Standing Status:   Standing     Number of Occurrences:   1     Order Specific Question:   Diet:     Answer:   Regular [1]       ACTIVITY  As tolerated.  Weight bearing as tolerated    CONSULTATIONS  Gastroenterology  Cardiology  Medical Oncology    PROCEDURES  OPERATIVE REPORT     PATIENT:   Lucas Rojas   1955         PREOPERATIVE DIAGNOSES/INDICATIONS: hematochezia, melena, near syncope, acute blood loss anemia, pancreatic cancer     POSTOPERATIVE DIAGNOSIS: esophageal ulcer x 2, extensive ulceration  duodenum to jejunum, abnormal tissue in duodenum suspect malignant     PROCEDURE:  ESOPHAGOGASTRODUODENOSCOPY with biopsies     PHYSICIAN:  Sofi Renteria MD     ANESTHESIA:  Per anesthesiologist.     LOCATION: [facility]     CONSENT:  OBTAINED. The risks, benefits and alternatives of the procedure were discussed in details. The risks include and are not limited to bleeding, infection, perforation, missed lesions, and sedations risks (cardiopulmonary compromise and allergic reaction to medications).     DESCRIPTION: The patient presented to the procedure room.  After routine checkup was performed, patient was brought into the endoscopy suite.  Patient was placed on his left lateral decubitus position. A bite block was placed in patient's mouth. Patient was sedated by anesthesia.  Vital signs were monitored throughout the procedure.  Oxygenation support was provided throughout the procedure. Upper endoscope was inserted into patient's mouth and advanced to the second portion of the duodenum under direct visualization.       Once the site was reached and examined, the upper endoscope was withdrawn.  Retroflexion was made within the stomach.  The stomach was decompressed, scope was withdrawn and the procedure was terminated.      The patient tolerated the procedure well.  There were no immediate complications.      OPERATIVE FINDINGS:     1. Esophagus: 2 shallow ulcers at 35 cm without esophagitis.  Near one another so possible pill induced.  Biopsies taken     2. Stomach: Normal.  No blood in lumen     3. Duodenum: bulb normal.  2nd portion with abnormal mucosa and suspect malignant.  Biopsies taken.  Extensive ulceration throughout duodenum with ulceration extending into jejunum.  No active bleeding at this time but multiple sites of pigmented spots that represent stigmata of previous bleeding. Too large for endoscopic treatment.     IMPRESSION/RECOMMENDATIONS:  Will follow up on pathology  Continue IV pantoprazole  Cl liq diet  I will speak with Surg Onc about this patient in terms of vascular involvement leading to ulceration  Will discuss with Dr. Brennan  If rebleeds, may benefit from bleeding scan instead of CTA    LABORATORY  Lab Results   Component Value Date    SODIUM 134 (L) 10/06/2023    POTASSIUM 4.4 10/06/2023    CHLORIDE 110 10/06/2023    CO2 14 (L) 10/06/2023    GLUCOSE 191 (H) 10/06/2023    BUN 37 (H) 10/06/2023    CREATININE 1.11 10/06/2023        Lab Results   Component Value Date    WBC 43.3 (H) 10/08/2023    HEMOGLOBIN 10.5 (L) 10/08/2023    HEMATOCRIT 29.2 (L) 10/08/2023    PLATELETCT 105 (L) 10/08/2023        Total time of the discharge process exceeds 33 minutes.

## 2023-10-09 NOTE — PROGRESS NOTES
Oncology/Hematology Progress Note               Author: Destin Telles M.D. Date & Time created: 10/9/2023  6:47 AM     Interval History:  Patient had bradycardia over night and metoprolol is being held    Review of Systems:  Review of Systems   Constitutional: Negative.    HENT: Negative.     Eyes: Negative.    Respiratory: Negative.     Cardiovascular: Negative.    Gastrointestinal: Negative.    Genitourinary: Negative.    Musculoskeletal: Negative.    Skin: Negative.    Neurological: Negative.    Endo/Heme/Allergies: Negative.    Psychiatric/Behavioral: Negative.         Physical Exam:  Physical Exam  Constitutional:       Appearance: Normal appearance. He is normal weight.   HENT:      Head: Normocephalic and atraumatic.      Right Ear: External ear normal.      Left Ear: External ear normal.      Nose: Nose normal.      Mouth/Throat:      Mouth: Mucous membranes are moist.      Pharynx: Oropharynx is clear.   Eyes:      Extraocular Movements: Extraocular movements intact.      Conjunctiva/sclera: Conjunctivae normal.      Pupils: Pupils are equal, round, and reactive to light.   Cardiovascular:      Rate and Rhythm: Normal rate and regular rhythm.      Pulses: Normal pulses.      Heart sounds: Normal heart sounds.   Pulmonary:      Effort: Pulmonary effort is normal.      Breath sounds: Normal breath sounds.   Abdominal:      General: Abdomen is flat. Bowel sounds are normal.      Palpations: Abdomen is soft.   Musculoskeletal:         General: Normal range of motion.      Cervical back: Normal range of motion and neck supple.   Skin:     General: Skin is warm and dry.   Neurological:      General: No focal deficit present.      Mental Status: He is alert and oriented to person, place, and time.   Psychiatric:         Mood and Affect: Mood normal.         Behavior: Behavior normal.         Thought Content: Thought content normal.         Judgment: Judgment normal.         Labs:          Recent Labs      10/06/23  1615 10/07/23  1338 10/08/23  0558   SODIUM 134*  --   --    POTASSIUM 4.4  --   --    CHLORIDE 110  --   --    CO2 14*  --   --    BUN 37*  --   --    CREATININE 1.11  --   --    MAGNESIUM  --  1.6 1.8   CALCIUM 7.8*  --   --        Recent Labs     10/06/23  1615   ALTSGPT 20   ASTSGOT 22   ALKPHOSPHAT 108*   TBILIRUBIN 0.5   LIPASE 44   GLUCOSE 191*       Recent Labs     10/06/23  1615 10/07/23  0318 10/08/23  0006 10/08/23  0558 10/08/23  1210   RBC 3.61*   < > 3.25* 3.25* 3.23*   HEMOGLOBIN 11.4*   < > 10.2* 10.2* 10.5*   HEMATOCRIT 34.1*   < > 29.4* 29.5* 29.2*   PLATELETCT 145*   < > 109* 109* 105*   PROTHROMBTM 17.7*  --   --   --   --    APTT 25.8  --   --   --   --    INR 1.43*  --   --   --   --     < > = values in this interval not displayed.       Recent Labs     10/06/23  1615 10/07/23  0318 10/07/23  1244 10/08/23  0006 10/08/23  0558 10/08/23  1210   WBC 38.8* 27.9*   < > 47.1* 50.1* 43.3*   NEUTSPOLYS 97.40* 92.80*  --   --   --   --    LYMPHOCYTES 1.70* 4.40*  --   --   --   --    MONOCYTES 0.90 1.30  --   --   --   --    EOSINOPHILS 0.00 0.10  --   --   --   --    BASOPHILS 0.00 0.20  --   --   --   --    ASTSGOT 22  --   --   --   --   --    ALTSGPT 20  --   --   --   --   --    ALKPHOSPHAT 108*  --   --   --   --   --    TBILIRUBIN 0.5  --   --   --   --   --     < > = values in this interval not displayed.       Recent Labs     10/06/23  1615   SODIUM 134*   POTASSIUM 4.4   CHLORIDE 110   CO2 14*   GLUCOSE 191*   BUN 37*   CREATININE 1.11   CALCIUM 7.8*       Hemodynamics:  Temp (24hrs), Av.4 °C (97.5 °F), Min:36.2 °C (97.2 °F), Max:36.4 °C (97.6 °F)  Temperature: 36.4 °C (97.6 °F)  Pulse  Av.3  Min: 48  Max: 116   Blood Pressure : 103/55     Respiratory:    Respiration: 18, Pulse Oximetry: 100 %           Fluids:    Intake/Output Summary (Last 24 hours) at 10/8/2023 0849  Last data filed at 10/8/2023 0800  Gross per 24 hour   Intake 2594.71 ml   Output 625 ml   Net 1969.71 ml      Weight: 67.3 kg (148 lb 5.9 oz)  GI/Nutrition:  Orders Placed This Encounter   Procedures    Diet Order Diet: Regular     Standing Status:   Standing     Number of Occurrences:   1     Order Specific Question:   Diet:     Answer:   Regular [1]     Medical Decision Making, by Problem:  Active Hospital Problems    Diagnosis     *GIB (gastrointestinal bleeding) [K92.2]     Acute post-hemorrhagic anemia [D62]     Hypomagnesemia [E83.42]     V tach (HCC) [I47.20]     ACP (advance care planning) [Z71.89]     Pancreatic cancer (HCC) [C25.9]     Coronary artery disease involving native coronary artery without angina pectoris [I25.10]        Plan:  Pancreatic adenocarcinoma - CTA may demonstrate disease progression despite neoadjuvant chemotherapy, await pathology from EGD, f/u outpatient with Dr. Issa and surgery  Anemia - improved s/p transfusion, likely iron deficient, monitor for bleeding  Leukocytosis - previously received Neulasta OnPro, hold any further G-CSF, monitor for any signs of infection  Melena/BRBPR - resolved, defer to GI  Ventricular tachycardia - defer to cardiology     Case discussed with patient     Destin Telles M.D.      Quality-Core Measures   Morgan catheter::  No Morgan  Central line in place:  Need for access  DVT prophylaxis pharmacological::  Contraindicated - High bleeding risk  DVT prophylaxis - mechanical:  SCDs  Ulcer Prophylaxis::  Yes

## 2023-10-09 NOTE — DISCHARGE INSTRUCTIONS
Discharge Instructions per Andre Teague M.D.    You were hospitalized for bleeding in your intestines due to ulcers in your small bowel (duodenum). You were monitored and the bleeding has stopped. You are started on antacid (omeprazole) to help the ulcers heal.    Please follow up with your oncology team for ongoing treatment.    DIET: Regular    ACTIVITY: Regular    DIAGNOSIS: Acute Blood Loss Anemia due to Duodenal Ulcer    Return to ER if you develop blood in your bowels or vomit, develop sudden chest pain or shortness of breath, faint for any reason, or are unable to keep down food/drink due to nausea and vomiting.

## 2023-10-09 NOTE — RESPIRATORY CARE
COPD EDUCATION by COPD CLINICAL EDUCATOR  10/9/2023 at 8:42 AM by Yun Geller, RRT     Patient reviewed by COPD education team. Patient does not have a formal history/diagnosis of COPD and no respiratory medications. Pt is a non-smoker.

## 2023-10-10 LAB
BACTERIA STL CULT: NORMAL
E COLI SXT1+2 STL IA: NORMAL
SIGNIFICANT IND 70042: NORMAL
SITE SITE: NORMAL
SOURCE SOURCE: NORMAL

## 2023-10-13 RX ORDER — 0.9 % SODIUM CHLORIDE 0.9 %
10 VIAL (ML) INJECTION PRN
Status: CANCELLED | OUTPATIENT
Start: 2023-10-18

## 2023-10-13 RX ORDER — LOPERAMIDE HYDROCHLORIDE 2 MG/1
2 CAPSULE ORAL
Status: CANCELLED | OUTPATIENT
Start: 2023-10-18

## 2023-10-13 RX ORDER — 0.9 % SODIUM CHLORIDE 0.9 %
VIAL (ML) INJECTION PRN
Status: CANCELLED | OUTPATIENT
Start: 2023-10-18

## 2023-10-13 RX ORDER — DIPHENHYDRAMINE HYDROCHLORIDE 50 MG/ML
50 INJECTION INTRAMUSCULAR; INTRAVENOUS PRN
Status: CANCELLED | OUTPATIENT
Start: 2023-10-18

## 2023-10-13 RX ORDER — METHYLPREDNISOLONE SODIUM SUCCINATE 125 MG/2ML
125 INJECTION, POWDER, LYOPHILIZED, FOR SOLUTION INTRAMUSCULAR; INTRAVENOUS PRN
Status: CANCELLED | OUTPATIENT
Start: 2023-10-18

## 2023-10-13 RX ORDER — 0.9 % SODIUM CHLORIDE 0.9 %
10 VIAL (ML) INJECTION PRN
Status: CANCELLED | OUTPATIENT
Start: 2023-10-17

## 2023-10-13 RX ORDER — 0.9 % SODIUM CHLORIDE 0.9 %
VIAL (ML) INJECTION PRN
Status: CANCELLED | OUTPATIENT
Start: 2023-10-17

## 2023-10-13 RX ORDER — PROCHLORPERAZINE MALEATE 10 MG
10 TABLET ORAL EVERY 6 HOURS PRN
Status: CANCELLED | OUTPATIENT
Start: 2023-10-18

## 2023-10-13 RX ORDER — ONDANSETRON 2 MG/ML
4 INJECTION INTRAMUSCULAR; INTRAVENOUS PRN
Status: CANCELLED | OUTPATIENT
Start: 2023-10-18

## 2023-10-13 RX ORDER — LOPERAMIDE HYDROCHLORIDE 2 MG/1
4 CAPSULE ORAL PRN
Status: CANCELLED | OUTPATIENT
Start: 2023-10-18

## 2023-10-13 RX ORDER — ONDANSETRON 8 MG/1
8 TABLET, ORALLY DISINTEGRATING ORAL PRN
Status: CANCELLED | OUTPATIENT
Start: 2023-10-18

## 2023-10-13 RX ORDER — DEXTROSE MONOHYDRATE 50 MG/ML
INJECTION, SOLUTION INTRAVENOUS CONTINUOUS
Status: CANCELLED | OUTPATIENT
Start: 2023-10-18

## 2023-10-13 RX ORDER — 0.9 % SODIUM CHLORIDE 0.9 %
3 VIAL (ML) INJECTION PRN
Status: CANCELLED | OUTPATIENT
Start: 2023-10-17

## 2023-10-13 RX ORDER — 0.9 % SODIUM CHLORIDE 0.9 %
3 VIAL (ML) INJECTION PRN
Status: CANCELLED | OUTPATIENT
Start: 2023-10-18

## 2023-10-13 RX ORDER — 0.9 % SODIUM CHLORIDE 0.9 %
20 VIAL (ML) INJECTION PRN
Status: CANCELLED | OUTPATIENT
Start: 2023-10-20

## 2023-10-13 RX ORDER — EPINEPHRINE 1 MG/ML(1)
0.5 AMPUL (ML) INJECTION PRN
Status: CANCELLED | OUTPATIENT
Start: 2023-10-18

## 2023-10-15 NOTE — PROGRESS NOTES
"Pharmacy Chemotherapy Calculation:    Dx: pancreatic adenocarcinoma         Cycle 1  Previous Treatment: C2 10/4/23    Protocol: mFOLFIRINOX     *Dosing Reference*  Oxaliplatin 85 mg/m² IV over 2 hrs on Day 1  Irinotecan 150 mg/m² IV over 90 min on Day 1  Leucovorin 400 mg/m² IV over 90 min on Day 1  Fluorouracil 1200 mg/Irinotecan 150 mg/m² IV over 90 min  CIVI daily on Days 1-2 (2400 mg/m² IV over 46 hrs)  14-day cycle for 4-12 cycles (neoadjuvant or adjuvant) or 4-6 cycles (as induction) (locally advanced) or until disease progression or unacceptable toxicity (locally advanced,recurrent, or metastatic  NCCN Guidelines for Pancreatic Adenocarcinoma V.2.2023  Deyvi T, et al. N Engl J Med. 2018;379(60):5702-6150    Allergies:  Patient has no known allergies.     /69   Pulse 82   Temp 36.1 °C (97 °F) (Temporal)   Resp 18   Ht 1.7 m (5' 6.93\")   Wt 67.4 kg (148 lb 9.4 oz)   SpO2 99%   BMI 23.32 kg/m²  Body surface area is 1.78 meters squared.    Labs 10/16/23 (Media):  ANC~ 1670 Plt = 231k   Hgb = 11.1     SCr = 1.0 mg/dL CrCl ~ 65 mL/min   AST/ALT/AP = 26/28/121  TBili = 0.4       Oxaliplatin (Eloxatin) 85 mg/m² x 1.78 m² = 151 mg   <10% difference, okay to treat with final dose = 150 mg IV    Irinotecan (Camptosar) 150 mg/m² x 1.78 m² = 267 mg   <10% difference, okay to treat with final dose = 267 mg IV    Leucovorin 400 mg/m² x 1.78 m² = 712 mg   <10% difference, okay to treat with final dose = 700 mg IV    Fluorouracil (Adrucil) 2400 mg/m² x 1.785 m² = 4272 mg   <10% difference, okay to treat with final dose = 4270 mg IV over 46 hrs @ 1.9 mL/hr      Marjorie Bryson, PharmD  "

## 2023-10-18 ENCOUNTER — DOCUMENTATION (OUTPATIENT)
Dept: ONCOLOGY | Facility: MEDICAL CENTER | Age: 68
End: 2023-10-18
Payer: MEDICARE

## 2023-10-18 ENCOUNTER — OUTPATIENT INFUSION SERVICES (OUTPATIENT)
Dept: ONCOLOGY | Facility: MEDICAL CENTER | Age: 68
End: 2023-10-18
Attending: STUDENT IN AN ORGANIZED HEALTH CARE EDUCATION/TRAINING PROGRAM
Payer: MEDICARE

## 2023-10-18 ENCOUNTER — HOSPITAL ENCOUNTER (OUTPATIENT)
Dept: HEMATOLOGY ONCOLOGY | Facility: MEDICAL CENTER | Age: 68
End: 2023-10-18
Attending: NURSE PRACTITIONER
Payer: MEDICARE

## 2023-10-18 VITALS
RESPIRATION RATE: 12 BRPM | BODY MASS INDEX: 21.19 KG/M2 | WEIGHT: 148 LBS | TEMPERATURE: 97.9 F | HEART RATE: 66 BPM | HEIGHT: 70 IN | SYSTOLIC BLOOD PRESSURE: 116 MMHG | DIASTOLIC BLOOD PRESSURE: 64 MMHG | OXYGEN SATURATION: 98 %

## 2023-10-18 VITALS
TEMPERATURE: 97 F | WEIGHT: 148.59 LBS | HEART RATE: 82 BPM | RESPIRATION RATE: 18 BRPM | BODY MASS INDEX: 23.32 KG/M2 | OXYGEN SATURATION: 99 % | SYSTOLIC BLOOD PRESSURE: 121 MMHG | DIASTOLIC BLOOD PRESSURE: 69 MMHG | HEIGHT: 67 IN

## 2023-10-18 DIAGNOSIS — R11.0 CHEMOTHERAPY-INDUCED NAUSEA: ICD-10-CM

## 2023-10-18 DIAGNOSIS — T45.1X5A CHEMOTHERAPY INDUCED DIARRHEA: ICD-10-CM

## 2023-10-18 DIAGNOSIS — Z79.899 ENCOUNTER FOR LONG-TERM CURRENT USE OF HIGH RISK MEDICATION: ICD-10-CM

## 2023-10-18 DIAGNOSIS — T45.1X5A CHEMOTHERAPY-INDUCED NAUSEA: ICD-10-CM

## 2023-10-18 DIAGNOSIS — C25.1 MALIGNANT NEOPLASM OF BODY OF PANCREAS (HCC): ICD-10-CM

## 2023-10-18 DIAGNOSIS — K52.1 CHEMOTHERAPY INDUCED DIARRHEA: ICD-10-CM

## 2023-10-18 PROCEDURE — 96413 CHEMO IV INFUSION 1 HR: CPT

## 2023-10-18 PROCEDURE — 96417 CHEMO IV INFUS EACH ADDL SEQ: CPT

## 2023-10-18 PROCEDURE — 700111 HCHG RX REV CODE 636 W/ 250 OVERRIDE (IP): Mod: JZ | Performed by: NURSE PRACTITIONER

## 2023-10-18 PROCEDURE — 99212 OFFICE O/P EST SF 10 MIN: CPT | Performed by: NURSE PRACTITIONER

## 2023-10-18 PROCEDURE — 700111 HCHG RX REV CODE 636 W/ 250 OVERRIDE (IP): Performed by: STUDENT IN AN ORGANIZED HEALTH CARE EDUCATION/TRAINING PROGRAM

## 2023-10-18 PROCEDURE — A4212 NON CORING NEEDLE OR STYLET: HCPCS

## 2023-10-18 PROCEDURE — 700105 HCHG RX REV CODE 258: Performed by: NURSE PRACTITIONER

## 2023-10-18 PROCEDURE — 99212 OFFICE O/P EST SF 10 MIN: CPT | Mod: 25 | Performed by: NURSE PRACTITIONER

## 2023-10-18 PROCEDURE — 96375 TX/PRO/DX INJ NEW DRUG ADDON: CPT

## 2023-10-18 PROCEDURE — 99214 OFFICE O/P EST MOD 30 MIN: CPT | Performed by: NURSE PRACTITIONER

## 2023-10-18 PROCEDURE — 96415 CHEMO IV INFUSION ADDL HR: CPT

## 2023-10-18 PROCEDURE — 96367 TX/PROPH/DG ADDL SEQ IV INF: CPT

## 2023-10-18 PROCEDURE — G0498 CHEMO EXTEND IV INFUS W/PUMP: HCPCS

## 2023-10-18 RX ADMIN — DEXAMETHASONE SODIUM PHOSPHATE 12 MG: 4 INJECTION, SOLUTION INTRA-ARTICULAR; INTRALESIONAL; INTRAMUSCULAR; INTRAVENOUS; SOFT TISSUE at 09:26

## 2023-10-18 RX ADMIN — OXALIPLATIN 150 MG: 5 INJECTION, SOLUTION INTRAVENOUS at 10:42

## 2023-10-18 RX ADMIN — LEUCOVORIN CALCIUM 700 MG: 350 INJECTION, POWDER, LYOPHILIZED, FOR SUSPENSION INTRAMUSCULAR; INTRAVENOUS at 13:04

## 2023-10-18 RX ADMIN — FOSAPREPITANT 150 MG: 150 INJECTION, POWDER, LYOPHILIZED, FOR SOLUTION INTRAVENOUS at 10:00

## 2023-10-18 RX ADMIN — IRINOTECAN HYDROCHLORIDE 267 MG: 20 INJECTION, SOLUTION INTRAVENOUS at 13:08

## 2023-10-18 RX ADMIN — ONDANSETRON 16 MG: 2 INJECTION INTRAMUSCULAR; INTRAVENOUS at 09:40

## 2023-10-18 RX ADMIN — FLUOROURACIL 4270 MG: 50 INJECTION, SOLUTION INTRAVENOUS at 14:56

## 2023-10-18 RX ADMIN — ATROPINE SULFATE 0.5 MG: 0.1 INJECTION INTRAVENOUS at 12:59

## 2023-10-18 ASSESSMENT — ENCOUNTER SYMPTOMS
FEVER: 0
TINGLING: 0
BLOOD IN STOOL: 0
DIZZINESS: 0
SEIZURES: 1
MYALGIAS: 0
SENSORY CHANGE: 1
CHILLS: 0
DIARRHEA: 0
COUGH: 0
WHEEZING: 0
INSOMNIA: 0
SHORTNESS OF BREATH: 0
WEIGHT LOSS: 0
NAUSEA: 0
CONSTIPATION: 0
HEADACHES: 0
VOMITING: 0
PALPITATIONS: 0

## 2023-10-18 ASSESSMENT — FIBROSIS 4 INDEX
FIB4 SCORE: 3.28
FIB4 SCORE: 3.28

## 2023-10-18 ASSESSMENT — PAIN SCALES - GENERAL: PAINLEVEL: NO PAIN

## 2023-10-18 NOTE — PROGRESS NOTES
Chemotherapy Verification - SECONDARY RN   C3 D1     Height = 170 cm  Weight = 67.4 kg  BSA = 1.78 m^2       Medication: oxaliplatin (ELOXATIN)  Dose: 85 mg/m2  Calculated Dose: 151.3 mg                             (In mg/m2, AUC, mg/kg)     Medication: irinotecan (CAMPTOSAR)  Dose: 150 mg/m2  Calculated Dose: 267 mg                             (In mg/m2, AUC, mg/kg)    Medication: leucovorin  Dose: 400 mg/m2  Calculated Dose: 712 mg                             (In mg/m2, AUC, mg/kg)    Medication: fluorouracil (ADRUCIL) in CADD  Dose: 2400 mg/m2  Calculated Dose: 4272 mg in CADD to infuse over 46 hours                              (In mg/m2, AUC, mg/kg)      I confirm that this process was performed independently.

## 2023-10-18 NOTE — PROGRESS NOTES
"Pharmacy Chemotherapy Calculation    Dx: Pancreatic cancer         Protocol: mFOLFIRINOX     *Dosing Reference*  Oxaliplatin 85 mg/m2 IV over 2 hours  Leucovorin 400 mg/m2 IV over 90 minutes concurrently with  Irinotecan 150 mg/m2 IV over 90 minutes followed by  Fluorouracil 2400 mg/m2 CIVI over 46 hours on Days 1-2  14-day cycle for 4-12 cycles (neoadjuvant or adjuvant) or 4-6 cycles (as induction) (locally advanced) or until DP/UT (locally advanced, recurrent, or metastatic)  NCCN Guidelines for Pancreatic Adenocarcinoma V.2.2023.  Deyvi T, et al. NEJM. 2018;379(25):8450-5479.  Herring SM, et al. Br J Cancer. 2016;114(7):737-43.  Arnav CAGLE et al. [facility]ilyn Surg Oncol. 2013;20(8):2787-95.    Allergies:  Patient has no known allergies.     /69   Pulse 82   Temp 36.1 °C (97 °F) (Temporal)   Resp 18   Ht 1.7 m (5' 6.93\")   Wt 67.4 kg (148 lb 9.4 oz)   SpO2 99%   BMI 23.32 kg/m²  Body surface area is 1.78 meters squared.    All labs (10/16/23) within treatment plan parameters.       Drug Order   (Drug name, dose, route, IV Fluid & volume, frequency, number of doses) Cycle 3  Previous treatment: C2 on 10/4/23     Medication = Oxaliplatin  Base Dose = 85 mg/m2  Calc Dose: Base Dose x 1.78m2 = 151.3mg  Final Dose = 150mg  Route = IV  Fluid & Volume = D5W 250 mL  Admin Duration = Over 2 hours          <10% difference, OK to treat with final dose   Medication = Irinotecan  Base Dose = 150 mg/m2  Calc Dose: Base Dose x 1.78m2 = 267mg  Final Dose = 267mg  Route = IV  Fluid & Volume = D5W 500 mL  Admin Duration = Over 90 mins    Concurrently with leucovorin      <10% difference, OK to treat with final dose   Medication = Leucovorin  Base Dose = 400 mg/m2  Calc Dose:Base Dose x 1.78m2 = 712mg  Final Dose = 700 mg  Route = IV  Fluid & Volume = D5W 250 mL  Admin Duration = Over 90 mins     Concurrently with irinotecan      <10% difference, OK to treat with final dose   Medication = Fluorouracil  Base Dose= 2400 mg/m2  Calc " Dose: Base Dose x 1.78m2 = 4272mg  Final Dose = 4270mg  Route = CIVI  Drug conc. = 50 mg/mL  Fluid & Volume = 85.4mL (+3 mL overfill )  Admin Duration = Over 46 hours at 1.9 mL/hr          <10% difference, OK to treat with final dose     By my signature below, I confirm this process was performed independently with the BSA and all final chemotherapy dosing calculations congruent. I have reviewed the above chemotherapy order and that my calculation of the final dose and BSA (when applicable) corroborate those calculations of the  pharmacist. Discrepancies of 10% or greater in the written dose have been addressed and documented within the EPIC Progress notes.    Rufus Culp, JoseD

## 2023-10-18 NOTE — PROGRESS NOTES
Nutrition Services: Brief Update  Wt Readings from Last 7 Encounters:   10/18/23 67.4 kg (148 lb 9.4 oz)   10/18/23 67.1 kg (148 lb)   10/09/23 67.3 kg (148 lb 5.9 oz)   10/06/23 64.8 kg (142 lb 13.7 oz)   10/04/23 64.2 kg (141 lb 9.6 oz)   10/04/23 64.8 kg (142 lb 13.7 oz)   09/22/23 71.5 kg (157 lb 10.1 oz)     Weight Change: wt fluctuates, dependent on scale utilized for measurement. Weight is up per Med Onc Scales (wt on 10/18 compared to wt on 10/4)    Pertinent Recent Lab work (10/6/23): Na 134, Glucose 191, BUN 37, albumin 2.6    RD able to visit pt chairside in [facility]. Pt states is managing well at thsi time. Mentions has been following a BRAT type diet and this has been helpful. Mentions still has occasional loose stool but better managed with diet and addition of lomotil. Pt mentions having food such as V8 juice, apple juice, rice, pork, banana, nuts, peas, scrambled eggs, or chicken. Mentions did experience some nausea and cold sensitivity, though otherwise managing well with good appetite. States also diagnosed with GI bleed and was told is unable to have dairy for 30 days. Denies other concerns or questions for RD    Plan/Recommend:  Encouraged continuation of dietary regimen for time being. The addition of starchy vegetables with soluble fiber is beneficial in bowel management.      Pt verbalizes understanding and is receptive to information provided.   RD available PRN   [phone]

## 2023-10-18 NOTE — PROGRESS NOTES
Pt arrived ambulatory with wife for C3 chemo, denies c/o, states he feels much better after recent hospitalization for GI bleed.  Pt saw Anabella TOM prior to arrival who reviewed lab results (scanned in media) and okay for treatment.  Right port accessed with 3/4 inch low profile needle.  Premeds and chemo given as ordered, pt tolerated well, no reaction noted.  Pt Dc'd home with 5FU pump infusing in RUN setting, will return Friday for unhook.

## 2023-10-18 NOTE — PROGRESS NOTES
Subjective     Marino Rojas is a 68 y.o. male who presents with Cancer (Prechemo)            HPI  Mr. Rojas presents for evaluation prior to cycle 3 FOLFIRINOX for treatment of stage IIa, cT3 N0 M0, invasive moderately differentiated pancreatic adenocarcinoma. He is accompanied by his wife for today's visit.    Patient was in his usual state of health until approximately 8/2023 when he noted increasing abdominal pain over the previous several months. CT completed 8/11/23 showed pancreatic mass of 3.8 x 2.9 cm, worrisome for malignancy. He underwent biopsy per upper EUS 8/23/23 with pathology confirming malignancy; CA 19-9 was 37 on 9/18/23. He initiated neoadjuvant FOLFIRINOX on 9/20/23. He underwent celiac plexus block on 9/22/2023.    Patient was admitted from 10/6-10/9/2023 for sudden onset GI bleed, CT completed 10/6 showed no metastatic disease, he continues on Pepcid twice daily for at least 30 days.  Patient is lost 4 pounds since last visit and his appetite is improving.  He received 3 units of blood and is feeling better overall.  He is up 7 pounds since last visit and reports his appetite is improving every day.  Diarrhea from cycle 2 was best managed with Pepto/Imodium, brat diet, hydration.  Nausea was improved with along the pain on days following the infusion.  Patient is otherwise returning to baseline since discharge from hospital.      Review of Systems   Constitutional:  Negative for chills, fever, malaise/fatigue (better, s/p 3 units rbc) and weight loss (up 7# eating better, s/p gi bleed and 3 days stay).   Respiratory:  Negative for cough, shortness of breath and wheezing.    Cardiovascular:  Negative for chest pain, palpitations and leg swelling.   Gastrointestinal:  Negative for blood in stool, constipation, diarrhea (soft: Pepto/imodium, BRAT, hydration (s/p gi bleed)), melena, nausea and vomiting.   Genitourinary:  Negative for dysuria.   Musculoskeletal:  Positive for joint pain (R  "knee). Negative for myalgias.        No wheelchair today     Neurological:  Positive for sensory change (mild cold sensitivity) and seizures. Negative for dizziness, tingling and headaches.   Psychiatric/Behavioral:  The patient does not have insomnia (6 hours is a good night).      No Known Allergies      Current Outpatient Medications on File Prior to Encounter   Medication Sig Dispense Refill    omeprazole (PRILOSEC) 20 MG delayed-release capsule Take 1 Capsule by mouth 2 times a day for 30 days. 60 Capsule 0    LORazepam (ATIVAN) 1 MG Tab Take 1 mg by mouth every 8 hours as needed for Anxiety.      rosuvastatin (CRESTOR) 20 MG Tab Take 20 mg by mouth every day.      OLANZapine (ZYPREXA) 5 MG Tab Take 1 tablet by mouth nightly on days 1-5 of chemo treatment week 12 Tablet 1    promethazine (PHENERGAN) 25 MG Tab Take 0.5-1 Tablets by mouth every 6 hours as needed for Nausea/Vomiting. 30 Tablet 1    metoprolol SR (TOPROL XL) 25 MG TABLET SR 24 HR Take 25 mg by mouth at bedtime.      ondansetron (ZOFRAN) 4 MG Tab tablet Take 1 Tablet by mouth every four hours as needed for Nausea/Vomiting (for nausea, vomiting). 30 Tablet 6    prochlorperazine (COMPAZINE) 10 MG Tab Take 1 Tablet by mouth every 6 hours as needed (for nausea, vomiting). 30 Tablet 6    losartan (COZAAR) 25 MG Tab Take 25 mg by mouth every day.      allopurinol (ZYLOPRIM) 100 MG Tab Take 100 mg by mouth every day.       No current facility-administered medications on file prior to encounter.              Objective     /64   Pulse 66   Temp 36.6 °C (97.9 °F) (Temporal)   Resp 12   Ht 1.778 m (5' 10\")   Wt 67.1 kg (148 lb)   SpO2 98%   BMI 21.24 kg/m²      Physical Exam  Vitals reviewed.   Constitutional:       General: He is not in acute distress.     Appearance: He is well-developed. He is not diaphoretic.   HENT:      Head: Normocephalic and atraumatic.   Eyes:      General: No scleral icterus.        Right eye: No discharge.         Left " eye: No discharge.   Cardiovascular:      Rate and Rhythm: Normal rate and regular rhythm.      Heart sounds: Normal heart sounds. No murmur heard.     No friction rub. No gallop.   Pulmonary:      Effort: Pulmonary effort is normal. No respiratory distress.      Breath sounds: Normal breath sounds. No wheezing.   Abdominal:      General: There is no distension.      Palpations: Abdomen is soft.      Tenderness: There is no abdominal tenderness.   Musculoskeletal:         General: Normal range of motion.      Cervical back: Normal range of motion.   Skin:     General: Skin is warm and dry.      Coloration: Skin is not pale.      Findings: No erythema or rash.   Neurological:      Mental Status: He is alert and oriented to person, place, and time.   Psychiatric:         Behavior: Behavior normal.         Treatment labs completed at [facility], scanned into media tab      CT CAP  10/6/2023 5:55 PM  HISTORY/REASON FOR EXAM:  GI bleed     TECHNIQUE/EXAM DESCRIPTION:  CT angiogram of the abdomen and pelvis without and with contrast, with reconstructions.     Initial precontrast helical sections were obtained from the diaphragmatic domes to the lesser trochanters. Following this, 95 mL of Omnipaque 350 nonionic contrast was administered at 5.0 mL/sec and helical scanning was obtained from the diaphragmatic domes through the lesser trochanters. Thin section overlapping reconstruction interval was utilized and multiplanar volume reformatted were generated in the sagittal and coronal planes.     3D angiographic images were reviewed on PACS. Maximum intensity projection (MIP) images were generated and reviewed.     Low dose optimization technique was utilized for this CT exam including automated exposure control and adjustment of the mA and/or kV according to patient size.     COMPARISON:  Outside CT abdomen and pelvis 11/12/2023 of which only a portion of the images are presumably provided.     FINDINGS:  Noncontrast  images:  There is no intramural hematoma.     There is right gynecomastia.     There has been previous sternotomy. There is facet arthropathy of lumbar spine. There is levoconvex scoliosis and there is bilateral sacroiliac joint osteoarthritis.     Contrast images:  Aorta and vasculature: There is aortic atherosclerotic plaque without aneurysm.     The celiac axis is patent with conventional branching pattern. The distal celiac axis is close proximity with the mass and could be involvement.     Common hepatic artery has lack of soft tissue plane with the mass and is likely involved.     The splenic artery abuts the mass and is likely involved.     The superior mesenteric artery is patent and there is no vascular involvement of this structure.     There is a dominant right main renal artery and a small accessory artery which are patent.     There is a single left renal artery with atherosclerotic plaque at its origin.     No active GI hemorrhage is identified.     Liver is unremarkable.  Spleen is normal in size.     Pancreas: There is a cystic and solid pancreatic body mass measuring 4.3 x 4.3 cm, highly suspicious for malignancy. Distal to the mass the pancreatic duct is dilated.     Gallbladder and biliary tree are normal.     Adrenal glands are normal.  Kidneys: There is a left renal cyst. Right kidney is normal in appearance.     Bowel is unremarkable.  There is no adenopathy or free fluid.       3D angiographic/MIP images of the vasculature confirm the vascular findings as described above.     IMPRESSION:  1.  Pancreatic body 4.3 x 4.3 cm cystic and solid mass, highly suspicious for malignancy     2.  Distal to the mass the pancreatic duct is dilated     3.  It is highly suspicious there is involvement of the splenic artery, common hepatic artery, and the proximal superior mesenteric artery     4.  Assessment of the venous vasculature including whether there is tumor involvement is nondiagnostic due to the  arterial phase only imaging            Assessment & Plan     1. Malignant neoplasm of body of pancreas (HCC)        2. Encounter for long-term current use of high risk medication        3. Chemotherapy induced diarrhea        4. Chemotherapy-induced nausea            1.  Diarrhea: Better managed with Pepto/lomotil, continue to monitor.     2.  Nausea: Better with days 1-5 olanzapine, ativan PRN, then z/c, continue to monitor.     3.  Pancreatic cancer: Diagnosed 8/23/23; initiated neoadjuvant FOLFIRINOX 9/20/23.     CBC, CMP, Ca 19-9 have been evaluated and found to be within acceptable limits.  Patient will proceed with cycle 3 of treatment and return in 2 weeks for evaluation prior to cycle 4, sooner as needed.        The patient verbalized agreement and understanding of current plan. All questions and concerns were addressed at time of visit.    Please note that this dictation was created using voice recognition software. I have made every reasonable attempt to correct obvious errors, but I expect that there are errors of grammar and possibly content that I did not discover before finalizing the note.

## 2023-10-18 NOTE — PROGRESS NOTES
Chemotherapy Verification - PRIMARY RN      Height = 170cm  Weight = 67.4kg  BSA = 1.78       Medication: Oxaliplatin  Dose: 85mg/m2  Calculated Dose: 151.3mg                             (In mg/m2, AUC, mg/kg)     Medication: Irinotecan  Dose: 150mg/m2  Calculated Dose: 267mg                             (In mg/m2, AUC, mg/kg)    Medication: Leucovorin  Dose: 400mg/m2  Calculated Dose: 712mg                             (In mg/m2, AUC, mg/kg)    Medication: 5FU  Dose: 2400mg/m2  Calculated Dose: 4272mg                             (In mg/m2, AUC, mg/kg)      I confirm this process was performed independently with the BSA and all final chemotherapy dosing calculations congruent.  Any discrepancies of 10% or greater have been addressed with the chemotherapy pharmacist. The resolution of the discrepancy has been documented in the EPIC progress notes.

## 2023-10-20 ENCOUNTER — OUTPATIENT INFUSION SERVICES (OUTPATIENT)
Dept: ONCOLOGY | Facility: MEDICAL CENTER | Age: 68
End: 2023-10-20
Attending: STUDENT IN AN ORGANIZED HEALTH CARE EDUCATION/TRAINING PROGRAM
Payer: MEDICARE

## 2023-10-20 VITALS
HEART RATE: 85 BPM | OXYGEN SATURATION: 97 % | RESPIRATION RATE: 16 BRPM | SYSTOLIC BLOOD PRESSURE: 103 MMHG | TEMPERATURE: 98.2 F | DIASTOLIC BLOOD PRESSURE: 74 MMHG

## 2023-10-20 DIAGNOSIS — C25.1 MALIGNANT NEOPLASM OF BODY OF PANCREAS (HCC): ICD-10-CM

## 2023-10-20 PROCEDURE — 700111 HCHG RX REV CODE 636 W/ 250 OVERRIDE (IP): Performed by: NURSE PRACTITIONER

## 2023-10-20 PROCEDURE — 96377 APPLICATON ON-BODY INJECTOR: CPT

## 2023-10-20 PROCEDURE — 96523 IRRIG DRUG DELIVERY DEVICE: CPT

## 2023-10-20 RX ADMIN — PEGFILGRASTIM 6 MG: KIT SUBCUTANEOUS at 12:45

## 2023-10-20 RX ADMIN — HEPARIN 500 UNITS: 100 SYRINGE at 12:44

## 2023-10-20 NOTE — PROGRESS NOTES
Pt arrived ambulatory with wife for CADD pump removal and Neulata OnPro application.  Pt denies c/o, states he tolerated chemo well.  Confirmed chemo pump empty, port flushed per protocol with NS and Heparin prior to removal.  Neulasta OnPro applied to left arm, confirmed OnPro started properly, needle injected, green light blinking, and meter reads full.  Reviewed instructions with pt and wife, verbalized understanding with home care of OnPro and will call with any further questions, and f/u as scheduled.

## 2023-10-21 ASSESSMENT — ENCOUNTER SYMPTOMS
VOMITING: 1
TINGLING: 1
DIARRHEA: 1
WEIGHT LOSS: 1

## 2023-10-27 RX ORDER — DIPHENHYDRAMINE HYDROCHLORIDE 50 MG/ML
50 INJECTION INTRAMUSCULAR; INTRAVENOUS PRN
Status: CANCELLED | OUTPATIENT
Start: 2023-11-01

## 2023-10-27 RX ORDER — 0.9 % SODIUM CHLORIDE 0.9 %
10 VIAL (ML) INJECTION PRN
Status: CANCELLED | OUTPATIENT
Start: 2023-10-31

## 2023-10-27 RX ORDER — 0.9 % SODIUM CHLORIDE 0.9 %
20 VIAL (ML) INJECTION PRN
Status: CANCELLED | OUTPATIENT
Start: 2023-11-03

## 2023-10-27 RX ORDER — ONDANSETRON 8 MG/1
8 TABLET, ORALLY DISINTEGRATING ORAL PRN
Status: CANCELLED | OUTPATIENT
Start: 2023-11-01

## 2023-10-27 RX ORDER — EPINEPHRINE 1 MG/ML(1)
0.5 AMPUL (ML) INJECTION PRN
Status: CANCELLED | OUTPATIENT
Start: 2023-11-01

## 2023-10-27 RX ORDER — 0.9 % SODIUM CHLORIDE 0.9 %
3 VIAL (ML) INJECTION PRN
Status: CANCELLED | OUTPATIENT
Start: 2023-11-01

## 2023-10-27 RX ORDER — 0.9 % SODIUM CHLORIDE 0.9 %
VIAL (ML) INJECTION PRN
Status: CANCELLED | OUTPATIENT
Start: 2023-10-31

## 2023-10-27 RX ORDER — ONDANSETRON 2 MG/ML
4 INJECTION INTRAMUSCULAR; INTRAVENOUS PRN
Status: CANCELLED | OUTPATIENT
Start: 2023-11-01

## 2023-10-27 RX ORDER — 0.9 % SODIUM CHLORIDE 0.9 %
10 VIAL (ML) INJECTION PRN
Status: CANCELLED | OUTPATIENT
Start: 2023-11-01

## 2023-10-27 RX ORDER — LOPERAMIDE HYDROCHLORIDE 2 MG/1
2 CAPSULE ORAL
Status: CANCELLED | OUTPATIENT
Start: 2023-11-01

## 2023-10-27 RX ORDER — 0.9 % SODIUM CHLORIDE 0.9 %
VIAL (ML) INJECTION PRN
Status: CANCELLED | OUTPATIENT
Start: 2023-11-01

## 2023-10-27 RX ORDER — 0.9 % SODIUM CHLORIDE 0.9 %
3 VIAL (ML) INJECTION PRN
Status: CANCELLED | OUTPATIENT
Start: 2023-10-31

## 2023-10-27 RX ORDER — LOPERAMIDE HYDROCHLORIDE 2 MG/1
4 CAPSULE ORAL PRN
Status: CANCELLED | OUTPATIENT
Start: 2023-11-01

## 2023-10-27 RX ORDER — METHYLPREDNISOLONE SODIUM SUCCINATE 125 MG/2ML
125 INJECTION, POWDER, LYOPHILIZED, FOR SOLUTION INTRAMUSCULAR; INTRAVENOUS PRN
Status: CANCELLED | OUTPATIENT
Start: 2023-11-01

## 2023-10-27 RX ORDER — DEXTROSE MONOHYDRATE 50 MG/ML
INJECTION, SOLUTION INTRAVENOUS CONTINUOUS
Status: CANCELLED | OUTPATIENT
Start: 2023-11-01

## 2023-10-27 RX ORDER — PROCHLORPERAZINE MALEATE 10 MG
10 TABLET ORAL EVERY 6 HOURS PRN
Status: CANCELLED | OUTPATIENT
Start: 2023-11-01

## 2023-10-29 NOTE — PROGRESS NOTES
"Pharmacy Chemotherapy Calculation    Dx: Pancreatic cancer         Protocol: mFOLFIRINOX     *Dosing Reference*  Oxaliplatin 85 mg/m2 IV over 2 hours  Leucovorin 400 mg/m2 IV over 90 minutes concurrently with  Irinotecan 150 mg/m2 IV over 90 minutes followed by  Fluorouracil 2400 mg/m2 CIVI over 46 hours on Days 1-2  14-day cycle for 4-12 cycles (neoadjuvant or adjuvant) or 4-6 cycles (as induction) (locally advanced) or until DP/UT (locally advanced, recurrent, or metastatic)  NCCN Guidelines for Pancreatic Adenocarcinoma V.2.2023.  Deyvi T, et al. NEJM. 2018;379(25):9858-5623.  Herring SM, et al. Br J Cancer. 2016;114(7):737-43.  Arnav MH et al. Ailyn Surg Oncol. 2013;20(8):2787-95.    Allergies:  Patient has no known allergies.     /62   Pulse 85   Temp 36.1 °C (96.9 °F) (Temporal)   Resp 18   Ht 1.7 m (5' 6.93\")   Wt 69.9 kg (154 lb 1.6 oz)   SpO2 97%   BMI 24.19 kg/m²  Body surface area is 1.82 meters squared.    All labs (10/31/23- [facility]) within treatment plan parameters.       Drug Order   (Drug name, dose, route, IV Fluid & volume, frequency, number of doses) Cycle 4  Previous treatment: 10/18/23     Medication = Oxaliplatin  Base Dose = 85 mg/m2  Calc Dose: Base Dose x 1.82m2 = 154.7mg  Final Dose = 155mg  Route = IV  Fluid & Volume = D5W 250 mL  Admin Duration = Over 2 hours          <10% difference, OK to treat with final dose   Medication = Irinotecan  Base Dose = 150 mg/m2  Calc Dose: Base Dose x 1.82m2 = 273mg  Final Dose = 273mg  Route = IV  Fluid & Volume = D5W 500 mL  Admin Duration = Over 90 mins    Concurrently with leucovorin      <10% difference, OK to treat with final dose   Medication = Leucovorin  Base Dose = 400 mg/m2  Calc Dose:Base Dose x 1.82m2 = 728mg  Final Dose = 730mg  Route = IV  Fluid & Volume = D5W 250 mL  Admin Duration = Over 90 mins     Concurrently with irinotecan      <10% difference, OK to treat with final dose   Medication = Fluorouracil  Base Dose= 2400 " mg/m2  Calc Dose: Base Dose x 1.82m2 = 4368mg  Final Dose = 4370mg  Route = CIVI  Drug conc. = 50 mg/mL  Fluid & Volume = 87.4mL (+3 mL overfill )  Admin Duration = Over 46 hours at 1.9 mL/hr          <10% difference, OK to treat with final dose     By my signature below, I confirm this process was performed independently with the BSA and all final chemotherapy dosing calculations congruent. I have reviewed the above chemotherapy order and that my calculation of the final dose and BSA (when applicable) corroborate those calculations of the  pharmacist. Discrepancies of 10% or greater in the written dose have been addressed and documented within the EPIC Progress notes.    Rufus Culp, JoseD

## 2023-11-01 ENCOUNTER — OUTPATIENT INFUSION SERVICES (OUTPATIENT)
Dept: ONCOLOGY | Facility: MEDICAL CENTER | Age: 68
End: 2023-11-01
Attending: STUDENT IN AN ORGANIZED HEALTH CARE EDUCATION/TRAINING PROGRAM
Payer: MEDICARE

## 2023-11-01 ENCOUNTER — HOSPITAL ENCOUNTER (OUTPATIENT)
Dept: HEMATOLOGY ONCOLOGY | Facility: MEDICAL CENTER | Age: 68
End: 2023-11-01
Attending: STUDENT IN AN ORGANIZED HEALTH CARE EDUCATION/TRAINING PROGRAM
Payer: MEDICARE

## 2023-11-01 VITALS
TEMPERATURE: 97.7 F | OXYGEN SATURATION: 98 % | WEIGHT: 153.4 LBS | HEART RATE: 78 BPM | SYSTOLIC BLOOD PRESSURE: 112 MMHG | DIASTOLIC BLOOD PRESSURE: 64 MMHG | HEIGHT: 67 IN | RESPIRATION RATE: 16 BRPM | BODY MASS INDEX: 24.08 KG/M2

## 2023-11-01 VITALS
WEIGHT: 154.1 LBS | OXYGEN SATURATION: 97 % | BODY MASS INDEX: 24.19 KG/M2 | HEART RATE: 85 BPM | TEMPERATURE: 96.9 F | HEIGHT: 67 IN | RESPIRATION RATE: 18 BRPM | DIASTOLIC BLOOD PRESSURE: 62 MMHG | SYSTOLIC BLOOD PRESSURE: 113 MMHG

## 2023-11-01 DIAGNOSIS — C25.1 MALIGNANT NEOPLASM OF BODY OF PANCREAS (HCC): ICD-10-CM

## 2023-11-01 PROCEDURE — 700105 HCHG RX REV CODE 258: Performed by: NURSE PRACTITIONER

## 2023-11-01 PROCEDURE — 96413 CHEMO IV INFUSION 1 HR: CPT

## 2023-11-01 PROCEDURE — A4212 NON CORING NEEDLE OR STYLET: HCPCS

## 2023-11-01 PROCEDURE — 96367 TX/PROPH/DG ADDL SEQ IV INF: CPT

## 2023-11-01 PROCEDURE — 96415 CHEMO IV INFUSION ADDL HR: CPT

## 2023-11-01 PROCEDURE — 96417 CHEMO IV INFUS EACH ADDL SEQ: CPT

## 2023-11-01 PROCEDURE — G0498 CHEMO EXTEND IV INFUS W/PUMP: HCPCS

## 2023-11-01 PROCEDURE — 96375 TX/PRO/DX INJ NEW DRUG ADDON: CPT

## 2023-11-01 PROCEDURE — 700111 HCHG RX REV CODE 636 W/ 250 OVERRIDE (IP): Performed by: NURSE PRACTITIONER

## 2023-11-01 PROCEDURE — 99214 OFFICE O/P EST MOD 30 MIN: CPT | Performed by: STUDENT IN AN ORGANIZED HEALTH CARE EDUCATION/TRAINING PROGRAM

## 2023-11-01 PROCEDURE — 99212 OFFICE O/P EST SF 10 MIN: CPT | Mod: 27 | Performed by: STUDENT IN AN ORGANIZED HEALTH CARE EDUCATION/TRAINING PROGRAM

## 2023-11-01 RX ORDER — ATROPINE SULFATE 1 MG/ML
0.5 INJECTION, SOLUTION INTRAVENOUS PRN
Status: DISCONTINUED | OUTPATIENT
Start: 2023-11-01 | End: 2023-11-01 | Stop reason: HOSPADM

## 2023-11-01 RX ADMIN — FLUOROURACIL 4370 MG: 50 INJECTION, SOLUTION INTRAVENOUS at 16:13

## 2023-11-01 RX ADMIN — OXALIPLATIN 155 MG: 100 INJECTION, SOLUTION, CONCENTRATE INTRAVENOUS at 12:06

## 2023-11-01 RX ADMIN — ATROPINE SULFATE 0.5 MG: 1 INJECTION, SOLUTION INTRAVENOUS at 14:33

## 2023-11-01 RX ADMIN — ONDANSETRON 16 MG: 2 INJECTION INTRAMUSCULAR; INTRAVENOUS at 10:57

## 2023-11-01 RX ADMIN — DEXAMETHASONE SODIUM PHOSPHATE 12 MG: 4 INJECTION, SOLUTION INTRA-ARTICULAR; INTRALESIONAL; INTRAMUSCULAR; INTRAVENOUS; SOFT TISSUE at 10:47

## 2023-11-01 RX ADMIN — LEUCOVORIN CALCIUM 730 MG: 350 INJECTION, POWDER, LYOPHILIZED, FOR SUSPENSION INTRAMUSCULAR; INTRAVENOUS at 14:36

## 2023-11-01 RX ADMIN — FOSAPREPITANT 150 MG: 150 INJECTION, POWDER, LYOPHILIZED, FOR SOLUTION INTRAVENOUS at 11:30

## 2023-11-01 RX ADMIN — IRINOTECAN HYDROCHLORIDE 273 MG: 20 INJECTION, SOLUTION INTRAVENOUS at 14:36

## 2023-11-01 ASSESSMENT — ENCOUNTER SYMPTOMS
SPUTUM PRODUCTION: 0
SINUS PAIN: 0
COUGH: 0
CONSTIPATION: 0
SHORTNESS OF BREATH: 0
DIAPHORESIS: 0
MYALGIAS: 0
WHEEZING: 0
FEVER: 0
ABDOMINAL PAIN: 0
HEADACHES: 0
INSOMNIA: 0
WEIGHT LOSS: 0
DOUBLE VISION: 0
BLURRED VISION: 0
BRUISES/BLEEDS EASILY: 0
NAUSEA: 0
ORTHOPNEA: 0
BLOOD IN STOOL: 0
VOMITING: 0
CHILLS: 0
DIARRHEA: 1
SORE THROAT: 0
DIZZINESS: 0
BACK PAIN: 0
NERVOUS/ANXIOUS: 0
HEARTBURN: 0
TINGLING: 1
PALPITATIONS: 0
WEAKNESS: 0

## 2023-11-01 ASSESSMENT — FIBROSIS 4 INDEX
FIB4 SCORE: 3.28
FIB4 SCORE: 3.28

## 2023-11-01 ASSESSMENT — PAIN SCALES - GENERAL: PAINLEVEL: NO PAIN

## 2023-11-01 NOTE — PROGRESS NOTES
Chemotherapy Verification - PRIMARY RN      Height = 170 cm  Weight = 69.9 kg  BSA = 1.82 m2       Medication: Oxaliplatin  Dose: 85 mg/m2  Calculated Dose: 154.7 mg                             (In mg/m2, AUC, mg/kg)     Medication: Irinotecan  Dose: 150 mg/m2  Calculated Dose: 273 mg                             (In mg/m2, AUC, mg/kg)    Medication: Fluorouracil CADD pump  Dose: 2400 mg/m2  Calculated Dose: 4368 mg over 46 hours                            (In mg/m2, AUC, mg/kg)      I confirm this process was performed independently with the BSA and all final chemotherapy dosing calculations congruent.  Any discrepancies of 10% or greater have been addressed with the chemotherapy pharmacist. The resolution of the discrepancy has been documented in the EPIC progress notes.

## 2023-11-01 NOTE — PROGRESS NOTES
"Pharmacy Chemotherapy Verification    Dx: pancreatic adenocarcinoma         Cycle 4  Previous Treatment: C3 10/18/23    Protocol: mFOLFIRINOX     OXALIplatin 85 mg/m² IV over 2 hrs on Day 1  Irinotecan 150 mg/m² IV over 90 min on Day 1  Leucovorin 400 mg/m² IV over 90 min on Day 1  Fluorouracil 1200 mg/Irinotecan 150 mg/m² IV over 90 min  CIVI daily on Days 1-2 (2400 mg/m² IV over 46 hrs)  14-day cycle for 4-12 cycles (neoadjuvant or adjuvant) or 4-6 cycles (as induction) (locally advanced) or until disease progression or unacceptable toxicity (locally advanced,recurrent, or metastatic  NCCN Guidelines for Pancreatic Adenocarcinoma V.2.2023  Deyvi T, et al. N Engl J Med. 2018;379(45):5859-0320    Allergies:  Patient has no known allergies.     /62   Pulse 85   Temp 36.1 °C (96.9 °F) (Temporal)   Resp 18   Ht 1.7 m (5' 6.93\")   Wt 69.9 kg (154 lb 1.6 oz)   SpO2 97%   BMI 24.19 kg/m²  Body surface area is 1.82 meters squared.    Labs 10/31/23 ( [facility])  ANC~ 97073 Hgb 8.4 Plt 229k  SCr 0.9 CrCl 75 mL/min   AST/ALT/AP = 28/26/171 Tbili = 0.2    OXALIplatin (Eloxatin) 85 mg/m² x 1.82 m² = 154.7 mg   <10% difference, okay to treat with final dose = 155 mg IV    Irinotecan (Camptosar) 150 mg/m² x 1.82 m² = 273 mg   <10% difference, okay to treat with final dose = 273 mg IV    Leucovorin 400 mg/m² x 1.82 m² = 728 mg   <10% difference, okay to treat with final dose = 730 mg IV    Fluorouracil (Adrucil) 2400 mg/m² x 1.82 m² = 4368 mg   <10% difference, okay to treat with final dose = 4370 mg IV over 46 hrs @ 1.9 mL/hr    Lisa Oneil, PharmD, BCOP    "

## 2023-11-01 NOTE — PROGRESS NOTES
Chemotherapy Verification - SECONDARY RN       Height = 170 cm  Weight = 69.9 kg  BSA = 1.82 m2       Medication: Oxaliplatin  Dose: 85 mg/m2  Calculated Dose: 154.7 mg                             (In mg/m2, AUC, mg/kg)     Medication: Irinotecan  Dose: 150 mg/m2  Calculated Dose: 273 mg                             (In mg/m2, AUC, mg/kg)    Medication: Fluorouracil  Dose: 2,400 mg/m2  Calculated Dose: 4,368 mg                             (In mg/m2, AUC, mg/kg)      I confirm that this process was performed independently.

## 2023-11-01 NOTE — PROGRESS NOTES
Follow Up Note:  Hematology/Oncology      Primary Care:  Luis Fernando Culp M.D.    Diagnosis: Pancreatic adenocarcinoma    Chief Complaint: On-treatment visit    Current Treatment: mFOLFIRINOX    Prior Treatment: NA    Oncology History of Presenting Illness:  Lucas Rojas is a 68 y.o.  man who presents to the clinic for evaluation for systemic therapy for diagnosis of pancreatic adenocarcinoma.  The patient had been having abdominal pain with worsening intensity over the past few months, and had a CT scan done which revealed a mass in the pancreatic neck.  He was referred to GI and surgical oncology, and ultimately underwent a biopsy which revealed pancreatic adenocarcinoma, moderately differentiated.  He underwent an endoscopic ultrasound which revealed a [id number] disease.  Staging scans did not reveal any signs of disease elsewhere, and he was referred to me by Dr. Escalera for neoadjuvant chemotherapy accordingly. His baseline Ca 19-9 was 12.     Treatment History:   09/20/23: C1 mFOLFIRINOX  10/04/23: C2 mFOLFIRINOX  10/18/23: C3 mFOLFIRINOX  11/01/23: C4 mFOLFIRINOX    Interval History:  Patient is here for follow up visit. He is doing okay overall. He gets cold sensitivity in his hands and feet but it doesn't last very long. He otherwise is handling chemotherapy okay. He gets diarrhea but is managing with antidiarrheals, and his energy levels are bit low. He otherwise is doing fine though. His wife is with him today.     Allergies as of 11/01/2023    (No Known Allergies)         Current Outpatient Medications:     omeprazole (PRILOSEC) 20 MG delayed-release capsule, Take 1 Capsule by mouth 2 times a day for 30 days., Disp: 60 Capsule, Rfl: 0    LORazepam (ATIVAN) 1 MG Tab, Take 1 mg by mouth every 8 hours as needed for Anxiety., Disp: , Rfl:     OLANZapine (ZYPREXA) 5 MG Tab, Take 1 tablet by mouth nightly on days 1-5 of chemo treatment week, Disp: 12 Tablet, Rfl: 1    promethazine (PHENERGAN) 25 MG  Tab, Take 0.5-1 Tablets by mouth every 6 hours as needed for Nausea/Vomiting., Disp: 30 Tablet, Rfl: 1    metoprolol SR (TOPROL XL) 25 MG TABLET SR 24 HR, Take 25 mg by mouth at bedtime., Disp: , Rfl:     ondansetron (ZOFRAN) 4 MG Tab tablet, Take 1 Tablet by mouth every four hours as needed for Nausea/Vomiting (for nausea, vomiting)., Disp: 30 Tablet, Rfl: 6    prochlorperazine (COMPAZINE) 10 MG Tab, Take 1 Tablet by mouth every 6 hours as needed (for nausea, vomiting)., Disp: 30 Tablet, Rfl: 6    allopurinol (ZYLOPRIM) 100 MG Tab, Take 100 mg by mouth every day., Disp: , Rfl:     rosuvastatin (CRESTOR) 20 MG Tab, Take 20 mg by mouth every day., Disp: , Rfl:     losartan (COZAAR) 25 MG Tab, Take 25 mg by mouth every day., Disp: , Rfl:       Review of Systems:  Review of Systems   Constitutional:  Positive for malaise/fatigue. Negative for chills, diaphoresis, fever and weight loss.   HENT:  Negative for hearing loss, nosebleeds, sinus pain and sore throat.    Eyes:  Negative for blurred vision and double vision.   Respiratory:  Negative for cough, sputum production, shortness of breath and wheezing.    Cardiovascular:  Negative for chest pain, palpitations, orthopnea and leg swelling.   Gastrointestinal:  Positive for diarrhea. Negative for abdominal pain, blood in stool, constipation, heartburn, melena, nausea and vomiting.   Genitourinary:  Negative for dysuria, frequency, hematuria and urgency.   Musculoskeletal:  Negative for back pain, joint pain and myalgias.   Skin:  Negative for rash.   Neurological:  Positive for tingling (Cold sensitivity in hands). Negative for dizziness, weakness and headaches.   Endo/Heme/Allergies:  Does not bruise/bleed easily.   Psychiatric/Behavioral:  The patient is not nervous/anxious and does not have insomnia.          Physical Exam:  Vitals:    11/01/23 0914   BP: 112/64   Pulse: 78   Resp: 16   Temp: 36.5 °C (97.7 °F)   TempSrc: Temporal   SpO2: 98%   Weight: 69.6 kg (153 lb  "6.4 oz)   Height: 1.7 m (5' 6.93\")       DESC; KARNOFSKY SCALE WITH ECOG EQUIVALENT: 100, Fully active, able to carry on all pre-disease performed without restriction (ECOG equivalent 0)    DISTRESS LEVEL: no apparent distress    Physical Exam  Vitals and nursing note reviewed.   Constitutional:       General: He is awake. He is not in acute distress.     Appearance: Normal appearance. He is normal weight. He is not ill-appearing, toxic-appearing or diaphoretic.   HENT:      Head: Normocephalic and atraumatic.      Nose: Nose normal. No congestion.      Mouth/Throat:      Pharynx: Oropharynx is clear. No oropharyngeal exudate or posterior oropharyngeal erythema.   Eyes:      General: No scleral icterus.     Extraocular Movements: Extraocular movements intact.      Conjunctiva/sclera: Conjunctivae normal.      Pupils: Pupils are equal, round, and reactive to light.   Cardiovascular:      Rate and Rhythm: Normal rate and regular rhythm.      Pulses: Normal pulses.      Heart sounds: Normal heart sounds. No murmur heard.     No friction rub. No gallop.   Pulmonary:      Effort: Pulmonary effort is normal.      Breath sounds: Normal breath sounds. No decreased air movement. No wheezing, rhonchi or rales.   Abdominal:      General: Bowel sounds are normal. There is no distension.      Tenderness: There is no abdominal tenderness.   Musculoskeletal:         General: No deformity. Normal range of motion.      Cervical back: Normal range of motion and neck supple. No tenderness.      Right lower leg: No edema.      Left lower leg: No edema.   Lymphadenopathy:      Cervical: No cervical adenopathy.      Upper Body:      Right upper body: No axillary adenopathy.      Left upper body: No axillary adenopathy.      Lower Body: No right inguinal adenopathy. No left inguinal adenopathy.   Skin:     General: Skin is warm and dry.      Coloration: Skin is pale. Skin is not jaundiced.      Findings: No erythema or rash. "   Neurological:      General: No focal deficit present.      Mental Status: He is alert and oriented to person, place, and time.      Sensory: Sensation is intact.      Motor: Motor function is intact. No weakness.      Gait: Gait is intact.   Psychiatric:         Attention and Perception: Attention normal.         Mood and Affect: Mood normal.         Behavior: Behavior normal. Behavior is cooperative.         Thought Content: Thought content normal.         Judgment: Judgment normal.           Labs:            Imaging:     All listed images below have been independently reviewed by me. I agree with the findings as summarized below:    NM-GI BLEEDING SCAN    Result Date: 10/7/2023  10/7/2023 2:03 PM HISTORY/REASON FOR EXAM:  upper GI bleeding, large duodenal ulcer not fixable by EGD TECHNIQUE/EXAM DESCRIPTION AND NUMBER OF VIEWS: GI bleeding scan. COMPARISON: CTA 10/6/2023 PROCEDURE: The patient?s red cells were tagged with 24.3 mCi of Tc UltraTag.  Serial filming was done over the abdomen. FINDINGS: The normal vascular structures are seen.  No areas of abnormal red cell accumulation are noted.     No scintigraphy evidence of active GI bleeding.    CTA ABDOMEN PELVIS W & W/O POST PROCESS    Result Date: 10/6/2023  10/6/2023 5:55 PM HISTORY/REASON FOR EXAM:  GI bleed TECHNIQUE/EXAM DESCRIPTION:  CT angiogram of the abdomen and pelvis without and with contrast, with reconstructions. Initial precontrast helical sections were obtained from the diaphragmatic domes to the lesser trochanters. Following this, 95 mL of Omnipaque 350 nonionic contrast was administered at 5.0 mL/sec and helical scanning was obtained from the diaphragmatic domes through the lesser trochanters. Thin section overlapping reconstruction interval was utilized and multiplanar volume reformatted were generated in the sagittal and coronal planes. 3D angiographic images were reviewed on PACS. Maximum intensity projection (MIP) images were generated and  reviewed. Low dose optimization technique was utilized for this CT exam including automated exposure control and adjustment of the mA and/or kV according to patient size. COMPARISON:  Outside CT abdomen and pelvis 11/12/2023 of which only a portion of the images are presumably provided. FINDINGS: Noncontrast images: There is no intramural hematoma. There is right gynecomastia. There has been previous sternotomy. There is facet arthropathy of lumbar spine. There is levoconvex scoliosis and there is bilateral sacroiliac joint osteoarthritis. Contrast images: Aorta and vasculature: There is aortic atherosclerotic plaque without aneurysm. The celiac axis is patent with conventional branching pattern. The distal celiac axis is close proximity with the mass and could be involvement. Common hepatic artery has lack of soft tissue plane with the mass and is likely involved. The splenic artery abuts the mass and is likely involved. The superior mesenteric artery is patent and there is no vascular involvement of this structure. There is a dominant right main renal artery and a small accessory artery which are patent. There is a single left renal artery with atherosclerotic plaque at its origin. No active GI hemorrhage is identified. Liver is unremarkable. Spleen is normal in size. Pancreas: There is a cystic and solid pancreatic body mass measuring 4.3 x 4.3 cm, highly suspicious for malignancy. Distal to the mass the pancreatic duct is dilated. Gallbladder and biliary tree are normal. Adrenal glands are normal. Kidneys: There is a left renal cyst. Right kidney is normal in appearance. Bowel is unremarkable. There is no adenopathy or free fluid. 3D angiographic/MIP images of the vasculature confirm the vascular findings as described above.     1.  Pancreatic body 4.3 x 4.3 cm cystic and solid mass, highly suspicious for malignancy 2.  Distal to the mass the pancreatic duct is dilated 3.  It is highly suspicious there is  involvement of the splenic artery, common hepatic artery, and the proximal superior mesenteric artery 4.  Assessment of the venous vasculature including whether there is tumor involvement is nondiagnostic due to the arterial phase only imaging    DX-CHEST-PORTABLE (1 VIEW)    Result Date: 10/6/2023  10/6/2023 4:16 PM HISTORY/REASON FOR EXAM:  Blood in vomit or stool or dark tarry stool. TECHNIQUE/EXAM DESCRIPTION AND NUMBER OF VIEWS: Single portable view of the chest. COMPARISON: None FINDINGS: LUNGS: The lungs are clear. HEART and MEDIASTINUM: normal in size. Pleura: There are no pleural effusion or pneumothoraces. Osseous structures: No significant bony abnormality.     No acute cardiopulmonary abnormality identified.      Pathology:         Assessment & Plan:  1. Malignant neoplasm of body of pancreas (HCC)  CT-CHEST (THORAX) WITH    CT-PANCREAS AND ABDOMEN WITH & W/O        This is a 68 year old  man with pancreatic adenocarcinoma, [id number] stage II-a disease, who presents for evaluation for systemic therapy.      Current Diagnosis and Staging: Pancreatic adenocarcinoma, [id number] stage II-a disease, moderately differentiated    Update: Patient doing well at this time. Monitor labs. Continue C4 today, with scans prior to C5 ordered.      Treatment Plan: FOLFIRINOX     Treatment Citation: NCCN     Plan of Care:     Primary Therapy: mFOLFIRINOX C4 today  Supportive Therapy: Antiemetics per protocol, morphine prescribed for pain, referral for celiac plexus nerve block  Toxicity: Patient is getting antineoplastic therapy and needs monitoring of blood counts, hepatic function, and renal function due to potential for organ dysfunction.   Labs: CBC with diff, CMP, Ca 19-9 monitoring  Imaging: Repeat CT chest, pancreatic protocol after C4 (ordered)  Treatment Planning: Plan for neoadjuvant chemotherapy with FOLFIRINOX followed by evaluation for surgical resection.   Consultations: Surgical oncology (Dr. Escalera);  GI (Dr. Dorsey); Palliative care (Dr. Al)  Code Status: Full  Miscellaneous: Patient declined referral to medical genetics, but was amenable to [facility] [place] and [facility] trials  Return for Follow Up: 2 weeks    Any questions and concerns raised by the patient were answered to the best of my ability. Thank you for allowing me to participate in the care for this patient. Please feel free to contact me for any questions or concerns.       Total time spent on chart review, clinic encounter, and documentation: 26 minutes.

## 2023-11-02 NOTE — PROGRESS NOTES
Pt presented to Infusion for D1C4 OP Pancreatic mFOLFIRINOX. POC discussed and pt verbalized understanding, reports no new changes and denies pain today. Pt declined lidocaine today. Port accessed per [facility] policy, brisk blood return noted, line flushes with ease, and labs drawn. Pt tolerated well. Labs reviewed. Pre-medications and chemotherapy administered per MAR. Pt tolerated well. No s/s of adverse reactions or complications noted. Port flushed per [facility] policy, brisk blood return noted and 5FU CADD pump connected with 2 RN verification of settings. Pump in RUN mode, connections secured, clamps open and pump secured in carrying case. Pt confirmed his disconnect appt, times adjusted so pt can get his pump off prior to CT scan on Friday. Pt verbalized understanding on pump troubleshooting, avoiding cold liquids/surfaces and when to contact provider. Pt confirmed next appt and discharged to self care, accompanied by his wife. Pt in NAD at time of discharge.

## 2023-11-03 ENCOUNTER — HOSPITAL ENCOUNTER (OUTPATIENT)
Dept: RADIOLOGY | Facility: MEDICAL CENTER | Age: 68
End: 2023-11-03
Attending: STUDENT IN AN ORGANIZED HEALTH CARE EDUCATION/TRAINING PROGRAM
Payer: MEDICARE

## 2023-11-03 ENCOUNTER — OUTPATIENT INFUSION SERVICES (OUTPATIENT)
Dept: ONCOLOGY | Facility: MEDICAL CENTER | Age: 68
End: 2023-11-03
Attending: STUDENT IN AN ORGANIZED HEALTH CARE EDUCATION/TRAINING PROGRAM
Payer: MEDICARE

## 2023-11-03 VITALS
OXYGEN SATURATION: 95 % | HEART RATE: 78 BPM | DIASTOLIC BLOOD PRESSURE: 66 MMHG | RESPIRATION RATE: 18 BRPM | SYSTOLIC BLOOD PRESSURE: 105 MMHG | TEMPERATURE: 98 F

## 2023-11-03 DIAGNOSIS — C25.1 MALIGNANT NEOPLASM OF BODY OF PANCREAS (HCC): ICD-10-CM

## 2023-11-03 PROCEDURE — 700117 HCHG RX CONTRAST REV CODE 255: Performed by: STUDENT IN AN ORGANIZED HEALTH CARE EDUCATION/TRAINING PROGRAM

## 2023-11-03 PROCEDURE — 74170 CT ABD WO CNTRST FLWD CNTRST: CPT

## 2023-11-03 PROCEDURE — 96523 IRRIG DRUG DELIVERY DEVICE: CPT

## 2023-11-03 PROCEDURE — 700111 HCHG RX REV CODE 636 W/ 250 OVERRIDE (IP): Performed by: NURSE PRACTITIONER

## 2023-11-03 PROCEDURE — 96377 APPLICATON ON-BODY INJECTOR: CPT | Mod: XU

## 2023-11-03 PROCEDURE — 71260 CT THORAX DX C+: CPT

## 2023-11-03 RX ADMIN — IOHEXOL 100 ML: 350 INJECTION, SOLUTION INTRAVENOUS at 16:07

## 2023-11-03 RX ADMIN — HEPARIN 500 UNITS: 100 SYRINGE at 14:10

## 2023-11-03 RX ADMIN — PEGFILGRASTIM 6 MG: KIT SUBCUTANEOUS at 14:15

## 2023-11-03 NOTE — PROGRESS NOTES
Here for 5 FU pump disconnect after 46 hrs of continuous infusion and Neulasta ONPro placement. See chemotherapy flow sheet for volume / dose administration. Port flushed per protocol, site d-accessed, needle intact compression dressing applied. Neulasta OnPro placed in back of Left arm, well tolerated. Lot # [id number], Exp [id number]. Patient discharge home to self care in [facility]. Appointment confirm for next treatment.

## 2023-11-14 RX ORDER — 0.9 % SODIUM CHLORIDE 0.9 %
10 VIAL (ML) INJECTION PRN
Status: CANCELLED | OUTPATIENT
Start: 2023-11-14

## 2023-11-14 RX ORDER — 0.9 % SODIUM CHLORIDE 0.9 %
3 VIAL (ML) INJECTION PRN
Status: CANCELLED | OUTPATIENT
Start: 2023-11-14

## 2023-11-14 RX ORDER — 0.9 % SODIUM CHLORIDE 0.9 %
VIAL (ML) INJECTION PRN
Status: CANCELLED | OUTPATIENT
Start: 2023-11-15

## 2023-11-14 RX ORDER — ONDANSETRON 8 MG/1
8 TABLET, ORALLY DISINTEGRATING ORAL PRN
Status: CANCELLED | OUTPATIENT
Start: 2023-11-15

## 2023-11-14 RX ORDER — 0.9 % SODIUM CHLORIDE 0.9 %
10 VIAL (ML) INJECTION PRN
Status: CANCELLED | OUTPATIENT
Start: 2023-11-15

## 2023-11-14 RX ORDER — LOPERAMIDE HYDROCHLORIDE 2 MG/1
4 CAPSULE ORAL PRN
Status: CANCELLED | OUTPATIENT
Start: 2023-11-15

## 2023-11-14 RX ORDER — METHYLPREDNISOLONE SODIUM SUCCINATE 125 MG/2ML
125 INJECTION, POWDER, LYOPHILIZED, FOR SOLUTION INTRAMUSCULAR; INTRAVENOUS PRN
Status: CANCELLED | OUTPATIENT
Start: 2023-11-15

## 2023-11-14 RX ORDER — PROCHLORPERAZINE MALEATE 10 MG
10 TABLET ORAL EVERY 6 HOURS PRN
Status: CANCELLED | OUTPATIENT
Start: 2023-11-15

## 2023-11-14 RX ORDER — EPINEPHRINE 1 MG/ML(1)
0.5 AMPUL (ML) INJECTION PRN
Status: CANCELLED | OUTPATIENT
Start: 2023-11-15

## 2023-11-14 RX ORDER — 0.9 % SODIUM CHLORIDE 0.9 %
3 VIAL (ML) INJECTION PRN
Status: CANCELLED | OUTPATIENT
Start: 2023-11-15

## 2023-11-14 RX ORDER — 0.9 % SODIUM CHLORIDE 0.9 %
VIAL (ML) INJECTION PRN
Status: CANCELLED | OUTPATIENT
Start: 2023-11-14

## 2023-11-14 RX ORDER — 0.9 % SODIUM CHLORIDE 0.9 %
20 VIAL (ML) INJECTION PRN
Status: CANCELLED | OUTPATIENT
Start: 2023-11-17

## 2023-11-14 RX ORDER — ONDANSETRON 2 MG/ML
4 INJECTION INTRAMUSCULAR; INTRAVENOUS PRN
Status: CANCELLED | OUTPATIENT
Start: 2023-11-15

## 2023-11-14 RX ORDER — LOPERAMIDE HYDROCHLORIDE 2 MG/1
2 CAPSULE ORAL
Status: CANCELLED | OUTPATIENT
Start: 2023-11-15

## 2023-11-14 RX ORDER — DEXTROSE MONOHYDRATE 50 MG/ML
INJECTION, SOLUTION INTRAVENOUS CONTINUOUS
Status: CANCELLED | OUTPATIENT
Start: 2023-11-15

## 2023-11-14 RX ORDER — DIPHENHYDRAMINE HYDROCHLORIDE 50 MG/ML
50 INJECTION INTRAMUSCULAR; INTRAVENOUS PRN
Status: CANCELLED | OUTPATIENT
Start: 2023-11-15

## 2023-11-14 NOTE — PROGRESS NOTES
"Pharmacy Chemotherapy Verification    Dx: pancreatic adenocarcinoma         Cycle 5  Previous Treatment: C4 11/1/23    Protocol: mFOLFIRINOX     OXALIplatin 85 mg/m² IV over 2 hrs on Day 1  Irinotecan 150 mg/m² IV over 90 min on Day 1  Leucovorin 400 mg/m² IV over 90 min on Day 1  Fluorouracil 1200 mg/Irinotecan 150 mg/m² IV over 90 min  CIVI daily on Days 1-2 (2400 mg/m² IV over 46 hrs)  14-day cycle for 4-12 cycles (neoadjuvant or adjuvant) or 4-6 cycles (as induction) (locally advanced) or until disease progression or unacceptable toxicity (locally advanced,recurrent, or metastatic  NCCN Guidelines for Pancreatic Adenocarcinoma V.2.2023  Deyvi T, et al. N Engl J Med. 2018;379(89):3548-9514    Allergies:  Patient has no known allergies.     /63   Pulse 70   Temp 36.3 °C (97.3 °F) (Temporal)   Resp 18   Ht 1.7 m (5' 6.93\")   Wt 68.3 kg (150 lb 9.2 oz)   SpO2 97%   BMI 23.63 kg/m²  Body surface area is 1.8 meters squared.    All lab results 11/13 CMP ([place]) and CBC 11/15/23 ([facility]) within treatment parameters. Except hgb < 8.   MD aware of all current lab results. Orders received to proceed with treatment .      OXALIplatin (Eloxatin) 85 mg/m² x 1.8 m² = 153 mg   <10% difference, okay to treat with final dose = 155 mg IV    Irinotecan (Camptosar) 150 mg/m² x 1.8 m² = 270mg   <10% difference, okay to treat with final dose = 270mg IV    Leucovorin 400 mg/m² x 1.8m² = 720 mg   <10% difference, okay to treat with final dose = 720 mg IV    Fluorouracil (Adrucil) 2400 mg/m² x 1.8m² = 4320 mg   <10% difference, okay to treat with final dose = 4320mg IV over 46 hrs @ 1.9mL/hr    CYNTHIA Culp Pharm.D.    "

## 2023-11-15 ENCOUNTER — HOSPITAL ENCOUNTER (OUTPATIENT)
Dept: HEMATOLOGY ONCOLOGY | Facility: MEDICAL CENTER | Age: 68
End: 2023-11-15
Attending: NURSE PRACTITIONER
Payer: MEDICARE

## 2023-11-15 ENCOUNTER — OUTPATIENT INFUSION SERVICES (OUTPATIENT)
Dept: ONCOLOGY | Facility: MEDICAL CENTER | Age: 68
End: 2023-11-15
Attending: STUDENT IN AN ORGANIZED HEALTH CARE EDUCATION/TRAINING PROGRAM
Payer: MEDICARE

## 2023-11-15 VITALS
DIASTOLIC BLOOD PRESSURE: 58 MMHG | OXYGEN SATURATION: 99 % | BODY MASS INDEX: 23.32 KG/M2 | WEIGHT: 148.6 LBS | HEIGHT: 67 IN | HEART RATE: 78 BPM | SYSTOLIC BLOOD PRESSURE: 92 MMHG | TEMPERATURE: 97.9 F | RESPIRATION RATE: 16 BRPM

## 2023-11-15 VITALS
TEMPERATURE: 98 F | HEIGHT: 67 IN | DIASTOLIC BLOOD PRESSURE: 65 MMHG | BODY MASS INDEX: 23.63 KG/M2 | HEART RATE: 62 BPM | OXYGEN SATURATION: 97 % | SYSTOLIC BLOOD PRESSURE: 112 MMHG | RESPIRATION RATE: 18 BRPM | WEIGHT: 150.57 LBS

## 2023-11-15 DIAGNOSIS — K59.1 FUNCTIONAL DIARRHEA: ICD-10-CM

## 2023-11-15 DIAGNOSIS — R60.0 LOCALIZED EDEMA: ICD-10-CM

## 2023-11-15 DIAGNOSIS — C25.1 MALIGNANT NEOPLASM OF BODY OF PANCREAS (HCC): ICD-10-CM

## 2023-11-15 DIAGNOSIS — Z79.899 ENCOUNTER FOR LONG-TERM CURRENT USE OF HIGH RISK MEDICATION: ICD-10-CM

## 2023-11-15 LAB
ABO GROUP BLD: NORMAL
ANISOCYTOSIS BLD QL SMEAR: ABNORMAL
BARCODED ABORH UBTYP: 6200
BARCODED PRD CODE UBPRD: NORMAL
BARCODED UNIT NUM UBUNT: NORMAL
BASOPHILS # BLD AUTO: 0 % (ref 0–1.8)
BASOPHILS # BLD: 0 K/UL (ref 0–0.12)
BLD GP AB SCN SERPL QL: NORMAL
COMPONENT R 8504R: NORMAL
EOSINOPHIL # BLD AUTO: 0 K/UL (ref 0–0.51)
EOSINOPHIL NFR BLD: 0 % (ref 0–6.9)
ERYTHROCYTE [DISTWIDTH] IN BLOOD BY AUTOMATED COUNT: 65.8 FL (ref 35.9–50)
HCT VFR BLD AUTO: 26.4 % (ref 42–52)
HGB BLD-MCNC: 7.9 G/DL (ref 14–18)
LYMPHOCYTES # BLD AUTO: 1.62 K/UL (ref 1–4.8)
LYMPHOCYTES NFR BLD: 4.4 % (ref 22–41)
MACROCYTES BLD QL SMEAR: ABNORMAL
MANUAL DIFF BLD: NORMAL
MCH RBC QN AUTO: 30.5 PG (ref 27–33)
MCHC RBC AUTO-ENTMCNC: 29.9 G/DL (ref 32.3–36.5)
MCV RBC AUTO: 101.9 FL (ref 81.4–97.8)
METAMYELOCYTES NFR BLD MANUAL: 0.9 %
MICROCYTES BLD QL SMEAR: ABNORMAL
MONOCYTES # BLD AUTO: 0.63 K/UL (ref 0–0.85)
MONOCYTES NFR BLD AUTO: 1.7 % (ref 0–13.4)
MORPHOLOGY BLD-IMP: NORMAL
NEUTROPHILS # BLD AUTO: 34.22 K/UL (ref 1.82–7.42)
NEUTROPHILS NFR BLD: 93 % (ref 44–72)
NRBC # BLD AUTO: 0.1 K/UL
NRBC BLD-RTO: 0.3 /100 WBC (ref 0–0.2)
OVALOCYTES BLD QL SMEAR: NORMAL
PLATELET # BLD AUTO: 291 K/UL (ref 164–446)
PLATELET BLD QL SMEAR: NORMAL
PMV BLD AUTO: 10.3 FL (ref 9–12.9)
POIKILOCYTOSIS BLD QL SMEAR: NORMAL
PRODUCT TYPE UPROD: NORMAL
RBC # BLD AUTO: 2.59 M/UL (ref 4.7–6.1)
RBC BLD AUTO: PRESENT
RH BLD: NORMAL
SCHISTOCYTES BLD QL SMEAR: NORMAL
TOXIC GRANULES BLD QL SMEAR: NORMAL
UNIT STATUS USTAT: NORMAL
WBC # BLD AUTO: 36.8 K/UL (ref 4.8–10.8)

## 2023-11-15 PROCEDURE — 86923 COMPATIBILITY TEST ELECTRIC: CPT

## 2023-11-15 PROCEDURE — 700111 HCHG RX REV CODE 636 W/ 250 OVERRIDE (IP): Performed by: STUDENT IN AN ORGANIZED HEALTH CARE EDUCATION/TRAINING PROGRAM

## 2023-11-15 PROCEDURE — 86850 RBC ANTIBODY SCREEN: CPT

## 2023-11-15 PROCEDURE — 96415 CHEMO IV INFUSION ADDL HR: CPT

## 2023-11-15 PROCEDURE — P9016 RBC LEUKOCYTES REDUCED: HCPCS

## 2023-11-15 PROCEDURE — 99212 OFFICE O/P EST SF 10 MIN: CPT | Mod: 27 | Performed by: NURSE PRACTITIONER

## 2023-11-15 PROCEDURE — 85007 BL SMEAR W/DIFF WBC COUNT: CPT

## 2023-11-15 PROCEDURE — 86901 BLOOD TYPING SEROLOGIC RH(D): CPT

## 2023-11-15 PROCEDURE — 36430 TRANSFUSION BLD/BLD COMPNT: CPT

## 2023-11-15 PROCEDURE — 700111 HCHG RX REV CODE 636 W/ 250 OVERRIDE (IP): Mod: JZ | Performed by: NURSE PRACTITIONER

## 2023-11-15 PROCEDURE — A4212 NON CORING NEEDLE OR STYLET: HCPCS

## 2023-11-15 PROCEDURE — 96417 CHEMO IV INFUS EACH ADDL SEQ: CPT

## 2023-11-15 PROCEDURE — G0498 CHEMO EXTEND IV INFUS W/PUMP: HCPCS

## 2023-11-15 PROCEDURE — A9270 NON-COVERED ITEM OR SERVICE: HCPCS | Performed by: NURSE PRACTITIONER

## 2023-11-15 PROCEDURE — 700102 HCHG RX REV CODE 250 W/ 637 OVERRIDE(OP): Performed by: NURSE PRACTITIONER

## 2023-11-15 PROCEDURE — 96413 CHEMO IV INFUSION 1 HR: CPT

## 2023-11-15 PROCEDURE — 99214 OFFICE O/P EST MOD 30 MIN: CPT | Performed by: NURSE PRACTITIONER

## 2023-11-15 PROCEDURE — 306780 HCHG STAT FOR TRANSFUSION PER CASE

## 2023-11-15 PROCEDURE — 96367 TX/PROPH/DG ADDL SEQ IV INF: CPT

## 2023-11-15 PROCEDURE — 86900 BLOOD TYPING SEROLOGIC ABO: CPT

## 2023-11-15 PROCEDURE — 85027 COMPLETE CBC AUTOMATED: CPT

## 2023-11-15 PROCEDURE — 700105 HCHG RX REV CODE 258: Performed by: STUDENT IN AN ORGANIZED HEALTH CARE EDUCATION/TRAINING PROGRAM

## 2023-11-15 PROCEDURE — 96375 TX/PRO/DX INJ NEW DRUG ADDON: CPT

## 2023-11-15 RX ORDER — DIPHENHYDRAMINE HCL 25 MG
25 TABLET ORAL ONCE
Status: COMPLETED | OUTPATIENT
Start: 2023-11-15 | End: 2023-11-15

## 2023-11-15 RX ORDER — DIPHENHYDRAMINE HCL 25 MG
25 TABLET ORAL ONCE
Status: CANCELLED | OUTPATIENT
Start: 2023-11-15 | End: 2023-11-15

## 2023-11-15 RX ORDER — ACETAMINOPHEN 325 MG/1
650 TABLET ORAL ONCE
Status: CANCELLED | OUTPATIENT
Start: 2023-11-15

## 2023-11-15 RX ORDER — ATROPINE SULFATE 1 MG/ML
0.5 INJECTION, SOLUTION INTRAVENOUS PRN
Status: DISCONTINUED | OUTPATIENT
Start: 2023-11-15 | End: 2023-11-15 | Stop reason: HOSPADM

## 2023-11-15 RX ORDER — ACETAMINOPHEN 325 MG/1
650 TABLET ORAL ONCE
Status: COMPLETED | OUTPATIENT
Start: 2023-11-15 | End: 2023-11-15

## 2023-11-15 RX ADMIN — IRINOTECAN HYDROCHLORIDE 270 MG: 20 INJECTION, SOLUTION INTRAVENOUS at 15:18

## 2023-11-15 RX ADMIN — ATROPINE SULFATE 0.5 MG: 1 INJECTION, SOLUTION INTRAVENOUS at 15:15

## 2023-11-15 RX ADMIN — OXALIPLATIN 155 MG: 100 INJECTION, SOLUTION INTRAVENOUS at 12:46

## 2023-11-15 RX ADMIN — FOSAPREPITANT 150 MG: 150 INJECTION, POWDER, LYOPHILIZED, FOR SOLUTION INTRAVENOUS at 12:10

## 2023-11-15 RX ADMIN — DIPHENHYDRAMINE HYDROCHLORIDE 25 MG: 25 TABLET ORAL at 16:22

## 2023-11-15 RX ADMIN — FLUOROURACIL 4320 MG: 50 INJECTION, SOLUTION INTRAVENOUS at 19:10

## 2023-11-15 RX ADMIN — ONDANSETRON 16 MG: 2 INJECTION INTRAMUSCULAR; INTRAVENOUS at 11:37

## 2023-11-15 RX ADMIN — DEXAMETHASONE SODIUM PHOSPHATE 12 MG: 4 INJECTION, SOLUTION INTRAMUSCULAR; INTRAVENOUS at 11:53

## 2023-11-15 RX ADMIN — HYDROCORTISONE SODIUM SUCCINATE 100 MG: 100 INJECTION, POWDER, FOR SOLUTION INTRAMUSCULAR; INTRAVENOUS at 16:22

## 2023-11-15 RX ADMIN — ACETAMINOPHEN 650 MG: 325 TABLET ORAL at 16:22

## 2023-11-15 RX ADMIN — LEUCOVORIN CALCIUM 720 MG: 350 INJECTION, POWDER, LYOPHILIZED, FOR SUSPENSION INTRAMUSCULAR; INTRAVENOUS at 15:12

## 2023-11-15 ASSESSMENT — ENCOUNTER SYMPTOMS
TINGLING: 1
CONSTIPATION: 0
CHILLS: 0
INSOMNIA: 0
WHEEZING: 0
VOMITING: 0
NAUSEA: 0
DIARRHEA: 1
MYALGIAS: 0
FEVER: 0
PALPITATIONS: 0
SHORTNESS OF BREATH: 1
HEADACHES: 0
WEIGHT LOSS: 1
COUGH: 0
DIZZINESS: 1

## 2023-11-15 ASSESSMENT — FIBROSIS 4 INDEX
FIB4 SCORE: 3.28
FIB4 SCORE: 3.28

## 2023-11-15 NOTE — PROGRESS NOTES
Chemotherapy Verification - SECONDARY RN       Height = 170 cm  Weight = 68.3 kg  BSA = 1.8 m2       Medication: oxaliplatin  Dose: 85 mg/m2  Calculated Dose: 153 mg                             (In mg/m2, AUC, mg/kg)     Medication: irinotecan  Dose: 150 mg/m2  Calculated Dose: 270 mg                             (In mg/m2, AUC, mg/kg)    Medication: 5FU CADD pump  Dose: 2400 mg/m2  Calculated Dose: 4320 mg                             (In mg/m2, AUC, mg/kg)    I confirm that this process was performed independently.

## 2023-11-15 NOTE — PROGRESS NOTES
Chemotherapy Verification - PRIMARY RN    Day 1 Cycle 5      Height = 1.7 m  Weight = 68.3 kg  BSA = 1.8 m 2      Medication: oxaliplatin  Dose: 85mg/m2  Calculated Dose: 153 mg                             (In mg/m2, AUC, mg/kg)     Medication: irinotecan  Dose: 150mg/m2  Calculated Dose: 270 mh                             (In mg/m2, AUC, mg/kg)    Medication: fluorouracil  Dose: 2400mg/m2  Calculated Dose: 4320 mg   46 Hour CADD PUMP                             (In mg/m2, AUC, mg/kg)        I confirm this process was performed independently with the BSA and all final chemotherapy dosing calculations congruent.  Any discrepancies of 10% or greater have been addressed with the chemotherapy pharmacist. The resolution of the discrepancy has been documented in the EPIC progress notes.

## 2023-11-15 NOTE — PROGRESS NOTES
"Subjective     Marino Franklyn Rojas is a 68 y.o. male who presents with Pancreatic Cancer ([facility])            HPI  Mr. Rojas presents for evaluation prior to cycle 5 FOLFIRINOX for treatment of stage IIa, cT3 N0 M0, invasive moderately differentiated pancreatic adenocarcinoma. He is accompanied by his wife for today's visit.     Patient was in his usual state of health until approximately 8/2023 when he noted increasing abdominal pain over the previous several months. CT completed 8/11/23 showed pancreatic mass of 3.8 x 2.9 cm, worrisome for malignancy. He underwent biopsy per upper EUS 8/23/23 with pathology confirming malignancy; CA 19-9 was 37 on 9/18/23. He initiated neoadjuvant FOLFIRINOX on 9/20/23. He underwent celiac plexus block on 9/22/2023.     Patient continues to recover from recent GI bleed (admitted from 10/6-10/9/23) with some persistent fatigue, SOB, lower Hgb. He has lost 5# but feels he is eating well. BLE trace edema persists with mild erythema noted. He self stopped losartan 2 months ago. Metamucil has helped diarrhea a bit, tries to manage with BRAT diet. He is not needing nausea meds.    Review of Systems   Constitutional:  Positive for malaise/fatigue (very little) and weight loss (5# down but \"eating very well\"). Negative for chills (\"get cold easy\", sometimes hard to get warm) and fever.   Respiratory:  Positive for shortness of breath (with any activity - lower Hgb but also poor activity tolerance). Negative for cough and wheezing.    Cardiovascular:  Positive for leg swelling (bipedal (R/L), past few weeks; does not improve with elevation; mild erythema at top of feet). Negative for chest pain and palpitations.        Stopped losartan x 2 months   Gastrointestinal:  Positive for diarrhea (soft/loose (metamucil powder helps best), BRAT diet, imodium as last resort). Negative for constipation, nausea (not needing Z/C, olanzapine either) and vomiting.   Genitourinary:  Negative for " "dysuria.   Musculoskeletal:  Negative for joint pain and myalgias.        Hard to walk on right foot - swollen, tender - US ordered   Neurological:  Positive for dizziness (when up) and tingling. Negative for headaches. Sensory change: both feet.  Psychiatric/Behavioral:  The patient does not have insomnia (improving).      No Known Allergies      Current Outpatient Medications on File Prior to Encounter   Medication Sig Dispense Refill    metoprolol SR (TOPROL XL) 25 MG TABLET SR 24 HR Take 25 mg by mouth at bedtime.      allopurinol (ZYLOPRIM) 100 MG Tab Take 100 mg by mouth every day.      LORazepam (ATIVAN) 1 MG Tab Take 1 mg by mouth every 8 hours as needed for Anxiety.      rosuvastatin (CRESTOR) 20 MG Tab Take 20 mg by mouth every day.      OLANZapine (ZYPREXA) 5 MG Tab Take 1 tablet by mouth nightly on days 1-5 of chemo treatment week 12 Tablet 1    promethazine (PHENERGAN) 25 MG Tab Take 0.5-1 Tablets by mouth every 6 hours as needed for Nausea/Vomiting. 30 Tablet 1    ondansetron (ZOFRAN) 4 MG Tab tablet Take 1 Tablet by mouth every four hours as needed for Nausea/Vomiting (for nausea, vomiting). 30 Tablet 6    prochlorperazine (COMPAZINE) 10 MG Tab Take 1 Tablet by mouth every 6 hours as needed (for nausea, vomiting). 30 Tablet 6    losartan (COZAAR) 25 MG Tab Take 25 mg by mouth every day.       No current facility-administered medications on file prior to encounter.              Objective     BP 92/58 (BP Location: Left arm, Patient Position: Sitting, BP Cuff Size: Small adult)   Pulse 78   Temp 36.6 °C (97.9 °F) (Temporal)   Resp 16   Ht 1.7 m (5' 6.93\")   Wt 67.4 kg (148 lb 9.6 oz)   SpO2 99%   BMI 23.32 kg/m²      Physical Exam  Vitals reviewed.   Constitutional:       General: He is not in acute distress.     Appearance: He is well-developed. He is not diaphoretic.   HENT:      Head: Normocephalic and atraumatic.   Eyes:      General: No scleral icterus.        Right eye: No discharge.        "  Left eye: No discharge.   Cardiovascular:      Rate and Rhythm: Normal rate and regular rhythm.      Heart sounds: Normal heart sounds. No murmur heard.     No friction rub. No gallop.   Pulmonary:      Effort: Pulmonary effort is normal. No respiratory distress.      Breath sounds: Normal breath sounds. No wheezing.   Abdominal:      General: There is no distension.      Palpations: Abdomen is soft.      Tenderness: There is no abdominal tenderness.   Musculoskeletal:         General: Normal range of motion.      Cervical back: Normal range of motion.   Skin:     General: Skin is warm and dry.      Coloration: Skin is pale.      Findings: No erythema or rash.   Neurological:      Mental Status: He is alert and oriented to person, place, and time.   Psychiatric:         Behavior: Behavior normal.      Comments: Anxious today                                            CT Chest  11/3/2023 3:38 PM  HISTORY/REASON FOR EXAM: .  Pancreatic adenocarcinoma restaging     TECHNIQUE/EXAM DESCRIPTION:  CT scan of the chest with contrast.     Thin-section helical images were obtained from the lung apices through the adrenal glands following the bolus administration of contrast. 100 mL of Omnipaque 350 nonionic contrast was utilized.     Low dose optimization technique was utilized for this CT exam including automated exposure control and adjustment of the mA and/or kV according to patient size.     COMPARISON:  Chest CT from outside facility 7/28/2023     FINDINGS:  Lungs: No nodules or airspace process. Scattered calcified granulomas.     Mediastinum/Flor: No significant adenopathy.     Pleura: No pleural effusion.     Cardiac: Heart normal in size without pericardial effusion. There is coronary artery calcification.     Vascular: Atherosclerosis. CABG. Right chest port is in place.     Soft tissues: There is bilateral gynecomastia. There is diffuse subcutaneous fat stranding most likely representing edema.     Bones: No acute  or destructive process.     IMPRESSION:  No evidence of metastatic disease within the chest.       CT Pancreas/Abd  11/3/2023 3:37 PM  HISTORY/REASON FOR EXAM:  Pancreatic adenocarcinoma, assess treatment response.     TECHNIQUE/EXAM DESCRIPTION:  CT pancreas and abdomen without and with contrast.     Initial precontrast thick-section images were obtained through the pancreas.  Following this, nonionic contrast was administered by IV, and thin-section helical scanning performed from the diaphragmatic domes through the iliac crests.  Pancreatic parenchymal phase and portal venous phase (dual-phase) images were obtained following contrast administration.     100 mL of Omnipaque 350 nonionic contrast was administered.     Low dose optimization technique was utilized for this CT exam including automated exposure control and adjustment of the mA and/or kV according to patient size.     COMPARISON: CTA of the abdomen and pelvis 10/6/2023     FINDINGS:  Liver: No liver metastases are seen     Spleen: Spleen is unremarkable.     Biliary system: Tiny amount of pneumobilia and small amount of air in decompressed gallbladder with nonspecific wall thickening.     Common bile duct is not dilated.     Pancreas: Pancreatic body mass measures about 3.3 x 3.1 cm, previously 3.8 x 3.5 cm when remeasured for internal consistency. There is pancreatic atrophy and ductal dilatation distal to the mass involving the body and tail. There is mild fat stranding/infiltration in the upper abdomen     Pancreatic duct: Distal pancreatic body and tail dilatation secondary to obstructing mass     Pancreatic duct variant: Absent     Arterial vasculature: The celiac axis has a normal branching pattern. The mass abuts the proximal splenic artery and likely the hepatic artery however without significant narrowing. There is atherosclerosis and a likely severe stenosis at the celiac artery origin.     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     Kidneys: Benign renal cysts did not require imaging follow-up  Adrenals: Adrenals are unremarkable.     Lymph nodes: There are no enlarged nodes.     Bowel: Bowel obstruction seen.     Lung bases: Right lower lobe calcified granuloma.  Bones: Degenerative and postsurgical changes of the lumbar spine.     IMPRESSION:  1.  Possible slight decrease in size of pancreatic body mass with size measurements above.  2.  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  3.  There is mild infiltrative soft tissue which appears to surround the proximal celiac artery branches. No significant stenosis or occlusion is seen.  4.  No definite pathologically enlarged lymph nodes or liver metastases.              Assessment & Plan     1. Malignant neoplasm of body of pancreas (HCC)  CBC WITH DIFFERENTIAL    Comp Metabolic Panel    CA 19-9      2. Encounter for long-term current use of high risk medication        3. Localized edema  US-EXTREMITY VENOUS LOWER BILAT      4. Functional diarrhea            1.  Diarrhea: Metamucil and BRAT diet helping a bit, imodium PRN, continue to monitor.     2.  BLE edema: Some tenderness, US ordered to r/I DVT.    3.  Anemia: s/p GI bleed, slowly recovering, patient will folate/vit B12, transfusion indicated this cycle - continue to follow.     4.  Pancreatic cancer: Diagnosed 8/23/23; initiated neoadjuvant FOLFIRINOX 9/20/23.     CT completed 11/3/23 were reviewed with patient by myself and Dr. Issa: continue current regimen, case will be re-presented to tumor board after at least C8, surgery remains an option and may pre-empt XRT.     CBC, CMP, Ca 19-9 have been evaluated and found to be within acceptable limits, except where addressed above. Patient will proceed with cycle 5 of treatment  and return in 2 weeks for evaluation prior to cycle 6, sooner as needed.     The patient verbalized agreement and understanding of current plan. All questions and concerns were addressed at time of visit.    Please note that this dictation was created using voice recognition software. I have made every reasonable attempt to correct obvious errors, but I expect that there are errors of grammar and possibly content that I did not discover before finalizing the note.

## 2023-11-15 NOTE — PROGRESS NOTES
Mr Rojas is here today for day 1 cycle 5 of FolFirInox.    He reports swelling to his right foot, he has an order for US to RO DVT.    Medi port to the right upper chest was flushed with good blood return.    Labs drawn at [place], however there was not a differential, CBC with diff. drawn today and sent sample to lab.   HGB 7.9 CHENTEB Anabella TOM ok to proceed with treatment today and to please give 1 unit of RBC's.       Chemotherapy given per MAR and was tolerated well.    Report given to MINDY Daley to assume care of remainder of infusion and blood transfusion.

## 2023-11-15 NOTE — PROGRESS NOTES
"Pharmacy Chemotherapy Calculation:    Dx: Pancreatic cancer         Protocol: mFOLFIRINOX     *Dosing Reference*  Oxaliplatin 85 mg/m2 IV over 2 hours  Leucovorin 400 mg/m2 IV over 90 minutes concurrently with  Irinotecan 150 mg/m2 IV over 90 minutes followed by  Fluorouracil 2400 mg/m2 CIVI over 46 hours on Days 1-2  14-day cycle for 4-12 cycles (neoadjuvant or adjuvant) or 4-6 cycles (as induction) (locally advanced) or until DP/UT (locally advanced, recurrent, or metastatic)    NCCN Guidelines for Pancreatic Adenocarcinoma V.2.2023.  Deyvi T, et al. NEJM. 2018;379(25):6525-4088.  Herring SM, et al. Br J Cancer. 2016;114(7):737-43.  Arnav MH et al. Ailyn Surg Oncol. 2013;20(8):2787-95.    Allergies:  Patient has no known allergies.     /63   Pulse 70   Temp 36.3 °C (97.3 °F) (Temporal)   Resp 18   Ht 1.7 m (5' 6.93\")   Wt 68.3 kg (150 lb 9.2 oz)   SpO2 97%   BMI 23.63 kg/m²  Body surface area is 1.8 meters squared.    Labs 11/15/23:  ANC~ 86973 Plt = 291k   Hgb = 7.9 (per Dr. Luis Manuel altamirano to proceed)    Labs 11/13/23: (media)     SCr = 0.9 mg/dL CrCl ~ 73.1 mL/min   AST/ALT/AP = 31/30/213 TBili = 0.2     Drug Order   (Drug name, dose, route, IV Fluid & volume, frequency, number of doses) Cycle 5  Previous treatment: C4 11/01/23     Medication = Oxaliplatin  Base Dose = 85 mg/m2  Calc Dose: Base Dose x 1.8 m2 = 153 mg  Final Dose = 155 mg  Route = IV  Fluid & Volume = D5W 250 mL  Admin Duration = Over 2 hours          <10% difference, OK to treat with final dose   Medication = Irinotecan  Base Dose = 150 mg/m2  Calc Dose: Base Dose x 1.8 m2 = 270 mg  Final Dose = 270 mg  Route = IV  Fluid & Volume = D5W 500 mL  Admin Duration = Over 90 mins    Concurrently with LCV      <10% difference, OK to treat with final dose   Medication = Leucovorin  Base Dose = 400 mg/m2  Calc Dose:Base Dose x 1.8 m2 = 720 mg  Final Dose = 720 mg  Route = IV  Fluid & Volume = D5W 250 mL  Admin Duration = Over 90 mins     Concurrently " with irinotecan      <10% difference, OK to treat with final dose   Medication = Fluorouracil  Base Dose= 2400 mg/m2  Calc Dose: Base Dose x 1.8 m2 = 4320 mg  Final Dose = 4320 mg  Route = CIVI  Drug conc. = 50 mg/mL  Fluid & Volume = 86.4mL (+3 mL overfill )  Admin Duration = Over 46 hours at 1.9 mL/hr          <10% difference, OK to treat with final dose     By my signature below, I confirm this process was performed independently with the BSA and all final chemotherapy dosing calculations congruent. I have reviewed the above chemotherapy order and that my calculation of the final dose and BSA (when applicable) corroborate those calculations of the  pharmacist. Discrepancies of 10% or greater in the written dose have been addressed and documented within the EPIC Progress notes.    Jose MasonD

## 2023-11-16 NOTE — PROGRESS NOTES
Assumed care of Marino during his blood transfusion. Consents signed today 11/15/23 and faxed to chart. Premedications (prior to blood) administered. Patient tolerated 1 unit of PRBCs without s/s blood transfusion reaction. Post-VSS. Port with brisk blood return after infusions and prior to 5FU CADD. 5FU CADD connected and set to RUN mode with connections secured and clamps opened. Patient returns to [facility] in 2 days. Discharged home to self care in no apparent distress.

## 2023-11-17 ENCOUNTER — TELEPHONE (OUTPATIENT)
Dept: HEMATOLOGY ONCOLOGY | Facility: MEDICAL CENTER | Age: 68
End: 2023-11-17

## 2023-11-17 ENCOUNTER — OUTPATIENT INFUSION SERVICES (OUTPATIENT)
Dept: ONCOLOGY | Facility: MEDICAL CENTER | Age: 68
End: 2023-11-17
Attending: STUDENT IN AN ORGANIZED HEALTH CARE EDUCATION/TRAINING PROGRAM
Payer: MEDICARE

## 2023-11-17 VITALS
WEIGHT: 150.57 LBS | HEART RATE: 71 BPM | SYSTOLIC BLOOD PRESSURE: 100 MMHG | TEMPERATURE: 97.8 F | RESPIRATION RATE: 17 BRPM | DIASTOLIC BLOOD PRESSURE: 62 MMHG | BODY MASS INDEX: 23.63 KG/M2 | OXYGEN SATURATION: 96 %

## 2023-11-17 DIAGNOSIS — C25.1 MALIGNANT NEOPLASM OF BODY OF PANCREAS (HCC): ICD-10-CM

## 2023-11-17 PROCEDURE — 96523 IRRIG DRUG DELIVERY DEVICE: CPT

## 2023-11-17 PROCEDURE — 700111 HCHG RX REV CODE 636 W/ 250 OVERRIDE (IP): Performed by: STUDENT IN AN ORGANIZED HEALTH CARE EDUCATION/TRAINING PROGRAM

## 2023-11-17 PROCEDURE — 96377 APPLICATON ON-BODY INJECTOR: CPT

## 2023-11-17 RX ADMIN — HEPARIN 500 UNITS: 100 SYRINGE at 17:37

## 2023-11-17 RX ADMIN — PEGFILGRASTIM 6 MG: KIT SUBCUTANEOUS at 17:41

## 2023-11-17 ASSESSMENT — FIBROSIS 4 INDEX: FIB4 SCORE: 1.18

## 2023-11-18 NOTE — TELEPHONE ENCOUNTER
Phoned patient to advise - NO DVT  He will continues to elevate BLE, increased protein, increase activity as tolerated and advise of and concerns

## 2023-11-18 NOTE — PROGRESS NOTES
Pt presents to hospitals for fluorouracil pump disconnect after 46 hrs of continuous infusion. See chemotherapy flow sheet for volume and dose administration. Brisk blood return observed from R chest port before flushed, heparin locked, and de-accessed; gauze and tape dressing applied. Pegfilgrastim On-Pro placed on L back arm; pt tolerated well. Next appointment confirmed and education provided. Pt discharged to self care with all personal belongings and in NAD.

## 2023-11-22 DIAGNOSIS — G55 NERVE ROOT AND PLEXUS COMPRESSIONS IN DISEASES CLASSIFIED ELSEWHERE: ICD-10-CM

## 2023-11-22 DIAGNOSIS — C25.1 MALIGNANT NEOPLASM OF BODY OF PANCREAS (HCC): ICD-10-CM

## 2023-11-22 DIAGNOSIS — Z79.899 ENCOUNTER FOR LONG-TERM CURRENT USE OF HIGH RISK MEDICATION: ICD-10-CM

## 2023-11-22 DIAGNOSIS — C90.00 MULTIPLE MYELOMA NOT HAVING ACHIEVED REMISSION (HCC): ICD-10-CM

## 2023-11-22 RX ORDER — GABAPENTIN 100 MG/1
100 CAPSULE ORAL 3 TIMES DAILY
Qty: 90 CAPSULE | Refills: 2 | Status: SHIPPED | OUTPATIENT
Start: 2023-11-22 | End: 2024-01-30

## 2023-11-24 NOTE — PROGRESS NOTES
"Pharmacy Chemotherapy Calculation:    Dx: Pancreatic cancer         Protocol: mFOLFIRINOX     *Dosing Reference*  Oxaliplatin 85 mg/m2 IV over 2 hours  Leucovorin 400 mg/m2 IV over 90 minutes concurrently with  Irinotecan 150 mg/m2 IV over 90 minutes followed by  Fluorouracil 2400 mg/m2 CIVI over 46 hours on Days 1-2  14-day cycle for 4-12 cycles (neoadjuvant or adjuvant) or 4-6 cycles (as induction) (locally advanced) or until DP/UT (locally advanced, recurrent, or metastatic)    NCCN Guidelines for Pancreatic Adenocarcinoma V.2.2023.  Deyvi T, et al. NEJM. 2018;379(25):0751-0844.  Herring SM, et al. Br J Cancer. 2016;114(7):737-43.  Arnav MH et al. Ailyn Surg Oncol. 2013;20(8):2787-95.    Allergies:  Patient has no known allergies.     /69   Pulse 78   Temp 36.7 °C (98 °F) (Temporal)   Resp 18   Ht 1.7 m (5' 6.93\")   Wt 65 kg (143 lb 4.8 oz)   SpO2 99%   BMI 22.49 kg/m²  Body surface area is 1.75 meters squared.    Labs 11/27/23:  ANC~ 38487 Plt = 277k   Hgb = 9.7     SCr = 0.9 mg/dL CrCl ~ 69.5 mL/min   AST/ALT/AP = 30/33/178 TBili = 0.1      Drug Order   (Drug name, dose, route, IV Fluid & volume, frequency, number of doses) Cycle 6  Previous treatment: C5 11/15/23     Medication = Oxaliplatin  Base Dose = 85 mg/m2  Calc Dose: Base Dose x 1.75 m2 = 148.75 mg  Final Dose = 150 mg  Route = IV  Fluid & Volume = D5W 250 mL  Admin Duration = Over 2 hours          <10% difference, OK to treat with final dose   Medication = Irinotecan  Base Dose = 150 mg/m2  Calc Dose: Base Dose x 1.75 m2 = 262.5 mg  Final Dose = 262.6 mg  Route = IV  Fluid & Volume = D5W 500 mL  Admin Duration = Over 90 mins    Concurrently with LCV      <10% difference, OK to treat with final dose   Medication = Leucovorin  Base Dose = 400 mg/m2  Calc Dose:Base Dose x 1.75 m2 = 700 mg  Final Dose = 700 mg  Route = IV  Fluid & Volume = D5W 250 mL  Admin Duration = Over 90 mins     Concurrently with irinotecan      <10% difference, OK to treat " with final dose   Medication = Fluorouracil  Base Dose= 2400 mg/m2  Calc Dose: Base Dose x 1.75 m2 = 4200 mg  Final Dose = 3850 mg  Route = CIVI via CADD  Drug conc. = 50 mg/mL  Fluid & Volume = 77 mL (+3 mL overfill)  Admin Duration = Over 46 hours at 1.7 mL/hr          <10% difference, OK to treat with final dose     By my signature below, I confirm this process was performed independently with the BSA and all final chemotherapy dosing calculations congruent. I have reviewed the above chemotherapy order and that my calculation of the final dose and BSA (when applicable) corroborate those calculations of the  pharmacist. Discrepancies of 10% or greater in the written dose have been addressed and documented within the EPIC Progress notes.    Jose MasonD

## 2023-11-25 NOTE — PROGRESS NOTES
"Pharmacy Chemotherapy Verification    Dx: pancreatic adenocarcinoma         Cycle 6  Previous Treatment: C5 11/15/23    Protocol: mFOLFIRINOX     OXALIplatin 85 mg/m² IV over 2 hrs on Day 1  Irinotecan 150 mg/m² IV over 90 min on Day 1  Leucovorin 400 mg/m² IV over 90 min on Day 1  Fluorouracil 1200 mg/m2  CIVI daily on Days 1-2 (2400 mg/m² IV over 46 hrs)  14-day cycle for 4-12 cycles (neoadjuvant or adjuvant) or 4-6 cycles (as induction) (locally advanced) or until disease progression or unacceptable toxicity (locally advanced,recurrent, or metastatic  NCCN Guidelines for Pancreatic Adenocarcinoma V.2.2023  Deyvi T, et al. N Engl J Med. 2018;379(77):5105-7898    Allergies:  Patient has no known allergies.     /69   Pulse 78   Temp 36.7 °C (98 °F) (Temporal)   Resp 18   Ht 1.7 m (5' 6.93\")   Wt 65 kg (143 lb 4.8 oz)   SpO2 99%   BMI 22.49 kg/m²  Body surface area is 1.75 meters squared.    Labs 11/27/23 ([facility])  ANC 16940 Hgb 9.7 Plt 277k  SCr 0.9 CrCl 69.5 mL/min   AST/ALT/AP = 30/33/178 Tbili = 0.1    OXALIplatin (Eloxatin) 85 mg/m² x 1.75 m² = 148.75 mg   <10% difference, okay to treat with final dose = 150 mg IV    Irinotecan (Camptosar) 150 mg/m² x 1.75 m² = 262.5 mg   <10% difference, okay to treat with final dose = 262.6 mg IV    Leucovorin 400 mg/m² x 1.75 m² = 700 mg   <10% difference, okay to treat with final dose = 700 mg IV    Fluorouracil (Adrucil) 2400 mg/m² x 1.75 m² = 4200 mg   <10% difference, okay to treat with final dose = 3850 mg IV over 46 hrs @ 1.7 mL/hr    Lisa Oneil, PharmD, BCOP    "

## 2023-11-28 RX ORDER — 0.9 % SODIUM CHLORIDE 0.9 %
3 VIAL (ML) INJECTION PRN
Status: CANCELLED | OUTPATIENT
Start: 2023-11-28

## 2023-11-28 RX ORDER — 0.9 % SODIUM CHLORIDE 0.9 %
VIAL (ML) INJECTION PRN
Status: CANCELLED | OUTPATIENT
Start: 2023-11-28

## 2023-11-28 RX ORDER — 0.9 % SODIUM CHLORIDE 0.9 %
10 VIAL (ML) INJECTION PRN
Status: CANCELLED | OUTPATIENT
Start: 2023-11-29

## 2023-11-28 RX ORDER — LOPERAMIDE HYDROCHLORIDE 2 MG/1
4 CAPSULE ORAL PRN
Status: CANCELLED | OUTPATIENT
Start: 2023-11-29

## 2023-11-28 RX ORDER — EPINEPHRINE 1 MG/ML(1)
0.5 AMPUL (ML) INJECTION PRN
Status: CANCELLED | OUTPATIENT
Start: 2023-11-29

## 2023-11-28 RX ORDER — 0.9 % SODIUM CHLORIDE 0.9 %
20 VIAL (ML) INJECTION PRN
Status: CANCELLED | OUTPATIENT
Start: 2023-12-01

## 2023-11-28 RX ORDER — DIPHENHYDRAMINE HYDROCHLORIDE 50 MG/ML
50 INJECTION INTRAMUSCULAR; INTRAVENOUS PRN
Status: CANCELLED | OUTPATIENT
Start: 2023-11-29

## 2023-11-28 RX ORDER — ONDANSETRON 2 MG/ML
4 INJECTION INTRAMUSCULAR; INTRAVENOUS PRN
Status: CANCELLED | OUTPATIENT
Start: 2023-11-29

## 2023-11-28 RX ORDER — DEXTROSE MONOHYDRATE 50 MG/ML
INJECTION, SOLUTION INTRAVENOUS CONTINUOUS
Status: CANCELLED | OUTPATIENT
Start: 2023-11-29

## 2023-11-28 RX ORDER — 0.9 % SODIUM CHLORIDE 0.9 %
VIAL (ML) INJECTION PRN
Status: CANCELLED | OUTPATIENT
Start: 2023-11-29

## 2023-11-28 RX ORDER — METHYLPREDNISOLONE SODIUM SUCCINATE 125 MG/2ML
125 INJECTION, POWDER, LYOPHILIZED, FOR SOLUTION INTRAMUSCULAR; INTRAVENOUS PRN
Status: CANCELLED | OUTPATIENT
Start: 2023-11-29

## 2023-11-28 RX ORDER — 0.9 % SODIUM CHLORIDE 0.9 %
3 VIAL (ML) INJECTION PRN
Status: CANCELLED | OUTPATIENT
Start: 2023-11-29

## 2023-11-28 RX ORDER — 0.9 % SODIUM CHLORIDE 0.9 %
10 VIAL (ML) INJECTION PRN
Status: CANCELLED | OUTPATIENT
Start: 2023-11-28

## 2023-11-28 RX ORDER — LOPERAMIDE HYDROCHLORIDE 2 MG/1
2 CAPSULE ORAL
Status: CANCELLED | OUTPATIENT
Start: 2023-11-29

## 2023-11-28 RX ORDER — PROCHLORPERAZINE MALEATE 10 MG
10 TABLET ORAL EVERY 6 HOURS PRN
Status: CANCELLED | OUTPATIENT
Start: 2023-11-29

## 2023-11-28 RX ORDER — ONDANSETRON 8 MG/1
8 TABLET, ORALLY DISINTEGRATING ORAL PRN
Status: CANCELLED | OUTPATIENT
Start: 2023-11-29

## 2023-11-29 ENCOUNTER — OUTPATIENT INFUSION SERVICES (OUTPATIENT)
Dept: ONCOLOGY | Facility: MEDICAL CENTER | Age: 68
End: 2023-11-29
Attending: STUDENT IN AN ORGANIZED HEALTH CARE EDUCATION/TRAINING PROGRAM
Payer: MEDICARE

## 2023-11-29 ENCOUNTER — HOSPITAL ENCOUNTER (OUTPATIENT)
Dept: HEMATOLOGY ONCOLOGY | Facility: MEDICAL CENTER | Age: 68
End: 2023-11-29
Attending: STUDENT IN AN ORGANIZED HEALTH CARE EDUCATION/TRAINING PROGRAM
Payer: MEDICARE

## 2023-11-29 VITALS
HEIGHT: 67 IN | OXYGEN SATURATION: 98 % | RESPIRATION RATE: 16 BRPM | HEART RATE: 63 BPM | WEIGHT: 145.2 LBS | TEMPERATURE: 97.3 F | DIASTOLIC BLOOD PRESSURE: 58 MMHG | SYSTOLIC BLOOD PRESSURE: 98 MMHG | BODY MASS INDEX: 22.79 KG/M2

## 2023-11-29 VITALS
RESPIRATION RATE: 18 BRPM | OXYGEN SATURATION: 99 % | DIASTOLIC BLOOD PRESSURE: 69 MMHG | TEMPERATURE: 98 F | HEART RATE: 78 BPM | SYSTOLIC BLOOD PRESSURE: 112 MMHG | WEIGHT: 143.3 LBS | HEIGHT: 67 IN | BODY MASS INDEX: 22.49 KG/M2

## 2023-11-29 DIAGNOSIS — C25.1 MALIGNANT NEOPLASM OF BODY OF PANCREAS (HCC): ICD-10-CM

## 2023-11-29 PROCEDURE — G0498 CHEMO EXTEND IV INFUS W/PUMP: HCPCS

## 2023-11-29 PROCEDURE — 700105 HCHG RX REV CODE 258: Performed by: STUDENT IN AN ORGANIZED HEALTH CARE EDUCATION/TRAINING PROGRAM

## 2023-11-29 PROCEDURE — 96367 TX/PROPH/DG ADDL SEQ IV INF: CPT

## 2023-11-29 PROCEDURE — 96375 TX/PRO/DX INJ NEW DRUG ADDON: CPT

## 2023-11-29 PROCEDURE — 96413 CHEMO IV INFUSION 1 HR: CPT

## 2023-11-29 PROCEDURE — 99212 OFFICE O/P EST SF 10 MIN: CPT | Mod: 25 | Performed by: STUDENT IN AN ORGANIZED HEALTH CARE EDUCATION/TRAINING PROGRAM

## 2023-11-29 PROCEDURE — 96417 CHEMO IV INFUS EACH ADDL SEQ: CPT

## 2023-11-29 PROCEDURE — 700111 HCHG RX REV CODE 636 W/ 250 OVERRIDE (IP): Performed by: STUDENT IN AN ORGANIZED HEALTH CARE EDUCATION/TRAINING PROGRAM

## 2023-11-29 PROCEDURE — A4212 NON CORING NEEDLE OR STYLET: HCPCS

## 2023-11-29 PROCEDURE — 96415 CHEMO IV INFUSION ADDL HR: CPT

## 2023-11-29 PROCEDURE — 99214 OFFICE O/P EST MOD 30 MIN: CPT | Performed by: STUDENT IN AN ORGANIZED HEALTH CARE EDUCATION/TRAINING PROGRAM

## 2023-11-29 RX ORDER — ATROPINE SULFATE 1 MG/ML
0.5 INJECTION, SOLUTION INTRAVENOUS PRN
Status: DISCONTINUED | OUTPATIENT
Start: 2023-11-29 | End: 2023-11-29 | Stop reason: HOSPADM

## 2023-11-29 RX ADMIN — FOSAPREPITANT 150 MG: 150 INJECTION, POWDER, LYOPHILIZED, FOR SOLUTION INTRAVENOUS at 11:17

## 2023-11-29 RX ADMIN — DEXAMETHASONE SODIUM PHOSPHATE 12 MG: 4 INJECTION, SOLUTION INTRA-ARTICULAR; INTRALESIONAL; INTRAMUSCULAR; INTRAVENOUS; SOFT TISSUE at 10:41

## 2023-11-29 RX ADMIN — IRINOTECAN HYDROCHLORIDE 262.6 MG: 20 INJECTION, SOLUTION INTRAVENOUS at 14:30

## 2023-11-29 RX ADMIN — OXALIPLATIN 150 MG: 5 INJECTION, SOLUTION INTRAVENOUS at 11:57

## 2023-11-29 RX ADMIN — ATROPINE SULFATE 0.5 MG: 1 INJECTION, SOLUTION INTRAVENOUS at 14:25

## 2023-11-29 RX ADMIN — LEUCOVORIN CALCIUM 700 MG: 500 INJECTION, POWDER, LYOPHILIZED, FOR SOLUTION INTRAMUSCULAR; INTRAVENOUS at 14:30

## 2023-11-29 RX ADMIN — ONDANSETRON 16 MG: 2 INJECTION INTRAMUSCULAR; INTRAVENOUS at 10:51

## 2023-11-29 RX ADMIN — FLUOROURACIL 3850 MG: 50 INJECTION, SOLUTION INTRAVENOUS at 16:19

## 2023-11-29 ASSESSMENT — ENCOUNTER SYMPTOMS
HEARTBURN: 0
SPUTUM PRODUCTION: 0
SINUS PAIN: 0
PALPITATIONS: 0
NAUSEA: 0
BLURRED VISION: 0
CHILLS: 0
WEAKNESS: 0
DIAPHORESIS: 0
BRUISES/BLEEDS EASILY: 0
ORTHOPNEA: 0
ABDOMINAL PAIN: 0
DIZZINESS: 0
WHEEZING: 0
NERVOUS/ANXIOUS: 0
HEADACHES: 0
VOMITING: 0
CONSTIPATION: 0
BACK PAIN: 0
MYALGIAS: 0
INSOMNIA: 0
BLOOD IN STOOL: 0
DOUBLE VISION: 0
FEVER: 0
COUGH: 0
SORE THROAT: 0
SHORTNESS OF BREATH: 0
DIARRHEA: 1
TINGLING: 1
WEIGHT LOSS: 0

## 2023-11-29 ASSESSMENT — PAIN SCALES - GENERAL: PAINLEVEL: 5=MODERATE PAIN

## 2023-11-29 ASSESSMENT — FIBROSIS 4 INDEX
FIB4 SCORE: 1.18
FIB4 SCORE: 1.18

## 2023-11-29 ASSESSMENT — PAIN DESCRIPTION - PAIN TYPE: TYPE: NEUROPATHIC PAIN

## 2023-11-29 NOTE — PROGRESS NOTES
Chemotherapy Verification - PRIMARY RN      Height = 170 cm  Weight = 65 kg  BSA = 1.75 m2       Medication: Oxaliplatin  Dose: 85 mg/m2  Calculated Dose: 148.75 mg                             (In mg/m2, AUC, mg/kg)     Medication: Irinotecan  Dose: 150 mg/m2  Calculated Dose: 262.5 mg                             (In mg/m2, AUC, mg/kg)    Medication: Fluorouracil CADD pump Dose: 2400 mg/m2 over 46 hours  Calculated Dose: 4200 mg over 46 hours                             (In mg/m2, AUC, mg/kg)    Medication: Leucovorin  Dose: 400 mg/m2  Calculated Dose: 700 mg                             (In mg/m2, AUC, mg/kg)    I confirm this process was performed independently with the BSA and all final chemotherapy dosing calculations congruent.  Any discrepancies of 10% or greater have been addressed with the chemotherapy pharmacist. The resolution of the discrepancy has been documented in the EPIC progress notes.

## 2023-11-29 NOTE — PROGRESS NOTES
Chemotherapy Verification - SECONDARY RN   C6 D1     Height = 170 cm  Weight = 65 kg  BSA = 1.75 m^2       Medication: oxaliplatin (ELOXATIN)  Dose: 85 mg/m2  Calculated Dose: 148.75 mg                             (In mg/m2, AUC, mg/kg)     Medication: irinotecan (CAMPTOSAR)  Dose: 150 mg/m2  Calculated Dose: 262.5 mg                             (In mg/m2, AUC, mg/kg)    Medication: leucovorin  Dose: 400 mg/m2  Calculated Dose: 700 mg                             (In mg/m2, AUC, mg/kg)    Medication: fluorouracil (ADRUCIL)  Dose: 2400 mg/m2  Calculated Dose: 4200 mg in CADD to infuse over 46 hours                              (In mg/m2, AUC, mg/kg)        I confirm that this process was performed independently.

## 2023-11-29 NOTE — PROGRESS NOTES
Follow Up Note:  Hematology/Oncology      Primary Care:  Luis Fernando Culp M.D.    Diagnosis: Pancreatic adenocarcinoma    Chief Complaint: On-treatment visit    Current Treatment: mFOLFIRINOX    Prior Treatment: NA    Oncology History of Presenting Illness:  Lucas Rojas is a 68 y.o.  man who presents to the clinic for evaluation for systemic therapy for diagnosis of pancreatic adenocarcinoma.  The patient had been having abdominal pain with worsening intensity over the past few months, and had a CT scan done which revealed a mass in the pancreatic neck.  He was referred to GI and surgical oncology, and ultimately underwent a biopsy which revealed pancreatic adenocarcinoma, moderately differentiated.  He underwent an endoscopic ultrasound which revealed a [id number] disease.  Staging scans did not reveal any signs of disease elsewhere, and he was referred to me by Dr. Escalera for neoadjuvant chemotherapy accordingly. His baseline Ca 19-9 was 12.     Treatment History:   09/20/23: C1 mFOLFIRINOX  10/04/23: C2 mFOLFIRINOX  10/18/23: C3 mFOLFIRINOX  11/01/23: C4 mFOLFIRINOX  11/15/23: C5 mFOLFIRINOX  11/29/23: C6 mFOLFIRINOX    Interval History:  Patient is here for follow up visit. He is doing okay overall. Gabapentin has helped tremendously with neuropathy and he feels well. He is managing the diarrhea well enough. He feels a bit fatigued but is doing well with treatment. He felt better after getting a blood transfusion previously, but otherwise is doing alright.      Allergies as of 11/29/2023    (No Known Allergies)         Current Outpatient Medications:     gabapentin (NEURONTIN) 100 MG Cap, Take 1 Capsule by mouth 3 times a day., Disp: 90 Capsule, Rfl: 2    metoprolol SR (TOPROL XL) 25 MG TABLET SR 24 HR, Take 25 mg by mouth at bedtime., Disp: , Rfl:     allopurinol (ZYLOPRIM) 100 MG Tab, Take 100 mg by mouth every day., Disp: , Rfl:     LORazepam (ATIVAN) 1 MG Tab, Take 1 mg by mouth every 8 hours as  needed for Anxiety., Disp: , Rfl:     rosuvastatin (CRESTOR) 20 MG Tab, Take 20 mg by mouth every day., Disp: , Rfl:     OLANZapine (ZYPREXA) 5 MG Tab, Take 1 tablet by mouth nightly on days 1-5 of chemo treatment week, Disp: 12 Tablet, Rfl: 1    promethazine (PHENERGAN) 25 MG Tab, Take 0.5-1 Tablets by mouth every 6 hours as needed for Nausea/Vomiting., Disp: 30 Tablet, Rfl: 1    ondansetron (ZOFRAN) 4 MG Tab tablet, Take 1 Tablet by mouth every four hours as needed for Nausea/Vomiting (for nausea, vomiting)., Disp: 30 Tablet, Rfl: 6    prochlorperazine (COMPAZINE) 10 MG Tab, Take 1 Tablet by mouth every 6 hours as needed (for nausea, vomiting)., Disp: 30 Tablet, Rfl: 6    losartan (COZAAR) 25 MG Tab, Take 25 mg by mouth every day., Disp: , Rfl:   No current facility-administered medications for this encounter.    Facility-Administered Medications Ordered in Other Encounters:     fosaprepitant (Emend) 150 mg in  mL ivpb, 150 mg, Intravenous, Once, David Issa D.O., Last Rate: 300 mL/hr at 11/29/23 1117, 150 mg at 11/29/23 1117    oxaliplatin (Eloxatin) 150 mg in dextrose 5% 250 mL Chemotherapy Infusion, 85 mg/m2, Intravenous, Once, David Issa D.O.    irinotecan (Camptosar) 262.6 mg in dextrose 5% 500 mL Chemotherapy Infusion, 150 mg/m2, Intravenous, Once, David Issa D.O.    leucovorin 700 mg in dextrose 5% 250 mL, 400 mg/m2, Intravenous, Once, David Issa D.O.    fluorouracil (Adrucil) 3,850 mg in CADD Cassette/Bag Chemo infusion (for use in CADD PUMP), 3,850 mg, Intravenous, Once, David Issa D.O.      Review of Systems:  Review of Systems   Constitutional:  Positive for malaise/fatigue. Negative for chills, diaphoresis, fever and weight loss.   HENT:  Negative for hearing loss, nosebleeds, sinus pain and sore throat.    Eyes:  Negative for blurred vision and double vision.   Respiratory:  Negative for cough, sputum production, shortness of breath and wheezing.    Cardiovascular:  Negative for chest  "pain, palpitations, orthopnea and leg swelling.   Gastrointestinal:  Positive for diarrhea. Negative for abdominal pain, blood in stool, constipation, heartburn, melena, nausea and vomiting.   Genitourinary:  Negative for dysuria, frequency, hematuria and urgency.   Musculoskeletal:  Negative for back pain, joint pain and myalgias.   Skin:  Negative for rash.   Neurological:  Positive for tingling (Cold sensitivity in hands, improved tremendously with gabapentin). Negative for dizziness, weakness and headaches.   Endo/Heme/Allergies:  Does not bruise/bleed easily.   Psychiatric/Behavioral:  The patient is not nervous/anxious and does not have insomnia.          Physical Exam:  Vitals:    11/29/23 0916   BP: 98/58   Pulse: 63   Resp: 16   Temp: 36.3 °C (97.3 °F)   TempSrc: Temporal   SpO2: 98%   Weight: 65.9 kg (145 lb 3.2 oz)   Height: 1.7 m (5' 6.93\")       DESC; KARNOFSKY SCALE WITH ECOG EQUIVALENT: 80, Normal activity with effort; some signs or symptoms of disease (ECOG equivalent 1)    DISTRESS LEVEL: no apparent distress    Physical Exam  Vitals and nursing note reviewed.   Constitutional:       General: He is awake. He is not in acute distress.     Appearance: Normal appearance. He is normal weight. He is not ill-appearing, toxic-appearing or diaphoretic.   HENT:      Head: Normocephalic and atraumatic.      Nose: Nose normal. No congestion.      Mouth/Throat:      Pharynx: Oropharynx is clear. No oropharyngeal exudate or posterior oropharyngeal erythema.   Eyes:      General: No scleral icterus.     Extraocular Movements: Extraocular movements intact.      Conjunctiva/sclera: Conjunctivae normal.      Pupils: Pupils are equal, round, and reactive to light.   Cardiovascular:      Rate and Rhythm: Normal rate and regular rhythm.      Pulses: Normal pulses.      Heart sounds: Normal heart sounds. No murmur heard.     No friction rub. No gallop.   Pulmonary:      Effort: Pulmonary effort is normal.      Breath " sounds: Normal breath sounds. No decreased air movement. No wheezing, rhonchi or rales.   Abdominal:      General: Bowel sounds are normal. There is no distension.      Tenderness: There is no abdominal tenderness.   Musculoskeletal:         General: No deformity. Normal range of motion.      Cervical back: Normal range of motion and neck supple. No tenderness.      Right lower leg: No edema.      Left lower leg: No edema.   Lymphadenopathy:      Cervical: No cervical adenopathy.      Upper Body:      Right upper body: No axillary adenopathy.      Left upper body: No axillary adenopathy.      Lower Body: No right inguinal adenopathy. No left inguinal adenopathy.   Skin:     General: Skin is warm and dry.      Coloration: Skin is pale. Skin is not jaundiced.      Findings: No erythema or rash.   Neurological:      General: No focal deficit present.      Mental Status: He is alert and oriented to person, place, and time.      Sensory: Sensation is intact.      Motor: Weakness present.      Gait: Gait abnormal (In wheelchair today).   Psychiatric:         Attention and Perception: Attention normal.         Mood and Affect: Mood normal.         Behavior: Behavior normal. Behavior is cooperative.         Thought Content: Thought content normal.         Judgment: Judgment normal.           Labs:                Imaging:     All listed images below have been independently reviewed by me. I agree with the findings as summarized below:    NM-GI BLEEDING SCAN    Result Date: 10/7/2023  10/7/2023 2:03 PM HISTORY/REASON FOR EXAM:  upper GI bleeding, large duodenal ulcer not fixable by EGD TECHNIQUE/EXAM DESCRIPTION AND NUMBER OF VIEWS: GI bleeding scan. COMPARISON: CTA 10/6/2023 PROCEDURE: The patient?s red cells were tagged with 24.3 mCi of Tc UltraTag.  Serial filming was done over the abdomen. FINDINGS: The normal vascular structures are seen.  No areas of abnormal red cell accumulation are noted.     No scintigraphy evidence  of active GI bleeding.    CTA ABDOMEN PELVIS W & W/O POST PROCESS    Result Date: 10/6/2023  10/6/2023 5:55 PM HISTORY/REASON FOR EXAM:  GI bleed TECHNIQUE/EXAM DESCRIPTION:  CT angiogram of the abdomen and pelvis without and with contrast, with reconstructions. Initial precontrast helical sections were obtained from the diaphragmatic domes to the lesser trochanters. Following this, 95 mL of Omnipaque 350 nonionic contrast was administered at 5.0 mL/sec and helical scanning was obtained from the diaphragmatic domes through the lesser trochanters. Thin section overlapping reconstruction interval was utilized and multiplanar volume reformatted were generated in the sagittal and coronal planes. 3D angiographic images were reviewed on PACS. Maximum intensity projection (MIP) images were generated and reviewed. Low dose optimization technique was utilized for this CT exam including automated exposure control and adjustment of the mA and/or kV according to patient size. COMPARISON:  Outside CT abdomen and pelvis 11/12/2023 of which only a portion of the images are presumably provided. FINDINGS: Noncontrast images: There is no intramural hematoma. There is right gynecomastia. There has been previous sternotomy. There is facet arthropathy of lumbar spine. There is levoconvex scoliosis and there is bilateral sacroiliac joint osteoarthritis. Contrast images: Aorta and vasculature: There is aortic atherosclerotic plaque without aneurysm. The celiac axis is patent with conventional branching pattern. The distal celiac axis is close proximity with the mass and could be involvement. Common hepatic artery has lack of soft tissue plane with the mass and is likely involved. The splenic artery abuts the mass and is likely involved. The superior mesenteric artery is patent and there is no vascular involvement of this structure. There is a dominant right main renal artery and a small accessory artery which are patent. There is a single  left renal artery with atherosclerotic plaque at its origin. No active GI hemorrhage is identified. Liver is unremarkable. Spleen is normal in size. Pancreas: There is a cystic and solid pancreatic body mass measuring 4.3 x 4.3 cm, highly suspicious for malignancy. Distal to the mass the pancreatic duct is dilated. Gallbladder and biliary tree are normal. Adrenal glands are normal. Kidneys: There is a left renal cyst. Right kidney is normal in appearance. Bowel is unremarkable. There is no adenopathy or free fluid. 3D angiographic/MIP images of the vasculature confirm the vascular findings as described above.     1.  Pancreatic body 4.3 x 4.3 cm cystic and solid mass, highly suspicious for malignancy 2.  Distal to the mass the pancreatic duct is dilated 3.  It is highly suspicious there is involvement of the splenic artery, common hepatic artery, and the proximal superior mesenteric artery 4.  Assessment of the venous vasculature including whether there is tumor involvement is nondiagnostic due to the arterial phase only imaging    DX-CHEST-PORTABLE (1 VIEW)    Result Date: 10/6/2023  10/6/2023 4:16 PM HISTORY/REASON FOR EXAM:  Blood in vomit or stool or dark tarry stool. TECHNIQUE/EXAM DESCRIPTION AND NUMBER OF VIEWS: Single portable view of the chest. COMPARISON: None FINDINGS: LUNGS: The lungs are clear. HEART and MEDIASTINUM: normal in size. Pleura: There are no pleural effusion or pneumothoraces. Osseous structures: No significant bony abnormality.     No acute cardiopulmonary abnormality identified.      Pathology:         Assessment & Plan:  1. Malignant neoplasm of body of pancreas (HCC)          This is a 68 year old  man with pancreatic adenocarcinoma, [id number] stage II-a disease, who presents for evaluation for systemic therapy.      Current Diagnosis and Staging: Pancreatic adenocarcinoma, [id number] stage II-a disease, moderately differentiated    Update: Patient doing well at this time. Monitor  labs. Continue C6 today.      Treatment Plan: FOLFIRINOX     Treatment Citation: NCCN     Plan of Care:     Primary Therapy: mFOLFIRINOX C6 today  Supportive Therapy: Antiemetics per protocol, morphine prescribed for pain, referral for celiac plexus nerve block  Toxicity: Patient is getting antineoplastic therapy and needs monitoring of blood counts, hepatic function, and renal function due to potential for organ dysfunction.   Labs: CBC with diff, CMP, Ca 19-9 monitoring  Imaging: Repeat CT chest, pancreatic protocol after C8   Treatment Planning: Plan for neoadjuvant chemotherapy with FOLFIRINOX followed by evaluation for surgical resection.   Consultations: Surgical oncology (Dr. Escalera); GI (Dr. Dorsey); Palliative care (Dr. Al)  Code Status: Full  Miscellaneous: Patient declined referral to medical genetics, but was amenable to [facility] [place] and [facility] trials  Return for Follow Up: 2 weeks    Any questions and concerns raised by the patient were answered to the best of my ability. Thank you for allowing me to participate in the care for this patient. Please feel free to contact me for any questions or concerns.       Total time spent on chart review, clinic encounter, and documentation: 24 minutes.

## 2023-11-29 NOTE — PROGRESS NOTES
Pt presented to Infusion for D1 C6 OP Pancreatic mFOLFIRINOX. POC discussed and pt verbalized understanding, reports his peripheral neuropathy is improving. Pt declined lidocaine. Port accessed per [facility] policy, brisk blood return noted, line flushes with ease. Pt tolerated well. Labs reviewed from media. Pre-medications and chemotherapy administered per MAR. Pt tolerated well. No s/s of adverse reactions or complications noted. Port flushed per [facility] policy, brisk blood return noted and 5FU CADD pump connected with 2 RN verification of settings. Pump in RUN mode, connections secured, clamps open and pump placed in carrying bag. Pt confirmed pump deaccess appt and discharged to self care, accompanied by his wife. Pt in NAD at time of discharge.

## 2023-12-01 ENCOUNTER — OUTPATIENT INFUSION SERVICES (OUTPATIENT)
Dept: ONCOLOGY | Facility: MEDICAL CENTER | Age: 68
End: 2023-12-01
Attending: STUDENT IN AN ORGANIZED HEALTH CARE EDUCATION/TRAINING PROGRAM
Payer: MEDICARE

## 2023-12-01 VITALS
RESPIRATION RATE: 18 BRPM | OXYGEN SATURATION: 98 % | SYSTOLIC BLOOD PRESSURE: 120 MMHG | DIASTOLIC BLOOD PRESSURE: 77 MMHG | HEART RATE: 103 BPM | TEMPERATURE: 97.1 F

## 2023-12-01 DIAGNOSIS — C25.1 MALIGNANT NEOPLASM OF BODY OF PANCREAS (HCC): ICD-10-CM

## 2023-12-01 PROCEDURE — 96523 IRRIG DRUG DELIVERY DEVICE: CPT

## 2023-12-01 PROCEDURE — 700111 HCHG RX REV CODE 636 W/ 250 OVERRIDE (IP): Performed by: STUDENT IN AN ORGANIZED HEALTH CARE EDUCATION/TRAINING PROGRAM

## 2023-12-01 PROCEDURE — 96377 APPLICATON ON-BODY INJECTOR: CPT

## 2023-12-01 RX ORDER — M-VIT,TX,IRON,MINS/CALC/FOLIC 27MG-0.4MG
1 TABLET ORAL DAILY
COMMUNITY

## 2023-12-01 RX ORDER — FERROUS SULFATE 325(65) MG
325 TABLET ORAL DAILY
COMMUNITY

## 2023-12-01 RX ORDER — FOLIC ACID 1 MG/1
1 TABLET ORAL DAILY
COMMUNITY

## 2023-12-01 RX ADMIN — PEGFILGRASTIM 6 MG: KIT SUBCUTANEOUS at 14:50

## 2023-12-01 RX ADMIN — HEPARIN 500 UNITS: 100 SYRINGE at 14:44

## 2023-12-01 ASSESSMENT — PAIN DESCRIPTION - PAIN TYPE: TYPE: NEUROPATHIC PAIN

## 2023-12-01 NOTE — PROGRESS NOTES
Pt arrives to IS for 5FU CADD pump disconnection/port flush/Neulasta Onpro.  Pt denies nausea.  Pt reports cold sensitivity neuropathy to fingers/toes.  5FU CADD pump volume is complete.  5FU CADD pump disconnected from the pt.  RUC port flushed with NS and brisk blood return observed.  Port flushed with NS and heparin locked.  Naqvi needle removed intact.  Site covered with gauze/tape.  Neulasta Onpro applied to back of LUE.  Neulasta Onpro is functioning properly and flashing green light was observed.  Confirmed next appt time with pt.  Pt dc home with family.

## 2023-12-09 RX ORDER — 0.9 % SODIUM CHLORIDE 0.9 %
3 VIAL (ML) INJECTION PRN
Status: CANCELLED | OUTPATIENT
Start: 2023-12-13

## 2023-12-09 RX ORDER — 0.9 % SODIUM CHLORIDE 0.9 %
10 VIAL (ML) INJECTION PRN
Status: CANCELLED | OUTPATIENT
Start: 2023-12-13

## 2023-12-09 RX ORDER — EPINEPHRINE 1 MG/ML(1)
0.5 AMPUL (ML) INJECTION PRN
Status: CANCELLED | OUTPATIENT
Start: 2023-12-13

## 2023-12-09 RX ORDER — METHYLPREDNISOLONE SODIUM SUCCINATE 125 MG/2ML
125 INJECTION, POWDER, LYOPHILIZED, FOR SOLUTION INTRAMUSCULAR; INTRAVENOUS PRN
Status: CANCELLED | OUTPATIENT
Start: 2023-12-13

## 2023-12-09 RX ORDER — 0.9 % SODIUM CHLORIDE 0.9 %
10 VIAL (ML) INJECTION PRN
Status: CANCELLED | OUTPATIENT
Start: 2023-12-12

## 2023-12-09 RX ORDER — DIPHENHYDRAMINE HYDROCHLORIDE 50 MG/ML
50 INJECTION INTRAMUSCULAR; INTRAVENOUS PRN
Status: CANCELLED | OUTPATIENT
Start: 2023-12-13

## 2023-12-09 RX ORDER — DEXTROSE MONOHYDRATE 50 MG/ML
INJECTION, SOLUTION INTRAVENOUS CONTINUOUS
Status: CANCELLED | OUTPATIENT
Start: 2023-12-13

## 2023-12-09 RX ORDER — ONDANSETRON 8 MG/1
8 TABLET, ORALLY DISINTEGRATING ORAL PRN
Status: CANCELLED | OUTPATIENT
Start: 2023-12-13

## 2023-12-09 RX ORDER — ONDANSETRON 2 MG/ML
4 INJECTION INTRAMUSCULAR; INTRAVENOUS PRN
Status: CANCELLED | OUTPATIENT
Start: 2023-12-13

## 2023-12-09 RX ORDER — 0.9 % SODIUM CHLORIDE 0.9 %
VIAL (ML) INJECTION PRN
Status: CANCELLED | OUTPATIENT
Start: 2023-12-13

## 2023-12-09 RX ORDER — LOPERAMIDE HYDROCHLORIDE 2 MG/1
4 CAPSULE ORAL PRN
Status: CANCELLED | OUTPATIENT
Start: 2023-12-13

## 2023-12-09 RX ORDER — 0.9 % SODIUM CHLORIDE 0.9 %
3 VIAL (ML) INJECTION PRN
Status: CANCELLED | OUTPATIENT
Start: 2023-12-12

## 2023-12-09 RX ORDER — PROCHLORPERAZINE MALEATE 10 MG
10 TABLET ORAL EVERY 6 HOURS PRN
Status: CANCELLED | OUTPATIENT
Start: 2023-12-13

## 2023-12-09 RX ORDER — 0.9 % SODIUM CHLORIDE 0.9 %
20 VIAL (ML) INJECTION PRN
Status: CANCELLED | OUTPATIENT
Start: 2023-12-15

## 2023-12-09 RX ORDER — 0.9 % SODIUM CHLORIDE 0.9 %
VIAL (ML) INJECTION PRN
Status: CANCELLED | OUTPATIENT
Start: 2023-12-12

## 2023-12-09 RX ORDER — LOPERAMIDE HYDROCHLORIDE 2 MG/1
2 CAPSULE ORAL
Status: CANCELLED | OUTPATIENT
Start: 2023-12-13

## 2023-12-12 NOTE — PROGRESS NOTES
"Pharmacy Chemotherapy Calculation:    Dx: Pancreatic cancer         Protocol: mFOLFIRINOX     *Dosing Reference*  Oxaliplatin  IV over 2 hours   - Dose reduced to 65 mg/m2 prior to Cycle 7 for tolerance  Leucovorin 400 mg/m2 IV over 90 minutes concurrently with  Irinotecan  IV over 90 minutes followed by   - Dose reduced to 125 mg/m2 prior to Cycle 7 for tolerance  Fluorouracil 2400 mg/m2 CIVI over 46 hours on Days 1-2  14-day cycle for 4-12 cycles (neoadjuvant or adjuvant) or 4-6 cycles (as induction) (locally advanced) or until DP/UT (locally advanced, recurrent, or metastatic)    NCCN Guidelines for Pancreatic Adenocarcinoma V.2.2023.  Deyvi T, et al. NEJM. 2018;379(25):0938-0927.  Herring SM, et al. Br J Cancer. 2016;114(7):737-43.  Arnav CAGLE et al. Ailyn Surg Oncol. 2013;20(8):2787-95.    Allergies:  Patient has no known allergies.     /61   Pulse 89   Temp 36.1 °C (97 °F) (Temporal)   Resp 18   Ht 1.7 m (5' 6.93\")   Wt 66 kg (145 lb 8.1 oz)   SpO2 98%   BMI 22.84 kg/m²  Body surface area is 1.77 meters squared.    Labs 12/11/23 (Media):  ANC~ 38196 Plt = 253k   Hgb = 9.9     SCr = 0.8 mg/dL CrCl ~ 79 mL/min   AST/ALT/AP = 23/24/182 TBili = 0.2     Drug Order   (Drug name, dose, route, IV Fluid & volume, frequency, number of doses) Cycle 7  Previous treatment: C6 11/29/23     Medication = Oxaliplatin  Base Dose = 65 mg/m2  Calc Dose: Base Dose x 1.77 m2 = 115.05 mg  Final Dose = 115 mg  Route = IV  Fluid & Volume = D5W 250 mL  Admin Duration = Over 2 hours          <10% difference, OK to treat with final dose   Medication = Irinotecan  Base Dose = 125 mg/m2  Calc Dose: Base Dose x 1.77 m2 = 221.25 mg  Final Dose = 220 mg  Route = IV  Fluid & Volume = D5W 500 mL  Admin Duration = Over 90 mins    Concurrently with LCV      <10% difference, OK to treat with final dose   Medication = Leucovorin  Base Dose = 400 mg/m2  Calc Dose:Base Dose x 1.77 m2 = 708 mg  Final Dose = 710 mg  Route = IV  Fluid & Volume = " D5W 250 mL  Admin Duration = Over 90 mins     Concurrently with irinotecan      <10% difference, OK to treat with final dose   Medication = Fluorouracil  Base Dose= 2400 mg/m2  Calc Dose: Base Dose x 1.77 m2 = 4248 mg  Final Dose = 3850 mg  Route = CIVI via CADD  Drug conc. = 50 mg/mL  Fluid & Volume = 77 mL (+3 mL overfill)  Admin Duration = Over 46 hours at 1.7 mL/hr          <10% difference, OK to treat with final dose     By my signature below, I confirm this process was performed independently with the BSA and all final chemotherapy dosing calculations congruent. I have reviewed the above chemotherapy order and that my calculation of the final dose and BSA (when applicable) corroborate those calculations of the  pharmacist. Discrepancies of 10% or greater in the written dose have been addressed and documented within the [facility] Progress notes.    Srikanth Fairbanks, PharmD

## 2023-12-13 ENCOUNTER — OUTPATIENT INFUSION SERVICES (OUTPATIENT)
Dept: ONCOLOGY | Facility: MEDICAL CENTER | Age: 68
End: 2023-12-13
Attending: STUDENT IN AN ORGANIZED HEALTH CARE EDUCATION/TRAINING PROGRAM
Payer: MEDICARE

## 2023-12-13 ENCOUNTER — HOSPITAL ENCOUNTER (OUTPATIENT)
Dept: HEMATOLOGY ONCOLOGY | Facility: MEDICAL CENTER | Age: 68
End: 2023-12-13
Attending: NURSE PRACTITIONER
Payer: MEDICARE

## 2023-12-13 VITALS
SYSTOLIC BLOOD PRESSURE: 112 MMHG | HEIGHT: 67 IN | BODY MASS INDEX: 23.07 KG/M2 | OXYGEN SATURATION: 98 % | HEART RATE: 92 BPM | WEIGHT: 147 LBS | RESPIRATION RATE: 16 BRPM | DIASTOLIC BLOOD PRESSURE: 62 MMHG | TEMPERATURE: 98.9 F

## 2023-12-13 VITALS
HEART RATE: 89 BPM | OXYGEN SATURATION: 98 % | SYSTOLIC BLOOD PRESSURE: 104 MMHG | TEMPERATURE: 97 F | RESPIRATION RATE: 18 BRPM | WEIGHT: 145.5 LBS | HEIGHT: 67 IN | DIASTOLIC BLOOD PRESSURE: 61 MMHG | BODY MASS INDEX: 22.84 KG/M2

## 2023-12-13 DIAGNOSIS — Z79.899 ENCOUNTER FOR LONG-TERM CURRENT USE OF HIGH RISK MEDICATION: ICD-10-CM

## 2023-12-13 DIAGNOSIS — K52.1 CHEMOTHERAPY INDUCED DIARRHEA: ICD-10-CM

## 2023-12-13 DIAGNOSIS — T45.1X5A CHEMOTHERAPY INDUCED DIARRHEA: ICD-10-CM

## 2023-12-13 DIAGNOSIS — C25.1 MALIGNANT NEOPLASM OF BODY OF PANCREAS (HCC): ICD-10-CM

## 2023-12-13 DIAGNOSIS — D72.828 OTHER ELEVATED WHITE BLOOD CELL (WBC) COUNT: ICD-10-CM

## 2023-12-13 DIAGNOSIS — T45.1X5A ANTINEOPLASTIC CHEMOTHERAPY INDUCED ANEMIA: ICD-10-CM

## 2023-12-13 DIAGNOSIS — D64.81 ANTINEOPLASTIC CHEMOTHERAPY INDUCED ANEMIA: ICD-10-CM

## 2023-12-13 PROCEDURE — A4212 NON CORING NEEDLE OR STYLET: HCPCS

## 2023-12-13 PROCEDURE — 700105 HCHG RX REV CODE 258: Performed by: STUDENT IN AN ORGANIZED HEALTH CARE EDUCATION/TRAINING PROGRAM

## 2023-12-13 PROCEDURE — 700111 HCHG RX REV CODE 636 W/ 250 OVERRIDE (IP): Mod: JZ | Performed by: STUDENT IN AN ORGANIZED HEALTH CARE EDUCATION/TRAINING PROGRAM

## 2023-12-13 PROCEDURE — 96417 CHEMO IV INFUS EACH ADDL SEQ: CPT

## 2023-12-13 PROCEDURE — 96367 TX/PROPH/DG ADDL SEQ IV INF: CPT

## 2023-12-13 PROCEDURE — 96413 CHEMO IV INFUSION 1 HR: CPT

## 2023-12-13 PROCEDURE — 96375 TX/PRO/DX INJ NEW DRUG ADDON: CPT

## 2023-12-13 PROCEDURE — 96415 CHEMO IV INFUSION ADDL HR: CPT

## 2023-12-13 PROCEDURE — G0498 CHEMO EXTEND IV INFUS W/PUMP: HCPCS

## 2023-12-13 PROCEDURE — 99212 OFFICE O/P EST SF 10 MIN: CPT | Mod: 25 | Performed by: NURSE PRACTITIONER

## 2023-12-13 PROCEDURE — 99214 OFFICE O/P EST MOD 30 MIN: CPT | Performed by: NURSE PRACTITIONER

## 2023-12-13 RX ORDER — ATROPINE SULFATE 1 MG/ML
0.5 INJECTION, SOLUTION INTRAVENOUS PRN
Status: DISCONTINUED | OUTPATIENT
Start: 2023-12-13 | End: 2023-12-13 | Stop reason: HOSPADM

## 2023-12-13 RX ADMIN — LEUCOVORIN CALCIUM 710 MG: 200 INJECTION, POWDER, LYOPHILIZED, FOR SUSPENSION INTRAMUSCULAR; INTRAVENOUS at 14:39

## 2023-12-13 RX ADMIN — DEXAMETHASONE SODIUM PHOSPHATE 12 MG: 4 INJECTION, SOLUTION INTRA-ARTICULAR; INTRALESIONAL; INTRAMUSCULAR; INTRAVENOUS; SOFT TISSUE at 10:31

## 2023-12-13 RX ADMIN — FLUOROURACIL 3850 MG: 50 INJECTION, SOLUTION INTRAVENOUS at 16:37

## 2023-12-13 RX ADMIN — OXALIPLATIN 115 MG: 5 INJECTION, SOLUTION INTRAVENOUS at 12:04

## 2023-12-13 RX ADMIN — FOSAPREPITANT 150 MG: 150 INJECTION, POWDER, LYOPHILIZED, FOR SOLUTION INTRAVENOUS at 11:17

## 2023-12-13 RX ADMIN — ATROPINE SULFATE 0.5 MG: 1 INJECTION, SOLUTION INTRAVENOUS at 14:47

## 2023-12-13 RX ADMIN — ONDANSETRON 16 MG: 2 INJECTION INTRAMUSCULAR; INTRAVENOUS at 10:50

## 2023-12-13 RX ADMIN — IRINOTECAN HYDROCHLORIDE 220 MG: 20 INJECTION, SOLUTION INTRAVENOUS at 14:39

## 2023-12-13 ASSESSMENT — ENCOUNTER SYMPTOMS
TINGLING: 1
WEIGHT LOSS: 0
DIARRHEA: 1
VOMITING: 0
HEADACHES: 0
SHORTNESS OF BREATH: 1
WHEEZING: 0
NAUSEA: 0
COUGH: 0
CHILLS: 0
MYALGIAS: 0
SENSORY CHANGE: 1
PALPITATIONS: 0
FEVER: 0
CONSTIPATION: 0
INSOMNIA: 0
DIZZINESS: 0

## 2023-12-13 ASSESSMENT — FIBROSIS 4 INDEX
FIB4 SCORE: 1.18
FIB4 SCORE: 1.18

## 2023-12-13 NOTE — PROGRESS NOTES
Chemotherapy Verification - PRIMARY RN      Height = 1.7m  Weight = 66kg  BSA = 1.77m2       Medication: oxaliplatin  Dose: 65mg/m2  Calculated Dose: 115.05mg                             (In mg/m2, AUC, mg/kg)     Medication: irinotecan  Dose: 150mg/m2  Calculated Dose: 265.5mg                             (In mg/m2, AUC, mg/kg)    Medication: fluorouracil  Dose: 2400mg/m2  Calculated Dose: 4248mg                             (In mg/m2, AUC, mg/kg)    I confirm this process was performed independently with the BSA and all final chemotherapy dosing calculations congruent.  Any discrepancies of 10% or greater have been addressed with the chemotherapy pharmacist. The resolution of the discrepancy has been documented in the EPIC progress notes.

## 2023-12-13 NOTE — PROGRESS NOTES
Chemotherapy Verification - SECONDARY RN   C7 D1    Height = 170 cm  Weight = 66 kg  BSA = 1.77 m^2       Medication: irinotecan (CAMPTOSAR)  Dose: 125 mg/m2  Calculated Dose: 221.25 mg                             (In mg/m2, AUC, mg/kg)       I confirm that this process was performed independently.

## 2023-12-13 NOTE — PROGRESS NOTES
Patient to [facility] for chemotherapy infusion. PORT accessed with sterile field ,flushed with + blood return. Patient meets parameters for chemo. Premeds given. Oxaliplatin infusing. Report given to Thuy.

## 2023-12-13 NOTE — PROGRESS NOTES
Subjective     Marino Rojas is a 68 y.o. male who presents with Pancreatic Cancer ([facility]/Prechemo)            HPI  Mr. Rojas presents for evaluation prior to cycle 7 FOLFIRINOX for treatment of stage IIa, cT3 N0 M0, invasive moderately differentiated pancreatic adenocarcinoma. He is accompanied by his wife for today's visit.     Patient was in his usual state of health until approximately 8/2023 when he noted increasing abdominal pain over the previous several months. CT completed 8/11/23 showed pancreatic mass of 3.8 x 2.9 cm, worrisome for malignancy. He underwent biopsy per upper EUS 8/23/23 with pathology confirming malignancy; CA 19-9 was 37 on 9/18/23. He initiated neoadjuvant FOLFIRINOX on 9/20/23. He underwent celiac plexus block on 9/22/23.     Patient notes increasing fatigue, irinotecan and oxaliplatin her dose reduced this cycle.  He does experience shortness of breath with activity that is ultimately self-limiting.  Fiber is helping intermittent diarrhea.  Gabapentin is helping with neuropathy.  He is otherwise at his baseline.        Review of Systems   Constitutional:  Positive for malaise/fatigue (more fatigue - irono and oxali are dose reduced this cycle). Negative for chills, fever and weight loss (up 2#, trace at ankles).   Respiratory:  Positive for shortness of breath (with activity). Negative for cough and wheezing.    Cardiovascular:  Negative for chest pain, palpitations and leg swelling.   Gastrointestinal:  Positive for diarrhea (fiber helps (dietary and supplemental)). Negative for constipation, nausea (no PRN meds needed) and vomiting.   Genitourinary:  Negative for dysuria.   Musculoskeletal:  Negative for joint pain and myalgias.   Neurological:  Positive for tingling (gabapentin is helping) and sensory change (2-3 days more significant). Negative for dizziness and headaches.   Psychiatric/Behavioral:  The patient does not have insomnia.      No Known Allergies      Current  "Outpatient Medications on File Prior to Encounter   Medication Sig Dispense Refill    therapeutic multivitamin-minerals (THERAGRAN-M) Tab Take 1 Tablet by mouth every day.      folic acid (FOLVITE) 1 MG Tab Take 1 mg by mouth every day.      Cyanocobalamin (VITAMIN B 12 PO) Take  by mouth. 2500 mcg      ferrous sulfate 325 (65 Fe) MG tablet Take 325 mg by mouth every day.      gabapentin (NEURONTIN) 100 MG Cap Take 1 Capsule by mouth 3 times a day. 90 Capsule 2    metoprolol SR (TOPROL XL) 25 MG TABLET SR 24 HR Take 25 mg by mouth at bedtime.      allopurinol (ZYLOPRIM) 100 MG Tab Take 100 mg by mouth every day.      promethazine (PHENERGAN) 25 MG Tab Take 0.5-1 Tablets by mouth every 6 hours as needed for Nausea/Vomiting. 30 Tablet 1    ondansetron (ZOFRAN) 4 MG Tab tablet Take 1 Tablet by mouth every four hours as needed for Nausea/Vomiting (for nausea, vomiting). 30 Tablet 6    prochlorperazine (COMPAZINE) 10 MG Tab Take 1 Tablet by mouth every 6 hours as needed (for nausea, vomiting). 30 Tablet 6    losartan (COZAAR) 25 MG Tab Take 25 mg by mouth every day.       No current facility-administered medications on file prior to encounter.              Objective     /62 (BP Location: Left arm, Patient Position: Sitting, BP Cuff Size: Adult)   Pulse 92   Temp 37.2 °C (98.9 °F) (Temporal)   Resp 16   Ht 1.7 m (5' 6.93\")   Wt 66.7 kg (147 lb)   SpO2 98%   BMI 23.07 kg/m²      Physical Exam  Vitals reviewed.   Constitutional:       General: He is not in acute distress.     Appearance: He is well-developed. He is not diaphoretic.   HENT:      Head: Normocephalic and atraumatic.   Eyes:      General: No scleral icterus.        Right eye: No discharge.         Left eye: No discharge.   Cardiovascular:      Rate and Rhythm: Normal rate and regular rhythm.      Heart sounds: Normal heart sounds. No murmur heard.     No friction rub. No gallop.   Pulmonary:      Effort: Pulmonary effort is normal. No respiratory " distress.      Breath sounds: Normal breath sounds. No wheezing.   Abdominal:      General: There is no distension.      Palpations: Abdomen is soft.      Tenderness: There is no abdominal tenderness.   Musculoskeletal:         General: Normal range of motion.      Cervical back: Normal range of motion.   Skin:     General: Skin is warm and dry.      Coloration: Skin is not pale.      Findings: No erythema or rash.   Neurological:      Mental Status: He is alert and oriented to person, place, and time.   Psychiatric:         Behavior: Behavior normal.       Labs from 12/11 in media tab             Assessment & Plan     1. Malignant neoplasm of body of pancreas (HCC)  CT-CHEST (THORAX) WITH    CT-PANCREAS AND ABDOMEN WITH & W/O      2. Encounter for long-term current use of high risk medication        3. Chemotherapy induced diarrhea        4. Antineoplastic chemotherapy induced anemia        5. Other elevated white blood cell (WBC) count              1.  Diarrhea: Fiber & BRAT help best, imodium PRN, continue to monitor.     2.  BLE edema: Trace, negative US 11/16/23, continue to monitor.     3.  Anemia: s/p GI bleed, counts have rebounded, stable this visit, last transfusion 11/15/23, continue to follow.      4.  Elevated WBC: patient afebrile and asymptomatic, attributed to Neulasta OnPro, continue to monitor.    5.  Pancreatic cancer: Diagnosed 8/23/23; initiated neoadjuvant FOLFIRINOX 9/20/23.     CT completed 11/3/23 were reviewed with patient by myself and Dr. Issa: continue current regimen, case will be re-presented to tumor board after at least C8, surgery remains an option and may pre-empt XRT.      CBC, CMP, Ca 19-9 have been evaluated and found to be within acceptable limits, except where addressed above.  Oxaliplatin dose reduced with this cycle from 85 mg/m² to 65 mg/m².  Patient will proceed with cycle 7 of treatment and return in 2 weeks for evaluation prior to cycle 8, sooner as needed.      CT due  after C8        The patient verbalized agreement and understanding of current plan. All questions and concerns were addressed at time of visit.    Please note that this dictation was created using voice recognition software. I have made every reasonable attempt to correct obvious errors, but I expect that there are errors of grammar and possibly content that I did not discover before finalizing the note.

## 2023-12-13 NOTE — PROGRESS NOTES
Chemotherapy Verification - PRIMARY RN      Height = 1.7m  Weight = 66kg  BSA = 1.77m2       Medication: oxaliplatin (Eloxatin)  Dose: 65mg/m2  Calculated Dose: 115.05mg                             (In mg/m2)     Medication: irinotecan (Camptosar)  Dose:125mg/m2  Calculated Dose: 221.25mg                            (In mg/m2)    Medication: fluorouracil (Adrucil)  Dose: 2400mg/m2  Calculated Dose: 4248mg                             (In mg/m2)        I confirm this process was performed independently with the BSA and all final chemotherapy dosing calculations congruent.  Any discrepancies of 10% or greater have been addressed with the chemotherapy pharmacist. The resolution of the discrepancy has been documented in the EPIC progress notes.

## 2023-12-13 NOTE — PROGRESS NOTES
"Pharmacy Chemotherapy Verification    Dx: pancreatic adenocarcinoma         Cycle 7  Previous Treatment: C6 11/29/23    Protocol: mFOLFIRINOX     OXALIplatin  IV over 2 hrs on Day 1  12/13/23- dose reduced to 65mg/m2 fo tolerance  Irinotecan  IV over 90 min on Day 1  12/13/23- dose reduced to 125mg/m2 fo tolerance  Leucovorin 400 mg/m² IV over 90 min on Day 1  Fluorouracil 1200 mg/m2  CIVI daily on Days 1-2 (2400 mg/m² IV over 46 hrs)  14-day cycle for 4-12 cycles (neoadjuvant or adjuvant) or 4-6 cycles (as induction) (locally advanced) or until disease progression or unacceptable toxicity (locally advanced,recurrent, or metastatic  NCCN Guidelines for Pancreatic Adenocarcinoma V.2.2023  Deyvi T, et al. N Engl J Med. 2018;379(08):6806-4763    Allergies:  Patient has no known allergies.     /61   Pulse 89   Temp 36.1 °C (97 °F) (Temporal)   Resp 18   Ht 1.7 m (5' 6.93\")   Wt 66 kg (145 lb 8.1 oz)   SpO2 98%   BMI 22.84 kg/m²  Body surface area is 1.77 meters squared.    All lab results 12/11/23 at [place] within treatment parameters.     OXALIplatin (Eloxatin) 65mg/m² x 1.77m² = 115mg   <10% difference, okay to treat with final dose = 115mg IV    Irinotecan (Camptosar) 125mg/m² x 1.77m² = 221mg   <10% difference, okay to treat with final dose = 220mg IV    Leucovorin 400 mg/m² x 1.77m² = 708mg   <10% difference, okay to treat with final dose = 710mg IV    Fluorouracil (Adrucil) 2400 mg/m² x 1.77m² = 4248 mg   <10% difference, okay to treat with final dose = 3850mg IV over 46 hrs @ 1.7mL/hr  Dose rounded within 10% due to national drug shortage    CYNTHIA Culp Pharm.D.    "

## 2023-12-14 NOTE — PROGRESS NOTES
Assumed care of Marino for the remainder of his chemotherapy infusions. Oxaliplatin infused without adverse s/s. Atropine administered prior to Irinotecan. Irinotecan and leucovorin infused concurrently over 90 mins without adverse s/s. Port with brisk blood return post-infusions and prior to 5FU CADD connect. 5FU CADD connected and set to RUN mode over 46 hours. Patient knows when to return to [facility] for a 5FU CADD disconnect. Discharged home to self care in no apparent distress.

## 2023-12-15 ENCOUNTER — OUTPATIENT INFUSION SERVICES (OUTPATIENT)
Dept: ONCOLOGY | Facility: MEDICAL CENTER | Age: 68
End: 2023-12-15
Attending: STUDENT IN AN ORGANIZED HEALTH CARE EDUCATION/TRAINING PROGRAM
Payer: MEDICARE

## 2023-12-15 VITALS
HEART RATE: 69 BPM | TEMPERATURE: 97.9 F | OXYGEN SATURATION: 99 % | RESPIRATION RATE: 18 BRPM | DIASTOLIC BLOOD PRESSURE: 73 MMHG | SYSTOLIC BLOOD PRESSURE: 126 MMHG

## 2023-12-15 DIAGNOSIS — C25.1 MALIGNANT NEOPLASM OF BODY OF PANCREAS (HCC): ICD-10-CM

## 2023-12-15 PROCEDURE — 96377 APPLICATON ON-BODY INJECTOR: CPT

## 2023-12-15 PROCEDURE — 96523 IRRIG DRUG DELIVERY DEVICE: CPT

## 2023-12-15 PROCEDURE — 700111 HCHG RX REV CODE 636 W/ 250 OVERRIDE (IP): Performed by: STUDENT IN AN ORGANIZED HEALTH CARE EDUCATION/TRAINING PROGRAM

## 2023-12-15 RX ADMIN — PEGFILGRASTIM 6 MG: KIT SUBCUTANEOUS at 15:17

## 2023-12-15 RX ADMIN — HEPARIN 500 UNITS: 100 SYRINGE at 15:10

## 2023-12-15 ASSESSMENT — PAIN DESCRIPTION - PAIN TYPE: TYPE: NEUROPATHIC PAIN

## 2023-12-15 NOTE — PROGRESS NOTES
Pt presented to Infusion for 5FU CADD disconnect and Onpro, accompanied by his wife. POC discussed and pt verbalized understanding. Pt reports doing well today. 5FU CADD pump in stop mode. Pump reads 0.0 ml reservoir and 78.2 ml given. Pump disconnected and port flushed per [facility] policy. Brisk blood return noted, line flushed with ease. Port de-accessed with needle intact, site covered with sterile gauze/paper tape and pt tolerated well. Onpro Neulast applied per [employer] manufacturers directions. Onpro adhesive intact, fill indicator reads FULL and green light blinking. Pt tolerated well. Pt and wife verbalize understanding on 27 hour administration start time for dose delivery and when to take device off. Pt and wife verbalize understanding of device troubleshooting. Pt confirms his next appt and discharged ambulatory in NAD.

## 2023-12-22 NOTE — PROGRESS NOTES
"Pharmacy Chemotherapy Verification    Dx: Pancreatic cancer         Protocol: mFOLFIRINOX     OXALIplatin  IV over 2 hours   - Dose reduced to 65 mg/m2 prior to Cycle 7 for tolerance  Leucovorin 400 mg/m2 IV over 90 minutes concurrently with  Irinotecan  IV over 90 minutes followed by   - Dose reduced to 125 mg/m2 prior to Cycle 7 for tolerance  Fluorouracil 2400 mg/m2 CIVI over 46 hours on Days 1-2  14-day cycle for 4-12 cycles (neoadjuvant or adjuvant) or 4-6 cycles (as induction) (locally advanced) or until DP/UT (locally advanced, recurrent, or metastatic)    NCCN Guidelines for Pancreatic Adenocarcinoma V.2.2023.  Deyvi T, et al. NEJM. 2018;379(25):5242-7172.  Herring SM, et al. Br J Cancer. 2016;114(7):737-43.  Arnav MH et al. Ailyn Surg Oncol. 2013;20(8):2787-95.    Allergies:  Patient has no known allergies.     /69   Pulse 80   Temp 36.8 °C (98.2 °F) (Temporal)   Resp 17   Ht 1.7 m (5' 6.93\")   Wt 63.2 kg (139 lb 5.3 oz)   SpO2 97%   BMI 21.87 kg/m²  Body surface area is 1.73 meters squared.    Labs 12/26/23 ([place])  ANC [id number] Hgb 9.3 Plt 205k  SCr 0.8 CrCl 76 mL/min   AST/ALT/AP = 30/25/170 Tbili 0.1    Drug Order   (Drug name, dose, route, IV Fluid & volume, frequency, number of doses) Cycle 8  Previous treatment: C7 12/13/23     Medication = OXALIplatin  Base Dose = 65 mg/m2  Calc Dose: Base Dose x 1.73 m2 = 112.45 mg  Final Dose = 110 mg  Route = IV  Fluid & Volume = D5W 250 mL  Admin Duration = Over 2 hours          <10% difference, OK to treat with final dose   Medication = Irinotecan  Base Dose = 125 mg/m2  Calc Dose: Base Dose x 1.73 m2 = 226.25 mg  Final Dose = 216.2 mg  Route = IV  Fluid & Volume = D5W 500 mL  Admin Duration = Over 90 minutes    Concurrently with LCV      <10% difference, OK to treat with final dose   Medication = Leucovorin  Base Dose = 400 mg/m2  Calc Dose:Base Dose x 1.73 m2 = 692 mg  Final Dose = 690 mg  Route = IV  Fluid & Volume = D5W 250 mL  Admin Duration = Over " 90 minutes     Concurrently with irinotecan      <10% difference, OK to treat with final dose   Medication = Fluorouracil  Base Dose= 2400 mg/m2  Calc Dose: Base Dose x 1.73 m2 = 4152 mg  Final Dose = 3850 mg  Route = CIVI via CADD  Drug conc. = 50 mg/mL  Fluid & Volume = 77 mL (+3 mL overfill)  Admin Duration = Over 46 hours at 1.7 mL/hr          <10% difference, OK to treat with final dose     By my signature below, I confirm this process was performed independently with the BSA and all final chemotherapy dosing calculations congruent. I have reviewed the above chemotherapy order and that my calculation of the final dose and BSA (when applicable) corroborate those calculations of the  pharmacist. Discrepancies of 10% or greater in the written dose have been addressed and documented within the EPIC Progress notes.    Lisa Oneil, PharmD, BCOP

## 2023-12-25 NOTE — PROGRESS NOTES
"Pharmacy Chemotherapy Verification    Dx: pancreatic adenocarcinoma         Cycle 8  Previous Treatment: C7 12/13/23    Protocol: mFOLFIRINOX     OXALIplatin  IV over 2 hrs on Day 1  12/13/23- dose reduced to 65mg/m2 fo tolerance  Irinotecan  IV over 90 min on Day 1  12/13/23- dose reduced to 125mg/m2 fo tolerance  Leucovorin 400 mg/m² IV over 90 min on Day 1  Fluorouracil 1200 mg/m2  CIVI daily on Days 1-2 (2400 mg/m² IV over 46 hrs)  14-day cycle for 4-12 cycles (neoadjuvant or adjuvant) or 4-6 cycles (as induction) (locally advanced) or until disease progression or unacceptable toxicity (locally advanced,recurrent, or metastatic  NCCN Guidelines for Pancreatic Adenocarcinoma V.2.2023  Deyvi T, et al. N Engl J Med. 2018;379(77):4672-8342    Allergies:  Patient has no known allergies.     /69   Pulse 80   Temp 36.8 °C (98.2 °F) (Temporal)   Resp 17   Ht 1.7 m (5' 6.93\")   Wt 63.2 kg (139 lb 5.3 oz)   SpO2 97%   BMI 21.87 kg/m²  Body surface area is 1.73 meters squared.    All lab results 12/26/23 at [place] within treatment parameters.     OXALIplatin (Eloxatin) 65mg/m² x 1.73m² = 112.5mg   <10% difference, okay to treat with final dose = 110mg IV    Irinotecan (Camptosar) 125mg/m² x 1.73m² = 216.3mg   <10% difference, okay to treat with final dose = 216.2mg IV    Leucovorin 400 mg/m² x 1.73m² = 692mg   <10% difference, okay to treat with final dose = 690mg IV    Fluorouracil (Adrucil) 2400 mg/m² x 1.73m² = 4152 mg   <10% difference, okay to treat with final dose = 3850mg IV over 46 hrs @ 1.7mL/hr  Dose rounded within 10% due to national drug shortage    CYNTHIA Culp Pharm.D.  "

## 2023-12-26 RX ORDER — METHYLPREDNISOLONE SODIUM SUCCINATE 125 MG/2ML
125 INJECTION, POWDER, LYOPHILIZED, FOR SOLUTION INTRAMUSCULAR; INTRAVENOUS PRN
Status: CANCELLED | OUTPATIENT
Start: 2023-12-27

## 2023-12-26 RX ORDER — PROCHLORPERAZINE MALEATE 10 MG
10 TABLET ORAL EVERY 6 HOURS PRN
Status: CANCELLED | OUTPATIENT
Start: 2023-12-27

## 2023-12-26 RX ORDER — 0.9 % SODIUM CHLORIDE 0.9 %
10 VIAL (ML) INJECTION PRN
Status: CANCELLED | OUTPATIENT
Start: 2023-12-26

## 2023-12-26 RX ORDER — DIPHENHYDRAMINE HYDROCHLORIDE 50 MG/ML
50 INJECTION INTRAMUSCULAR; INTRAVENOUS PRN
Status: CANCELLED | OUTPATIENT
Start: 2023-12-27

## 2023-12-26 RX ORDER — ONDANSETRON 8 MG/1
8 TABLET, ORALLY DISINTEGRATING ORAL PRN
Status: CANCELLED | OUTPATIENT
Start: 2023-12-27

## 2023-12-26 RX ORDER — DEXTROSE MONOHYDRATE 50 MG/ML
INJECTION, SOLUTION INTRAVENOUS CONTINUOUS
Status: CANCELLED | OUTPATIENT
Start: 2023-12-27

## 2023-12-26 RX ORDER — EPINEPHRINE 1 MG/ML(1)
0.5 AMPUL (ML) INJECTION PRN
Status: CANCELLED | OUTPATIENT
Start: 2023-12-27

## 2023-12-26 RX ORDER — ONDANSETRON 2 MG/ML
4 INJECTION INTRAMUSCULAR; INTRAVENOUS PRN
Status: CANCELLED | OUTPATIENT
Start: 2023-12-27

## 2023-12-26 RX ORDER — 0.9 % SODIUM CHLORIDE 0.9 %
3 VIAL (ML) INJECTION PRN
Status: CANCELLED | OUTPATIENT
Start: 2023-12-26

## 2023-12-26 RX ORDER — 0.9 % SODIUM CHLORIDE 0.9 %
3 VIAL (ML) INJECTION PRN
Status: CANCELLED | OUTPATIENT
Start: 2023-12-27

## 2023-12-26 RX ORDER — 0.9 % SODIUM CHLORIDE 0.9 %
VIAL (ML) INJECTION PRN
Status: CANCELLED | OUTPATIENT
Start: 2023-12-27

## 2023-12-26 RX ORDER — 0.9 % SODIUM CHLORIDE 0.9 %
VIAL (ML) INJECTION PRN
Status: CANCELLED | OUTPATIENT
Start: 2023-12-26

## 2023-12-26 RX ORDER — 0.9 % SODIUM CHLORIDE 0.9 %
20 VIAL (ML) INJECTION PRN
Status: CANCELLED | OUTPATIENT
Start: 2023-12-29

## 2023-12-26 RX ORDER — 0.9 % SODIUM CHLORIDE 0.9 %
10 VIAL (ML) INJECTION PRN
Status: CANCELLED | OUTPATIENT
Start: 2023-12-27

## 2023-12-26 RX ORDER — LOPERAMIDE HYDROCHLORIDE 2 MG/1
4 CAPSULE ORAL PRN
Status: CANCELLED | OUTPATIENT
Start: 2023-12-27

## 2023-12-26 RX ORDER — LOPERAMIDE HYDROCHLORIDE 2 MG/1
2 CAPSULE ORAL
Status: CANCELLED | OUTPATIENT
Start: 2023-12-27

## 2023-12-27 ENCOUNTER — OUTPATIENT INFUSION SERVICES (OUTPATIENT)
Dept: ONCOLOGY | Facility: MEDICAL CENTER | Age: 68
End: 2023-12-27
Attending: STUDENT IN AN ORGANIZED HEALTH CARE EDUCATION/TRAINING PROGRAM
Payer: MEDICARE

## 2023-12-27 ENCOUNTER — HOSPITAL ENCOUNTER (OUTPATIENT)
Dept: HEMATOLOGY ONCOLOGY | Facility: MEDICAL CENTER | Age: 68
End: 2023-12-27
Attending: NURSE PRACTITIONER
Payer: MEDICARE

## 2023-12-27 VITALS
WEIGHT: 139.33 LBS | DIASTOLIC BLOOD PRESSURE: 69 MMHG | RESPIRATION RATE: 17 BRPM | OXYGEN SATURATION: 97 % | BODY MASS INDEX: 21.87 KG/M2 | SYSTOLIC BLOOD PRESSURE: 114 MMHG | HEIGHT: 67 IN | HEART RATE: 80 BPM | TEMPERATURE: 98.2 F

## 2023-12-27 VITALS
TEMPERATURE: 98.8 F | OXYGEN SATURATION: 99 % | SYSTOLIC BLOOD PRESSURE: 102 MMHG | HEIGHT: 67 IN | WEIGHT: 149 LBS | DIASTOLIC BLOOD PRESSURE: 60 MMHG | BODY MASS INDEX: 23.39 KG/M2 | HEART RATE: 74 BPM | RESPIRATION RATE: 15 BRPM

## 2023-12-27 DIAGNOSIS — T45.1X5A CHEMOTHERAPY INDUCED DIARRHEA: ICD-10-CM

## 2023-12-27 DIAGNOSIS — C25.1 MALIGNANT NEOPLASM OF BODY OF PANCREAS (HCC): ICD-10-CM

## 2023-12-27 DIAGNOSIS — K52.1 CHEMOTHERAPY INDUCED DIARRHEA: ICD-10-CM

## 2023-12-27 DIAGNOSIS — D72.828 OTHER ELEVATED WHITE BLOOD CELL (WBC) COUNT: ICD-10-CM

## 2023-12-27 DIAGNOSIS — R53.0 NEOPLASTIC MALIGNANT RELATED FATIGUE: ICD-10-CM

## 2023-12-27 DIAGNOSIS — Z79.899 ENCOUNTER FOR LONG-TERM CURRENT USE OF HIGH RISK MEDICATION: ICD-10-CM

## 2023-12-27 PROCEDURE — G0498 CHEMO EXTEND IV INFUS W/PUMP: HCPCS

## 2023-12-27 PROCEDURE — 96375 TX/PRO/DX INJ NEW DRUG ADDON: CPT

## 2023-12-27 PROCEDURE — 96411 CHEMO IV PUSH ADDL DRUG: CPT

## 2023-12-27 PROCEDURE — 700111 HCHG RX REV CODE 636 W/ 250 OVERRIDE (IP): Mod: JZ | Performed by: STUDENT IN AN ORGANIZED HEALTH CARE EDUCATION/TRAINING PROGRAM

## 2023-12-27 PROCEDURE — 96415 CHEMO IV INFUSION ADDL HR: CPT

## 2023-12-27 PROCEDURE — A4212 NON CORING NEEDLE OR STYLET: HCPCS

## 2023-12-27 PROCEDURE — 96367 TX/PROPH/DG ADDL SEQ IV INF: CPT

## 2023-12-27 PROCEDURE — 99212 OFFICE O/P EST SF 10 MIN: CPT | Performed by: NURSE PRACTITIONER

## 2023-12-27 PROCEDURE — 99214 OFFICE O/P EST MOD 30 MIN: CPT | Performed by: NURSE PRACTITIONER

## 2023-12-27 PROCEDURE — 96413 CHEMO IV INFUSION 1 HR: CPT

## 2023-12-27 PROCEDURE — 700105 HCHG RX REV CODE 258: Performed by: STUDENT IN AN ORGANIZED HEALTH CARE EDUCATION/TRAINING PROGRAM

## 2023-12-27 PROCEDURE — 96417 CHEMO IV INFUS EACH ADDL SEQ: CPT

## 2023-12-27 RX ORDER — ATROPINE SULFATE 1 MG/ML
0.5 INJECTION, SOLUTION INTRAVENOUS PRN
Status: DISCONTINUED | OUTPATIENT
Start: 2023-12-27 | End: 2023-12-27 | Stop reason: HOSPADM

## 2023-12-27 RX ADMIN — ATROPINE SULFATE 0.5 MG: 1 INJECTION, SOLUTION INTRAMUSCULAR; INTRAVENOUS; SUBCUTANEOUS at 13:50

## 2023-12-27 RX ADMIN — LEUCOVORIN CALCIUM 690 MG: 500 INJECTION, POWDER, LYOPHILIZED, FOR SOLUTION INTRAMUSCULAR; INTRAVENOUS at 13:48

## 2023-12-27 RX ADMIN — ONDANSETRON 16 MG: 2 INJECTION INTRAMUSCULAR; INTRAVENOUS at 10:42

## 2023-12-27 RX ADMIN — FLUOROURACIL 3850 MG: 50 INJECTION, SOLUTION INTRAVENOUS at 15:33

## 2023-12-27 RX ADMIN — IRINOTECAN HYDROCHLORIDE 216.2 MG: 20 INJECTION, SOLUTION INTRAVENOUS at 13:51

## 2023-12-27 RX ADMIN — DEXAMETHASONE SODIUM PHOSPHATE 12 MG: 4 INJECTION, SOLUTION INTRAMUSCULAR; INTRAVENOUS at 10:29

## 2023-12-27 RX ADMIN — FOSAPREPITANT 150 MG: 150 INJECTION, POWDER, LYOPHILIZED, FOR SOLUTION INTRAVENOUS at 11:00

## 2023-12-27 RX ADMIN — OXALIPLATIN 110 MG: 100 INJECTION, SOLUTION INTRAVENOUS at 11:37

## 2023-12-27 ASSESSMENT — ENCOUNTER SYMPTOMS
CONSTIPATION: 0
SHORTNESS OF BREATH: 1
HEADACHES: 0
FEVER: 0
WHEEZING: 0
MYALGIAS: 0
NAUSEA: 0
INSOMNIA: 0
CHILLS: 0
PALPITATIONS: 0
DIARRHEA: 1
DIZZINESS: 1
SENSORY CHANGE: 1
WEIGHT LOSS: 0
VOMITING: 0
COUGH: 0
TINGLING: 0

## 2023-12-27 ASSESSMENT — PAIN DESCRIPTION - PAIN TYPE: TYPE: NEUROPATHIC PAIN

## 2023-12-27 ASSESSMENT — FIBROSIS 4 INDEX
FIB4 SCORE: 1.18
FIB4 SCORE: 1.18

## 2023-12-27 NOTE — Clinical Note
Can we get the ca 19-9 from [place] [facility] The patient send the labs,  does not, would like to see the trend of this result

## 2023-12-27 NOTE — PROGRESS NOTES
Chemotherapy Verification - SECONDARY RN   C8 D1    Height = 170 cm  Weight = 63.2 kg  BSA = 1.73 m^2       Medication: oxaliplatin (ELOXATIN)  Dose: 65 mg/m2  Calculated Dose: 112.45 mg                             (In mg/m2, AUC, mg/kg)     Medication: irinotecan (CAMPTOSAR)  Dose: 125 mg/m2  Calculated Dose: 216.25 mg                             (In mg/m2, AUC, mg/kg)    Medication: leucovorin   Dose: 400 mg/m2  Calculated Dose: 692 mg                             (In mg/m2, AUC, mg/kg)    Medication: fluorouracil (ADRUCIL)   Dose: 2400 mg/m2  Calculated Dose: 4152 mg in CADD to infuse over 46 hours                              (In mg/m2, AUC, mg/kg)      I confirm that this process was performed independently.

## 2023-12-27 NOTE — PROGRESS NOTES
Chemotherapy Verification - PRIMARY RN      Height = 1.7 m  Weight = 63.2 kg  BSA = 1.73 m2       Medication: oxaliplatin  Dose: 65mg/m2  Calculated Dose: 112.45 mg                             (In mg/m2, AUC, mg/kg)     Medication: Irinotecan  Dose: 125mg/m2  Calculated Dose: 216.25 mg                             (In mg/m2, AUC, mg/kg)    Medication: adrucil  Dose: 2400mg/m2  Calculated Dose: 4152 mg                             (In mg/m2, AUC, mg/kg)        I confirm this process was performed independently with the BSA and all final chemotherapy dosing calculations congruent.  Any discrepancies of 10% or greater have been addressed with the chemotherapy pharmacist. The resolution of the discrepancy has been documented in the EPIC progress notes.

## 2023-12-27 NOTE — PROGRESS NOTES
Subjective     Marino Rojas is a 68 y.o. male who presents with Pancreatic Cancer ([facility]/Prechemo)            HPI  Mr. Rojas presents for evaluation prior to cycle 8 FOLFIRINOX for treatment of stage IIa, cT3 N0 M0, invasive moderately differentiated pancreatic adenocarcinoma. He is accompanied by his wife for today's visit.     Patient was in his usual state of health until approximately 8/2023 when he noted increasing abdominal pain over the previous several months. CT completed 8/11/23 showed pancreatic mass of 3.8 x 2.9 cm, worrisome for malignancy. He underwent biopsy per upper EUS 8/23/23 with pathology confirming malignancy; CA 19-9 was 37 on 9/18/23. He initiated neoadjuvant FOLFIRINOX on 9/20/23. He underwent celiac plexus block on 9/22/23.     Patient continues with poor activity tolerance and poor stamina.  He notes shortness of breath with activity and is utilizing wheelchair for longer mobility.  Diarrhea is best managed with fiber and brat diet.  He notes dizziness when he gets up too quickly.  Cold sensitivity lasted the entire 2 weeks between cycles this visit.      Review of Systems   Constitutional:  Positive for malaise/fatigue (poor stamina, poor activity tolerance). Negative for chills, fever and weight loss.   Respiratory:  Positive for shortness of breath (with activity). Negative for cough and wheezing.    Cardiovascular:  Negative for chest pain, palpitations and leg swelling.   Gastrointestinal:  Positive for diarrhea (fiber and BRAT). Negative for constipation, nausea and vomiting.   Genitourinary:  Negative for dysuria.   Musculoskeletal:  Negative for joint pain and myalgias.        Long distances with WC, in house & short distance with walker   Neurological:  Positive for dizziness (when up too quick) and sensory change (cold sensitivity - lasted whole 2 weeks this past cycle). Negative for tingling and headaches.   Psychiatric/Behavioral:  The patient does not have insomnia.   "    No Known Allergies      Current Outpatient Medications on File Prior to Encounter   Medication Sig Dispense Refill    therapeutic multivitamin-minerals (THERAGRAN-M) Tab Take 1 Tablet by mouth every day.      folic acid (FOLVITE) 1 MG Tab Take 1 mg by mouth every day.      Cyanocobalamin (VITAMIN B 12 PO) Take  by mouth. 2500 mcg      ferrous sulfate 325 (65 Fe) MG tablet Take 325 mg by mouth every day.      gabapentin (NEURONTIN) 100 MG Cap Take 1 Capsule by mouth 3 times a day. 90 Capsule 2    promethazine (PHENERGAN) 25 MG Tab Take 0.5-1 Tablets by mouth every 6 hours as needed for Nausea/Vomiting. 30 Tablet 1    metoprolol SR (TOPROL XL) 25 MG TABLET SR 24 HR Take 25 mg by mouth at bedtime.      ondansetron (ZOFRAN) 4 MG Tab tablet Take 1 Tablet by mouth every four hours as needed for Nausea/Vomiting (for nausea, vomiting). 30 Tablet 6    prochlorperazine (COMPAZINE) 10 MG Tab Take 1 Tablet by mouth every 6 hours as needed (for nausea, vomiting). 30 Tablet 6    allopurinol (ZYLOPRIM) 100 MG Tab Take 100 mg by mouth every day.       No current facility-administered medications on file prior to encounter.              Objective     /60 (BP Location: Left arm, Patient Position: Sitting, BP Cuff Size: Adult)   Pulse 74   Temp 37.1 °C (98.8 °F) (Temporal)   Resp 15   Ht 1.7 m (5' 6.93\")   Wt 67.6 kg (149 lb)   SpO2 99%   BMI 23.39 kg/m²      Physical Exam  Vitals reviewed.   Constitutional:       General: He is not in acute distress.     Appearance: He is well-developed. He is not diaphoretic.   HENT:      Head: Normocephalic and atraumatic.   Eyes:      General: No scleral icterus.        Right eye: No discharge.         Left eye: No discharge.   Cardiovascular:      Rate and Rhythm: Normal rate and regular rhythm.      Heart sounds: Normal heart sounds. No murmur heard.     No friction rub. No gallop.   Pulmonary:      Effort: Pulmonary effort is normal. No respiratory distress.      Breath sounds: " Normal breath sounds. No wheezing.   Abdominal:      General: There is no distension.      Palpations: Abdomen is soft.      Tenderness: There is no abdominal tenderness.   Musculoskeletal:         General: Normal range of motion.      Cervical back: Normal range of motion.      Comments: W/C today   Skin:     General: Skin is warm and dry.      Coloration: Skin is not pale.      Findings: No erythema or rash.   Neurological:      Mental Status: He is alert and oriented to person, place, and time.   Psychiatric:         Behavior: Behavior normal.                                                Assessment & Plan     1. Malignant neoplasm of body of pancreas (HCC)        2. Encounter for long-term current use of high risk medication        3. Chemotherapy induced diarrhea        4. Neoplastic malignant related fatigue        5. Other elevated white blood cell (WBC) count               1.  Diarrhea: Fiber & BRAT continue to help best, imodium PRN, continue to monitor.     2.  Fatigue: Worse per patient, despite dose reduction in oxaliplatin. Hgb stable at 9.3 this visit; last visit 9.9, last transfusion 11/15/23, continue to monitor.     3.  Elevated WBC: 29.2 this cycles - Patient afebrile and asymptomatic, attributed to Neulasta OnPro, continue to monitor.     4.  Pancreatic cancer: Diagnosed 8/23/23; initiated neoadjuvant FOLFIRINOX 9/20/23.     CT completed 11/3/23 were reviewed with patient by myself and Dr. Issa: continue current regimen, case will be re-presented to tumor board after at least C8, surgery remains an option and may pre-empt XRT.      CBC, CMP have been evaluated and found to be within acceptable limits, except where addressed above. Ca 19-9 remains pending. Patient will proceed with cycle 8 of treatment and return in 2-4 weeks for post treatment evaluation.      CT due after C8, already scheduled      The patient verbalized agreement and understanding of current plan. All questions and concerns  were addressed at time of visit.    Please note that this dictation was created using voice recognition software. I have made every reasonable attempt to correct obvious errors, but I expect that there are errors of grammar and possibly content that I did not discover before finalizing the note.

## 2023-12-27 NOTE — PROGRESS NOTES
Mr Rojas is here today for day 1 cycle 8 of FolfirInox.    Pt reports fatigue.    Medi port to his right chest was flushed, labs previously drawn and are within parameters for treatment today.    Pre medications given and were tolerated well.     Chemotherapy was given per MAR and was tolerated well.    CADD pump was connected and placed in RUN mode and put into carry bag.    Mr Rojas will return 12/29/23 for disconnect and onpro.     Discharged in stable condition.

## 2023-12-29 ENCOUNTER — OUTPATIENT INFUSION SERVICES (OUTPATIENT)
Dept: ONCOLOGY | Facility: MEDICAL CENTER | Age: 68
End: 2023-12-29
Attending: STUDENT IN AN ORGANIZED HEALTH CARE EDUCATION/TRAINING PROGRAM
Payer: MEDICARE

## 2023-12-29 VITALS
DIASTOLIC BLOOD PRESSURE: 72 MMHG | HEART RATE: 82 BPM | SYSTOLIC BLOOD PRESSURE: 119 MMHG | TEMPERATURE: 97.6 F | RESPIRATION RATE: 16 BRPM | OXYGEN SATURATION: 98 %

## 2023-12-29 DIAGNOSIS — C25.1 MALIGNANT NEOPLASM OF BODY OF PANCREAS (HCC): ICD-10-CM

## 2023-12-29 PROCEDURE — 700111 HCHG RX REV CODE 636 W/ 250 OVERRIDE (IP): Mod: JZ,JG | Performed by: STUDENT IN AN ORGANIZED HEALTH CARE EDUCATION/TRAINING PROGRAM

## 2023-12-29 PROCEDURE — 96523 IRRIG DRUG DELIVERY DEVICE: CPT

## 2023-12-29 PROCEDURE — 96377 APPLICATON ON-BODY INJECTOR: CPT

## 2023-12-29 RX ADMIN — HEPARIN 500 UNITS: 100 SYRINGE at 13:54

## 2023-12-29 RX ADMIN — PEGFILGRASTIM 6 MG: KIT SUBCUTANEOUS at 13:55

## 2023-12-29 NOTE — PROGRESS NOTES
Pt arrived ambulatory accompanied by wife for CADD pump disconnect, confirmed pump empty.  Port flushed with good blood return, flushed with NS and Heparin prior to needle removal.  Neulasta On Pro applied to right arm, confirmed that On Pro was activated, meter reading FULL, and green light flashing.  Pt Dc'd home without incident and will call for any further appts after CT scan completed on 1/11 and sees Dr. Issa after that.  Current plan is for surgery and port removal, pt will call for port flush appt is plan changes by MD.

## 2024-01-10 ENCOUNTER — APPOINTMENT (OUTPATIENT)
Dept: HEMATOLOGY ONCOLOGY | Facility: MEDICAL CENTER | Age: 69
End: 2024-01-10
Payer: MEDICARE

## 2024-01-11 ENCOUNTER — HOSPITAL ENCOUNTER (OUTPATIENT)
Dept: HEMATOLOGY ONCOLOGY | Facility: MEDICAL CENTER | Age: 69
End: 2024-01-11
Attending: STUDENT IN AN ORGANIZED HEALTH CARE EDUCATION/TRAINING PROGRAM
Payer: MEDICARE

## 2024-01-11 ENCOUNTER — HOSPITAL ENCOUNTER (OUTPATIENT)
Dept: RADIOLOGY | Facility: MEDICAL CENTER | Age: 69
End: 2024-01-11
Attending: NURSE PRACTITIONER
Payer: MEDICARE

## 2024-01-11 VITALS
HEART RATE: 81 BPM | DIASTOLIC BLOOD PRESSURE: 60 MMHG | WEIGHT: 150 LBS | HEIGHT: 67 IN | TEMPERATURE: 97.7 F | BODY MASS INDEX: 23.54 KG/M2 | OXYGEN SATURATION: 99 % | SYSTOLIC BLOOD PRESSURE: 116 MMHG

## 2024-01-11 DIAGNOSIS — D49.0 NEOPLASM OF DIGESTIVE SYSTEM: ICD-10-CM

## 2024-01-11 DIAGNOSIS — C25.1 MALIGNANT NEOPLASM OF BODY OF PANCREAS (HCC): ICD-10-CM

## 2024-01-11 PROCEDURE — 74170 CT ABD WO CNTRST FLWD CNTRST: CPT

## 2024-01-11 PROCEDURE — 700117 HCHG RX CONTRAST REV CODE 255: Performed by: NURSE PRACTITIONER

## 2024-01-11 PROCEDURE — 71260 CT THORAX DX C+: CPT

## 2024-01-11 PROCEDURE — 99212 OFFICE O/P EST SF 10 MIN: CPT | Performed by: STUDENT IN AN ORGANIZED HEALTH CARE EDUCATION/TRAINING PROGRAM

## 2024-01-11 PROCEDURE — 99214 OFFICE O/P EST MOD 30 MIN: CPT | Performed by: STUDENT IN AN ORGANIZED HEALTH CARE EDUCATION/TRAINING PROGRAM

## 2024-01-11 RX ADMIN — IOHEXOL 100 ML: 350 INJECTION, SOLUTION INTRAVENOUS at 08:43

## 2024-01-11 ASSESSMENT — FIBROSIS 4 INDEX: FIB4 SCORE: 1.18

## 2024-01-11 ASSESSMENT — ENCOUNTER SYMPTOMS
DOUBLE VISION: 0
SORE THROAT: 0
HEARTBURN: 0
SPUTUM PRODUCTION: 0
BACK PAIN: 0
ABDOMINAL PAIN: 0
WHEEZING: 0
INSOMNIA: 0
BLURRED VISION: 0
CONSTIPATION: 0
SINUS PAIN: 0
ORTHOPNEA: 0
DIARRHEA: 1
VOMITING: 0
NERVOUS/ANXIOUS: 0
MYALGIAS: 0
FEVER: 0
HEADACHES: 0
TINGLING: 1
WEIGHT LOSS: 0
WEAKNESS: 1
NAUSEA: 0
COUGH: 0
BRUISES/BLEEDS EASILY: 0
DIZZINESS: 0
SHORTNESS OF BREATH: 0
DIAPHORESIS: 0
PALPITATIONS: 0
BLOOD IN STOOL: 0
CHILLS: 0

## 2024-01-11 NOTE — PROGRESS NOTES
Follow Up Note:  Hematology/Oncology      Primary Care:  Luis Fernando Culp M.D.    Diagnosis: Pancreatic adenocarcinoma    Chief Complaint: On-treatment visit    Current Treatment: mFOLFIRINOX    Prior Treatment: NA    Oncology History of Presenting Illness:  Lucas Rojas is a 68 y.o.  man who presents to the clinic for evaluation for systemic therapy for diagnosis of pancreatic adenocarcinoma.  The patient had been having abdominal pain with worsening intensity over the past few months, and had a CT scan done which revealed a mass in the pancreatic neck.  He was referred to GI and surgical oncology, and ultimately underwent a biopsy which revealed pancreatic adenocarcinoma, moderately differentiated.  He underwent an endoscopic ultrasound which revealed a [id number] disease.  Staging scans did not reveal any signs of disease elsewhere, and he was referred to me by Dr. Escalera for neoadjuvant chemotherapy accordingly. His baseline Ca 19-9 was 12.     Treatment History:   09/20/23: C1 mFOLFIRINOX  10/04/23: C2 mFOLFIRINOX  10/18/23: C3 mFOLFIRINOX  11/01/23: C4 mFOLFIRINOX  11/15/23: C5 mFOLFIRINOX  11/29/23: C6 mFOLFIRINOX  12/13/23: C7 mFOLFIRINOX (oxaliplatin 65 mg/m2 for neuropathy, irinotecan 125 mg/m2 for diarrhea)  12/27/23: C8 mFOLFIRINOX    Interval History:  Patient is here for follow up visit. He is doing okay overall, but feeling weak after chemotherapy. He's glad he was able to make it through that chemotherapy but is unsure about being able to go further with chemotherapy if he has to (though he will try). He otherwise feels about the same.       Allergies as of 01/11/2024    (No Known Allergies)         Current Outpatient Medications:     therapeutic multivitamin-minerals (THERAGRAN-M) Tab, Take 1 Tablet by mouth every day., Disp: , Rfl:     folic acid (FOLVITE) 1 MG Tab, Take 1 mg by mouth every day., Disp: , Rfl:     Cyanocobalamin (VITAMIN B 12 PO), Take  by mouth. 2500 mcg, Disp: , Rfl:      ferrous sulfate 325 (65 Fe) MG tablet, Take 325 mg by mouth every day., Disp: , Rfl:     gabapentin (NEURONTIN) 100 MG Cap, Take 1 Capsule by mouth 3 times a day., Disp: 90 Capsule, Rfl: 2    promethazine (PHENERGAN) 25 MG Tab, Take 0.5-1 Tablets by mouth every 6 hours as needed for Nausea/Vomiting., Disp: 30 Tablet, Rfl: 1    metoprolol SR (TOPROL XL) 25 MG TABLET SR 24 HR, Take 25 mg by mouth at bedtime., Disp: , Rfl:     ondansetron (ZOFRAN) 4 MG Tab tablet, Take 1 Tablet by mouth every four hours as needed for Nausea/Vomiting (for nausea, vomiting)., Disp: 30 Tablet, Rfl: 6    prochlorperazine (COMPAZINE) 10 MG Tab, Take 1 Tablet by mouth every 6 hours as needed (for nausea, vomiting)., Disp: 30 Tablet, Rfl: 6    allopurinol (ZYLOPRIM) 100 MG Tab, Take 100 mg by mouth every day., Disp: , Rfl:       Review of Systems:  Review of Systems   Constitutional:  Positive for malaise/fatigue. Negative for chills, diaphoresis, fever and weight loss.   HENT:  Negative for hearing loss, nosebleeds, sinus pain and sore throat.    Eyes:  Negative for blurred vision and double vision.   Respiratory:  Negative for cough, sputum production, shortness of breath and wheezing.    Cardiovascular:  Negative for chest pain, palpitations, orthopnea and leg swelling.   Gastrointestinal:  Positive for diarrhea. Negative for abdominal pain, blood in stool, constipation, heartburn, melena, nausea and vomiting.   Genitourinary:  Negative for dysuria, frequency, hematuria and urgency.   Musculoskeletal:  Negative for back pain, joint pain and myalgias.   Skin:  Negative for rash.   Neurological:  Positive for tingling (Cold sensitivity in hands, improved tremendously with gabapentin) and weakness. Negative for dizziness and headaches.   Endo/Heme/Allergies:  Does not bruise/bleed easily.   Psychiatric/Behavioral:  The patient is not nervous/anxious and does not have insomnia.          Physical Exam:  Vitals:    01/11/24 1007   BP: 116/60  "  BP Location: Left arm   Patient Position: Sitting   BP Cuff Size: Adult   Pulse: 81   Temp: 36.5 °C (97.7 °F)   TempSrc: Temporal   SpO2: 99%   Weight: 68 kg (150 lb)   Height: 1.7 m (5' 6.93\")       DESC; KARNOFSKY SCALE WITH ECOG EQUIVALENT: 80, Normal activity with effort; some signs or symptoms of disease (ECOG equivalent 1)    DISTRESS LEVEL: no apparent distress    Physical Exam  Vitals and nursing note reviewed.   Constitutional:       General: He is awake. He is not in acute distress.     Appearance: Normal appearance. He is normal weight. He is not ill-appearing, toxic-appearing or diaphoretic.   HENT:      Head: Normocephalic and atraumatic.      Nose: Nose normal. No congestion.      Mouth/Throat:      Pharynx: Oropharynx is clear. No oropharyngeal exudate or posterior oropharyngeal erythema.   Eyes:      General: No scleral icterus.     Extraocular Movements: Extraocular movements intact.      Conjunctiva/sclera: Conjunctivae normal.      Pupils: Pupils are equal, round, and reactive to light.   Cardiovascular:      Rate and Rhythm: Normal rate and regular rhythm.      Pulses: Normal pulses.      Heart sounds: Normal heart sounds. No murmur heard.     No friction rub. No gallop.   Pulmonary:      Effort: Pulmonary effort is normal.      Breath sounds: Normal breath sounds. No decreased air movement. No wheezing, rhonchi or rales.   Abdominal:      General: Bowel sounds are normal. There is no distension.      Tenderness: There is no abdominal tenderness.   Musculoskeletal:         General: No deformity. Normal range of motion.      Cervical back: Normal range of motion and neck supple. No tenderness.      Right lower leg: No edema.      Left lower leg: No edema.   Lymphadenopathy:      Cervical: No cervical adenopathy.      Upper Body:      Right upper body: No axillary adenopathy.      Left upper body: No axillary adenopathy.      Lower Body: No right inguinal adenopathy. No left inguinal adenopathy. "   Skin:     General: Skin is warm and dry.      Coloration: Skin is pale. Skin is not jaundiced.      Findings: No erythema or rash.   Neurological:      General: No focal deficit present.      Mental Status: He is alert and oriented to person, place, and time.      Sensory: Sensation is intact.      Motor: Weakness present.      Gait: Gait abnormal (In wheelchair today).   Psychiatric:         Attention and Perception: Attention normal.         Mood and Affect: Mood normal.         Behavior: Behavior normal. Behavior is cooperative.         Thought Content: Thought content normal.         Judgment: Judgment normal.           Labs:                Imaging:     All listed images below have been independently reviewed by me. I agree with the findings as summarized below:    CT c/a/p 01/11/24:    Official read pending. Per my review, pancreatic body/tail mass has shrunk in size slightly to 25 by 24 mm. Continued occlusion of splenic splenic vein without any encasement of any arteries.     NM-GI BLEEDING SCAN    Result Date: 10/7/2023  10/7/2023 2:03 PM HISTORY/REASON FOR EXAM:  upper GI bleeding, large duodenal ulcer not fixable by EGD TECHNIQUE/EXAM DESCRIPTION AND NUMBER OF VIEWS: GI bleeding scan. COMPARISON: CTA 10/6/2023 PROCEDURE: The patient?s red cells were tagged with 24.3 mCi of Tc UltraTag.  Serial filming was done over the abdomen. FINDINGS: The normal vascular structures are seen.  No areas of abnormal red cell accumulation are noted.     No scintigraphy evidence of active GI bleeding.    CTA ABDOMEN PELVIS W & W/O POST PROCESS    Result Date: 10/6/2023  10/6/2023 5:55 PM HISTORY/REASON FOR EXAM:  GI bleed TECHNIQUE/EXAM DESCRIPTION:  CT angiogram of the abdomen and pelvis without and with contrast, with reconstructions. Initial precontrast helical sections were obtained from the diaphragmatic domes to the lesser trochanters. Following this, 95 mL of Omnipaque 350 nonionic contrast was administered at 5.0  mL/sec and helical scanning was obtained from the diaphragmatic domes through the lesser trochanters. Thin section overlapping reconstruction interval was utilized and multiplanar volume reformatted were generated in the sagittal and coronal planes. 3D angiographic images were reviewed on PACS. Maximum intensity projection (MIP) images were generated and reviewed. Low dose optimization technique was utilized for this CT exam including automated exposure control and adjustment of the mA and/or kV according to patient size. COMPARISON:  Outside CT abdomen and pelvis 11/12/2023 of which only a portion of the images are presumably provided. FINDINGS: Noncontrast images: There is no intramural hematoma. There is right gynecomastia. There has been previous sternotomy. There is facet arthropathy of lumbar spine. There is levoconvex scoliosis and there is bilateral sacroiliac joint osteoarthritis. Contrast images: Aorta and vasculature: There is aortic atherosclerotic plaque without aneurysm. The celiac axis is patent with conventional branching pattern. The distal celiac axis is close proximity with the mass and could be involvement. Common hepatic artery has lack of soft tissue plane with the mass and is likely involved. The splenic artery abuts the mass and is likely involved. The superior mesenteric artery is patent and there is no vascular involvement of this structure. There is a dominant right main renal artery and a small accessory artery which are patent. There is a single left renal artery with atherosclerotic plaque at its origin. No active GI hemorrhage is identified. Liver is unremarkable. Spleen is normal in size. Pancreas: There is a cystic and solid pancreatic body mass measuring 4.3 x 4.3 cm, highly suspicious for malignancy. Distal to the mass the pancreatic duct is dilated. Gallbladder and biliary tree are normal. Adrenal glands are normal. Kidneys: There is a left renal cyst. Right kidney is normal in  appearance. Bowel is unremarkable. There is no adenopathy or free fluid. 3D angiographic/MIP images of the vasculature confirm the vascular findings as described above.     1.  Pancreatic body 4.3 x 4.3 cm cystic and solid mass, highly suspicious for malignancy 2.  Distal to the mass the pancreatic duct is dilated 3.  It is highly suspicious there is involvement of the splenic artery, common hepatic artery, and the proximal superior mesenteric artery 4.  Assessment of the venous vasculature including whether there is tumor involvement is nondiagnostic due to the arterial phase only imaging    DX-CHEST-PORTABLE (1 VIEW)    Result Date: 10/6/2023  10/6/2023 4:16 PM HISTORY/REASON FOR EXAM:  Blood in vomit or stool or dark tarry stool. TECHNIQUE/EXAM DESCRIPTION AND NUMBER OF VIEWS: Single portable view of the chest. COMPARISON: None FINDINGS: LUNGS: The lungs are clear. HEART and MEDIASTINUM: normal in size. Pleura: There are no pleural effusion or pneumothoraces. Osseous structures: No significant bony abnormality.     No acute cardiopulmonary abnormality identified.      Pathology:         Assessment & Plan:  1. Malignant neoplasm of body of pancreas (HCC)          This is a 68 year old  man with pancreatic adenocarcinoma, [id number] stage II-a disease, who presents for evaluation for systemic therapy.      Current Diagnosis and Staging: Pancreatic adenocarcinoma, [id number] stage II-a disease, moderately differentiated    Update: Patient has completed chemotherapy. Will present case at tumor board to assess resectability now. May need to have radiation therapy prior to surgery, which patient is amenable to having a conversation about (though he remains resistant to the idea of radiation).      Treatment Plan: TBD - surgery now vs chemoRT and then re-evaluation     Treatment Citation: NCCN     Plan of Care:     Primary Therapy: TBD  Supportive Therapy: Antiemetics per protocol, morphine prescribed for  pain.  Toxicity: Patient is getting antineoplastic therapy and needs monitoring of blood counts, hepatic function, and renal function due to potential for organ dysfunction.   Labs: CBC with diff, CMP, Ca 19-9 monitoring  Imaging: CT chest, pancreatic protocol reviewed  Treatment Planning: Plan for neoadjuvant chemotherapy with FOLFIRINOX followed by evaluation for surgical resection.   Consultations: Surgical oncology (Dr. Escalera); GI (Dr. Dorsey); Palliative care (Dr. Al)  Code Status: Full  Miscellaneous: Patient declined referral to medical genetics, but was amenable to [employer] and [employer] trials  Return for Follow Up: After tumor board discussion, will call for next steps    Any questions and concerns raised by the patient were answered to the best of my ability. Thank you for allowing me to participate in the care for this patient. Please feel free to contact me for any questions or concerns.       Total time spent on chart review, clinic encounter, and documentation: 28 minutes.

## 2024-01-17 ENCOUNTER — PATIENT MESSAGE (OUTPATIENT)
Dept: SURGICAL ONCOLOGY | Facility: MEDICAL CENTER | Age: 69
End: 2024-01-17
Payer: MEDICARE

## 2024-01-17 DIAGNOSIS — C25.9 MALIGNANT NEOPLASM OF PANCREAS, UNSPECIFIED LOCATION OF MALIGNANCY (HCC): ICD-10-CM

## 2024-01-17 NOTE — PROGRESS NOTES
Subjective:   1/24/2024 10:54 AM  Primary care physician: Luis Fernando Culp M.D.  Medical Oncologist: MD Luis Manuel    Chief Complaint:   Chief Complaint   Patient presents with    Follow-Up     PANC MASS 3.5 CM  CHEMO x8  CONSIDER RT  CT: SMALLER?        Diagnosis:   1. Malignant neoplasm of body of pancreas (HCC)        2. Neoplasm of digestive system        3. Abnormal finding of diagnostic imaging        4. Cancer related pain          History of presenting illness:    Lucas Rojas is a pleasant 68 y.o. male with CAD and hx of inferior STEMI on Effient, dysplipidemia and hypertension who presented  to his primary care provider for cardiology follow up with complaint of a lump on his incision site. He had undergone cardiac catheterization on 2/22/2019. He underwent a chest CT scan on 7/28/23 that demonstrated a 3.5 cm mass body of the pancreas concerning for pancreatic cancer.      He states he really has never had symptoms secondary to this.  Sometimes he has a little bit of epigastric discomfort which he was relating to his previously reconstructed sternal separation. He denies any ongoing weight loss.  He has not noticed any jaundice.  He is tolerating a diet and having regular bowel movements and is remained active and healthy.  He has had no unintended weight loss.  Denies any nausea or vomiting.  No melena.  This mass was identified secondary to lower cuts on a chest CT that was done to evaluate his sternal closure.  No history of smoking or drinking or drug use.  No significant family history of cancer    Update 1/24/24  CT ABDOMEN on 1/11/24 noted slightly decreased, now 2.5 cm pancreatic body mass. Stable encasement of the common hepatic artery. There is stable tumor extension abutting the proximal superior mesenteric and proximal splenic arteries. Stable associated short segment occlusion of the central portion of the splenic vein. No solid organ metastasis or regional adenopathy identified. Mild  splenomegaly, 13 cm in length. Duodenal wall thickening and mucosal enhancement consistent with unspecified duodenitis.     CT CHEST on 1/11/24 noted no metastatic disease to the chest.    Patient seen by Dr Issa on 1/11/24.   Treatment History:   09/20/23: C1 mFOLFIRINOX  10/04/23: C2 mFOLFIRINOX  10/18/23: C3 mFOLFIRINOX  11/01/23: C4 mFOLFIRINOX  11/15/23: C5 mFOLFIRINOX  11/29/23: C6 mFOLFIRINOX  12/13/23: C7 mFOLFIRINOX (oxaliplatin 65 mg/m2 for neuropathy, irinotecan 125 mg/m2 for diarrhea)  12/27/23: C8 mFOLFIRINOX    Patient has completed planned chemotherapy. Consider need for radiation therapy prior to surgery, which patient is amenable to having a conversation about (though he remains resistant to the idea of radiation).      He is here today for discussion of his most recent imaging after having completed chemotherapy.  In general he is starting to feel like he is recovering from his chemotherapy.  He is starting to eat better and feels like his energy level is improving.  He did feel like during his chemotherapy he was pretty much just existing but found it difficult to enjoy life.  Continues to have intermittent pain.  Seems to be eating better and having normal bowel movements.  No jaundice, no nausea or vomiting.    Past Medical History:   Diagnosis Date    Anemia     Anesthesia     post op  nausea, last neurosurgery no problems    Arthritis     osteo; back/right ankle and right wrist    Cancer (HCC) Current    Pancreatic stage 2    Cataract     Coronary artery disease involving native coronary artery without angina pectoris 03/06/2019    Emphysema of lung (HCC)     per xray result. pt states no active disease    High cholesterol     Hypertension     Myocardial infarct (HCC) Feb 22, 2019    Followed by Dr. Kulkarni    Pain 03/30/2017     back, 7-8/10    PONV (postoperative nausea and vomiting)     Snoring     sometimes, very mild, no sleep study     Past Surgical History:   Procedure Laterality Date     AK UPPER GI ENDOSCOPY,DIAGNOSIS N/A 10/7/2023    Procedure: GASTROSCOPY;  Surgeon: Sofi Renteria M.D.;  Location: SURGERY [facility];  Service: Gastroenterology    AK UPPER GI ENDOSCOPY,BIOPSY N/A 10/7/2023    Procedure: GASTROSCOPY, WITH BIOPSY;  Surgeon: Sofi Renteria M.D.;  Location: SURGERY [facility];  Service: Gastroenterology    FUSION, SPINE, LUMBAR, PLIF  04/04/2017    Procedure: LUMBAR FUSION POSTERIOR - L4-5 FUSION/EXPLORATION ;  Surgeon: Osmani Engel M.D.;  Location: [facility];  Service:     LUMBAR LAMINECTOMY DISKECTOMY Right 04/04/2017    Procedure: LUMBAR LAMINECTOMY DISKECTOMY L3-S1 AND MEDIAL FACETECTOMY;  Surgeon: Osmani Engel M.D.;  Location: [facility];  Service:     FORAMINOTOMY  04/04/2017    Procedure: FORAMINOTOMY;  Surgeon: Osmani Engel M.D.;  Location: [facility];  Service:     HARDWARE REMOVAL ORTHO  04/04/2017    Procedure: HARDWARE REMOVAL ORTHO - L4-5;  Surgeon: Osmani Engel M.D.;  Location: SURGERY [facility];  Service:     FUSION, SPINE, LUMBAR, PLIF N/A 12/15/2015    Procedure: LUMBAR FUSION POSTERIOR INSTRUMENTED TLIF L4-5;  Surgeon: Osmani Engel M.D.;  Location: [facility];  Service:     LUMBAR LAMINECTOMY DISKECTOMY Left 12/15/2015    Procedure: LUMBAR LAMINECTOMY DISKECTOMY L1-2;  Surgeon: Osmani Engel M.D.;  Location: [facility];  Service:     SHOULDER SURGERY  01/01/2013    arthroscopic     SHOULDER SURGERY  01/01/2012    right shoulder    APPENDECTOMY  01/01/1976    CATARACT EXTRACTION WITH IOL Bilateral     OTHER CARDIAC SURGERY  Sept 6, 2019    CABG     No Known Allergies  Outpatient Encounter Medications as of 1/24/2024   Medication Sig Dispense Refill    therapeutic multivitamin-minerals (THERAGRAN-M) Tab Take 1 Tablet by mouth every day.      folic acid (FOLVITE) 1 MG Tab Take 1 mg by mouth every day.      Cyanocobalamin (VITAMIN B 12 PO) Take  by mouth. 2500 mcg       ferrous sulfate 325 (65 Fe) MG tablet Take 325 mg by mouth every day.      gabapentin (NEURONTIN) 100 MG Cap Take 1 Capsule by mouth 3 times a day. 90 Capsule 2    promethazine (PHENERGAN) 25 MG Tab Take 0.5-1 Tablets by mouth every 6 hours as needed for Nausea/Vomiting. 30 Tablet 1    metoprolol SR (TOPROL XL) 25 MG TABLET SR 24 HR Take 25 mg by mouth at bedtime.      ondansetron (ZOFRAN) 4 MG Tab tablet Take 1 Tablet by mouth every four hours as needed for Nausea/Vomiting (for nausea, vomiting). 30 Tablet 6    prochlorperazine (COMPAZINE) 10 MG Tab Take 1 Tablet by mouth every 6 hours as needed (for nausea, vomiting). 30 Tablet 6    allopurinol (ZYLOPRIM) 100 MG Tab Take 100 mg by mouth every day.       No facility-administered encounter medications on file as of 1/24/2024.     Social History     Socioeconomic History    Marital status:      Spouse name: Not on file    Number of children: Not on file    Years of education: Not on file    Highest education level: Not on file   Occupational History    Not on file   Tobacco Use    Smoking status: Never     Passive exposure: Yes    Smokeless tobacco: Never   Vaping Use    Vaping Use: Never used   Substance and Sexual Activity    Alcohol use: Not Currently    Drug use: No    Sexual activity: Not on file   Other Topics Concern    Not on file   Social History Narrative    Not on file     Social Determinants of Health     Financial Resource Strain: Not on file   Food Insecurity: Not on file   Transportation Needs: Not on file   Physical Activity: Not on file   Stress: Not on file   Social Connections: Not on file   Intimate Partner Violence: Not on file   Housing Stability: Not on file      Social History     Tobacco Use   Smoking Status Never    Passive exposure: Yes   Smokeless Tobacco Never     Social History     Substance and Sexual Activity   Alcohol Use Not Currently     Social History     Substance and Sexual Activity   Drug Use No      Family History  "  Problem Relation Age of Onset    Cancer Mother         Lung cancer    Hypertension Father     Heart Attack Brother     Drug abuse Brother     Alcohol abuse Brother      Review of Systems   Constitutional:  Negative for chills, fever, malaise/fatigue and weight loss.   HENT:  Negative for congestion, ear discharge, ear pain, hearing loss and nosebleeds.    Eyes:  Negative for blurred vision, double vision, photophobia, pain and discharge.   Respiratory:  Negative for cough, hemoptysis and sputum production.    Cardiovascular:  Negative for chest pain, palpitations, orthopnea and claudication.   Gastrointestinal:  Positive for abdominal pain. Negative for constipation, diarrhea, heartburn, nausea and vomiting.   Genitourinary:  Negative for dysuria, frequency, hematuria and urgency.   Musculoskeletal:  Negative for back pain, joint pain, myalgias and neck pain.   Skin:  Negative for itching and rash.   Neurological:  Negative for dizziness, tingling, tremors, sensory change, speech change, focal weakness and headaches.   Endo/Heme/Allergies:  Negative for environmental allergies. Does not bruise/bleed easily.   Psychiatric/Behavioral:  Negative for depression, hallucinations, substance abuse and suicidal ideas. The patient is not nervous/anxious.         Objective:   /68 (BP Location: Right arm, Patient Position: Sitting, BP Cuff Size: Small adult)   Pulse 85   Temp 36.7 °C (98 °F) (Temporal)   Ht 1.7 m (5' 6.93\")   Wt 66.7 kg (147 lb)   SpO2 98%   BMI 23.07 kg/m²     Physical Exam  Constitutional:       General: He is not in acute distress.     Appearance: He is not ill-appearing.   HENT:      Head: Normocephalic.   Eyes:      General: No scleral icterus.     Pupils: Pupils are equal, round, and reactive to light.   Cardiovascular:      Rate and Rhythm: Normal rate and regular rhythm.   Pulmonary:      Effort: Pulmonary effort is normal. No respiratory distress.   Abdominal:      General: Abdomen is " flat. There is no distension.      Palpations: Abdomen is soft.      Tenderness: There is no abdominal tenderness.   Skin:     Coloration: Skin is not jaundiced.   Neurological:      General: No focal deficit present.      Mental Status: He is alert and oriented to person, place, and time.         Labs   Latest Reference Range & Units 11/15/23 10:08   WBC 4.8 - 10.8 K/uL 36.8 (H)   RBC 4.70 - 6.10 M/uL 2.59 (L)   Hemoglobin 14.0 - 18.0 g/dL 7.9 (L)   Hematocrit 42.0 - 52.0 % 26.4 (L)   MCV 81.4 - 97.8 fL 101.9 (H)   MCH 27.0 - 33.0 pg 30.5   MCHC 32.3 - 36.5 g/dL 29.9 (L)   RDW 35.9 - 50.0 fL 65.8 (H)   Platelet Count 164 - 446 K/uL 291   MPV 9.0 - 12.9 fL 10.3   (H): Data is abnormally high  (L): Data is abnormally low     Latest Reference Range & Units 10/06/23 16:15   Sodium 135 - 145 mmol/L 134 (L)   Potassium 3.6 - 5.5 mmol/L 4.4   Chloride 96 - 112 mmol/L 110   Co2 20 - 33 mmol/L 14 (L)   Anion Gap 7.0 - 16.0  10.0   Glucose 65 - 99 mg/dL 191 (H)   Bun 8 - 22 mg/dL 37 (H)   Creatinine 0.50 - 1.40 mg/dL 1.11   GFR (CKD-EPI) >60 mL/min/1.73 m 2 71   Calcium 8.5 - 10.5 mg/dL 7.8 (L)   Correct Calcium 8.5 - 10.5 mg/dL 8.9   AST(SGOT) 12 - 45 U/L 22   ALT(SGPT) 2 - 50 U/L 20   Alkaline Phosphatase 30 - 99 U/L 108 (H)   Total Bilirubin 0.1 - 1.5 mg/dL 0.5   Albumin 3.2 - 4.9 g/dL 2.6 (L)   Total Protein 6.0 - 8.2 g/dL 4.0 (L)   Globulin 1.9 - 3.5 g/dL 1.4 (L)   A-G Ratio g/dL 1.9   Lipase 11 - 82 U/L 44   (L): Data is abnormally low  (H): Data is abnormally high          Imaging  CT ABDOMEN (1/11/24)  IMPRESSION:  1. Slightly decreased, now 2.5 cm pancreatic body mass.  2. Stable encasement of the common hepatic artery. There is stable tumor extension abutting the proximal superior mesenteric and proximal splenic arteries. Stable associated short segment occlusion of the central portion of the splenic vein.  3. No solid organ metastasis or regional adenopathy identified.  4. Mild splenomegaly, 13 cm in length.  5.  Duodenal wall thickening and mucosal enhancement consistent with unspecified duodenitis. This could be inflammatory, infectious, or radiation induced.  6. Simple bilateral renal cysts again noted.    CT CHEST (1/11/24)  IMPRESSION:   1. No metastatic disease to the chest.  2. 4 cm ascending thoracic aortic aneurysm.    CT scan chest 7/28/23       Pathology         Procedures  None       Diagnosis:     1. Malignant neoplasm of body of pancreas (HCC)        2. Neoplasm of digestive system        3. Abnormal finding of diagnostic imaging        4. Cancer related pain            Medical Decision Making:  Today's Assessment / Status / Plan:     This is 68-year-old male he does have a history of cardiac disease and is undergone a prior CABG who had issues with his sternal wound and has had reconstruction of the sternum secondary to this and underwent a CT scan to evaluate his sternal closure which identified a mass in the body of the pancreas.  He has subsequently completed 8 cycles of modified FOLFIRINOX and presents today to discuss his follow-up imaging.    We had an extensive discussion today and reviewed his imaging.  We also discussed the tumor board discussion we had this week.  When looking at his CT scan he continues to have pretty extensive involvement of the celiac vessels as well as the portal vein.  I explained to him that I do not think resection is possible in this case given how much this tumor tracks up the hepatic artery as well as and involvement of the portal vein and some thickening around his SMA.  We did discuss for a while that this is not resectable but I did discuss with him the potential option of radiation treatment.  He is somewhat resistant to this and would like to focus mostly on quality of life but is meeting with the radiation oncology team today.  I otherwise explained to him that I would expect that at some point this is going to start to progress and he may run into quality-of-life  issues as that happens and we certainly are available to help address those with surgical resection of the cancer is not a possibility at this time.  I provided him with some support during this visit as they were very emotional which is understandable given the circumstances.  They have my contact information and they know to call me if there are any issues or further questions that I can answer otherwise we will plan to see him on an as-needed basis and they will call if there are any problems or concerns.    I, Srikanth Escalera MD have entered, reviewed and confirmed the above diagnosis related to this patient on this date of service, January 24, 2024    Srikanth Escalera M.D.

## 2024-01-18 NOTE — PROGRESS NOTES
Returned phone call and that Dr. Escalera will discuss options with patient when he sees him. Marino said he is willing to meet with radiation oncology that day as well.

## 2024-01-21 ASSESSMENT — LIFESTYLE VARIABLES
SMOKING_STATUS: NO
TOBACCO_USE: NO

## 2024-01-23 ENCOUNTER — PATIENT OUTREACH (OUTPATIENT)
Dept: ONCOLOGY | Facility: MEDICAL CENTER | Age: 69
End: 2024-01-23
Payer: MEDICARE

## 2024-01-23 NOTE — PROGRESS NOTES
Oncology nurse navigator called patient to follow up on a referral.  Patient is outside of [place] and is following up tomorrow with Surgical and Radiation departments.  He states that he has heard that he may need some radiation over the course of several weeks.  Patient is disappointment that after the tumor board that no one called and provided information updates to him.  He states that he has concerns that if he has to be in town over several weeks..  HILDA did explain that we could provide our lodging resources.  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  HILDA will send my contact information along with the [facility] information.

## 2024-01-24 ENCOUNTER — OFFICE VISIT (OUTPATIENT)
Dept: SURGICAL ONCOLOGY | Facility: MEDICAL CENTER | Age: 69
End: 2024-01-24
Payer: MEDICARE

## 2024-01-24 ENCOUNTER — HOSPITAL ENCOUNTER (OUTPATIENT)
Dept: RADIATION ONCOLOGY | Facility: MEDICAL CENTER | Age: 69
End: 2024-01-31
Attending: RADIOLOGY
Payer: MEDICARE

## 2024-01-24 VITALS
RESPIRATION RATE: 18 BRPM | OXYGEN SATURATION: 97 % | HEIGHT: 68 IN | BODY MASS INDEX: 21.82 KG/M2 | WEIGHT: 143.96 LBS | TEMPERATURE: 97.6 F | SYSTOLIC BLOOD PRESSURE: 134 MMHG | HEART RATE: 81 BPM | DIASTOLIC BLOOD PRESSURE: 79 MMHG

## 2024-01-24 VITALS
TEMPERATURE: 98 F | SYSTOLIC BLOOD PRESSURE: 104 MMHG | HEIGHT: 67 IN | DIASTOLIC BLOOD PRESSURE: 68 MMHG | WEIGHT: 147 LBS | OXYGEN SATURATION: 98 % | BODY MASS INDEX: 23.07 KG/M2 | HEART RATE: 85 BPM

## 2024-01-24 DIAGNOSIS — D49.0 NEOPLASM OF DIGESTIVE SYSTEM: ICD-10-CM

## 2024-01-24 DIAGNOSIS — C25.1 MALIGNANT NEOPLASM OF BODY OF PANCREAS (HCC): ICD-10-CM

## 2024-01-24 DIAGNOSIS — G89.3 CANCER RELATED PAIN: ICD-10-CM

## 2024-01-24 DIAGNOSIS — R93.89 ABNORMAL FINDING OF DIAGNOSTIC IMAGING: ICD-10-CM

## 2024-01-24 PROCEDURE — 99205 OFFICE O/P NEW HI 60 MIN: CPT | Performed by: RADIOLOGY

## 2024-01-24 PROCEDURE — 3078F DIAST BP <80 MM HG: CPT | Performed by: SURGERY

## 2024-01-24 PROCEDURE — 99214 OFFICE O/P EST MOD 30 MIN: CPT | Performed by: RADIOLOGY

## 2024-01-24 PROCEDURE — 99214 OFFICE O/P EST MOD 30 MIN: CPT | Performed by: SURGERY

## 2024-01-24 PROCEDURE — 3074F SYST BP LT 130 MM HG: CPT | Performed by: SURGERY

## 2024-01-24 ASSESSMENT — FIBROSIS 4 INDEX
FIB4 SCORE: 1.18
FIB4 SCORE: 1.18

## 2024-01-24 ASSESSMENT — PAIN SCALES - GENERAL: PAINLEVEL: NO PAIN

## 2024-01-24 NOTE — CONSULTS
RADIATION ONCOLOGY CONSULT    DATE OF SERVICE: 1/24/2024    IDENTIFICATION: A 68 y.o. male with   Visit Diagnoses     ICD-10-CM   1. Malignant neoplasm of body of pancreas (HCC)  C25.1     Malignant neoplasm of body of pancreas (HCC)  Staging form: Exocrine Pancreas, AJCC 8th Edition  - Clinical: Stage IIA (cT3, cN0, cM0) - Signed by David Issa D.O. on 9/6/2023  Total positive nodes: 0  Histologic grade (G): G2  Histologic grading system: 3 grade system    He is here at the kind request of Dr. Escalera     HISTORY OF PRESENT ILLNESS:   Patient is a pleasant 68-year-old male with originally presenting with abdominal pain and underwent CT chest July 28, 2023 which showed 3.5 cm body pancreas mass underwent EUS August 23, 2024 by Dr. Dorsey which showed mass in the proximal pancreatic body 45 x 39 mm clinical T3 N0 M0 invasive moderately differentiated adenocarcinoma.  Patient started on modified FOLFIRINOX September 20, 2023 completed cycle 8 December 27, 2023.  Patient had follow-up pancreas protocol January 11, 2024 which showed stable mass with encasement of common hepatic artery proximal superior mesenteric and proximal splenic artery with duodenal wall thickening.  Patient had CA 19-9 December 26, 2023 14.  Patient was seen by Dr. Escalera who discussed that this was unresectable.      PAST MEDICAL HISTORY:  Past Medical History:   Diagnosis Date    Anemia     Anesthesia     post op  nausea, last neurosurgery no problems    Arthritis     osteo; back/right ankle and right wrist    Cancer (HCC) Current    Pancreatic stage 2    Cataract     Coronary artery disease involving native coronary artery without angina pectoris 03/06/2019    Emphysema of lung (HCC)     per xray result. pt states no active disease    High cholesterol     Hypertension     Myocardial infarct (HCC) Feb 22, 2019    Followed by Dr. Kulkarni    Pain 03/30/2017     back, 7-8/10    PONV (postoperative nausea and vomiting)     Snoring     sometimes, very  mild, no sleep study       PAST SURGICAL HISTORY:  Past Surgical History:   Procedure Laterality Date    TN UPPER GI ENDOSCOPY,DIAGNOSIS N/A 10/7/2023    Procedure: GASTROSCOPY;  Surgeon: Sofi Renteria M.D.;  Location: [facility];  Service: Gastroenterology    TN UPPER GI ENDOSCOPY,BIOPSY N/A 10/7/2023    Procedure: GASTROSCOPY, WITH BIOPSY;  Surgeon: Sofi Renteria M.D.;  Location: [facility];  Service: Gastroenterology    FUSION, SPINE, LUMBAR, PLIF  04/04/2017    Procedure: LUMBAR FUSION POSTERIOR - L4-5 FUSION/EXPLORATION ;  Surgeon: Osmani Engel M.D.;  Location: SURGERY [facility];  Service:     LUMBAR LAMINECTOMY DISKECTOMY Right 04/04/2017    Procedure: LUMBAR LAMINECTOMY DISKECTOMY L3-S1 AND MEDIAL FACETECTOMY;  Surgeon: Osmani Engel M.D.;  Location: [facility];  Service:     FORAMINOTOMY  04/04/2017    Procedure: FORAMINOTOMY;  Surgeon: Osmani Engel M.D.;  Location: [facility];  Service:     HARDWARE REMOVAL ORTHO  04/04/2017    Procedure: HARDWARE REMOVAL ORTHO - L4-5;  Surgeon: Osmani Engel M.D.;  Location: SURGERY [facility];  Service:     FUSION, SPINE, LUMBAR, PLIF N/A 12/15/2015    Procedure: LUMBAR FUSION POSTERIOR INSTRUMENTED TLIF L4-5;  Surgeon: Osmani Engel M.D.;  Location: [facility];  Service:     LUMBAR LAMINECTOMY DISKECTOMY Left 12/15/2015    Procedure: LUMBAR LAMINECTOMY DISKECTOMY L1-2;  Surgeon: Osmani Engel M.D.;  Location: [facility];  Service:     SHOULDER SURGERY  01/01/2013    arthroscopic     SHOULDER SURGERY  01/01/2012    right shoulder    APPENDECTOMY  01/01/1976    CATARACT EXTRACTION WITH IOL Bilateral     OTHER CARDIAC SURGERY  Sept 6, 2019    CABG       CURRENT MEDICATIONS:  Current Outpatient Medications   Medication Sig Dispense Refill    therapeutic multivitamin-minerals (THERAGRAN-M) Tab Take 1 Tablet by mouth every day.      folic acid (FOLVITE) 1 MG Tab Take 1 mg  by mouth every day.      Cyanocobalamin (VITAMIN B 12 PO) Take  by mouth. 2500 mcg      ferrous sulfate 325 (65 Fe) MG tablet Take 325 mg by mouth every day.      gabapentin (NEURONTIN) 100 MG Cap Take 1 Capsule by mouth 3 times a day. 90 Capsule 2    promethazine (PHENERGAN) 25 MG Tab Take 0.5-1 Tablets by mouth every 6 hours as needed for Nausea/Vomiting. 30 Tablet 1    metoprolol SR (TOPROL XL) 25 MG TABLET SR 24 HR Take 25 mg by mouth at bedtime.      ondansetron (ZOFRAN) 4 MG Tab tablet Take 1 Tablet by mouth every four hours as needed for Nausea/Vomiting (for nausea, vomiting). 30 Tablet 6    prochlorperazine (COMPAZINE) 10 MG Tab Take 1 Tablet by mouth every 6 hours as needed (for nausea, vomiting). 30 Tablet 6    allopurinol (ZYLOPRIM) 100 MG Tab Take 100 mg by mouth every day.       No current facility-administered medications for this encounter.       ALLERGIES:    Patient has no known allergies.    FAMILY HISTORY:    family history includes Alcohol abuse in his brother; Cancer in his mother; Drug abuse in his brother; Heart Attack in his brother; Hypertension in his father.    SOCIAL HISTORY:     reports that he has never smoked. He has been exposed to tobacco smoke. He has never used smokeless tobacco. He reports that he does not currently use alcohol. He reports that he does not use drugs. He states that he lives in [place] with his spouse, Lashae. He is retired from EMS work.     REVIEW OF SYSTEMS:  A review of systems for today's date of service was reviewed and uploaded into the electronic medical record.      PHYSICAL EXAM:    ECOG PERFORMANCE STATUS:      1/24/2024    12:41 PM   ECOG Performance Review   ECOG Performance Status Restricted in physically strenuous activity but ambulatory and able to carry out work of a light or sedentary nature, e.g., light house work, office work         1/24/2024    12:41 PM   Karnofsky Score   Karnofsky Score 70     /79 (BP Location: Right arm, Patient  "Position: Sitting, BP Cuff Size: Adult)   Pulse 81   Temp 36.4 °C (97.6 °F)   Resp 18   Ht 1.727 m (5' 8\")   Wt 65.3 kg (143 lb 15.4 oz)   SpO2 97%   BMI 21.89 kg/m²   Physical Exam  Vitals reviewed.   HENT:      Head: Normocephalic.      Mouth/Throat:      Mouth: Mucous membranes are moist.   Eyes:      Pupils: Pupils are equal, round, and reactive to light.   Cardiovascular:      Rate and Rhythm: Normal rate.   Pulmonary:      Effort: Pulmonary effort is normal.   Abdominal:      General: Abdomen is flat.   Musculoskeletal:         General: Normal range of motion.   Neurological:      General: No focal deficit present.      Mental Status: He is alert.   Psychiatric:         Mood and Affect: Mood normal.        LABORATORY DATA:   Lab Results   Component Value Date/Time    WBC 36.8 (H) 11/15/2023 1008    WBC 43.3 (H) 10/08/2023 1210    WBC 50.1 (HH) 10/08/2023 0558    HEMOGLOBIN 7.9 (L) 11/15/2023 1008    HEMOGLOBIN 10.5 (L) 10/08/2023 1210    HEMOGLOBIN 10.2 (L) 10/08/2023 0558    HEMATOCRIT 26.4 (L) 11/15/2023 1008    HEMATOCRIT 29.2 (L) 10/08/2023 1210    HEMATOCRIT 29.5 (L) 10/08/2023 0558    .9 (H) 11/15/2023 1008    MCV 90.4 10/08/2023 1210    MCV 90.8 10/08/2023 0558    PLATELETCT 291 11/15/2023 1008    PLATELETCT 105 (L) 10/08/2023 1210    PLATELETCT 109 (L) 10/08/2023 0558    NEUTS 34.22 (H) 11/15/2023 1008    NEUTS 25.88 (H) 10/07/2023 0318    NEUTS 37.79 (H) 10/06/2023 1615      Lab Results   Component Value Date/Time    SODIUM 134 (L) 10/06/2023 1615    SODIUM 138 09/20/2023 1020    SODIUM 138 02/24/2019 0224    POTASSIUM 4.4 10/06/2023 1615    POTASSIUM 4.6 09/20/2023 1020    POTASSIUM 4.4 02/24/2019 0224    BUN 37 (H) 10/06/2023 1615    BUN 32 (H) 09/20/2023 1020    BUN 23 (H) 02/24/2019 0224    CREATININE 1.11 10/06/2023 1615    CREATININE 0.85 09/20/2023 1020    CREATININE 1.15 02/24/2019 0224    CALCIUM 7.8 (L) 10/06/2023 1615    CALCIUM 8.0 (L) 09/20/2023 1020    CALCIUM 8.3 (L) " 02/24/2019 0224    ALBUMIN 2.6 (L) 10/06/2023 1615    ALBUMIN 3.6 09/20/2023 1020    ALBUMIN 4.6 02/22/2019 0054    ASTSGOT 22 10/06/2023 1615    ASTSGOT 31 09/20/2023 1020    ASTSGOT 94 (H) 02/22/2019 0054    ALKPHOSPHAT 108 (H) 10/06/2023 1615    ALKPHOSPHAT 135 (H) 09/20/2023 1020    ALKPHOSPHAT 131 (H) 02/22/2019 0054    IFNOTAFR >60 02/24/2019 0224    IFNOTAFR >60 02/23/2019 0545    IFNOTAFR >60 02/22/2019 0054       RADIOLOGY DATA:  CT-CHEST (THORAX) WITH    Result Date: 1/12/2024 1/11/2024 8:29 AM HISTORY/REASON FOR EXAM:  pancreatic cancer; post treatment eval. TECHNIQUE/EXAM DESCRIPTION: CT scan of the chest with contrast. Thin-section helical images were obtained from the lung apices through the adrenal glands following the bolus administration of contrast. 100 mL of Omnipaque 350 nonionic contrast was utilized. Low dose optimization technique was utilized for this CT exam including automated exposure control and adjustment of the mA and/or kV according to patient size. COMPARISON:  None. FINDINGS: Lungs: No nodule or airspace process. Mediastinum/Flor: No adenopathy. Pleura: No pleural effusion. Cardiac: Heart normal in size. Previous CABG. There is coronary artery calcification. Vascular: 4 cm ascending thoracic aortic aneurysm. Soft tissues: Port noted. Upper abdomen: See concurrent abdominal CT Bones: No acute process or concerning osseous lesion.     1. No metastatic disease to the chest. 2. 4 cm ascending thoracic aortic aneurysm. Fleischner Society pulmonary nodule recommendations: Not Applicable     CT-ABDOMEN WITH & W/O    Result Date: 1/12/2024 1/11/2024 8:29 AM HISTORY/REASON FOR EXAM:  Neoplasm: pancreas; PANCREATIC PROTOCOL; STAT READ REQUESTED BY PROVIDER. TECHNIQUE/EXAM DESCRIPTION:  CT scan of the abdomen without and with contrast. Initial precontrast images were obtained from the diaphragmatic domes through the iliac crests using helical technique.  Following nonionic contrast  administration in an intravenous bolus fashion, and postcontrast, thin-section helical scanning obtained from the diaphragmatic domes through the iliac crests. 100 mL of Omnipaque 350 nonionic contrast was administered. Low dose optimization technique was utilized for this CT exam including automated exposure control and adjustment of the mA and/or kV according to patient size. COMPARISON: CT 11/3/2023 and additional FINDINGS: Lung: Right lower lobe calcified granuloma. Liver: No focal liver lesion Spleen: Mildly enlarged, 13 cm in diameter Adrenal glands: Normal Pancreas: There is a slightly decreased, now 2.5 x 2.4 cm pancreatic body mass on image 48 series 5. This continues to abut the proximal splenic artery and proximal superior mesenteric artery, but without circumferential encasement. There is stable encasement around the common hepatic artery on image 44 series 5. 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 Stable pancreatic tail atrophy and mild pancreatic duct dilation.. Gallbladder: Distended Biliary: No biliary duct dilation visualized. Kidneys: Simple bilateral renal cysts. Vasculature: Aorta is nonaneurysmal. Scattered plaque throughout the aorta. Lymph nodes: . Bowel: There is wall thickening of the second and third portions of the duodenum. Associated mucosal enhancement as well. No bowel dilation or pneumatosis. Peritoneum: No ascites Musculoskeletal: No acute abnormality or concerning osseous lesion     1. Slightly decreased, now 2.5 cm pancreatic body mass. 2. Stable encasement of the common hepatic artery. There is stable tumor extension abutting the proximal superior mesenteric and proximal splenic arteries. Stable associated short segment occlusion of the central portion of the splenic vein. 3. No solid organ metastasis or regional adenopathy identified. 4. Mild splenomegaly, 13 cm in length. 5. Duodenal  wall thickening and mucosal enhancement consistent with unspecified duodenitis. This could be inflammatory, infectious, or radiation induced. 6. Simple bilateral renal cysts again noted.       IMPRESSION:    A 68 y.o. with  Visit Diagnoses     ICD-10-CM   1. Malignant neoplasm of body of pancreas (HCC)  C25.1     Malignant neoplasm of body of pancreas (HCC)  Staging form: Exocrine Pancreas, AJCC 8th Edition  - Clinical: Stage IIA (cT3, cN0, cM0) - Signed by David Issa D.O. on 9/6/2023  Total positive nodes: 0  Histologic grade (G): G2  Histologic grading system: 3 grade system    RECOMMENDATIONS:   Patient was discussed in multidisciplinary GI tumor board and consideration for stereotactic body radiation therapy given that he lives in [place].  I reviewed recent data from GI [facility] 2024 suggesting ablative radiation can provide long-term local control. I discussed case with Dr. Dorsey to place fiducial markers for SBRT targeting.  We will plan around 30-50 Gray in 5 fractions specifically minimizing dose to duodenum.  We discussed risk and benefits patient amenable to treatment.    Thank you for the opportunity to participate in his care.  If any questions or comments, please do not hesitate in calling.    Orders Placed This Encounter    REFERRAL TO ONCOLOGY NURSE NAVIGATOR     CC: Dr. Dorsey, Dr. Issa, Dr. Culp

## 2024-01-24 NOTE — PROGRESS NOTES
"Patient was seen today in clinic with Dr. Martinez for consult.  Vitals signs and weight were obtained and pain assessment was completed.  Allergies and medications were reviewed with the patient.       Vitals/Pain:  Vitals:    01/24/24 1237   BP: 134/79   BP Location: Right arm   Patient Position: Sitting   BP Cuff Size: Adult   Pulse: 81   Resp: 18   Temp: 36.4 °C (97.6 °F)   SpO2: 97%   Weight: 65.3 kg (143 lb 15.4 oz)   Height: 1.727 m (5' 8\")   Pain Score: No pain        Allergies:   Patient has no known allergies.    Current Medications:  Current Outpatient Medications   Medication Sig Dispense Refill    therapeutic multivitamin-minerals (THERAGRAN-M) Tab Take 1 Tablet by mouth every day.      folic acid (FOLVITE) 1 MG Tab Take 1 mg by mouth every day.      Cyanocobalamin (VITAMIN B 12 PO) Take  by mouth. 2500 mcg      ferrous sulfate 325 (65 Fe) MG tablet Take 325 mg by mouth every day.      gabapentin (NEURONTIN) 100 MG Cap Take 1 Capsule by mouth 3 times a day. 90 Capsule 2    promethazine (PHENERGAN) 25 MG Tab Take 0.5-1 Tablets by mouth every 6 hours as needed for Nausea/Vomiting. 30 Tablet 1    metoprolol SR (TOPROL XL) 25 MG TABLET SR 24 HR Take 25 mg by mouth at bedtime.      ondansetron (ZOFRAN) 4 MG Tab tablet Take 1 Tablet by mouth every four hours as needed for Nausea/Vomiting (for nausea, vomiting). 30 Tablet 6    prochlorperazine (COMPAZINE) 10 MG Tab Take 1 Tablet by mouth every 6 hours as needed (for nausea, vomiting). 30 Tablet 6    allopurinol (ZYLOPRIM) 100 MG Tab Take 100 mg by mouth every day.       No current facility-administered medications for this encounter.           Nina Ruiz R.N.  "

## 2024-01-26 ENCOUNTER — APPOINTMENT (OUTPATIENT)
Dept: ADMISSIONS | Facility: MEDICAL CENTER | Age: 69
End: 2024-01-26
Attending: INTERNAL MEDICINE
Payer: MEDICARE

## 2024-01-26 DIAGNOSIS — C25.1 MALIGNANT NEOPLASM OF BODY OF PANCREAS (HCC): ICD-10-CM

## 2024-01-26 ASSESSMENT — ENCOUNTER SYMPTOMS
CHILLS: 0
PALPITATIONS: 0
FOCAL WEAKNESS: 0
DIARRHEA: 0
DEPRESSION: 0
NECK PAIN: 0
EYE DISCHARGE: 0
HALLUCINATIONS: 0
NAUSEA: 0
DOUBLE VISION: 0
WEIGHT LOSS: 0
BLURRED VISION: 0
COUGH: 0
HEADACHES: 0
TINGLING: 0
HEARTBURN: 0
CONSTIPATION: 0
ORTHOPNEA: 0
SPEECH CHANGE: 0
ABDOMINAL PAIN: 1
HEMOPTYSIS: 0
TREMORS: 0
MYALGIAS: 0
CLAUDICATION: 0
SENSORY CHANGE: 0
VOMITING: 0
BACK PAIN: 0
FEVER: 0
PHOTOPHOBIA: 0
SPUTUM PRODUCTION: 0
EYE PAIN: 0
DIZZINESS: 0
BRUISES/BLEEDS EASILY: 0
NERVOUS/ANXIOUS: 0

## 2024-01-26 ASSESSMENT — LIFESTYLE VARIABLES: SUBSTANCE_ABUSE: 0

## 2024-01-30 ENCOUNTER — PRE-ADMISSION TESTING (OUTPATIENT)
Dept: ADMISSIONS | Facility: MEDICAL CENTER | Age: 69
End: 2024-01-30
Attending: INTERNAL MEDICINE
Payer: MEDICARE

## 2024-01-30 VITALS — HEIGHT: 68 IN | BODY MASS INDEX: 21.89 KG/M2

## 2024-01-30 RX ORDER — CHLORAL HYDRATE 500 MG
1000 CAPSULE ORAL
COMMUNITY

## 2024-01-30 NOTE — PREPROCEDURE INSTRUCTIONS
"Reviewed dietary highlights of \"Preparing for your procedure\" via phone.     Confirmed check in location. Confirmed received preadmit Handouts/Brochure/map/Video via email.     Patient instructed per pharmacy guidelines regarding taking, holding or contacting provider for instructions on regularly prescribed medications before surgery. Instructed to take the following medications the day of surgery with a sip of water per pharmacy medication guidelines: allopurinal  "

## 2024-02-01 ENCOUNTER — ANESTHESIA (OUTPATIENT)
Dept: SURGERY | Facility: MEDICAL CENTER | Age: 69
End: 2024-02-01
Payer: MEDICARE

## 2024-02-01 ENCOUNTER — HOSPITAL ENCOUNTER (OUTPATIENT)
Facility: MEDICAL CENTER | Age: 69
End: 2024-02-01
Attending: INTERNAL MEDICINE | Admitting: INTERNAL MEDICINE
Payer: MEDICARE

## 2024-02-01 ENCOUNTER — ANESTHESIA EVENT (OUTPATIENT)
Dept: SURGERY | Facility: MEDICAL CENTER | Age: 69
End: 2024-02-01
Payer: MEDICARE

## 2024-02-01 VITALS
HEART RATE: 64 BPM | BODY MASS INDEX: 21.79 KG/M2 | DIASTOLIC BLOOD PRESSURE: 72 MMHG | OXYGEN SATURATION: 94 % | WEIGHT: 143.3 LBS | RESPIRATION RATE: 16 BRPM | SYSTOLIC BLOOD PRESSURE: 123 MMHG | TEMPERATURE: 97 F

## 2024-02-01 LAB
HCT VFR BLD AUTO: 32.9 % (ref 42–52)
HGB BLD-MCNC: 9.9 G/DL (ref 14–18)

## 2024-02-01 PROCEDURE — 160009 HCHG ANES TIME/MIN: Performed by: INTERNAL MEDICINE

## 2024-02-01 PROCEDURE — 700111 HCHG RX REV CODE 636 W/ 250 OVERRIDE (IP): Performed by: ANESTHESIOLOGY

## 2024-02-01 PROCEDURE — 160035 HCHG PACU - 1ST 60 MINS PHASE I: Performed by: INTERNAL MEDICINE

## 2024-02-01 PROCEDURE — 160048 HCHG OR STATISTICAL LEVEL 1-5: Performed by: INTERNAL MEDICINE

## 2024-02-01 PROCEDURE — 160025 RECOVERY II MINUTES (STATS): Performed by: INTERNAL MEDICINE

## 2024-02-01 PROCEDURE — 700101 HCHG RX REV CODE 250: Performed by: INTERNAL MEDICINE

## 2024-02-01 PROCEDURE — 160002 HCHG RECOVERY MINUTES (STAT): Performed by: INTERNAL MEDICINE

## 2024-02-01 PROCEDURE — 85018 HEMOGLOBIN: CPT

## 2024-02-01 PROCEDURE — 85014 HEMATOCRIT: CPT

## 2024-02-01 PROCEDURE — 160203 HCHG ENDO MINUTES - 1ST 30 MINS LEVEL 4: Performed by: INTERNAL MEDICINE

## 2024-02-01 PROCEDURE — 160046 HCHG PACU - 1ST 60 MINS PHASE II: Performed by: INTERNAL MEDICINE

## 2024-02-01 PROCEDURE — 700101 HCHG RX REV CODE 250: Performed by: ANESTHESIOLOGY

## 2024-02-01 PROCEDURE — 700105 HCHG RX REV CODE 258: Performed by: INTERNAL MEDICINE

## 2024-02-01 PROCEDURE — 160208 HCHG ENDO MINUTES - EA ADDL 1 MIN LEVEL 4: Performed by: INTERNAL MEDICINE

## 2024-02-01 RX ORDER — ONDANSETRON 2 MG/ML
4 INJECTION INTRAMUSCULAR; INTRAVENOUS
Status: DISCONTINUED | OUTPATIENT
Start: 2024-02-01 | End: 2024-02-01 | Stop reason: HOSPADM

## 2024-02-01 RX ORDER — OXYCODONE HCL 5 MG/5 ML
10 SOLUTION, ORAL ORAL
Status: DISCONTINUED | OUTPATIENT
Start: 2024-02-01 | End: 2024-02-01 | Stop reason: HOSPADM

## 2024-02-01 RX ORDER — SODIUM CHLORIDE, SODIUM LACTATE, POTASSIUM CHLORIDE, CALCIUM CHLORIDE 600; 310; 30; 20 MG/100ML; MG/100ML; MG/100ML; MG/100ML
INJECTION, SOLUTION INTRAVENOUS CONTINUOUS
Status: DISCONTINUED | OUTPATIENT
Start: 2024-02-01 | End: 2024-02-01 | Stop reason: HOSPADM

## 2024-02-01 RX ORDER — OXYCODONE HCL 5 MG/5 ML
5 SOLUTION, ORAL ORAL
Status: DISCONTINUED | OUTPATIENT
Start: 2024-02-01 | End: 2024-02-01 | Stop reason: HOSPADM

## 2024-02-01 RX ORDER — LIDOCAINE HYDROCHLORIDE 20 MG/ML
INJECTION, SOLUTION EPIDURAL; INFILTRATION; INTRACAUDAL; PERINEURAL PRN
Status: DISCONTINUED | OUTPATIENT
Start: 2024-02-01 | End: 2024-02-01 | Stop reason: HOSPADM

## 2024-02-01 RX ORDER — HYDRALAZINE HYDROCHLORIDE 20 MG/ML
5 INJECTION INTRAMUSCULAR; INTRAVENOUS
Status: DISCONTINUED | OUTPATIENT
Start: 2024-02-01 | End: 2024-02-01 | Stop reason: HOSPADM

## 2024-02-01 RX ORDER — HALOPERIDOL 5 MG/ML
1 INJECTION INTRAMUSCULAR
Status: DISCONTINUED | OUTPATIENT
Start: 2024-02-01 | End: 2024-02-01 | Stop reason: HOSPADM

## 2024-02-01 RX ADMIN — LIDOCAINE HYDROCHLORIDE 0.5 ML: 10 SOLUTION INTRAVENOUS at 08:56

## 2024-02-01 RX ADMIN — PROPOFOL 50 MG: 10 INJECTION, EMULSION INTRAVENOUS at 09:30

## 2024-02-01 RX ADMIN — SODIUM CHLORIDE, POTASSIUM CHLORIDE, SODIUM LACTATE AND CALCIUM CHLORIDE: 600; 310; 30; 20 INJECTION, SOLUTION INTRAVENOUS at 08:56

## 2024-02-01 RX ADMIN — LIDOCAINE HYDROCHLORIDE 60 MG: 20 INJECTION, SOLUTION EPIDURAL; INFILTRATION; INTRACAUDAL at 09:30

## 2024-02-01 RX ADMIN — PROPOFOL 160 MCG/KG/MIN: 10 INJECTION, EMULSION INTRAVENOUS at 09:31

## 2024-02-01 ASSESSMENT — PAIN DESCRIPTION - PAIN TYPE: TYPE: SURGICAL PAIN

## 2024-02-01 ASSESSMENT — PAIN SCALES - GENERAL: PAIN_LEVEL: 0

## 2024-02-01 ASSESSMENT — FIBROSIS 4 INDEX: FIB4 SCORE: 1.18

## 2024-02-01 NOTE — ANESTHESIA PREPROCEDURE EVALUATION
Case: [id number] Date/Time: 02/01/24 0915    Procedures:       ESOPHAGOGASTRODUODENOSCOPY AND ENDOSCOPIC ULTRASOUND WITH FINE NEEDLE ASPIRATION (Abdomen)      EGD, WITH ENDOSCOPIC US (Abdomen)    Anesthesia type: General    Pre-op diagnosis: MALIGNANT NEOPLASM OF BODY OF PANCREAS    Location:  ENDOSCOPIC ULTRASOUND ROOM / SURGERY [facility]    Surgeons: Osmani Dorsey M.D.          HTN, emphysema, CAD/MI    Relevant Problems   CARDIAC   (positive) Coronary artery disease involving native coronary artery without angina pectoris   (positive) Essential hypertension       Physical Exam    Airway   Mallampati: II  TM distance: >3 FB  Neck ROM: full       Cardiovascular - normal exam  Rhythm: regular  Rate: normal  (-) murmur     Dental - normal exam           Pulmonary - normal exam  Breath sounds clear to auscultation     Abdominal    Neurological - normal exam                   Anesthesia Plan    ASA 3   ASA physical status 3 criteria: CAD/stents (> 3 months)    Plan - MAC         (Likely MAC, possible GETA)      Induction: intravenous          Informed Consent:    Anesthetic plan and risks discussed with patient.    Use of blood products discussed with: patient whom consented to blood products.

## 2024-02-01 NOTE — ANESTHESIA TIME REPORT
Anesthesia Start and Stop Event Times       Date Time Event    2/1/2024 0816 Ready for Procedure     0927 Anesthesia Start     1002 Anesthesia Stop          Responsible Staff  02/01/24      Name Role Begin End    Eduardo Acosta M.D. Anesth 0927 1002          Overtime Reason:  no overtime (within assigned shift)    Comments:

## 2024-02-01 NOTE — OR NURSING
1000: Patient arrived to PACU from endo via gurney. Report received from anesthesia and RN. Respirations are spontaneous and unlabored. VSS on 6L.     1005: Report to Manuel GUILLEN

## 2024-02-01 NOTE — OR NURSING
1005: Report rec'd, care assumed. Pt is awake and alert. Denies pain or nausea.    1023: Dr. Dorsey at bedside.    1038: Meets criteria for stage ll.

## 2024-02-01 NOTE — OR NURSING
"Patient allergies and NPO status verified, home medication reconciliation completed, belongings secured. Patient verbalizes understanding of pain scale, expected course of stay and plan of care. Surgical site verified with patient, IV access established, pt declined port access stating \"it doesn't work very well anymore.\" Pt awaiting procedure.   "

## 2024-02-01 NOTE — ANESTHESIA POSTPROCEDURE EVALUATION
Patient: Lucas Rojas    Procedure Summary       Date: 02/01/24 Room / Location:  ENDOSCOPIC ULTRASOUND ROOM / SURGERY [facility]    Anesthesia Start: 0927 Anesthesia Stop: 1002    Procedures:       ESOPHAGOGASTRODUODENOSCOPY AND ENDOSCOPIC ULTRASOUND WITH FINE NEEDLE ASPIRATION (Abdomen)      EGD, WITH ENDOSCOPIC US (Abdomen) Diagnosis: (MALIGNANT NEOPLASM OF BODY OF PANCREAS)    Surgeons: Osmani Dorsey M.D. Responsible Provider: Eduardo Acosta M.D.    Anesthesia Type: MAC ASA Status: 3            Final Anesthesia Type: MAC  Last vitals  BP   Blood Pressure : 120/70    Temp   36.3 °C (97.3 °F)    Pulse   75   Resp   16    SpO2   98 %      Anesthesia Post Evaluation    Patient location during evaluation: PACU  Patient participation: complete - patient participated  Level of consciousness: awake and alert  Pain score: 0    Airway patency: patent  Anesthetic complications: no  Cardiovascular status: hemodynamically stable  Respiratory status: acceptable  Hydration status: euvolemic    PONV: none          No notable events documented.     Nurse Pain Score: 0 (NPRS)

## 2024-02-01 NOTE — DISCHARGE INSTRUCTIONS
What to Expect Post Anesthesia    Rest and take it easy for the first 24 hours.  A responsible adult is recommended to remain with you during that time.  It is normal to feel sleepy.  We encourage you to not do anything that requires balance, judgment or coordination.    FOR 24 HOURS DO NOT:  Drive, operate machinery or run household appliances.  Drink beer or alcoholic beverages.  Make important decisions or sign legal documents.    To avoid nausea, slowly advance diet as tolerated, avoiding spicy or greasy foods for the first day.  Add more substantial food to your diet according to your provider's instructions.  Babies can be fed formula or breast milk as soon as they are hungry.  INCREASE FLUIDS AND FIBER TO AVOID CONSTIPATION.    MILD FLU-LIKE SYMPTOMS ARE NORMAL.  YOU MAY EXPERIENCE GENERALIZED MUSCLE ACHES, THROAT IRRITATION, HEADACHE AND/OR SOME NAUSEA.    If any questions arise, call your provider.  If your provider is not available, please feel free to call the Surgical Center at [phone].    MEDICATIONS: Resume taking daily medication.  Take prescribed pain medication with food.  If no medication is prescribed, you may take non-aspirin pain medication if needed.  PAIN MEDICATION CAN BE VERY CONSTIPATING.  Take a stool softener or laxative such as senokot, pericolace, or milk of magnesia if needed.    No pain medication given in recovery.    Diet  Resume pre-operative diet upon discharge from the hospital. Depending on how you are feeling and whether you have nausea or not, you may wish to stay with a bland diet for the first few days. However, you can advance your diet as quickly as you feel ready.      ENDOSCOPY HOME CARE INSTRUCTIONS    GASTROSCOPY OR ERCP  1. Don't eat or drink anything for about an hour after the test. You can then resume your regular diet.  2. Don't drive or drink alcohol for 24 hours. The medication you received will make you too drowsy.  3. Don't take any coffee, tea, or  aspirin products until after you see your doctor. These can harm the lining of your stomach.  4. If you begin to vomit bloody material, or develop black or bloody stools, call your doctor as soon as possible.  5. If you have any neck, chest, abdominal pain or temp of 100 degrees, call your doctor.    IMPRESSION:  1)Successful placement of 3 sets of fiducial markers in the previously diagnosed pancreatic malignancy      RECOMMENDATION:    1)f/u with Dr Martinez  2)discharge home today  3)restart regular diet  4)f/u with [facility] as needed

## 2024-02-01 NOTE — OP REPORT
DATE OF SERVICE:  02/01/2024     INDICATION FOR PROCEDURE:  unresectable pancreatic cancer    PROCEDURE PERFORMED: EGD/EUS with tumor fiducial marker placement     CONSENT:  Informed consent was obtained directly from the patient after benefits, risks and possible alternatives were discussed.     MEDICATIONS:  The patient received Propofol for this procedure. Please see the anesthesia record for details     PROCEDURE DESCRIPTION:  The patient was placed in the left lateral decubitus position after sedation and intubation by the anesthesiologist. Vital signs were monitored continuously throughout the procedure.  When ready, the upper endoscope was placed in the patient's mouth and advanced easily and carefully to the esophagus and subsequently to the stomach and duodenum. The scope was then slowly withdrawn and mucosa examined. Retroflexion was performed in the stomach. The EGD scope was removed.    The EUS scope was then selected. The scope was placed in the mouth and advanced with semi-direct visualization through the oropharynx to the esophagus, stomach and duodenum. Ultrasound images are noted below. The patients toleration of this procedure was excellent.     FINDINGS:    Esophagus: normal    Stomach: normal    Duodenum: normal    EUS: The linear scope was selected. The gastric wall appeared normal. The liver appeared normal. There was no intrahepatic ductal dilation. The aorta, celiac artery and SMA appeared normal. The left kidney, left adrenal and spleen appeared normal. The previously diagnosed cancer was seen in the neck/proximal body of the pancreas. It again appeared to involve the hepatic artery and splenic artery. The tumor was hypoechoic and measured 2.5cm in size The scope was advanced to the duodenal bulb. The duodenal wall appeared normal. The CBD measured 2.5mm and was non-dilated. The pancreatic head was evaluated and appeared normal. The gallbladder was visualized and appeared distended.      After completion of the viewing portion of the exam, attention was turned to the pancreatic mass. A 22g Covidien delivery system and the 22g insert preloaded with gold fiducials were selected. Three sets of fiducials were successfully placed in the tumor. No bleeding or complication was noted. The scope was removed and procedure completed.     COMPLICATIONS:  No complications or blood loss during or in the immediate postoperative period.     IMPRESSION:  1)Successful placement of 3 sets of fiducial markers in the previously diagnosed pancreatic malignancy      RECOMMENDATION:    1)f/u with Dr Martinez  2)discharge home today  3)restart regular diet  4)f/u with [facility] as needed

## 2024-02-01 NOTE — OR NURSING
1041: Patient arrived to phase 2. Patient changed out of surgical gown into clothes. Patient is A&Ox4. Patient reports no pain and no nausea. Vital signs taken and patient assessed. Asked the patient if they had to use the bathroom.    1101: IV removed and discharge instructions read. All questions answered.     1107: Discharged to care of responsible adult via wheelchair by CNA.

## 2024-02-06 ENCOUNTER — APPOINTMENT (OUTPATIENT)
Dept: ADMISSIONS | Facility: MEDICAL CENTER | Age: 69
End: 2024-02-06
Attending: STUDENT IN AN ORGANIZED HEALTH CARE EDUCATION/TRAINING PROGRAM
Payer: MEDICARE

## 2024-02-06 ENCOUNTER — HOSPITAL ENCOUNTER (OUTPATIENT)
Dept: RADIATION ONCOLOGY | Facility: MEDICAL CENTER | Age: 69
End: 2024-02-29
Attending: RADIOLOGY
Payer: MEDICARE

## 2024-02-09 ENCOUNTER — APPOINTMENT (OUTPATIENT)
Dept: RADIATION ONCOLOGY | Facility: MEDICAL CENTER | Age: 69
End: 2024-02-09
Payer: MEDICARE

## 2024-02-09 DIAGNOSIS — C25.1 MALIGNANT NEOPLASM OF BODY OF PANCREAS (HCC): ICD-10-CM

## 2024-02-09 PROCEDURE — 77263 THER RADIOLOGY TX PLNG CPLX: CPT | Performed by: RADIOLOGY

## 2024-02-09 PROCEDURE — 77470 SPECIAL RADIATION TREATMENT: CPT | Mod: 26 | Performed by: RADIOLOGY

## 2024-02-09 PROCEDURE — 77470 SPECIAL RADIATION TREATMENT: CPT | Performed by: RADIOLOGY

## 2024-02-09 PROCEDURE — 77290 THER RAD SIMULAJ FIELD CPLX: CPT | Performed by: RADIOLOGY

## 2024-02-09 PROCEDURE — 77290 THER RAD SIMULAJ FIELD CPLX: CPT | Mod: 26 | Performed by: RADIOLOGY

## 2024-02-09 PROCEDURE — 77334 RADIATION TREATMENT AID(S): CPT | Mod: 26 | Performed by: RADIOLOGY

## 2024-02-09 PROCEDURE — 77334 RADIATION TREATMENT AID(S): CPT | Mod: XU | Performed by: RADIOLOGY

## 2024-02-09 NOTE — CT SIMULATION
PATIENT NAME Lucas Rojas   PRIMARY PHYSICIAN Luis Fernando Culp [id number]   REFERRING PHYSICIAN No ref. provider found 1955     Malignant neoplasm of body of pancreas (HCC)  Staging form: Exocrine Pancreas, AJCC 8th Edition  - Clinical stage from 1/24/2024: Stage III (cT4, cN0, cM0) - Signed by Manuel Martinez M.D. on 1/24/2024  Total positive nodes: 0  Histologic grade (G): G2  Histologic grading system: 3 grade system         Treatment Planning CT Simulation        Order Questions       Question Answer Comment    Is this for a new course of treatment? Yes     Is this an Addendum? No     Implanted Device/Pacemaker No     Simulation Status Initial     Planned Start Date 2/16/2024     Treatment Site Pancreas - Head     Laterality Axial     Treatment Technique SBRT     Treatment Pattern/Frequency Q OD     Concurrent Chemotherapy No     CT Technique 3D      4D      DIBH     Slice Thickness 2mm     Scan Extent Abdomen     Contrast IV pancreas protocol     Per Protocol     Treatment Device(s) Other omniboard W comp     Vac Myriam     Patient Attire Gown     Patient Position Supine     Patient Orientation Head First     Arm Position Up     Treatment Machine TB1 - STx     Image Guidance Match PTV - Soft Tissue      Bone     Fiducial Tracking Fluoro     Treatment Planning Image Fusion CT/CT     Special Physics Consult Stereotactic     Other Orders Special Tx Procedure      Weekly Physics Check

## 2024-02-14 PROCEDURE — 77295 3-D RADIOTHERAPY PLAN: CPT | Mod: 26 | Performed by: RADIOLOGY

## 2024-02-14 PROCEDURE — 77334 RADIATION TREATMENT AID(S): CPT | Performed by: RADIOLOGY

## 2024-02-14 PROCEDURE — 77334 RADIATION TREATMENT AID(S): CPT | Mod: 26 | Performed by: RADIOLOGY

## 2024-02-14 PROCEDURE — 77293 RESPIRATOR MOTION MGMT SIMUL: CPT | Performed by: RADIOLOGY

## 2024-02-14 PROCEDURE — 77293 RESPIRATOR MOTION MGMT SIMUL: CPT | Mod: 26 | Performed by: RADIOLOGY

## 2024-02-14 PROCEDURE — 77300 RADIATION THERAPY DOSE PLAN: CPT | Mod: 26 | Performed by: RADIOLOGY

## 2024-02-14 PROCEDURE — 77295 3-D RADIOTHERAPY PLAN: CPT | Performed by: RADIOLOGY

## 2024-02-14 PROCEDURE — 77300 RADIATION THERAPY DOSE PLAN: CPT | Performed by: RADIOLOGY

## 2024-02-15 ENCOUNTER — PRE-ADMISSION TESTING (OUTPATIENT)
Dept: ADMISSIONS | Facility: MEDICAL CENTER | Age: 69
End: 2024-02-15
Attending: STUDENT IN AN ORGANIZED HEALTH CARE EDUCATION/TRAINING PROGRAM
Payer: MEDICARE

## 2024-02-16 ENCOUNTER — APPOINTMENT (OUTPATIENT)
Dept: RADIOLOGY | Facility: MEDICAL CENTER | Age: 69
End: 2024-02-16
Attending: STUDENT IN AN ORGANIZED HEALTH CARE EDUCATION/TRAINING PROGRAM
Payer: MEDICARE

## 2024-02-16 ENCOUNTER — HOSPITAL ENCOUNTER (OUTPATIENT)
Facility: MEDICAL CENTER | Age: 69
End: 2024-02-16
Attending: STUDENT IN AN ORGANIZED HEALTH CARE EDUCATION/TRAINING PROGRAM | Admitting: STUDENT IN AN ORGANIZED HEALTH CARE EDUCATION/TRAINING PROGRAM
Payer: MEDICARE

## 2024-02-16 VITALS
OXYGEN SATURATION: 98 % | WEIGHT: 148 LBS | DIASTOLIC BLOOD PRESSURE: 61 MMHG | HEART RATE: 74 BPM | HEIGHT: 68 IN | SYSTOLIC BLOOD PRESSURE: 108 MMHG | RESPIRATION RATE: 18 BRPM | BODY MASS INDEX: 22.43 KG/M2

## 2024-02-16 DIAGNOSIS — C25.1 MALIGNANT NEOPLASM OF BODY OF PANCREAS (HCC): ICD-10-CM

## 2024-02-16 PROCEDURE — 77373 STRTCTC BDY RAD THER TX DLVR: CPT | Performed by: RADIOLOGY

## 2024-02-16 PROCEDURE — 36590 REMOVAL TUNNELED CV CATH: CPT

## 2024-02-16 PROCEDURE — 77435 SBRT MANAGEMENT: CPT | Performed by: RADIOLOGY

## 2024-02-16 PROCEDURE — 77280 THER RAD SIMULAJ FIELD SMPL: CPT | Performed by: RADIOLOGY

## 2024-02-16 PROCEDURE — 700101 HCHG RX REV CODE 250

## 2024-02-16 PROCEDURE — 77280 THER RAD SIMULAJ FIELD SMPL: CPT | Mod: 26 | Performed by: RADIOLOGY

## 2024-02-16 RX ORDER — LIDOCAINE HCL/EPINEPHRINE/PF 2%-1:200K
VIAL (ML) INJECTION
Status: COMPLETED
Start: 2024-02-16 | End: 2024-02-16

## 2024-02-16 RX ADMIN — LIDOCAINE HYDROCHLORIDE,EPINEPHRINE BITARTRATE: 20; .005 INJECTION, SOLUTION EPIDURAL; INFILTRATION; INTRACAUDAL; PERINEURAL at 10:09

## 2024-02-16 ASSESSMENT — FIBROSIS 4 INDEX: FIB4 SCORE: 1.18

## 2024-02-16 NOTE — PROGRESS NOTES
[facility] . Patient  was consented by MD at bedside, confirmed by this RN and consent at bedside. Pt transferred to OPIR table table in supine position. Patient underwent a Removal of a port a cath by Dr. Balderrama. Procedure site was marked by MD and verified using imaging guidance. Pt placed on monitor, prepped and draped in a sterile fashion.Patient was local anesthetic only.  [facility], discharge instructions were reviewed with the patient, all questions were answered. Patient discharged to home in stable condition with his wife.

## 2024-02-20 ENCOUNTER — APPOINTMENT (OUTPATIENT)
Dept: RADIATION ONCOLOGY | Facility: MEDICAL CENTER | Age: 69
End: 2024-02-20
Payer: MEDICARE

## 2024-02-20 DIAGNOSIS — R26.2 AMBULATORY DYSFUNCTION: ICD-10-CM

## 2024-02-20 DIAGNOSIS — C25.1 MALIGNANT NEOPLASM OF BODY OF PANCREAS (HCC): ICD-10-CM

## 2024-02-20 PROCEDURE — 77373 STRTCTC BDY RAD THER TX DLVR: CPT | Performed by: RADIOLOGY

## 2024-02-20 PROCEDURE — 77280 THER RAD SIMULAJ FIELD SMPL: CPT | Performed by: RADIOLOGY

## 2024-02-20 PROCEDURE — 77280 THER RAD SIMULAJ FIELD SMPL: CPT | Mod: 26 | Performed by: RADIOLOGY

## 2024-02-23 ENCOUNTER — APPOINTMENT (OUTPATIENT)
Dept: RADIATION ONCOLOGY | Facility: MEDICAL CENTER | Age: 69
End: 2024-02-23
Payer: MEDICARE

## 2024-02-23 PROCEDURE — 77336 RADIATION PHYSICS CONSULT: CPT | Mod: XU | Performed by: RADIOLOGY

## 2024-02-23 PROCEDURE — 77280 THER RAD SIMULAJ FIELD SMPL: CPT | Mod: 26 | Performed by: RADIOLOGY

## 2024-02-23 PROCEDURE — 77373 STRTCTC BDY RAD THER TX DLVR: CPT | Performed by: RADIOLOGY

## 2024-02-23 PROCEDURE — 77280 THER RAD SIMULAJ FIELD SMPL: CPT | Performed by: RADIOLOGY

## 2024-02-26 ENCOUNTER — APPOINTMENT (OUTPATIENT)
Dept: RADIATION ONCOLOGY | Facility: MEDICAL CENTER | Age: 69
End: 2024-02-26
Payer: MEDICARE

## 2024-02-26 PROCEDURE — 77280 THER RAD SIMULAJ FIELD SMPL: CPT | Performed by: RADIOLOGY

## 2024-02-26 PROCEDURE — 77280 THER RAD SIMULAJ FIELD SMPL: CPT | Mod: 26 | Performed by: RADIOLOGY

## 2024-02-26 PROCEDURE — 77373 STRTCTC BDY RAD THER TX DLVR: CPT | Performed by: RADIOLOGY

## 2024-02-27 DIAGNOSIS — C25.0 MALIGNANT NEOPLASM OF HEAD OF PANCREAS (HCC): ICD-10-CM

## 2024-02-28 ENCOUNTER — APPOINTMENT (OUTPATIENT)
Dept: RADIATION ONCOLOGY | Facility: MEDICAL CENTER | Age: 69
End: 2024-02-28
Payer: MEDICARE

## 2024-02-28 DIAGNOSIS — R19.7 DIARRHEA, UNSPECIFIED TYPE: ICD-10-CM

## 2024-02-28 DIAGNOSIS — C25.0 MALIGNANT NEOPLASM OF HEAD OF PANCREAS (HCC): ICD-10-CM

## 2024-02-28 PROCEDURE — 77280 THER RAD SIMULAJ FIELD SMPL: CPT | Performed by: RADIOLOGY

## 2024-02-28 PROCEDURE — 77373 STRTCTC BDY RAD THER TX DLVR: CPT | Performed by: RADIOLOGY

## 2024-02-28 PROCEDURE — 77280 THER RAD SIMULAJ FIELD SMPL: CPT | Mod: 26 | Performed by: RADIOLOGY

## 2024-02-28 RX ORDER — DIPHENOXYLATE HYDROCHLORIDE AND ATROPINE SULFATE 2.5; .025 MG/1; MG/1
1 TABLET ORAL 4 TIMES DAILY PRN
Qty: 56 TABLET | Refills: 0 | Status: SHIPPED | OUTPATIENT
Start: 2024-02-28 | End: 2024-03-13

## 2024-02-28 NOTE — RADIATION COMPLETION NOTES
END OF TREATMENT SUMMARY    Patient name:  Lucas Rojas    Primary Physician:  Luis Fernando Culp M.D. MRN: [id number]  CSN: [id number]   Referring physician:  Srikanth Escalera M.*  : 1955, 68 y.o.       TREATMENT SUMMARY:        Course First Treatment Date 2024    Course Last Treatment Date 2024   Course Elapsed Days 12 @ [id number]   Course Intent Curative     Radiation Therapy Episodes       Active Episodes       Radiation Therapy: SBRT (2024)                   Radiation Treatments         Plan Last Treated On Elapsed Days Fractions Treated Prescribed Fraction Dose (cGy) Prescribed Total Dose (cGy)    PancSBRT DIBH 2024 12 @ [id number] 5 of 5 800 4,000                  Reference Point Last Treated On Elapsed Days Most Recent Session Dose (cGy) Total Dose (cGy)    GTVp40 2024 12 @ [id number] -- 4,000                                     STAGE:   Malignant neoplasm of body of pancreas (HCC)  Staging form: Exocrine Pancreas, AJCC 8th Edition  - Clinical stage from 2024: Stage III (cT4, cN0, cM0) - Signed by Manuel Martinez M.D. on 2024  Total positive nodes: 0  Histologic grade (G): G2  Histologic grading system: 3 grade system       TREATMENT INDICATION:   curative     CONCURRENT SYSTEMIC TREATMENT:   none     RT COURSE DISCONTINUED EARLY:   No     PATIENT EXPERIENCE:        No data to display                 FOLLOW-UP PLAN:   8 Weeks     COMMENT:          ANATOMIC TARGET SUMMARY    ANATOMIC TARGET MODALITY TECHNIQUE   pancreas   External beam, photons SBRT            COMMENT:         DIAGRAMS:      DOSE VOLUME HISTOGRAMS:

## 2024-03-01 DIAGNOSIS — C90.00 MULTIPLE MYELOMA NOT HAVING ACHIEVED REMISSION (HCC): ICD-10-CM

## 2024-03-01 DIAGNOSIS — Z79.899 ENCOUNTER FOR LONG-TERM CURRENT USE OF HIGH RISK MEDICATION: ICD-10-CM

## 2024-03-01 DIAGNOSIS — C25.9 PANCREATIC ADENOCARCINOMA (HCC): ICD-10-CM

## 2024-03-07 DIAGNOSIS — C90.00 MULTIPLE MYELOMA NOT HAVING ACHIEVED REMISSION (HCC): ICD-10-CM

## 2024-03-07 DIAGNOSIS — Z79.899 ENCOUNTER FOR LONG-TERM CURRENT USE OF HIGH RISK MEDICATION: ICD-10-CM

## 2024-03-07 DIAGNOSIS — C25.9 PANCREATIC ADENOCARCINOMA (HCC): ICD-10-CM

## 2024-04-09 ENCOUNTER — APPOINTMENT (OUTPATIENT)
Dept: RADIOLOGY | Facility: MEDICAL CENTER | Age: 69
End: 2024-04-09
Attending: RADIOLOGY
Payer: MEDICARE

## 2024-04-15 ENCOUNTER — HOSPITAL ENCOUNTER (OUTPATIENT)
Dept: RADIATION ONCOLOGY | Facility: MEDICAL CENTER | Age: 69
End: 2024-04-15
Attending: RADIOLOGY
Payer: MEDICARE

## 2024-04-15 VITALS
HEIGHT: 68 IN | WEIGHT: 135 LBS | OXYGEN SATURATION: 95 % | BODY MASS INDEX: 20.46 KG/M2 | SYSTOLIC BLOOD PRESSURE: 116 MMHG | DIASTOLIC BLOOD PRESSURE: 72 MMHG | TEMPERATURE: 98.6 F

## 2024-04-15 DIAGNOSIS — C25.1 MALIGNANT NEOPLASM OF BODY OF PANCREAS (HCC): ICD-10-CM

## 2024-04-15 ASSESSMENT — PAIN SCALES - GENERAL: PAINLEVEL: 2=MINIMAL-SLIGHT

## 2024-04-15 ASSESSMENT — FIBROSIS 4 INDEX: FIB4 SCORE: 1.2

## 2024-04-15 NOTE — PROGRESS NOTES
Virtual Visit: Established Patient   This visit was conducted via Zoom using secure and encrypted videoconferencing technology.   The patient was in their home in the [facility].    The patient's identity was confirmed and verbal consent was obtained for this virtual visit.    Subjective:   CC: Pancreas Cancer    Lucas Rojas is a 69 y.o. male presenting for evaluation and management of:      HISTORY OF PRESENT ILLNESS:   Patient is a pleasant 68-year-old male with originally presenting with abdominal pain and underwent CT chest July 28, 2023 which showed 3.5 cm body pancreas mass underwent EUS August 23, 2024 by Dr. Dorsey which showed mass in the proximal pancreatic body 45 x 39 mm clinical T3 N0 M0 invasive moderately differentiated adenocarcinoma.  Patient started on modified FOLFIRINOX September 20, 2023 completed cycle 8 December 27, 2023.  Patient had follow-up pancreas protocol January 11, 2024 which showed stable mass with encasement of common hepatic artery proximal superior mesenteric and proximal splenic artery with duodenal wall thickening.  Patient had CA 19-9 December 26, 2023 14.  Patient was seen by Dr. Escalera who discussed that this was unresectable.      Interval Hx:  Patient completed pancreas SBRT 40 Gray in 5 fractions February 28, 2024.  Doing well minimal abdominal pain.  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  Had interval CT abdomen April 8, 2024 which shows stable 3.8 cm hypodense lesion pancreas.    ROS   Comprehensive review of systems is negative with the exception of the aforementioned HPI, PMH, and PSH bullets in accordance with CMS guidelines.      Current medicines (including changes today)  Current Outpatient Medications   Medication Sig Dispense Refill    Pancrelipase, Lip-Prot-Amyl, 58160-581943 units Cap DR Particles Take 36,000 Lipoprotein Lipase Releasing Units by mouth 3 times a day before meals. 300 Capsule 3    DIGESTIVE  ENZYMES PO       Omega-3 Fatty Acids (FISH OIL) 1000 MG Cap capsule Take 1,000 mg by mouth 3 times a day with meals.      Loperamide HCl (ANTI-DIARRHEAL PO) Take  by mouth as needed.      therapeutic multivitamin-minerals (THERAGRAN-M) Tab Take 1 Tablet by mouth every day.      folic acid (FOLVITE) 1 MG Tab Take 1 mg by mouth every day.      Cyanocobalamin (VITAMIN B 12 PO) Take 1 Tablet by mouth every day. 2500 mcg      ferrous sulfate 325 (65 Fe) MG tablet Take 325 mg by mouth every day.      metoprolol SR (TOPROL XL) 25 MG TABLET SR 24 HR Take 25 mg by mouth at bedtime.      allopurinol (ZYLOPRIM) 100 MG Tab Take 100 mg by mouth every day.      FIBER PO Take  by mouth every day. (Patient not taking: Reported on 4/15/2024)      promethazine (PHENERGAN) 25 MG Tab Take 0.5-1 Tablets by mouth every 6 hours as needed for Nausea/Vomiting. (Patient not taking: Reported on 2/15/2024) 30 Tablet 1    ondansetron (ZOFRAN) 4 MG Tab tablet Take 1 Tablet by mouth every four hours as needed for Nausea/Vomiting (for nausea, vomiting). (Patient not taking: Reported on 4/15/2024) 30 Tablet 6    prochlorperazine (COMPAZINE) 10 MG Tab Take 1 Tablet by mouth every 6 hours as needed (for nausea, vomiting). (Patient not taking: Reported on 2/15/2024) 30 Tablet 6     No current facility-administered medications for this encounter.       Patient Active Problem List    Diagnosis Date Noted    Acute post-hemorrhagic anemia 10/07/2023    ACP (advance care planning) 10/06/2023    Cancer related pain 09/13/2023    Malignant neoplasm of body of pancreas (HCC) 09/06/2023    Nerve root and plexus compressions in diseases classified elsewhere 09/06/2023    Coronary artery disease involving native coronary artery without angina pectoris 03/06/2019    Hypoxia 02/23/2019    Tophaceous gout 02/22/2019    Dyslipidemia 02/22/2019    Essential hypertension 02/22/2019    Class 1 obesity due to excess calories without serious comorbidity with body mass  "index (BMI) of 31.0 to 31.9 in adult 02/22/2019    Spinal stenosis, lumbar region, without neurogenic claudication 04/04/2017    Spondylolisthesis of lumbar region 12/15/2015        Objective:   /72   Temp 37 °C (98.6 °F)   Ht 1.727 m (5' 8\")   Wt 61.2 kg (135 lb)   SpO2 95%   BMI 20.53 kg/m²     Physical Exam:  Constitutional: Alert, no distress, well-groomed.  Skin: No rashes in visible areas.  Eye: Round. Conjunctiva clear, lids normal. No icterus.   ENMT: Lips pink without lesions, good dentition, moist mucous membranes. Phonation normal.  Neck: No masses, no thyromegaly. Moves freely without pain.  Respiratory: Unlabored respiratory effort, no cough or audible wheeze  Psych: Alert and oriented x3, normal affect and mood.     Assessment and Plan:   The following treatment plan was discussed:    Cancer Staging   Malignant neoplasm of body of pancreas (HCC)  Staging form: Exocrine Pancreas, AJCC 8th Edition  - Clinical stage from 1/24/2024: Stage III (cT4, cN0, cM0) - Signed by Manuel Martinez M.D. on 1/24/2024        Follow-up:  Call re post TB recommendation 4/24 8am. Patient would like to consider surgery with Dr. Escalera. Repeat CT chest and pancreas 3 months with Ca199.         "

## 2024-04-24 ENCOUNTER — HOSPITAL ENCOUNTER (OUTPATIENT)
Dept: RADIATION ONCOLOGY | Facility: MEDICAL CENTER | Age: 69
End: 2024-04-24
Attending: RADIOLOGY
Payer: MEDICARE

## 2024-04-24 NOTE — PROGRESS NOTES
Called patient post GI tumor board 4/24/24 which shows celiac axis involvement, will see Dr. Escalera to discuss but not a good surgical candidate due to celiac axis involvement. Recommend repeat scans in 3 months.

## 2024-05-16 ENCOUNTER — TELEPHONE (OUTPATIENT)
Dept: SURGICAL ONCOLOGY | Facility: MEDICAL CENTER | Age: 69
End: 2024-05-16
Payer: MEDICARE

## 2024-05-16 NOTE — TELEPHONE ENCOUNTER
I called patient to schedule he states he would like to know before he schedules an appointment if surgery is an option. If it is not an option he does not want to schedule at this time.     Please advise.         ----- Message from Srikanth Escalera M.D. sent at 5/14/2024 12:56 PM PDT -----  Regarding: FW: post SBRT radiation pancreas  Can we get him in to see me?      Thanks  Candelario  ----- Message -----  From: Manuel Martinez M.D.  Sent: 4/16/2024   9:35 AM PDT  To: Srikanth Escalera M.D.  Subject: RE: post SBRT radiation pancreas                 Ok great.. can you get him back on your schedule to discuss as well in person?    ----- Message -----  From: Srikanth Escalera M.D.  Sent: 4/16/2024   8:57 AM PDT  To: Manuel Martinez M.D.  Subject: RE: post SBRT radiation pancreas                 I took a look at it.  Its hard to tell what is inflammation vs tumor at this point.  It still looks like he has soft tissue thickening around the splenic artery and hepatic artery, might be tracking to the celiac but hard to tell.  Lets see what TB thinks but I think its still low likelihood we can get this thing out, we could give it a shot knowing that higher than normal likelihood that we abort the operation.    ----- Message -----  From: Manuel Martinez M.D.  Sent: 4/15/2024   1:09 PM PDT  To: Srikanth Escalera M.D.  Subject: post SBRT radiation pancreas                     Can you take a look at post SBRT pancreas cancer ct scan 4/8/24? He's highly interested in surgery, this the shiela that had the EUS with nasreen who thought it looked like it peeled back. I'm throwing him on TB 4/24

## 2024-09-03 ENCOUNTER — TELEPHONE (OUTPATIENT)
Dept: HEALTH INFORMATION MANAGEMENT | Facility: OTHER | Age: 69
End: 2024-09-03
Payer: MEDICARE

## 2024-10-18 ENCOUNTER — OFFICE VISIT (OUTPATIENT)
Dept: CARDIOLOGY | Facility: MEDICAL CENTER | Age: 69
End: 2024-10-18
Attending: INTERNAL MEDICINE
Payer: MEDICARE

## 2024-10-18 ENCOUNTER — TELEPHONE (OUTPATIENT)
Dept: CARDIOLOGY | Facility: MEDICAL CENTER | Age: 69
End: 2024-10-18

## 2024-10-18 VITALS
RESPIRATION RATE: 16 BRPM | HEIGHT: 68 IN | DIASTOLIC BLOOD PRESSURE: 66 MMHG | OXYGEN SATURATION: 96 % | HEART RATE: 67 BPM | WEIGHT: 134 LBS | SYSTOLIC BLOOD PRESSURE: 110 MMHG | BODY MASS INDEX: 20.31 KG/M2

## 2024-10-18 DIAGNOSIS — I50.22 CHRONIC HFREF (HEART FAILURE WITH REDUCED EJECTION FRACTION) (HCC): ICD-10-CM

## 2024-10-18 DIAGNOSIS — I25.5 ISCHEMIC CARDIOMYOPATHY: ICD-10-CM

## 2024-10-18 DIAGNOSIS — R06.00 DYSPNEA, UNSPECIFIED TYPE: ICD-10-CM

## 2024-10-18 DIAGNOSIS — I25.10 CORONARY ARTERY DISEASE INVOLVING NATIVE CORONARY ARTERY WITHOUT ANGINA PECTORIS, UNSPECIFIED WHETHER NATIVE OR TRANSPLANTED HEART: ICD-10-CM

## 2024-10-18 DIAGNOSIS — E78.5 DYSLIPIDEMIA: ICD-10-CM

## 2024-10-18 DIAGNOSIS — I10 ESSENTIAL HYPERTENSION: ICD-10-CM

## 2024-10-18 DIAGNOSIS — I24.0 ISCHEMIC HEART DISEASE DUE TO CORONARY ARTERY OBSTRUCTION (HCC): ICD-10-CM

## 2024-10-18 DIAGNOSIS — I25.9 ISCHEMIC HEART DISEASE DUE TO CORONARY ARTERY OBSTRUCTION (HCC): ICD-10-CM

## 2024-10-18 LAB — EKG IMPRESSION: NORMAL

## 2024-10-18 PROCEDURE — 93005 ELECTROCARDIOGRAM TRACING: CPT | Performed by: INTERNAL MEDICINE

## 2024-10-18 PROCEDURE — 93010 ELECTROCARDIOGRAM REPORT: CPT | Performed by: INTERNAL MEDICINE

## 2024-10-18 PROCEDURE — 3078F DIAST BP <80 MM HG: CPT | Performed by: INTERNAL MEDICINE

## 2024-10-18 PROCEDURE — 99214 OFFICE O/P EST MOD 30 MIN: CPT | Mod: 25 | Performed by: INTERNAL MEDICINE

## 2024-10-18 PROCEDURE — 99213 OFFICE O/P EST LOW 20 MIN: CPT | Performed by: INTERNAL MEDICINE

## 2024-10-18 PROCEDURE — 3074F SYST BP LT 130 MM HG: CPT | Performed by: INTERNAL MEDICINE

## 2024-10-18 RX ORDER — LOSARTAN POTASSIUM 25 MG/1
25 TABLET ORAL DAILY
Qty: 90 TABLET | Refills: 3 | Status: SHIPPED | OUTPATIENT
Start: 2024-10-18

## 2024-10-18 RX ORDER — EMPAGLIFLOZIN 10 MG/1
10 TABLET, FILM COATED ORAL DAILY
Qty: 90 TABLET | Refills: 3 | Status: SHIPPED | OUTPATIENT
Start: 2024-10-18

## 2024-10-18 RX ORDER — EZETIMIBE 10 MG/1
10 TABLET ORAL DAILY
Qty: 90 TABLET | Refills: 3 | Status: SHIPPED | OUTPATIENT
Start: 2024-10-18

## 2024-10-18 RX ORDER — METOPROLOL SUCCINATE 50 MG/1
50 TABLET, EXTENDED RELEASE ORAL
Qty: 90 TABLET | Refills: 3 | Status: SHIPPED | OUTPATIENT
Start: 2024-10-18

## 2024-10-18 RX ORDER — ASPIRIN 81 MG/1
81 TABLET ORAL DAILY
COMMUNITY

## 2024-10-18 ASSESSMENT — FIBROSIS 4 INDEX: FIB4 SCORE: 1.2

## 2024-10-24 ENCOUNTER — TELEPHONE (OUTPATIENT)
Dept: HEMATOLOGY ONCOLOGY | Facility: MEDICAL CENTER | Age: 69
End: 2024-10-24
Payer: MEDICARE

## 2024-10-28 ENCOUNTER — HOSPITAL ENCOUNTER (OUTPATIENT)
Dept: HEMATOLOGY ONCOLOGY | Facility: MEDICAL CENTER | Age: 69
End: 2024-10-28
Attending: FAMILY MEDICINE
Payer: MEDICARE

## 2024-10-28 DIAGNOSIS — C25.1 MALIGNANT NEOPLASM OF BODY OF PANCREAS (HCC): ICD-10-CM

## 2024-10-28 DIAGNOSIS — R10.13 EPIGASTRIC PAIN: ICD-10-CM

## 2024-10-28 PROCEDURE — 99214 OFFICE O/P EST MOD 30 MIN: CPT | Mod: 95 | Performed by: FAMILY MEDICINE

## 2024-10-29 ENCOUNTER — APPOINTMENT (OUTPATIENT)
Dept: ADMISSIONS | Facility: MEDICAL CENTER | Age: 69
End: 2024-10-29
Attending: FAMILY MEDICINE
Payer: MEDICARE

## 2024-11-03 ASSESSMENT — ENCOUNTER SYMPTOMS
COUGH: 0
VOMITING: 0
DIARRHEA: 0
NAUSEA: 0
PALPITATIONS: 0
ABDOMINAL PAIN: 1
SHORTNESS OF BREATH: 0
CHILLS: 0
FEVER: 0
CONSTIPATION: 0

## 2024-11-03 NOTE — PROGRESS NOTES
Virtual Visit: Established Patient   This visit was conducted via Teams using secure and encrypted videoconferencing technology.   The patient was in a private location outside of their home in the state of [place].    The patient's identity was confirmed and verbal consent was obtained for this virtual visit.     Subjective:   CC:   Chief Complaint   Patient presents with    Cancer     FV       Lucas Rojas is a 69 y.o. male presenting to palliative care for follow-up on symptom management for oncological pain.  Patient reports since he last visit with us he completed treatments for his pancreatic cancer and at last follow-up with radiology had stable cancer.  He shared that he is planning to take a palliative approach to his treatment and not pursue any disease modifying options.  He would like to discuss today some worsening epigastric pain which was previously successfully treated with a celiac plexus nerve block approximately 10 months ago.  Does not endorse any significant nausea vomiting or constipation or diarrhea.  Pain does not radiate.  We did discuss this could potentially be an indication of progression of his cancer.  Through shared decision making we discussed that we will continue to take an approach to quality through management of symptoms and not cancer related treatments.  Concluded the visit with a plan to order his nerve block and follow-up as needed based on its outcome.    ROS   Review of Systems   Constitutional:  Negative for chills and fever.   Respiratory:  Negative for cough and shortness of breath.    Cardiovascular:  Negative for chest pain and palpitations.   Gastrointestinal:  Positive for abdominal pain. Negative for constipation, diarrhea, nausea and vomiting.       Current medicines (including changes today)  Current Outpatient Medications   Medication Sig Dispense Refill    ezetimibe (ZETIA) 10 MG Tab Take 1 Tablet by mouth every day. 90 Tablet 3    metoprolol SR (TOPROL  XL) 50 MG TABLET SR 24 HR Take 1 Tablet by mouth at bedtime. 90 Tablet 3    losartan (COZAAR) 25 MG Tab Take 1 Tablet by mouth every day. 90 Tablet 3    Empagliflozin (JARDIANCE) 10 MG Tab tablet Take 1 Tablet by mouth every day. 90 Tablet 3    aspirin 81 MG EC tablet Take 81 mg by mouth every day.      Pancrelipase, Lip-Prot-Amyl, 20347-577363 units Cap DR Particles Take 36,000 Lipoprotein Lipase Releasing Units by mouth 3 times a day before meals. 300 Capsule 3    DIGESTIVE ENZYMES PO       Omega-3 Fatty Acids (FISH OIL) 1000 MG Cap capsule Take 1,000 mg by mouth 3 times a day with meals.      FIBER PO Take  by mouth every day.      Loperamide HCl (ANTI-DIARRHEAL PO) Take  by mouth as needed.      therapeutic multivitamin-minerals (THERAGRAN-M) Tab Take 1 Tablet by mouth every day.      folic acid (FOLVITE) 1 MG Tab Take 1 mg by mouth every day.      Cyanocobalamin (VITAMIN B 12 PO) Take 1 Tablet by mouth every day. 2500 mcg      ferrous sulfate 325 (65 Fe) MG tablet Take 325 mg by mouth every day.      promethazine (PHENERGAN) 25 MG Tab Take 0.5-1 Tablets by mouth every 6 hours as needed for Nausea/Vomiting. 30 Tablet 1    ondansetron (ZOFRAN) 4 MG Tab tablet Take 1 Tablet by mouth every four hours as needed for Nausea/Vomiting (for nausea, vomiting). 30 Tablet 6    allopurinol (ZYLOPRIM) 100 MG Tab Take 100 mg by mouth every day.       No current facility-administered medications for this encounter.       Patient Active Problem List    Diagnosis Date Noted    Acute post-hemorrhagic anemia 10/07/2023    ACP (advance care planning) 10/06/2023    Cancer related pain 09/13/2023    Malignant neoplasm of body of pancreas (HCC) 09/06/2023    Nerve root and plexus compressions in diseases classified elsewhere 09/06/2023    Coronary artery disease involving native coronary artery without angina pectoris 03/06/2019    Hypoxia 02/23/2019    Tophaceous gout 02/22/2019    Dyslipidemia 02/22/2019    Essential hypertension  02/22/2019    Class 1 obesity due to excess calories without serious comorbidity with body mass index (BMI) of 31.0 to 31.9 in adult 02/22/2019    Spinal stenosis, lumbar region, without neurogenic claudication 04/04/2017    Spondylolisthesis of lumbar region 12/15/2015        Objective:   There were no vitals taken for this visit.    Physical Exam:  Constitutional: Alert, no distress, well-groomed.  Skin: No rashes in visible areas.  Eye: Round. Conjunctiva clear, lids normal. No icterus.   ENMT: Lips pink without lesions, good dentition, moist mucous membranes. Phonation normal.  Neck: No masses, no thyromegaly. Moves freely without pain.  Respiratory: Unlabored respiratory effort, no cough or audible wheeze  Psych: Alert and oriented x3, normal affect and mood.     Assessment and Plan:   The following treatment plan was discussed: Patient with history of pancreatic cancer.  Has completed most recently radiation.  Patient is electing a palliative approach to his symptoms and is not pursuing any disease modifying treatments.  Has had a return of epigastric pain.  Will see if a repeat of his nerve block will address the symptoms.  May be an indication of progression of his cancer.  Will follow-up as needed.    1. Malignant neoplasm of body of pancreas (HCC)  - CT-INJECT NERV BLOCK,CELIAC PLEX; Future    2. Epigastric pain  - CT-INJECT NERV BLOCK,CELIAC PLEX; Future    28 minutes in total spent on patient encounter including chart review and consultation with patient oncological providers.    Follow-up: Return if symptoms worsen or fail to improve.

## 2024-11-04 ENCOUNTER — APPOINTMENT (OUTPATIENT)
Dept: ADMISSIONS | Facility: MEDICAL CENTER | Age: 69
End: 2024-11-04
Attending: FAMILY MEDICINE
Payer: MEDICARE

## 2024-11-04 NOTE — OR NURSING
Preadmit phone call completed with pt. Chart updated. Pt states unable to come to [facility] prior to procedure for testing. Testing to be done day of procedure.

## 2024-11-13 ENCOUNTER — PATIENT MESSAGE (OUTPATIENT)
Dept: CARDIOLOGY | Facility: MEDICAL CENTER | Age: 69
End: 2024-11-13
Payer: MEDICARE

## 2024-11-14 NOTE — PATIENT COMMUNICATION
Matt MASON M.D.  You47 minutes ago (2:58 PM)     If with symptoms and intolerance. He can stop the medication.    CHIKIS Huff response sent to pt.

## 2024-11-15 NOTE — OR NURSING
Pt encouraged to drink 16 oz of electrolyte fluid night before procedure and to drink water up until 2 hours before procedure.

## 2024-11-18 ENCOUNTER — HOSPITAL ENCOUNTER (OUTPATIENT)
Facility: MEDICAL CENTER | Age: 69
End: 2024-11-18
Attending: FAMILY MEDICINE | Admitting: FAMILY MEDICINE
Payer: MEDICARE

## 2024-11-18 ENCOUNTER — APPOINTMENT (OUTPATIENT)
Dept: RADIOLOGY | Facility: MEDICAL CENTER | Age: 69
End: 2024-11-18
Attending: FAMILY MEDICINE
Payer: MEDICARE

## 2024-11-18 VITALS
WEIGHT: 134.7 LBS | SYSTOLIC BLOOD PRESSURE: 102 MMHG | TEMPERATURE: 97.7 F | RESPIRATION RATE: 14 BRPM | DIASTOLIC BLOOD PRESSURE: 66 MMHG | HEART RATE: 80 BPM | HEIGHT: 68 IN | OXYGEN SATURATION: 96 % | BODY MASS INDEX: 20.41 KG/M2

## 2024-11-18 DIAGNOSIS — C25.1 MALIGNANT NEOPLASM OF BODY OF PANCREAS (HCC): ICD-10-CM

## 2024-11-18 DIAGNOSIS — R10.13 EPIGASTRIC PAIN: ICD-10-CM

## 2024-11-18 LAB
ERYTHROCYTE [DISTWIDTH] IN BLOOD BY AUTOMATED COUNT: 53.2 FL (ref 35.9–50)
HCT VFR BLD AUTO: 30.7 % (ref 42–52)
HGB BLD-MCNC: 9.7 G/DL (ref 14–18)
INR PPP: 1.24 (ref 0.87–1.13)
MCH RBC QN AUTO: 32.1 PG (ref 27–33)
MCHC RBC AUTO-ENTMCNC: 31.6 G/DL (ref 32.3–36.5)
MCV RBC AUTO: 101.7 FL (ref 81.4–97.8)
PLATELET # BLD AUTO: 174 K/UL (ref 164–446)
PMV BLD AUTO: 9.6 FL (ref 9–12.9)
PROTHROMBIN TIME: 15.6 SEC (ref 12–14.6)
RBC # BLD AUTO: 3.02 M/UL (ref 4.7–6.1)
WBC # BLD AUTO: 5.4 K/UL (ref 4.8–10.8)

## 2024-11-18 PROCEDURE — 160036 HCHG PACU - EA ADDL 30 MINS PHASE I

## 2024-11-18 PROCEDURE — 700101 HCHG RX REV CODE 250: Performed by: RADIOLOGY

## 2024-11-18 PROCEDURE — 85027 COMPLETE CBC AUTOMATED: CPT

## 2024-11-18 PROCEDURE — 160002 HCHG RECOVERY MINUTES (STAT)

## 2024-11-18 PROCEDURE — 700111 HCHG RX REV CODE 636 W/ 250 OVERRIDE (IP): Mod: JZ

## 2024-11-18 PROCEDURE — 700105 HCHG RX REV CODE 258: Performed by: RADIOLOGY

## 2024-11-18 PROCEDURE — 85610 PROTHROMBIN TIME: CPT

## 2024-11-18 PROCEDURE — 160035 HCHG PACU - 1ST 60 MINS PHASE I

## 2024-11-18 PROCEDURE — 160047 HCHG PACU  - EA ADDL 30 MINS PHASE II

## 2024-11-18 PROCEDURE — 160046 HCHG PACU - 1ST 60 MINS PHASE II

## 2024-11-18 PROCEDURE — 99153 MOD SED SAME PHYS/QHP EA: CPT

## 2024-11-18 PROCEDURE — 700111 HCHG RX REV CODE 636 W/ 250 OVERRIDE (IP): Mod: JZ | Performed by: RADIOLOGY

## 2024-11-18 RX ORDER — HYDROCODONE BITARTRATE AND ACETAMINOPHEN 5; 325 MG/1; MG/1
1 TABLET ORAL EVERY 6 HOURS PRN
COMMUNITY

## 2024-11-18 RX ORDER — ONDANSETRON 2 MG/ML
4 INJECTION INTRAMUSCULAR; INTRAVENOUS PRN
Status: ACTIVE | OUTPATIENT
Start: 2024-11-18 | End: 2024-11-18

## 2024-11-18 RX ORDER — MIDAZOLAM HYDROCHLORIDE 1 MG/ML
.5-2 INJECTION INTRAMUSCULAR; INTRAVENOUS PRN
Status: ACTIVE | OUTPATIENT
Start: 2024-11-18 | End: 2024-11-18

## 2024-11-18 RX ORDER — MIDAZOLAM HYDROCHLORIDE 1 MG/ML
INJECTION INTRAMUSCULAR; INTRAVENOUS
Status: COMPLETED
Start: 2024-11-18 | End: 2024-11-18

## 2024-11-18 RX ORDER — ONDANSETRON 2 MG/ML
4 INJECTION INTRAMUSCULAR; INTRAVENOUS EVERY 8 HOURS PRN
Status: DISCONTINUED | OUTPATIENT
Start: 2024-11-18 | End: 2024-11-18 | Stop reason: HOSPADM

## 2024-11-18 RX ORDER — SODIUM CHLORIDE 9 MG/ML
500 INJECTION, SOLUTION INTRAVENOUS
Status: DISPENSED | OUTPATIENT
Start: 2024-11-18 | End: 2024-11-18

## 2024-11-18 RX ORDER — ALCOHOL 1 ML/ML
30 INJECTION, SOLUTION PERCUTANEOUS ONCE
Status: DISCONTINUED | OUTPATIENT
Start: 2024-11-18 | End: 2024-11-18 | Stop reason: HOSPADM

## 2024-11-18 RX ORDER — HYDROMORPHONE HYDROCHLORIDE 1 MG/ML
INJECTION, SOLUTION INTRAMUSCULAR; INTRAVENOUS; SUBCUTANEOUS
Status: DISCONTINUED
Start: 2024-11-18 | End: 2024-11-18 | Stop reason: HOSPADM

## 2024-11-18 RX ORDER — HYDROMORPHONE HYDROCHLORIDE 1 MG/ML
0.5 INJECTION, SOLUTION INTRAMUSCULAR; INTRAVENOUS; SUBCUTANEOUS PRN
Status: ACTIVE | OUTPATIENT
Start: 2024-11-18 | End: 2024-11-18

## 2024-11-18 RX ORDER — ACETAMINOPHEN 325 MG/1
1000 TABLET ORAL EVERY 4 HOURS PRN
COMMUNITY

## 2024-11-18 RX ADMIN — MIDAZOLAM HYDROCHLORIDE 2 MG: 1 INJECTION INTRAMUSCULAR; INTRAVENOUS at 14:19

## 2024-11-18 RX ADMIN — FENTANYL CITRATE 50 MCG: 50 INJECTION, SOLUTION INTRAMUSCULAR; INTRAVENOUS at 14:42

## 2024-11-18 RX ADMIN — FENTANYL CITRATE 50 MCG: 50 INJECTION, SOLUTION INTRAMUSCULAR; INTRAVENOUS at 14:19

## 2024-11-18 RX ADMIN — FENTANYL CITRATE 25 MCG: 50 INJECTION, SOLUTION INTRAMUSCULAR; INTRAVENOUS at 14:43

## 2024-11-18 RX ADMIN — FENTANYL CITRATE 25 MCG: 50 INJECTION, SOLUTION INTRAMUSCULAR; INTRAVENOUS at 14:30

## 2024-11-18 RX ADMIN — FENTANYL CITRATE 25 MCG: 50 INJECTION, SOLUTION INTRAMUSCULAR; INTRAVENOUS at 14:44

## 2024-11-18 RX ADMIN — FENTANYL CITRATE 25 MCG: 50 INJECTION, SOLUTION INTRAMUSCULAR; INTRAVENOUS at 14:34

## 2024-11-18 RX ADMIN — MIDAZOLAM HYDROCHLORIDE 2 MG: 1 INJECTION, SOLUTION INTRAMUSCULAR; INTRAVENOUS at 14:19

## 2024-11-18 ASSESSMENT — FIBROSIS 4 INDEX: FIB4 SCORE: 1.2

## 2024-11-18 NOTE — PROGRESS NOTES
Pt presents to CT4. Patient was consented by MD at bedside, confirmed by this RN and consent at bedside. Pt transferred to CT table in Prone position. Patient underwent a Plexus nerve block by Dr. Villa. Procedure site was marked by MD and verified using imaging guidance. Pt placed on monitor, prepped and draped in a sterile fashion. Vitals were taken every 5 minutes and remained stable during procedure (see doc flow sheet for results). CO2 waveform capnography was monitored and remained WNL throughout procedure.  Pt transported by bed with RN to PPU.

## 2024-11-18 NOTE — DISCHARGE INSTRUCTIONS
What can I expect after the procedure?  After the procedure, it is common to feel some discomfort at the injection (puncture) site. Layne may have a temporary increase in blood sugar if steroids were used with the injection.  Follow these instructions at home:  Injection site care  Take off your bandage (dressing) 24 hours after your procedure, or as told by your health care provider.  Check your injection site every day for signs of infection. Check for:  Redness, swelling, or pain.  Warmth.  Fluid or blood.  Pus or a bad smell.  General instructions  Take over-the-counter and prescription medicines only as told by your health care provider.  Do not take baths, swim, or use a hot tub for 5 days.  Contact a health care provider if:  You have more redness, swelling, or pain around your injection site.  You have fluid or blood coming from your injection site.  You have pus or a bad smell coming from your injection site.  Your injection site feels warm to the touch.  You have a fever.  You have diabetes and your blood sugar remains above 180 mg/dL.  Get help right away if:  You have:  Shortness of breath.  A severe headache.  Pain that gets worse.  Or Chest pain  You develop weakness or loss of feeling (numbness).  You develop muscle weakness or a loss of movement (paralysis).  Summary  Do not drive or operate machinery for 24 hours.  Check your injection site every day for signs of infection.  No submersion of site for 5 days.  This information is not intended to replace advice given to you by your health care provider. Make sure you discuss any questions you have with your health care provider.  Document Revised: 08/12/2022 Document Reviewed: 08/12/2022  Elsevier Patient Education © 2023 [employer]

## 2024-11-18 NOTE — PROGRESS NOTES
1454 - patient arrived to pacu.  2 identifiers verified, attached to monitors, alarms/parameters verified, and received report.  Abdominal site assessed with IR RN.  Site is clean, dry, and soft with tegaderm and gauze dressing in place.  Oriented to unit and plan of care discussed.   Patient declines pain and nausea.  Call light within reach, no other needs at this time.    1508 - phoned patient's significant other with updates.  They are making their way back to unit to be with patient bedside.     1515 - patient's wife bedside    1525 -  Dr Villa bedside, reviewed case with patient and their wife.  Per Dr Villa, okay for patient to eat/drink and to ambulate to restroom.  MD to place DC orders, wants recovery time to be 3 hours.  No other questions from patient/their wife.    1600 - patient sitting edge of bed eating.  No other needs.    1720 - patient ambulated to restroom. Standby assist. Changed into personal clothing.     1730 - DC instructions reviewed with patient and their wife.  All questions answered from patient and their wife.  No other needs at this time. Site assessed and remains clean, dry, and soft.    1800 - patient discharged via wheelchair by RN.  All belongings accounted for.  Patient's wife providing transportation home.

## 2024-11-18 NOTE — OR SURGEON
Immediate Post- Operative Note        Findings: NEEDLE AND CONTRAST IN SATISFACTORY POSITION AT TARGETED CELIAC PLEXUS      Procedure(s): CELIAC PLEXUS BLOCK    20G CHIBA NEEDLE. MIDLINE ANTERIOR APPROACH    2 ML DILUTE OMNIPAQUE FOR LOCALIZATION    ETOH (DEHYDRATED) 30 ML    GOOD DISTRIBUTION      Estimated Blood Loss: Less than 5 ml        Complications: None            11/18/2024     3:59 PM     Daniel Villa M.D.

## 2024-11-26 ENCOUNTER — TELEPHONE (OUTPATIENT)
Dept: HEMATOLOGY ONCOLOGY | Facility: MEDICAL CENTER | Age: 69
End: 2024-11-26
Payer: MEDICARE

## 2024-11-26 NOTE — TELEPHONE ENCOUNTER
Received call from [facility] requesting an updated order for a CT Abdomen/Pelvis with and without (they received the CT order from Dr Issa 01/2024 but the Pt never got the imagine done).  Reported that our Oncology office has not seen the Pt since January of this year.  Stated that we would need to get the Pt reestablished in our office before we would consider providing a new order.  [facility] will reach out to the Pt letting him know to contact our office.

## 2024-12-06 ENCOUNTER — HOSPITAL ENCOUNTER (OUTPATIENT)
Dept: HEMATOLOGY ONCOLOGY | Facility: MEDICAL CENTER | Age: 69
End: 2024-12-06
Attending: NURSE PRACTITIONER
Payer: MEDICARE

## 2024-12-06 DIAGNOSIS — K90.9 DIARRHEA DUE TO MALABSORPTION: ICD-10-CM

## 2024-12-06 DIAGNOSIS — R19.7 DIARRHEA DUE TO MALABSORPTION: ICD-10-CM

## 2024-12-06 DIAGNOSIS — K86.89 PANCREATIC INSUFFICIENCY: ICD-10-CM

## 2024-12-06 DIAGNOSIS — Z85.07 ENCOUNTER FOR FOLLOW-UP SURVEILLANCE OF PANCREATIC CANCER: ICD-10-CM

## 2024-12-06 DIAGNOSIS — D62 ACUTE POST-HEMORRHAGIC ANEMIA: ICD-10-CM

## 2024-12-06 DIAGNOSIS — Z71.89 CARE PLAN DISCUSSED WITH PATIENT: ICD-10-CM

## 2024-12-06 DIAGNOSIS — G89.3 CANCER RELATED PAIN: ICD-10-CM

## 2024-12-06 DIAGNOSIS — C25.1 MALIGNANT NEOPLASM OF BODY OF PANCREAS (HCC): ICD-10-CM

## 2024-12-06 DIAGNOSIS — Z08 ENCOUNTER FOR FOLLOW-UP SURVEILLANCE OF PANCREATIC CANCER: ICD-10-CM

## 2024-12-06 PROCEDURE — 99214 OFFICE O/P EST MOD 30 MIN: CPT | Mod: 95 | Performed by: NURSE PRACTITIONER

## 2024-12-06 ASSESSMENT — ENCOUNTER SYMPTOMS
NAUSEA: 0
WEIGHT LOSS: 0
DIARRHEA: 1
MYALGIAS: 0
CHILLS: 0
WHEEZING: 0
CONSTIPATION: 0
FEVER: 0
PALPITATIONS: 0
INSOMNIA: 1
COUGH: 0
VOMITING: 0
HEADACHES: 0
SHORTNESS OF BREATH: 0
DIZZINESS: 1
TINGLING: 1

## 2024-12-06 NOTE — Clinical Note
Can you look my note over? I don't think he'd want to come to [place], but just looking at options for him given limitation of his geographic area - Dr. Culp tried really hard to manage things out there...

## 2024-12-06 NOTE — LETTER
[facility]   [address] [place] [address],   [address]  [place] [address]  Phone: [phone]  Fax: [phone]              Lucas Rojas  1955    Encounter Date: 12/6/2024    IZZY Montaño          PROGRESS NOTE:  Subjective    Marino Rojas is a 69 y.o. male who presents with Pancreatic Cancer (Luis Manuel/CT scan fv)            HPI  Mr. Rojas presents for review of recent imaging and discuss plan of care pertaining to stage IIa, cT3 N0 M0, invasive moderately differentiated pancreatic adenocarcinoma. Visit is conducted as an on demand video visit using Teams and patient is accompanied by his wife today.    Patient was in his usual state of health until approximately 8/2023 when he noted increasing abdominal pain over the previous several months. CT completed 8/11/23 showed pancreatic mass of 3.8 x 2.9 cm, worrisome for malignancy. He underwent biopsy per upper EUS 8/23/23 with pathology confirming malignancy; CA 19-9 was 37 on 9/18/23. He completed 8 cycles neoadjuvant FOLFIRINOX on 9/20-12/27/23 (Oxal DR BEDOYA). He underwent celiac plexus block on 9/22/23.  Patient completed 5 fractions of palliative radiation to pancreas from 2/16-2/28/24 and subsequently opted for no further chemotherapy, proceeding with surveillance. He underwent repeat celiac plexus block on 11/18/24.     CT was ordered in January 2024 but never completed, patient desired completion then new order was provided.  Imaging does show progression of disease.  He continues with weight loss and is cachectic per video visit.  He has ongoing issues with heart rate and blood pressure for which she follows up with cardiology.  Pain is being managed per Dr. Culp in [place], he underwent repeat celiac plexus block on 11/18/2024.  He is taking approximately 13 Creon per day to help manage diarrhea and digestion.  He notes some dizziness which may correlate with intake/hydration.  He continues with residual neuropathy and may  benefit from gabapentin but will defer to PCP.  Extensive discussion with patient and spouse regarding imaging findings as well as personal desires, verified with Dr. Issa we will plan would be show patient desires to proceed with treatment.      Review of Systems   Constitutional:  Negative for chills, fever, malaise/fatigue and weight loss (still losing (recent weight 121 lbs this past Sunday)).   Respiratory:  Negative for cough, shortness of breath and wheezing.    Cardiovascular:  Negative for chest pain, palpitations and leg swelling.        Following up with cards for rate and pressure - has held BP meds for lower pressures, will discuss accordingly   Gastrointestinal:  Positive for abdominal pain (repeat celiac block 11/18; manging pain with Dr. Culp) and diarrhea (2-3 times per day, more controlled). Negative for constipation, nausea and vomiting.        13 Creon per day in total   Genitourinary:  Negative for dysuria.   Musculoskeletal:  Negative for joint pain and myalgias.   Neurological:  Positive for dizziness and tingling. Negative for headaches.   Psychiatric/Behavioral:  The patient has insomnia (consistent with baseline).        Allergies   Allergen Reactions    Crestor [Rosuvastatin]      Elevated LFTs       Current Outpatient Medications on File Prior to Encounter   Medication Sig Dispense Refill    HYDROcodone-acetaminophen (NORCO) 5-325 MG Tab per tablet Take 1 Tablet by mouth every 6 hours as needed (pain).      acetaminophen (TYLENOL) 325 MG Tab Take 1,000 mg by mouth every four hours as needed for Mild Pain.      Multiple Vitamins-Minerals (EYE HEALTH + LUTEIN PO) Take 1 Tablet by mouth every day.      metoprolol SR (TOPROL XL) 50 MG TABLET SR 24 HR Take 1 Tablet by mouth at bedtime. 90 Tablet 3    Pancrelipase, Lip-Prot-Amyl, 80317-035980 units Cap DR Particles Take 36,000 Lipoprotein Lipase Releasing Units by mouth 3 times a day before meals. (Patient taking differently: Take 36,000  Lipoprotein Lipase Releasing Units by mouth 3 times a day before meals. 3 times daily before meals and with snacks.) 300 Capsule 3    Omega-3 Fatty Acids (FISH OIL) 1000 MG Cap capsule Take 1,000 mg by mouth every day at 6 PM.      therapeutic multivitamin-minerals (THERAGRAN-M) Tab Take 1 Tablet by mouth every day.      folic acid (FOLVITE) 1 MG Tab Take 1 mg by mouth every day.      Cyanocobalamin (VITAMIN B 12 PO) Take 1 Tablet by mouth every day. 2500 mcg      ferrous sulfate 325 (65 Fe) MG tablet Take 325 mg by mouth every day.      allopurinol (ZYLOPRIM) 100 MG Tab Take 300 mg by mouth every day.      ezetimibe (ZETIA) 10 MG Tab Take 1 Tablet by mouth every day. 90 Tablet 3    losartan (COZAAR) 25 MG Tab Take 1 Tablet by mouth every day. 90 Tablet 3    Empagliflozin (JARDIANCE) 10 MG Tab tablet Take 1 Tablet by mouth every day. 90 Tablet 3    promethazine (PHENERGAN) 25 MG Tab Take 0.5-1 Tablets by mouth every 6 hours as needed for Nausea/Vomiting. (Patient not taking: Reported on 11/4/2024) 30 Tablet 1    ondansetron (ZOFRAN) 4 MG Tab tablet Take 1 Tablet by mouth every four hours as needed for Nausea/Vomiting (for nausea, vomiting). (Patient not taking: Reported on 11/4/2024) 30 Tablet 6     No current facility-administered medications on file prior to encounter.              Objective    There were no vitals taken for this visit.     Physical Exam  Constitutional:       General: He is not in acute distress.     Appearance: Normal appearance.      Comments: cachectic   Pulmonary:      Effort: Pulmonary effort is normal.   Skin:     Coloration: Skin is not jaundiced or pale.   Neurological:      Mental Status: He is alert and oriented to person, place, and time. Mental status is at baseline.   Psychiatric:         Mood and Affect: Mood normal.         Admission on 11/18/2024, Discharged on 11/18/2024   Component Date Value Ref Range Status    WBC 11/18/2024 5.4  4.8 - 10.8 K/uL Final    RBC 11/18/2024 3.02  (L)  4.70 - 6.10 M/uL Final    Hemoglobin 11/18/2024 9.7 (L)  14.0 - 18.0 g/dL Final    Hematocrit 11/18/2024 30.7 (L)  42.0 - 52.0 % Final    MCV 11/18/2024 101.7 (H)  81.4 - 97.8 fL Final    MCH 11/18/2024 32.1  27.0 - 33.0 pg Final    MCHC 11/18/2024 31.6 (L)  32.3 - 36.5 g/dL Final    RDW 11/18/2024 53.2 (H)  35.9 - 50.0 fL Final    Platelet Count 11/18/2024 174  164 - 446 K/uL Final    MPV 11/18/2024 9.6  9.0 - 12.9 fL Final    PT 11/18/2024 15.6 (H)  12.0 - 14.6 sec Final    INR 11/18/2024 1.24 (H)  0.87 - 1.13 Final    Comment: INR - Non-therapeutic Reference Range: 0.87-1.13  INR - Therapeutic Reference Range: 2.0-4.0         CT CAP            CT Celiac Nerve Block  11/18/2024 1:56 PM  HISTORY/REASON FOR EXAM:  history of pancreatic cancer, did have prior block 13 months ago.  Had 10 months of relief following prior celiac block. Pain is now recurring.     TECHNIQUE/EXAM DESCRIPTION:  CT guided celiac plexus block (celiac neurolysis)     Low dose optimization technique was utilized for this CT exam including automated exposure control and adjustment of the mA and/or kV according to patient size.     ALL ELEMENTS OF MAXIMAL STERILE BARRIER TECHNIQUE WERE FOLLOWED.     PROCEDURE:  Informed consent was obtained.     Moderate sedation was provided. Pulse oximetry and continuous cardiac monitoring by the nurse was performed throughout the exam.     SEDATION DURATION/INTRASERVICE TIME: 30 minutes.     Localizing CT images were obtained with the patient in supine position.     The skin was prepped with ChloraPrep and draped in a sterile fashion.     Following local anesthesia with 1% Lidocaine, a 20 G Chiba needle was advanced from an anterior abdominal approach toward the retroperitoneum at the interval between the celiac trunk and SMA targeting the celiac plexus. The needle tip was positioned in the target area.     Next, dilute contrast, 1 mL Omnipaque 300 (2 mL contrast in 10 mL saline) was injected through the  "20-gauge Chiba needle. This showed satisfactory deposition of contrast.     Next, 1% lidocaine, a 6 mL was injected through the Chiba needle.     Next, dehydrated EtOH (\"absolute alcohol\"), 30 mL was injected through the Chiba needle into the target area.     The Chiba needle was removed and final CT images obtained showing characteristic distribution of streaky mixed hypodensity and contrast density in the target area of the celiac plexus.     The patient tolerated the procedure well.     COMPARISON: CT of the abdomen and pelvis with contrast outside films 4/8/2024, celiac plexus block CT images 9/22/2023     FINDINGS:  Satisfactory positioning of the needle tip was confirmed at CT. Dilute contrast injection demonstrated satisfactory contrast distribution in the target area. Final CT images following celiac block show characteristic hypodensity of the EtOH with mixed streaky contrast in the target area.     IMPRESSION:  1.  CT GUIDED CELIAC PLEXUS BLOCK WITH ETOH AS THE NEUROLYTIC AGENT.         Assessment & Plan    1. Malignant neoplasm of body of pancreas (HCC)        2. Encounter for follow-up surveillance of pancreatic cancer        3. Care plan discussed with patient        4. Cancer related pain        5. Acute post-hemorrhagic anemia        6. Pancreatic insufficiency        7. Diarrhea due to malabsorption            1.  Pain: Managed per PCP; s/p celiac plexus block 9/22/23, 11/18/24. He may benefit from gabapentin or Celexa, will defer to pain/PCP. Message to palliative MD for any other recommendations that can be easily managed rurally, per local PCP, as there is no definitive hospice available near him.    2.  Diarrhea/pancreatic insufficiency: Fiber, imodium PRN, Creon - continue to monitor.     3.  Anemia: CBC from celiac plexus procedure in November was reviewed, hemoglobin 9.7, patient reports increased fatigue and some shortness of breath with activity which can be reasonably attributed to debility " versus anemia.  He may benefit from establishing with local [facility] infusion center for PRN transfusion support, will defer to PCP.    4.  Pancreatic cancer: Diagnosed 8/23/23; s/p 8 cycles neoadjuvant FOLFIRINOX 9/20-12/27/23; s/p pall XRT 2/16-2/28/24    CT ordered in January completed 12/5/24, disease progression noted.  Treatment options reviewed with Dr. Issa and they continue to be chemotherapy. Available options discussed with patient and his spouse, he declines treatment with chemotherapy and would rather continue with surveillance and symptom management. Our office is supportive of his decision and available for any questions or concerns.    He will follow-up with our office only as needed, follow up with PCP as scheduled.        This evaluation was conducted via Teams using secure and encrypted videoconferencing technology. The patient was in a private location outside of their home in the state of  , inadvertently did not clarify . The patient's identity was confirmed and verbal consent was obtained for this virtual visit.    The patient verbalized agreement and understanding of current plan. All questions and concerns were addressed at time of visit.    Please note that this dictation was created using voice recognition software. I have made every reasonable attempt to correct obvious errors, but I expect that there are errors of grammar and possibly content that I did not discover before finalizing the note.                     Luis Fernando Culp M.D.  [address] [facility] [address]  Via Fax: [phone]

## 2024-12-06 NOTE — PROGRESS NOTES
Subjective     Marino Rojas is a 69 y.o. male who presents with Pancreatic Cancer (Luis Manuel/CT scan fv)            HPI  Mr. Rojas presents for review of recent imaging and discuss plan of care pertaining to stage IIa, cT3 N0 M0, invasive moderately differentiated pancreatic adenocarcinoma. Visit is conducted as an on demand video visit using Teams and patient is accompanied by his wife today.    Patient was in his usual state of health until approximately 8/2023 when he noted increasing abdominal pain over the previous several months. CT completed 8/11/23 showed pancreatic mass of 3.8 x 2.9 cm, worrisome for malignancy. He underwent biopsy per upper EUS 8/23/23 with pathology confirming malignancy; CA 19-9 was 37 on 9/18/23. He completed 8 cycles neoadjuvant FOLFIRINOX on 9/20-12/27/23 (Oxal DR BEDOYA). He underwent celiac plexus block on 9/22/23.  Patient completed 5 fractions of palliative radiation to pancreas from 2/16-2/28/24 and subsequently opted for no further chemotherapy, proceeding with surveillance. He underwent repeat celiac plexus block on 11/18/24.     CT was ordered in January 2024 but never completed, patient desired completion then new order was provided.  Imaging does show progression of disease.  He continues with weight loss and is cachectic per video visit.  He has ongoing issues with heart rate and blood pressure for which she follows up with cardiology.  Pain is being managed per Dr. Culp in [place], he underwent repeat celiac plexus block on 11/18/2024.  He is taking approximately 13 Creon per day to help manage diarrhea and digestion.  He notes some dizziness which may correlate with intake/hydration.  He continues with residual neuropathy and may benefit from gabapentin but will defer to PCP.  Extensive discussion with patient and spouse regarding imaging findings as well as personal desires, verified plan with Dr. Issa and discussed with patient and spouse.    Review of Systems    Constitutional:  Negative for chills, fever, malaise/fatigue and weight loss (still losing (recent weight 121 lbs this past Sunday)).   Respiratory:  Negative for cough, shortness of breath and wheezing.    Cardiovascular:  Negative for chest pain, palpitations and leg swelling.        Following up with cards for rate and pressure - has held BP meds for lower pressures, will discuss accordingly   Gastrointestinal:  Positive for abdominal pain (repeat celiac block 11/18; manging pain with Dr. Culp) and diarrhea (2-3 times per day, more controlled). Negative for constipation, nausea and vomiting.        13 Creon per day in total   Genitourinary:  Negative for dysuria.   Musculoskeletal:  Negative for joint pain and myalgias.   Neurological:  Positive for dizziness and tingling. Negative for headaches.   Psychiatric/Behavioral:  The patient has insomnia (consistent with baseline).        Allergies   Allergen Reactions    Crestor [Rosuvastatin]      Elevated LFTs       Current Outpatient Medications on File Prior to Encounter   Medication Sig Dispense Refill    HYDROcodone-acetaminophen (NORCO) 5-325 MG Tab per tablet Take 1 Tablet by mouth every 6 hours as needed (pain).      acetaminophen (TYLENOL) 325 MG Tab Take 1,000 mg by mouth every four hours as needed for Mild Pain.      Multiple Vitamins-Minerals (EYE HEALTH + LUTEIN PO) Take 1 Tablet by mouth every day.      metoprolol SR (TOPROL XL) 50 MG TABLET SR 24 HR Take 1 Tablet by mouth at bedtime. 90 Tablet 3    Pancrelipase, Lip-Prot-Amyl, 83698-131314 units Cap DR Particles Take 36,000 Lipoprotein Lipase Releasing Units by mouth 3 times a day before meals. (Patient taking differently: Take 36,000 Lipoprotein Lipase Releasing Units by mouth 3 times a day before meals. 3 times daily before meals and with snacks.) 300 Capsule 3    Omega-3 Fatty Acids (FISH OIL) 1000 MG Cap capsule Take 1,000 mg by mouth every day at 6 PM.      therapeutic multivitamin-minerals  (THERAGRAN-M) Tab Take 1 Tablet by mouth every day.      folic acid (FOLVITE) 1 MG Tab Take 1 mg by mouth every day.      Cyanocobalamin (VITAMIN B 12 PO) Take 1 Tablet by mouth every day. 2500 mcg      ferrous sulfate 325 (65 Fe) MG tablet Take 325 mg by mouth every day.      allopurinol (ZYLOPRIM) 100 MG Tab Take 300 mg by mouth every day.      ezetimibe (ZETIA) 10 MG Tab Take 1 Tablet by mouth every day. 90 Tablet 3    losartan (COZAAR) 25 MG Tab Take 1 Tablet by mouth every day. 90 Tablet 3    Empagliflozin (JARDIANCE) 10 MG Tab tablet Take 1 Tablet by mouth every day. 90 Tablet 3    promethazine (PHENERGAN) 25 MG Tab Take 0.5-1 Tablets by mouth every 6 hours as needed for Nausea/Vomiting. (Patient not taking: Reported on 11/4/2024) 30 Tablet 1    ondansetron (ZOFRAN) 4 MG Tab tablet Take 1 Tablet by mouth every four hours as needed for Nausea/Vomiting (for nausea, vomiting). (Patient not taking: Reported on 11/4/2024) 30 Tablet 6     No current facility-administered medications on file prior to encounter.              Objective     There were no vitals taken for this visit.     Physical Exam  Constitutional:       General: He is not in acute distress.     Appearance: Normal appearance.      Comments: cachectic   Pulmonary:      Effort: Pulmonary effort is normal.   Skin:     Coloration: Skin is not jaundiced or pale.   Neurological:      Mental Status: He is alert and oriented to person, place, and time. Mental status is at baseline.   Psychiatric:         Mood and Affect: Mood normal.         Admission on 11/18/2024, Discharged on 11/18/2024   Component Date Value Ref Range Status    WBC 11/18/2024 5.4  4.8 - 10.8 K/uL Final    RBC 11/18/2024 3.02 (L)  4.70 - 6.10 M/uL Final    Hemoglobin 11/18/2024 9.7 (L)  14.0 - 18.0 g/dL Final    Hematocrit 11/18/2024 30.7 (L)  42.0 - 52.0 % Final    MCV 11/18/2024 101.7 (H)  81.4 - 97.8 fL Final    MCH 11/18/2024 32.1  27.0 - 33.0 pg Final    MCHC 11/18/2024 31.6 (L)   "32.3 - 36.5 g/dL Final    RDW 11/18/2024 53.2 (H)  35.9 - 50.0 fL Final    Platelet Count 11/18/2024 174  164 - 446 K/uL Final    MPV 11/18/2024 9.6  9.0 - 12.9 fL Final    PT 11/18/2024 15.6 (H)  12.0 - 14.6 sec Final    INR 11/18/2024 1.24 (H)  0.87 - 1.13 Final    Comment: INR - Non-therapeutic Reference Range: 0.87-1.13  INR - Therapeutic Reference Range: 2.0-4.0         CT CAP            CT Celiac Nerve Block  11/18/2024 1:56 PM  HISTORY/REASON FOR EXAM:  history of pancreatic cancer, did have prior block 13 months ago.  Had 10 months of relief following prior celiac block. Pain is now recurring.     TECHNIQUE/EXAM DESCRIPTION:  CT guided celiac plexus block (celiac neurolysis)     Low dose optimization technique was utilized for this CT exam including automated exposure control and adjustment of the mA and/or kV according to patient size.     ALL ELEMENTS OF MAXIMAL STERILE BARRIER TECHNIQUE WERE FOLLOWED.     PROCEDURE:  Informed consent was obtained.     Moderate sedation was provided. Pulse oximetry and continuous cardiac monitoring by the nurse was performed throughout the exam.     SEDATION DURATION/INTRASERVICE TIME: 30 minutes.     Localizing CT images were obtained with the patient in supine position.     The skin was prepped with ChloraPrep and draped in a sterile fashion.     Following local anesthesia with 1% Lidocaine, a 20 G Chiba needle was advanced from an anterior abdominal approach toward the retroperitoneum at the interval between the celiac trunk and SMA targeting the celiac plexus. The needle tip was positioned in the target area.     Next, dilute contrast, 1 mL Omnipaque 300 (2 mL contrast in 10 mL saline) was injected through the 20-gauge Chiba needle. This showed satisfactory deposition of contrast.     Next, 1% lidocaine, a 6 mL was injected through the Chiba needle.     Next, dehydrated EtOH (\"absolute alcohol\"), 30 mL was injected through the Chiba needle into the target area.     The " Chiba needle was removed and final CT images obtained showing characteristic distribution of streaky mixed hypodensity and contrast density in the target area of the celiac plexus.     The patient tolerated the procedure well.     COMPARISON: CT of the abdomen and pelvis with contrast outside films 4/8/2024, celiac plexus block CT images 9/22/2023     FINDINGS:  Satisfactory positioning of the needle tip was confirmed at CT. Dilute contrast injection demonstrated satisfactory contrast distribution in the target area. Final CT images following celiac block show characteristic hypodensity of the EtOH with mixed streaky contrast in the target area.     IMPRESSION:  1.  CT GUIDED CELIAC PLEXUS BLOCK WITH ETOH AS THE NEUROLYTIC AGENT.         Assessment & Plan     1. Malignant neoplasm of body of pancreas (HCC)        2. Encounter for follow-up surveillance of pancreatic cancer        3. Care plan discussed with patient        4. Cancer related pain        5. Acute post-hemorrhagic anemia        6. Pancreatic insufficiency        7. Diarrhea due to malabsorption            1.  Pain: Managed per PCP; s/p celiac plexus block 9/22/23, 11/18/24. He may benefit from gabapentin or Celexa, will defer to pain/PCP. Message to palliative MD for any other recommendations that can be easily managed rurally, per local PCP, as there is no definitive hospice available near him.    2.  Diarrhea/pancreatic insufficiency: Fiber, imodium PRN, Creon - continue to monitor.     3.  Anemia: CBC from celiac plexus procedure in November was reviewed, hemoglobin 9.7, patient reports increased fatigue and some shortness of breath with activity which can be reasonably attributed to debility versus anemia.  He may benefit from establishing with local hospital infusion center for PRN transfusion support, will defer to PCP.    4.  Pancreatic cancer: Diagnosed 8/23/23; s/p 8 cycles neoadjuvant FOLFIRINOX 9/20-12/27/23; s/p pall XRT 2/16-2/28/24    CT  ordered in January completed 12/5/24, disease progression noted.  Treatment options reviewed with Dr. Issa and they continue to be chemotherapy. Available options discussed with patient and his spouse, he declines treatment with chemotherapy and would rather continue with surveillance and symptom management. Our office is supportive of his decision and available for any questions or concerns.    He will follow-up with our office only as needed, follow up with PCP as scheduled.        This evaluation was conducted via Teams using secure and encrypted videoconferencing technology. The patient was in a private location outside of their home in the state of  , inadvertently did not clarify . The patient's identity was confirmed and verbal consent was obtained for this virtual visit.    The patient verbalized agreement and understanding of current plan. All questions and concerns were addressed at time of visit.    Please note that this dictation was created using voice recognition software. I have made every reasonable attempt to correct obvious errors, but I expect that there are errors of grammar and possibly content that I did not discover before finalizing the note.

## 2024-12-06 NOTE — LETTER
[facility]   [address] [place] [address],   [address]  [place] [address]  Phone: [phone]  Fax: [phone]              Lucas Rojas  1955    Encounter Date: 12/6/2024    IZZY Montaño          PROGRESS NOTE:  Subjective    Marino Rojas is a 69 y.o. male who presents with Pancreatic Cancer (Luis Manuel/CT scan fv)            HPI  Mr. Rojas presents for review of recent imaging and discuss plan of care pertaining to stage IIa, cT3 N0 M0, invasive moderately differentiated pancreatic adenocarcinoma. Visit is conducted as an on demand video visit using Teams and patient is accompanied by his wife today.    Patient was in his usual state of health until approximately 8/2023 when he noted increasing abdominal pain over the previous several months. CT completed 8/11/23 showed pancreatic mass of 3.8 x 2.9 cm, worrisome for malignancy. He underwent biopsy per upper EUS 8/23/23 with pathology confirming malignancy; CA 19-9 was 37 on 9/18/23. He completed 8 cycles neoadjuvant FOLFIRINOX on 9/20-12/27/23 (Oxal DR BEDOYA). He underwent celiac plexus block on 9/22/23.  Patient completed 5 fractions of palliative radiation to pancreas from 2/16-2/28/24 and subsequently opted for no further chemotherapy, proceeding with surveillance. He underwent repeat celiac plexus block on 11/18/24.     CT was ordered in January 2024 but never completed, patient desired completion then new order was provided.  Imaging does show progression of disease.  He continues with weight loss and is cachectic per video visit.  He has ongoing issues with heart rate and blood pressure for which she follows up with cardiology.  Pain is being managed per Dr. Culp in [place], he underwent repeat celiac plexus block on 11/18/2024.  He is taking approximately 13 Creon per day to help manage diarrhea and digestion.  He notes some dizziness which may correlate with intake/hydration.  He continues with residual neuropathy and may  benefit from gabapentin but will defer to PCP.  Extensive discussion with patient and spouse regarding imaging findings as well as personal desires, verified with Dr. Issa we will plan would be show patient desires to proceed with treatment.      Review of Systems   Constitutional:  Negative for chills, fever, malaise/fatigue and weight loss (still losing (recent weight 121 lbs this past Sunday)).   Respiratory:  Negative for cough, shortness of breath and wheezing.    Cardiovascular:  Negative for chest pain, palpitations and leg swelling.        Following up with cards for rate and pressure - has held BP meds for lower pressures, will discuss accordingly   Gastrointestinal:  Positive for abdominal pain (repeat celiac block 11/18; manging pain with Dr. Culp) and diarrhea (2-3 times per day, more controlled). Negative for constipation, nausea and vomiting.        13 Creon per day in total   Genitourinary:  Negative for dysuria.   Musculoskeletal:  Negative for joint pain and myalgias.   Neurological:  Positive for dizziness and tingling. Negative for headaches.   Psychiatric/Behavioral:  The patient has insomnia (consistent with baseline).        Allergies   Allergen Reactions    Crestor [Rosuvastatin]      Elevated LFTs       Current Outpatient Medications on File Prior to Encounter   Medication Sig Dispense Refill    HYDROcodone-acetaminophen (NORCO) 5-325 MG Tab per tablet Take 1 Tablet by mouth every 6 hours as needed (pain).      acetaminophen (TYLENOL) 325 MG Tab Take 1,000 mg by mouth every four hours as needed for Mild Pain.      Multiple Vitamins-Minerals (EYE HEALTH + LUTEIN PO) Take 1 Tablet by mouth every day.      metoprolol SR (TOPROL XL) 50 MG TABLET SR 24 HR Take 1 Tablet by mouth at bedtime. 90 Tablet 3    Pancrelipase, Lip-Prot-Amyl, 48151-366594 units Cap DR Particles Take 36,000 Lipoprotein Lipase Releasing Units by mouth 3 times a day before meals. (Patient taking differently: Take 36,000  Lipoprotein Lipase Releasing Units by mouth 3 times a day before meals. 3 times daily before meals and with snacks.) 300 Capsule 3    Omega-3 Fatty Acids (FISH OIL) 1000 MG Cap capsule Take 1,000 mg by mouth every day at 6 PM.      therapeutic multivitamin-minerals (THERAGRAN-M) Tab Take 1 Tablet by mouth every day.      folic acid (FOLVITE) 1 MG Tab Take 1 mg by mouth every day.      Cyanocobalamin (VITAMIN B 12 PO) Take 1 Tablet by mouth every day. 2500 mcg      ferrous sulfate 325 (65 Fe) MG tablet Take 325 mg by mouth every day.      allopurinol (ZYLOPRIM) 100 MG Tab Take 300 mg by mouth every day.      ezetimibe (ZETIA) 10 MG Tab Take 1 Tablet by mouth every day. 90 Tablet 3    losartan (COZAAR) 25 MG Tab Take 1 Tablet by mouth every day. 90 Tablet 3    Empagliflozin (JARDIANCE) 10 MG Tab tablet Take 1 Tablet by mouth every day. 90 Tablet 3    promethazine (PHENERGAN) 25 MG Tab Take 0.5-1 Tablets by mouth every 6 hours as needed for Nausea/Vomiting. (Patient not taking: Reported on 11/4/2024) 30 Tablet 1    ondansetron (ZOFRAN) 4 MG Tab tablet Take 1 Tablet by mouth every four hours as needed for Nausea/Vomiting (for nausea, vomiting). (Patient not taking: Reported on 11/4/2024) 30 Tablet 6     No current facility-administered medications on file prior to encounter.              Objective    There were no vitals taken for this visit.     Physical Exam  Constitutional:       General: He is not in acute distress.     Appearance: Normal appearance.      Comments: cachectic   Pulmonary:      Effort: Pulmonary effort is normal.   Skin:     Coloration: Skin is not jaundiced or pale.   Neurological:      Mental Status: He is alert and oriented to person, place, and time. Mental status is at baseline.   Psychiatric:         Mood and Affect: Mood normal.         Admission on 11/18/2024, Discharged on 11/18/2024   Component Date Value Ref Range Status    WBC 11/18/2024 5.4  4.8 - 10.8 K/uL Final    RBC 11/18/2024 3.02  (L)  4.70 - 6.10 M/uL Final    Hemoglobin 11/18/2024 9.7 (L)  14.0 - 18.0 g/dL Final    Hematocrit 11/18/2024 30.7 (L)  42.0 - 52.0 % Final    MCV 11/18/2024 101.7 (H)  81.4 - 97.8 fL Final    MCH 11/18/2024 32.1  27.0 - 33.0 pg Final    MCHC 11/18/2024 31.6 (L)  32.3 - 36.5 g/dL Final    RDW 11/18/2024 53.2 (H)  35.9 - 50.0 fL Final    Platelet Count 11/18/2024 174  164 - 446 K/uL Final    MPV 11/18/2024 9.6  9.0 - 12.9 fL Final    PT 11/18/2024 15.6 (H)  12.0 - 14.6 sec Final    INR 11/18/2024 1.24 (H)  0.87 - 1.13 Final    Comment: INR - Non-therapeutic Reference Range: 0.87-1.13  INR - Therapeutic Reference Range: 2.0-4.0         CT CAP            CT Celiac Nerve Block  11/18/2024 1:56 PM  HISTORY/REASON FOR EXAM:  history of pancreatic cancer, did have prior block 13 months ago.  Had 10 months of relief following prior celiac block. Pain is now recurring.     TECHNIQUE/EXAM DESCRIPTION:  CT guided celiac plexus block (celiac neurolysis)     Low dose optimization technique was utilized for this CT exam including automated exposure control and adjustment of the mA and/or kV according to patient size.     ALL ELEMENTS OF MAXIMAL STERILE BARRIER TECHNIQUE WERE FOLLOWED.     PROCEDURE:  Informed consent was obtained.     Moderate sedation was provided. Pulse oximetry and continuous cardiac monitoring by the nurse was performed throughout the exam.     SEDATION DURATION/INTRASERVICE TIME: 30 minutes.     Localizing CT images were obtained with the patient in supine position.     The skin was prepped with ChloraPrep and draped in a sterile fashion.     Following local anesthesia with 1% Lidocaine, a 20 G Chiba needle was advanced from an anterior abdominal approach toward the retroperitoneum at the interval between the celiac trunk and SMA targeting the celiac plexus. The needle tip was positioned in the target area.     Next, dilute contrast, 1 mL Omnipaque 300 (2 mL contrast in 10 mL saline) was injected through the  "20-gauge Chiba needle. This showed satisfactory deposition of contrast.     Next, 1% lidocaine, a 6 mL was injected through the Chiba needle.     Next, dehydrated EtOH (\"absolute alcohol\"), 30 mL was injected through the Chiba needle into the target area.     The Chiba needle was removed and final CT images obtained showing characteristic distribution of streaky mixed hypodensity and contrast density in the target area of the celiac plexus.     The patient tolerated the procedure well.     COMPARISON: CT of the abdomen and pelvis with contrast outside films 4/8/2024, celiac plexus block CT images 9/22/2023     FINDINGS:  Satisfactory positioning of the needle tip was confirmed at CT. Dilute contrast injection demonstrated satisfactory contrast distribution in the target area. Final CT images following celiac block show characteristic hypodensity of the EtOH with mixed streaky contrast in the target area.     IMPRESSION:  1.  CT GUIDED CELIAC PLEXUS BLOCK WITH ETOH AS THE NEUROLYTIC AGENT.         Assessment & Plan    1. Malignant neoplasm of body of pancreas (HCC)        2. Encounter for follow-up surveillance of pancreatic cancer        3. Care plan discussed with patient        4. Cancer related pain        5. Acute post-hemorrhagic anemia        6. Pancreatic insufficiency        7. Diarrhea due to malabsorption            1.  Pain: Managed per PCP; s/p celiac plexus block 9/22/23, 11/18/24. He may benefit from gabapentin or Celexa, will defer to pain/PCP. Message to palliative MD for any other recommendations that can be easily managed rurally, per local PCP, as there is no definitive hospice available near him.    2.  Diarrhea/pancreatic insufficiency: Fiber, imodium PRN, Creon - continue to monitor.     3.  Anemia: CBC from celiac plexus procedure in November was reviewed, hemoglobin 9.7, patient reports increased fatigue and some shortness of breath with activity which can be reasonably attributed to debility " versus anemia.  He may benefit from establishing with local [facility] infusion center for PRN transfusion support, will defer to PCP.    4.  Pancreatic cancer: Diagnosed 8/23/23; s/p 8 cycles neoadjuvant FOLFIRINOX 9/20-12/27/23; s/p pall XRT 2/16-2/28/24    CT ordered in January completed 12/5/24, disease progression noted.  Treatment options reviewed with Dr. Issa and they continue to be chemotherapy.  Available options discussed with patient and his spouse, he declines treatment with chemotherapy and would rather continue with surveillance and symptom management.    He will follow-up with our office as needed, we are available for any questions or concerns          This evaluation was conducted via Teams using secure and encrypted videoconferencing technology. The patient was in a private location outside of their home in the state of  , inadvertently did not clarify . The patient's identity was confirmed and verbal consent was obtained for this virtual visit.    The patient verbalized agreement and understanding of current plan. All questions and concerns were addressed at time of visit.    Please note that this dictation was created using voice recognition software. I have made every reasonable attempt to correct obvious errors, but I expect that there are errors of grammar and possibly content that I did not discover before finalizing the note.                       Luis Fernando Culp M.D.  [address] [facility] [address]  Via Fax: [phone]

## 2024-12-15 RX ORDER — OXYCODONE AND ACETAMINOPHEN 10; 325 MG/1; MG/1
TABLET ORAL
COMMUNITY
Start: 2024-12-04

## 2024-12-15 ASSESSMENT — ENCOUNTER SYMPTOMS: ABDOMINAL PAIN: 1

## 2025-01-14 ENCOUNTER — TELEPHONE (OUTPATIENT)
Dept: CARDIOLOGY | Facility: MEDICAL CENTER | Age: 70
End: 2025-01-14
Payer: MEDICARE

## 2025-01-14 NOTE — TELEPHONE ENCOUNTER
Called pt in regards to imaging work that was ordered at previous OV. Patient states he completed echo today and [facility] will fax over the echo results to pt's chart. Pt also stated due to other circumstances, he might need to reschedule. Pt was advised to call the office to reschedule appointment if he is unable to make it. Pt has follow up appointment scheduled with MK on 01.17.2025.

## 2025-01-17 ENCOUNTER — APPOINTMENT (OUTPATIENT)
Dept: CARDIOLOGY | Facility: MEDICAL CENTER | Age: 70
End: 2025-01-17
Attending: INTERNAL MEDICINE
Payer: MEDICARE

## (undated) DEVICE — SPONGE GAUZE NON-STERILE 4X4 - (2000/CA 10PK/CA)

## (undated) DEVICE — SUTURE 4-0 MONOCRYL PLUS PS-2 - 27 INCH (36/BX)

## (undated) DEVICE — FORCEP RADIAL JAW 4 STANDARD CAPACITY W/NEEDLE 240CM (40EA/BX)

## (undated) DEVICE — SODIUM CHL IRRIGATION 0.9% 1000ML (12EA/CA)

## (undated) DEVICE — KIT ROOM DECONTAMINATION

## (undated) DEVICE — SUTURE GENERAL

## (undated) DEVICE — MASK OXYGEN VNYL ADLT MED CONC WITH 7 FOOT TUBING  - (50EA/CA)

## (undated) DEVICE — HEAD HOLDER JUNIOR/ADULT

## (undated) DEVICE — TUBE SUCTION YANKAUER  1/4 X 6FT (20EA/CA)"

## (undated) DEVICE — SUCTION INSTRUMENT YANKAUER BULBOUS TIP W/O VENT (50EA/CA)

## (undated) DEVICE — BONE MILL BM210

## (undated) DEVICE — SENSOR SPO2 ADULT LNCS ADTX (20/BX) ORDER ITEM #19593

## (undated) DEVICE — CLOSURE SKIN STRIP 1/2 X 4 IN - (STERI STRIP) (50/BX 4BX/CA)

## (undated) DEVICE — CHLORAPREP 26 ML APPLICATOR - ORANGE TINT(25/CA)

## (undated) DEVICE — DRAPE STRLE REG TOWEL 18X24 - (10/BX 4BX/CA)"

## (undated) DEVICE — SYRINGE SAFETY 10 ML 18 GA X 1 1/2 BLUNT LL (100/BX 4BX/CA)

## (undated) DEVICE — GLOVE BIOGEL PI INDICATOR SZ 7.0 SURGICAL PF LF - (50/BX 4BX/CA)

## (undated) DEVICE — TOOL DISSECT MATCH HEAD

## (undated) DEVICE — WATER IRRIG. STER. 1500 ML - (9/CA)

## (undated) DEVICE — BLOCK BITE MAXI DENTAL RETENTION RIM (100EA/BX)

## (undated) DEVICE — GOWN SURGEONS LARGE - (32/CA)

## (undated) DEVICE — SOD. CHL. INJ. 0.9% 1000 ML - (14EA/CA 60CA/PF)

## (undated) DEVICE — BIOPSY NEEDLE

## (undated) DEVICE — KIT CUSTOM PROCEDURE SINGLE FOR ENDO  (15/CA)

## (undated) DEVICE — LACTATED RINGERS INJ 1000 ML - (14EA/CA 60CA/PF)

## (undated) DEVICE — GLOVE BIOGEL INDICATOR SZ 6.5 SURGICAL PF LTX - (50PR/BX 4BX/CA)

## (undated) DEVICE — TUBING C&T SET FLYING LEADS DRAIN TUBING (10EA/BX)

## (undated) DEVICE — SUTURE 2-0 VICRYL PLUS CT-1 - 8 X 18 INCH(12/BX)

## (undated) DEVICE — SYRINGE DISP. 60 CC LL - (30/BX, 12BX/CA)**WHEN THESE ARE GONE ORDER #500206**

## (undated) DEVICE — GLOVE BIOGEL SZ 7 SURGICAL PF LTX - (50PR/BX 4BX/CA)

## (undated) DEVICE — SYRINGE SAFETY 5 ML 18 GA X 1-1/2 BLUNT LL (100/BX 4BX/CA)

## (undated) DEVICE — PORT AUXILLARY WATER (50EA/BX)

## (undated) DEVICE — BLOCK BITE ENDOSCOPIC 2809 - (100/BX) INTERMEDIATE

## (undated) DEVICE — NEPTUNE 4 PORT MANIFOLD - (20/PK)

## (undated) DEVICE — TUBE E-T HI-LO CUFF 8.5MM (10EA/PK)

## (undated) DEVICE — MASK WITH FACE SHIELD (25/BX 4BX/CA)

## (undated) DEVICE — TRAY CATHETER FOLEY URINE METER W/STATLOCK 350ML (10EA/CA)

## (undated) DEVICE — TUBING CLEARLINK DUO-VENT - C-FLO (48EA/CA)

## (undated) DEVICE — SUTURE 0 VICRYL PLUS CT-1 - 8 X 18 INCH (12/BX)

## (undated) DEVICE — CANISTER SUCTION 3000ML MECHANICAL FILTER AUTO SHUTOFF MEDI-VAC NONSTERILE LF DISP  (40EA/CA)

## (undated) DEVICE — SYRINGE SAFETY 3 ML 18 GA X 1 1/2 BLUNT LL (100/BX 8BX/CA)

## (undated) DEVICE — FILM CASSETTE ENDO

## (undated) DEVICE — SPONGE GAUZESTER 4 X 4 4PLY - (128PK/CA)

## (undated) DEVICE — MASK ANESTHESIA ADULT  - (100/CA)

## (undated) DEVICE — DRAPE C-ARM LARGE 41IN X 74 IN - (10/BX 2BX/CA)

## (undated) DEVICE — DRESSING NON-ADHERING 8 X 3 - (50/BX)

## (undated) DEVICE — PROTECTOR ULNA NERVE - (36PR/CA)

## (undated) DEVICE — ELECTRODE 850 FOAM ADHESIVE - HYDROGEL RADIOTRNSPRNT (50/PK)

## (undated) DEVICE — CANISTER SUCTION RIGID RED 1500CC (40EA/CA)

## (undated) DEVICE — DRAPE LAPAROTOMY T SHEET - (12EA/CA)

## (undated) DEVICE — NEEDLE EUS DELIVERY SYSTEM FNB PRE LOADED 22 GA

## (undated) DEVICE — SENSOR OXIMETER ADULT SPO2 RD SET (20EA/BX)

## (undated) DEVICE — CATHETER IV SAFETY 20 GA X 1-1/4 (50/BX)

## (undated) DEVICE — MIDAS LUBRICATOR DIFFUSER PACK (4EA/CA)

## (undated) DEVICE — TOWEL STOP TIMEOUT SAFETY FLAG (40EA/CA)

## (undated) DEVICE — SET LEADWIRE 5 LEAD BEDSIDE DISPOSABLE ECG (1SET OF 5/EA)

## (undated) DEVICE — TUBE CONNECTING SUCTION - CLEAR PLASTIC STERILE 72 IN (50EA/CA)

## (undated) DEVICE — HEADREST PRONEVIEW LARGE - (10/CA)

## (undated) DEVICE — SENSOR SPO2 NEO LNCS ADHESIVE (20/BX) SEE USER NOTES

## (undated) DEVICE — BITE BLOCK ADULT 60FR (100EA/CA)

## (undated) DEVICE — CUFF BP ADULT LARGE DISPOSABLE (20EA/CA)

## (undated) DEVICE — KIT ANESTHESIA W/CIRCUIT & 3/LT BAG W/FILTER (20EA/CA)

## (undated) DEVICE — PACK JACKSON TABLE KIT W/OUT - HR (6EA/CA)

## (undated) DEVICE — STERI STRIP COMPOUND BENZOIN - TINCTURE 0.6ML WITH APPLICATOR (40EA/BX)

## (undated) DEVICE — APPLICATOR COTTON TIP 6 IN - STERILE (2EA/PK 100PK/BX)

## (undated) DEVICE — TUBE E-T HI-LO CUFF 7.0MM (10EA/PK)

## (undated) DEVICE — MASK AIRWAY SIZE 3 UNIQUE SILICON (10/BX)

## (undated) DEVICE — WATER IRRIGATION STERILE 1000ML (12EA/CA)

## (undated) DEVICE — CATHETER IV SAFETY 22 GA X 1 (50EA/BX)

## (undated) DEVICE — MASK AIRWAY SIZE 4 UNIQUE SILICON (10EA/BX)

## (undated) DEVICE — GLOVE BIOGEL PI ORTHO SZ 6 1/2 SURGICAL PF LF (40PR/BX)

## (undated) DEVICE — SLEEVE, VASO, THIGH, MED

## (undated) DEVICE — PACK LOWER EXTREMITY - (2/CA)

## (undated) DEVICE — KIT EVACUATER 3 SPRING PVC LF 1/8 DRAIN SIZE (10EA/CA)"

## (undated) DEVICE — MASK  AIRWAY SIZE 5 UNIQUE SILICON (10EA/BX)

## (undated) DEVICE — SURGIFOAM (12X7) - (12EA/CA)

## (undated) DEVICE — PACK NEURO - (2EA/CA)

## (undated) DEVICE — DRAPE LARGE 3 QUARTER - (20/CA)

## (undated) DEVICE — MASK PANORAMIC OXYGEN PRO2 (30EA/CA)

## (undated) DEVICE — MASK AIRWAY SIZE 2 LMA WITH LUBE & SYRINGE (10/BX)

## (undated) DEVICE — BUTTON ENDOSCOPY DISPOSABLE

## (undated) DEVICE — CONTAINER, SPECIMEN, STERILE

## (undated) DEVICE — COVER LIGHT HANDLE FLEXIBLE - SOFT (2EA/PK 80PK/CA)

## (undated) DEVICE — LACTATED RINGERS INJ. 500 ML - (24EA/CA)

## (undated) DEVICE — KIT  I.V. START (100EA/CA)

## (undated) DEVICE — DRESSING XEROFORM 1X8 - (50/BX 4BX/CA)

## (undated) DEVICE — SET EXTENSION WITH 2 PORTS (48EA/CA) ***PART #2C8610 IS A SUBSTITUTE*****

## (undated) DEVICE — TUBE E-T HI-LO CUFF 6.5MM (10EA/BX)

## (undated) DEVICE — KIT SURGIFLO W/OUT THROMBIN - (6EA/CA)

## (undated) DEVICE — LEAD SET 6 DISP. EKG NIHON KOHDEN

## (undated) DEVICE — SEALER BIPOLAR 2.3 AQUAMANTYS

## (undated) DEVICE — ELECTRODE DUAL RETURN W/ CORD - (50/PK)

## (undated) DEVICE — GLOVE BIOGEL PI ORTHO SZ 8 PF LF (40PR/BX)

## (undated) DEVICE — TUBE E-T HI-LO CUFF 8.0MM (10EA/PK)

## (undated) DEVICE — ENDO PEANUT 5MM DEVICE (12EA/BX)

## (undated) DEVICE — ARMREST CRADLE FOAM - (2PR/PK 12PR/CA)

## (undated) DEVICE — PATTIES SURG X-RAYCOTTONOID - 1/2 X 3 IN (200/CA)

## (undated) DEVICE — BOVIE BLADE COATED - (50/PK)

## (undated) DEVICE — GLOVE BIOGEL SZ 7.5 SURGICAL PF LTX - (50PR/BX 4BX/CA)

## (undated) DEVICE — DETERGENT RENUZYME PLUS 10 OZ PACKET (50/BX)

## (undated) DEVICE — GOWN WARMING STANDARD FLEX - (30/CA)

## (undated) DEVICE — GLOVE BIOGEL INDICATOR SZ 7.5 SURGICAL PF LTX - (50PR/BX 4BX/CA)

## (undated) DEVICE — MASK O2 VNYL ADLT RBRTH HI - (50/CS)

## (undated) DEVICE — GLOVE BIOGEL SZ 6.5 SURGICAL PF LTX (50PR/BX 4BX/CA)

## (undated) DEVICE — HEMOSTAT SURG ABSORBABLE - 2 X 3 IN SURGICEL (24EA/CA)

## (undated) DEVICE — TUBE E-T HI-LO CUFF 7.5MM (10EA/PK)

## (undated) DEVICE — NEEDLE NON SAFETY HYPO 22 GA X 1 1/2 IN (100/BX)